# Patient Record
Sex: MALE | Race: WHITE | Employment: OTHER | URBAN - METROPOLITAN AREA
[De-identification: names, ages, dates, MRNs, and addresses within clinical notes are randomized per-mention and may not be internally consistent; named-entity substitution may affect disease eponyms.]

---

## 2017-02-15 PROBLEM — I65.23 BILATERAL CAROTID ARTERY STENOSIS: Status: ACTIVE | Noted: 2017-02-15

## 2017-05-09 ENCOUNTER — OFFICE VISIT (OUTPATIENT)
Dept: FAMILY MEDICINE CLINIC | Age: 67
End: 2017-05-09

## 2017-05-09 VITALS
HEART RATE: 60 BPM | RESPIRATION RATE: 16 BRPM | HEIGHT: 71 IN | TEMPERATURE: 97.9 F | DIASTOLIC BLOOD PRESSURE: 70 MMHG | SYSTOLIC BLOOD PRESSURE: 132 MMHG | BODY MASS INDEX: 34.8 KG/M2 | WEIGHT: 248.6 LBS

## 2017-05-09 DIAGNOSIS — F51.02 ADJUSTMENT INSOMNIA: Primary | ICD-10-CM

## 2017-05-09 PROCEDURE — 99213 OFFICE O/P EST LOW 20 MIN: CPT | Performed by: FAMILY MEDICINE

## 2017-05-09 RX ORDER — ZOLPIDEM TARTRATE 5 MG/1
5 TABLET ORAL NIGHTLY PRN
Qty: 30 TABLET | Refills: 0 | Status: SHIPPED | OUTPATIENT
Start: 2017-05-09 | End: 2018-08-22

## 2017-05-09 ASSESSMENT — ENCOUNTER SYMPTOMS
SORE THROAT: 0
CONSTIPATION: 0
NAUSEA: 0
ABDOMINAL PAIN: 0
SINUS PRESSURE: 0
COUGH: 0
RHINORRHEA: 0
ABDOMINAL DISTENTION: 0
DIARRHEA: 0
SHORTNESS OF BREATH: 0
EYE PAIN: 0

## 2017-11-27 RX ORDER — ATORVASTATIN CALCIUM 20 MG/1
TABLET, FILM COATED ORAL
Qty: 90 TABLET | Refills: 3 | Status: SHIPPED | OUTPATIENT
Start: 2017-11-27 | End: 2017-12-01 | Stop reason: SDUPTHER

## 2017-12-01 ENCOUNTER — TELEPHONE (OUTPATIENT)
Dept: CARDIOLOGY CLINIC | Age: 67
End: 2017-12-01

## 2017-12-02 RX ORDER — CLOPIDOGREL BISULFATE 75 MG/1
75 TABLET ORAL DAILY
Qty: 90 TABLET | Refills: 0 | Status: SHIPPED | OUTPATIENT
Start: 2017-12-02 | End: 2018-02-12 | Stop reason: SDUPTHER

## 2017-12-02 RX ORDER — METOPROLOL SUCCINATE 25 MG/1
TABLET, EXTENDED RELEASE ORAL
Qty: 90 TABLET | Refills: 0 | Status: SHIPPED | OUTPATIENT
Start: 2017-12-02 | End: 2018-02-12 | Stop reason: SDUPTHER

## 2017-12-02 RX ORDER — ATORVASTATIN CALCIUM 20 MG/1
TABLET, FILM COATED ORAL
Qty: 90 TABLET | Refills: 0 | Status: SHIPPED | OUTPATIENT
Start: 2017-12-02 | End: 2018-02-12 | Stop reason: SDUPTHER

## 2018-02-12 ENCOUNTER — OFFICE VISIT (OUTPATIENT)
Dept: CARDIOLOGY CLINIC | Age: 68
End: 2018-02-12
Payer: MEDICARE

## 2018-02-12 VITALS
HEART RATE: 90 BPM | DIASTOLIC BLOOD PRESSURE: 74 MMHG | BODY MASS INDEX: 33.93 KG/M2 | WEIGHT: 256 LBS | HEIGHT: 73 IN | SYSTOLIC BLOOD PRESSURE: 126 MMHG

## 2018-02-12 DIAGNOSIS — I25.810 CORONARY ARTERY DISEASE INVOLVING CORONARY BYPASS GRAFT OF NATIVE HEART WITHOUT ANGINA PECTORIS: ICD-10-CM

## 2018-02-12 DIAGNOSIS — I48.19 PERSISTENT ATRIAL FIBRILLATION (HCC): ICD-10-CM

## 2018-02-12 DIAGNOSIS — I10 ESSENTIAL HYPERTENSION: Primary | ICD-10-CM

## 2018-02-12 DIAGNOSIS — E78.01 FAMILIAL HYPERCHOLESTEROLEMIA: ICD-10-CM

## 2018-02-12 PROCEDURE — 1123F ACP DISCUSS/DSCN MKR DOCD: CPT | Performed by: NUCLEAR MEDICINE

## 2018-02-12 PROCEDURE — 93000 ELECTROCARDIOGRAM COMPLETE: CPT | Performed by: NUCLEAR MEDICINE

## 2018-02-12 PROCEDURE — G8484 FLU IMMUNIZE NO ADMIN: HCPCS | Performed by: NUCLEAR MEDICINE

## 2018-02-12 PROCEDURE — G8417 CALC BMI ABV UP PARAM F/U: HCPCS | Performed by: NUCLEAR MEDICINE

## 2018-02-12 PROCEDURE — 4040F PNEUMOC VAC/ADMIN/RCVD: CPT | Performed by: NUCLEAR MEDICINE

## 2018-02-12 PROCEDURE — 3017F COLORECTAL CA SCREEN DOC REV: CPT | Performed by: NUCLEAR MEDICINE

## 2018-02-12 PROCEDURE — G8427 DOCREV CUR MEDS BY ELIG CLIN: HCPCS | Performed by: NUCLEAR MEDICINE

## 2018-02-12 PROCEDURE — G8598 ASA/ANTIPLAT THER USED: HCPCS | Performed by: NUCLEAR MEDICINE

## 2018-02-12 PROCEDURE — 1036F TOBACCO NON-USER: CPT | Performed by: NUCLEAR MEDICINE

## 2018-02-12 PROCEDURE — 99214 OFFICE O/P EST MOD 30 MIN: CPT | Performed by: NUCLEAR MEDICINE

## 2018-02-12 RX ORDER — ATORVASTATIN CALCIUM 20 MG/1
TABLET, FILM COATED ORAL
Qty: 90 TABLET | Refills: 3 | Status: SHIPPED | OUTPATIENT
Start: 2018-02-12 | End: 2018-10-11 | Stop reason: SDUPTHER

## 2018-02-12 RX ORDER — METOPROLOL SUCCINATE 25 MG/1
TABLET, EXTENDED RELEASE ORAL
Qty: 90 TABLET | Refills: 3 | Status: SHIPPED | OUTPATIENT
Start: 2018-02-12 | End: 2018-10-11 | Stop reason: SDUPTHER

## 2018-02-12 RX ORDER — CLOPIDOGREL BISULFATE 75 MG/1
75 TABLET ORAL DAILY
Qty: 90 TABLET | Refills: 3 | Status: SHIPPED | OUTPATIENT
Start: 2018-02-12 | End: 2018-02-12

## 2018-02-21 ENCOUNTER — HOSPITAL ENCOUNTER (OUTPATIENT)
Dept: NON INVASIVE DIAGNOSTICS | Age: 68
Discharge: HOME | End: 2018-02-21
Payer: MEDICARE

## 2018-02-21 DIAGNOSIS — I25.810 CORONARY ARTERY DISEASE INVOLVING CORONARY BYPASS GRAFT OF NATIVE HEART WITHOUT ANGINA PECTORIS: ICD-10-CM

## 2018-02-21 DIAGNOSIS — I48.19 PERSISTENT ATRIAL FIBRILLATION (HCC): ICD-10-CM

## 2018-02-21 DIAGNOSIS — I10 ESSENTIAL HYPERTENSION: ICD-10-CM

## 2018-02-21 LAB
LV EF: 60 %
LVEF MODALITY: NORMAL

## 2018-02-21 PROCEDURE — 93306 TTE W/DOPPLER COMPLETE: CPT

## 2018-02-21 PROCEDURE — 93225 XTRNL ECG REC<48 HRS REC: CPT

## 2018-02-21 PROCEDURE — 93226 XTRNL ECG REC<48 HR SCAN A/R: CPT

## 2018-03-23 ENCOUNTER — OFFICE VISIT (OUTPATIENT)
Dept: CARDIOLOGY CLINIC | Age: 68
End: 2018-03-23
Payer: MEDICARE

## 2018-03-23 VITALS
HEIGHT: 73 IN | BODY MASS INDEX: 33.53 KG/M2 | DIASTOLIC BLOOD PRESSURE: 72 MMHG | SYSTOLIC BLOOD PRESSURE: 118 MMHG | WEIGHT: 253 LBS | HEART RATE: 88 BPM

## 2018-03-23 DIAGNOSIS — I73.9 PVD (PERIPHERAL VASCULAR DISEASE) (HCC): ICD-10-CM

## 2018-03-23 DIAGNOSIS — I10 ESSENTIAL HYPERTENSION: ICD-10-CM

## 2018-03-23 DIAGNOSIS — E78.01 FAMILIAL HYPERCHOLESTEROLEMIA: ICD-10-CM

## 2018-03-23 DIAGNOSIS — I25.810 CORONARY ARTERY DISEASE INVOLVING CORONARY BYPASS GRAFT OF NATIVE HEART WITHOUT ANGINA PECTORIS: Primary | ICD-10-CM

## 2018-03-23 DIAGNOSIS — I48.19 PERSISTENT ATRIAL FIBRILLATION (HCC): ICD-10-CM

## 2018-03-23 PROCEDURE — G8417 CALC BMI ABV UP PARAM F/U: HCPCS | Performed by: NUCLEAR MEDICINE

## 2018-03-23 PROCEDURE — 4040F PNEUMOC VAC/ADMIN/RCVD: CPT | Performed by: NUCLEAR MEDICINE

## 2018-03-23 PROCEDURE — G8484 FLU IMMUNIZE NO ADMIN: HCPCS | Performed by: NUCLEAR MEDICINE

## 2018-03-23 PROCEDURE — 99214 OFFICE O/P EST MOD 30 MIN: CPT | Performed by: NUCLEAR MEDICINE

## 2018-03-23 PROCEDURE — 1036F TOBACCO NON-USER: CPT | Performed by: NUCLEAR MEDICINE

## 2018-03-23 PROCEDURE — 3017F COLORECTAL CA SCREEN DOC REV: CPT | Performed by: NUCLEAR MEDICINE

## 2018-03-23 PROCEDURE — G8427 DOCREV CUR MEDS BY ELIG CLIN: HCPCS | Performed by: NUCLEAR MEDICINE

## 2018-03-23 PROCEDURE — G8598 ASA/ANTIPLAT THER USED: HCPCS | Performed by: NUCLEAR MEDICINE

## 2018-03-23 PROCEDURE — 1123F ACP DISCUSS/DSCN MKR DOCD: CPT | Performed by: NUCLEAR MEDICINE

## 2018-03-23 NOTE — PROGRESS NOTES
Pt here for 4 wk follow up echo and holter. Pt denies cp, dizziness/lightheadedness, palpitations, FLAVIA. Pt c/o SOB on exertion. Pt states he is having a hard time remembering things.
function was normal. EF was estimated in the range of 55-65%. There were no regional wall motion abnormalities. Wall thickness was normal. Doppler - LV diastolic function parameters were normal. Mild MR. The aortic valve was trileaflet. Leaflets exhibited mildly increased thickness, mild calcification, normal cuspal separation, and sclerosis. Mild TR. Family History   Problem Relation Age of Onset    Heart Disease Mother     Diabetes Mother     Heart Disease Father      Social History   Substance Use Topics    Smoking status: Never Smoker    Smokeless tobacco: Former User     Types: Chew     Quit date: 1/1/2000    Alcohol use No      Current Outpatient Prescriptions   Medication Sig Dispense Refill    atorvastatin (LIPITOR) 20 MG tablet TAKE 1 TABLET BY MOUTH DAILY 90 tablet 3    metoprolol succinate (TOPROL XL) 25 MG extended release tablet TAKE 1 TABLET BY MOUTH EVERY DAY 90 tablet 3    apixaban (ELIQUIS) 5 MG TABS tablet Take 1 tablet by mouth 2 times daily 60 tablet 11    aspirin 81 MG tablet Take 81 mg by mouth daily.  ferrous sulfate 325 (65 FE) MG tablet Take 1 tablet by mouth daily (with breakfast). 30 tablet 11    Krill Oil 500 MG CAPS Take  by mouth daily.  Coenzyme Q10 (CO Q 10 PO) Take  by mouth daily.  zolpidem (AMBIEN) 5 MG tablet Take 1 tablet by mouth nightly as needed for Sleep 30 tablet 0     No current facility-administered medications for this visit.       No Known Allergies  Health Maintenance   Topic Date Due    Hepatitis C screen  1950    HIV screen  08/03/1967    DTaP/Tdap/Td vaccine (1 - Tdap) 08/03/1971    Diabetes screen  08/03/1992    Shingles Vaccine (1 of 2 - 2 Dose Series) 08/03/2002    Colon cancer screen colonoscopy  08/03/2002    Pneumococcal low/med risk (1 of 2 - PCV13) 08/03/2017    Flu vaccine (1) 09/01/2017    Lipid screen  04/01/2020       Subjective:  Review of Systems  General:   No fever, no chills, some fatigue or weight

## 2018-08-22 ENCOUNTER — OFFICE VISIT (OUTPATIENT)
Dept: FAMILY MEDICINE CLINIC | Age: 68
End: 2018-08-22
Payer: MEDICARE

## 2018-08-22 VITALS
WEIGHT: 253 LBS | RESPIRATION RATE: 16 BRPM | HEART RATE: 68 BPM | TEMPERATURE: 97.9 F | BODY MASS INDEX: 33.38 KG/M2 | SYSTOLIC BLOOD PRESSURE: 139 MMHG | DIASTOLIC BLOOD PRESSURE: 74 MMHG

## 2018-08-22 DIAGNOSIS — R73.9 HYPERGLYCEMIA: ICD-10-CM

## 2018-08-22 DIAGNOSIS — Z11.59 NEED FOR HEPATITIS C SCREENING TEST: ICD-10-CM

## 2018-08-22 DIAGNOSIS — I48.91 ATRIAL FIBRILLATION, UNSPECIFIED TYPE (HCC): ICD-10-CM

## 2018-08-22 DIAGNOSIS — Z12.5 SCREENING PSA (PROSTATE SPECIFIC ANTIGEN): ICD-10-CM

## 2018-08-22 DIAGNOSIS — J40 BRONCHITIS: Primary | ICD-10-CM

## 2018-08-22 DIAGNOSIS — E78.5 HYPERLIPIDEMIA, UNSPECIFIED HYPERLIPIDEMIA TYPE: Chronic | ICD-10-CM

## 2018-08-22 DIAGNOSIS — I10 ESSENTIAL HYPERTENSION: ICD-10-CM

## 2018-08-22 PROCEDURE — G8417 CALC BMI ABV UP PARAM F/U: HCPCS | Performed by: FAMILY MEDICINE

## 2018-08-22 PROCEDURE — 4040F PNEUMOC VAC/ADMIN/RCVD: CPT | Performed by: FAMILY MEDICINE

## 2018-08-22 PROCEDURE — 1123F ACP DISCUSS/DSCN MKR DOCD: CPT | Performed by: FAMILY MEDICINE

## 2018-08-22 PROCEDURE — 96372 THER/PROPH/DIAG INJ SC/IM: CPT | Performed by: FAMILY MEDICINE

## 2018-08-22 PROCEDURE — 1101F PT FALLS ASSESS-DOCD LE1/YR: CPT | Performed by: FAMILY MEDICINE

## 2018-08-22 PROCEDURE — 3017F COLORECTAL CA SCREEN DOC REV: CPT | Performed by: FAMILY MEDICINE

## 2018-08-22 PROCEDURE — G8427 DOCREV CUR MEDS BY ELIG CLIN: HCPCS | Performed by: FAMILY MEDICINE

## 2018-08-22 PROCEDURE — 99213 OFFICE O/P EST LOW 20 MIN: CPT | Performed by: FAMILY MEDICINE

## 2018-08-22 PROCEDURE — 1036F TOBACCO NON-USER: CPT | Performed by: FAMILY MEDICINE

## 2018-08-22 PROCEDURE — G8598 ASA/ANTIPLAT THER USED: HCPCS | Performed by: FAMILY MEDICINE

## 2018-08-22 RX ORDER — ALBUTEROL SULFATE 90 UG/1
2 AEROSOL, METERED RESPIRATORY (INHALATION) EVERY 6 HOURS PRN
Qty: 1 INHALER | Refills: 3 | Status: SHIPPED | OUTPATIENT
Start: 2018-08-22 | End: 2018-12-05 | Stop reason: ALTCHOICE

## 2018-08-22 RX ORDER — CEFUROXIME AXETIL 250 MG/1
250 TABLET ORAL 2 TIMES DAILY
Qty: 20 TABLET | Refills: 0 | Status: SHIPPED | OUTPATIENT
Start: 2018-08-22 | End: 2018-09-01

## 2018-08-22 RX ORDER — METHYLPREDNISOLONE ACETATE 80 MG/ML
160 INJECTION, SUSPENSION INTRA-ARTICULAR; INTRALESIONAL; INTRAMUSCULAR; SOFT TISSUE ONCE
Status: COMPLETED | OUTPATIENT
Start: 2018-08-22 | End: 2018-08-22

## 2018-08-22 RX ADMIN — METHYLPREDNISOLONE ACETATE 160 MG: 80 INJECTION, SUSPENSION INTRA-ARTICULAR; INTRALESIONAL; INTRAMUSCULAR; SOFT TISSUE at 08:46

## 2018-08-22 ASSESSMENT — ENCOUNTER SYMPTOMS
NAUSEA: 0
ABDOMINAL DISTENTION: 0
RHINORRHEA: 0
SORE THROAT: 0
SHORTNESS OF BREATH: 0
DIARRHEA: 0
SINUS PRESSURE: 0
EYE PAIN: 0
ABDOMINAL PAIN: 0
WHEEZING: 1
COUGH: 1
CHEST TIGHTNESS: 1
CONSTIPATION: 0

## 2018-08-22 ASSESSMENT — PATIENT HEALTH QUESTIONNAIRE - PHQ9
2. FEELING DOWN, DEPRESSED OR HOPELESS: 0
SUM OF ALL RESPONSES TO PHQ QUESTIONS 1-9: 0
1. LITTLE INTEREST OR PLEASURE IN DOING THINGS: 0
SUM OF ALL RESPONSES TO PHQ QUESTIONS 1-9: 0
SUM OF ALL RESPONSES TO PHQ9 QUESTIONS 1 & 2: 0

## 2018-08-22 NOTE — PROGRESS NOTES
[address] [address]  Dept: [phone]  Dept Fax: [phone]  Loc: [phone]  PROGRESS NOTE      Visit Date: 8/22/2018    Kwasi Mora is a 79 y.o. male who presents today for:  Chief Complaint   Patient presents with    Cough     white to greenish yellow phlem x1wk, chest pain         Subjective:  HPI  Patient comes in with one-week history of cough bringing up greenish and white phlegm hears himself wheezing at times some soreness in his chest when he is coughing. Felt feverish couple days ago but no longer having that    Review of Systems   Constitutional: Negative for appetite change and fever. HENT: Positive for congestion. Negative for ear pain, postnasal drip, rhinorrhea, sinus pressure and sore throat. Eyes: Negative for pain and visual disturbance. Respiratory: Positive for cough, chest tightness and wheezing. Negative for shortness of breath. Cardiovascular: Negative for chest pain. Gastrointestinal: Negative for abdominal distention, abdominal pain, constipation, diarrhea and nausea. Genitourinary: Negative for dysuria, frequency and urgency. Musculoskeletal: Negative for arthralgias. Skin: Negative for rash. Neurological: Negative for dizziness. Past Medical History:   Diagnosis Date    BPH (benign prostatic hyperplasia)     CVA (cerebral vascular accident) ([facility] [address] 200    FH: heart disease     Hyperlipidemia     Hypertension     Obesity       Past Surgical History:   Procedure Laterality Date    CARDIAC VALVE REPLACEMENT  April 2011    [place], [place]    CARDIOVASCULAR STRESS TEST  1 05 2011    Cannot exclude slight inferobasal lateral stress-induced ischemia in a relatively small-sized area. EF 68%. Mildly abnormal nuclear scan. Lexiscan test associated with nonspecific symptoms. EKG nondiagnostic with nonspecific ST-T wave changes.      CAROTID ENDARTERECTOMY  2 08  aspirin 81 MG tablet Take 81 mg by mouth daily.  ferrous sulfate 325 (65 FE) MG tablet Take 1 tablet by mouth daily (with breakfast). 30 tablet 11    Krill Oil 500 MG CAPS Take  by mouth daily.  Coenzyme Q10 (CO Q 10 PO) Take  by mouth daily. No current facility-administered medications for this visit. No Known Allergies  Health Maintenance   Topic Date Due    Hepatitis C screen  1950    DTaP/Tdap/Td vaccine (1 - Tdap) 08/03/1971    Diabetes screen  08/03/1992    Shingles Vaccine (1 of 2 - 2 Dose Series) 08/03/2002    Colon cancer screen colonoscopy  08/03/2002    Pneumococcal low/med risk (1 of 2 - PCV13) 08/03/2017    Flu vaccine (1) 09/01/2018    Lipid screen  04/01/2020         Objective:     Physical Exam   Constitutional: He is oriented to person, place, and time. He appears well-developed and well-nourished. No distress. HENT:   Head: Normocephalic and atraumatic. Right Ear: External ear normal.   Left Ear: External ear normal.   Eyes: Conjunctivae are normal.   Neck: No JVD present. Cardiovascular: Normal rate, regular rhythm and normal heart sounds. Pulmonary/Chest: Effort normal. He has wheezes. He has no rales. Bilateral rhonchi   Musculoskeletal: He exhibits no edema or tenderness. Neurological: He is alert and oriented to person, place, and time. Skin: Skin is warm and dry. He is not diaphoretic. No pallor. /74 (Site: Left Arm)   Pulse 68   Temp 97.9 °F (36.6 °C) (Oral)   Resp 16   Wt 253 lb (114.8 kg)   BMI 33.38 kg/m²       Impression/Plan:  1. Bronchitis    2. Essential hypertension    3. Hyperlipidemia, unspecified hyperlipidemia type    4. Atrial fibrillation, unspecified type ([address]    5. Need for hepatitis C screening test    6. Screening PSA (prostate specific antigen)    7.  Hyperglycemia      Requested Prescriptions     Signed Prescriptions Disp Refills    albuterol sulfate HFA (PROAIR HFA) 108 (90 Base) MCG/ACT inhaler 1

## 2018-09-06 ENCOUNTER — HOSPITAL ENCOUNTER (OUTPATIENT)
Age: 68
Discharge: HOME | End: 2018-09-06
Payer: MEDICARE

## 2018-09-06 DIAGNOSIS — I48.91 ATRIAL FIBRILLATION, UNSPECIFIED TYPE (HCC): ICD-10-CM

## 2018-09-06 DIAGNOSIS — Z11.59 NEED FOR HEPATITIS C SCREENING TEST: ICD-10-CM

## 2018-09-06 DIAGNOSIS — E78.5 HYPERLIPIDEMIA, UNSPECIFIED HYPERLIPIDEMIA TYPE: Chronic | ICD-10-CM

## 2018-09-06 DIAGNOSIS — R73.9 HYPERGLYCEMIA: ICD-10-CM

## 2018-09-06 DIAGNOSIS — Z12.5 SCREENING PSA (PROSTATE SPECIFIC ANTIGEN): ICD-10-CM

## 2018-09-06 LAB
ALBUMIN SERPL-MCNC: 4.4 G/DL (ref 3.5–5.1)
ALP BLD-CCNC: 54 U/L (ref 38–126)
ALT SERPL-CCNC: 25 U/L (ref 11–66)
ANION GAP SERPL CALCULATED.3IONS-SCNC: 11 MEQ/L (ref 8–16)
AST SERPL-CCNC: 19 U/L (ref 5–40)
AVERAGE GLUCOSE: 129 MG/DL (ref 70–126)
BASOPHILS # BLD: 0.6 %
BASOPHILS ABSOLUTE: 0.1 THOU/MM3 (ref 0–0.1)
BILIRUB SERPL-MCNC: 0.9 MG/DL (ref 0.3–1.2)
BUN BLDV-MCNC: 15 MG/DL (ref 7–22)
CALCIUM SERPL-MCNC: 9.4 MG/DL (ref 8.5–10.5)
CHLORIDE BLD-SCNC: 103 MEQ/L (ref 98–111)
CHOLESTEROL, TOTAL: 143 MG/DL (ref 100–199)
CO2: 29 MEQ/L (ref 23–33)
CREAT SERPL-MCNC: 0.8 MG/DL (ref 0.4–1.2)
EOSINOPHIL # BLD: 1.5 %
EOSINOPHILS ABSOLUTE: 0.2 THOU/MM3 (ref 0–0.4)
ERYTHROCYTE [DISTWIDTH] IN BLOOD BY AUTOMATED COUNT: 11.6 % (ref 11.5–14.5)
ERYTHROCYTE [DISTWIDTH] IN BLOOD BY AUTOMATED COUNT: 42 FL (ref 35–45)
GFR SERPL CREATININE-BSD FRML MDRD: > 90 ML/MIN/1.73M2
GLUCOSE BLD-MCNC: 115 MG/DL (ref 70–108)
HBA1C MFR BLD: 6.3 % (ref 4.4–6.4)
HCT VFR BLD CALC: 50.6 % (ref 42–52)
HDLC SERPL-MCNC: 54 MG/DL
HEMOGLOBIN: 17 GM/DL (ref 14–18)
HEPATITIS C ANTIBODY: NEGATIVE
IMMATURE GRANS (ABS): 0.11 THOU/MM3 (ref 0–0.07)
IMMATURE GRANULOCYTES: 1 %
LDL CHOLESTEROL CALCULATED: 75 MG/DL
LYMPHOCYTES # BLD: 15.1 %
LYMPHOCYTES ABSOLUTE: 1.7 THOU/MM3 (ref 1–4.8)
MCH RBC QN AUTO: 32.9 PG (ref 26–33)
MCHC RBC AUTO-ENTMCNC: 33.6 GM/DL (ref 32.2–35.5)
MCV RBC AUTO: 98.1 FL (ref 80–94)
MONOCYTES # BLD: 7.6 %
MONOCYTES ABSOLUTE: 0.8 THOU/MM3 (ref 0.4–1.3)
NUCLEATED RED BLOOD CELLS: 0 /100 WBC
PLATELET # BLD: 222 THOU/MM3 (ref 130–400)
PMV BLD AUTO: 8.8 FL (ref 9.4–12.4)
POTASSIUM SERPL-SCNC: 4.5 MEQ/L (ref 3.5–5.2)
PROSTATE SPECIFIC ANTIGEN: 8.28 NG/ML (ref 0–1)
RBC # BLD: 5.16 MILL/MM3 (ref 4.7–6.1)
SEG NEUTROPHILS: 74.2 %
SEGMENTED NEUTROPHILS ABSOLUTE COUNT: 8.2 THOU/MM3 (ref 1.8–7.7)
SODIUM BLD-SCNC: 143 MEQ/L (ref 135–145)
TOTAL PROTEIN: 7.3 G/DL (ref 6.1–8)
TRIGL SERPL-MCNC: 68 MG/DL (ref 0–199)
WBC # BLD: 11 THOU/MM3 (ref 4.8–10.8)

## 2018-09-06 PROCEDURE — G0103 PSA SCREENING: HCPCS

## 2018-09-06 PROCEDURE — 80061 LIPID PANEL: CPT

## 2018-09-06 PROCEDURE — 85025 COMPLETE CBC W/AUTO DIFF WBC: CPT

## 2018-09-06 PROCEDURE — 36415 COLL VENOUS BLD VENIPUNCTURE: CPT

## 2018-09-06 PROCEDURE — 86803 HEPATITIS C AB TEST: CPT

## 2018-09-06 PROCEDURE — 83036 HEMOGLOBIN GLYCOSYLATED A1C: CPT

## 2018-09-06 PROCEDURE — 80053 COMPREHEN METABOLIC PANEL: CPT

## 2018-10-03 ENCOUNTER — TELEPHONE (OUTPATIENT)
Dept: FAMILY MEDICINE CLINIC | Age: 68
End: 2018-10-03

## 2018-10-03 DIAGNOSIS — R97.20 ELEVATED PSA: Primary | ICD-10-CM

## 2018-10-11 ENCOUNTER — OFFICE VISIT (OUTPATIENT)
Dept: CARDIOLOGY CLINIC | Age: 68
End: 2018-10-11
Payer: MEDICARE

## 2018-10-11 VITALS
BODY MASS INDEX: 34.54 KG/M2 | SYSTOLIC BLOOD PRESSURE: 136 MMHG | HEIGHT: 72 IN | HEART RATE: 92 BPM | WEIGHT: 255 LBS | DIASTOLIC BLOOD PRESSURE: 84 MMHG

## 2018-10-11 DIAGNOSIS — I73.9 PVD (PERIPHERAL VASCULAR DISEASE) (HCC): ICD-10-CM

## 2018-10-11 DIAGNOSIS — I10 ESSENTIAL HYPERTENSION: ICD-10-CM

## 2018-10-11 DIAGNOSIS — I25.810 CORONARY ARTERY DISEASE INVOLVING CORONARY BYPASS GRAFT OF NATIVE HEART WITHOUT ANGINA PECTORIS: Primary | ICD-10-CM

## 2018-10-11 PROCEDURE — 1123F ACP DISCUSS/DSCN MKR DOCD: CPT | Performed by: NUCLEAR MEDICINE

## 2018-10-11 PROCEDURE — G8484 FLU IMMUNIZE NO ADMIN: HCPCS | Performed by: NUCLEAR MEDICINE

## 2018-10-11 PROCEDURE — 3017F COLORECTAL CA SCREEN DOC REV: CPT | Performed by: NUCLEAR MEDICINE

## 2018-10-11 PROCEDURE — G8598 ASA/ANTIPLAT THER USED: HCPCS | Performed by: NUCLEAR MEDICINE

## 2018-10-11 PROCEDURE — G8427 DOCREV CUR MEDS BY ELIG CLIN: HCPCS | Performed by: NUCLEAR MEDICINE

## 2018-10-11 PROCEDURE — 1036F TOBACCO NON-USER: CPT | Performed by: NUCLEAR MEDICINE

## 2018-10-11 PROCEDURE — 99213 OFFICE O/P EST LOW 20 MIN: CPT | Performed by: NUCLEAR MEDICINE

## 2018-10-11 PROCEDURE — 4040F PNEUMOC VAC/ADMIN/RCVD: CPT | Performed by: NUCLEAR MEDICINE

## 2018-10-11 PROCEDURE — G8417 CALC BMI ABV UP PARAM F/U: HCPCS | Performed by: NUCLEAR MEDICINE

## 2018-10-11 PROCEDURE — 1101F PT FALLS ASSESS-DOCD LE1/YR: CPT | Performed by: NUCLEAR MEDICINE

## 2018-10-11 RX ORDER — METOPROLOL SUCCINATE 50 MG/1
TABLET, EXTENDED RELEASE ORAL
Qty: 90 TABLET | Refills: 3 | Status: SHIPPED | OUTPATIENT
Start: 2018-10-11 | End: 2019-09-29 | Stop reason: SDUPTHER

## 2018-10-11 RX ORDER — ATORVASTATIN CALCIUM 20 MG/1
TABLET, FILM COATED ORAL
Qty: 90 TABLET | Refills: 3 | Status: SHIPPED | OUTPATIENT
Start: 2018-10-11 | End: 2019-09-14 | Stop reason: SDUPTHER

## 2018-10-24 ENCOUNTER — OFFICE VISIT (OUTPATIENT)
Dept: UROLOGY | Age: 68
End: 2018-10-24
Payer: MEDICARE

## 2018-10-24 ENCOUNTER — TELEPHONE (OUTPATIENT)
Dept: UROLOGY | Age: 68
End: 2018-10-24

## 2018-10-24 VITALS
BODY MASS INDEX: 34.95 KG/M2 | DIASTOLIC BLOOD PRESSURE: 70 MMHG | WEIGHT: 258 LBS | HEIGHT: 72 IN | SYSTOLIC BLOOD PRESSURE: 114 MMHG

## 2018-10-24 DIAGNOSIS — R97.20 ELEVATED PSA: Primary | ICD-10-CM

## 2018-10-24 PROCEDURE — G8484 FLU IMMUNIZE NO ADMIN: HCPCS | Performed by: NURSE PRACTITIONER

## 2018-10-24 PROCEDURE — G8598 ASA/ANTIPLAT THER USED: HCPCS | Performed by: NURSE PRACTITIONER

## 2018-10-24 PROCEDURE — 99203 OFFICE O/P NEW LOW 30 MIN: CPT | Performed by: NURSE PRACTITIONER

## 2018-10-24 PROCEDURE — 4040F PNEUMOC VAC/ADMIN/RCVD: CPT | Performed by: NURSE PRACTITIONER

## 2018-10-24 PROCEDURE — G8417 CALC BMI ABV UP PARAM F/U: HCPCS | Performed by: NURSE PRACTITIONER

## 2018-10-24 PROCEDURE — 3017F COLORECTAL CA SCREEN DOC REV: CPT | Performed by: NURSE PRACTITIONER

## 2018-10-24 PROCEDURE — 1036F TOBACCO NON-USER: CPT | Performed by: NURSE PRACTITIONER

## 2018-10-24 PROCEDURE — G8427 DOCREV CUR MEDS BY ELIG CLIN: HCPCS | Performed by: NURSE PRACTITIONER

## 2018-10-24 PROCEDURE — 1101F PT FALLS ASSESS-DOCD LE1/YR: CPT | Performed by: NURSE PRACTITIONER

## 2018-10-24 PROCEDURE — 1123F ACP DISCUSS/DSCN MKR DOCD: CPT | Performed by: NURSE PRACTITIONER

## 2018-10-24 RX ORDER — GENTAMICIN SULFATE 40 MG/ML
80 INJECTION, SOLUTION INTRAMUSCULAR; INTRAVENOUS ONCE
Qty: 2 ML | Refills: 0 | Status: SHIPPED | OUTPATIENT
Start: 2018-10-24 | End: 2018-10-24

## 2018-10-24 RX ORDER — LORAZEPAM 1 MG/1
1 TABLET ORAL ONCE
Qty: 1 TABLET | Refills: 0 | Status: SHIPPED | OUTPATIENT
Start: 2018-10-24 | End: 2018-10-24

## 2018-10-24 RX ORDER — CIPROFLOXACIN 500 MG/1
500 TABLET, FILM COATED ORAL 2 TIMES DAILY
Qty: 6 TABLET | Refills: 0 | Status: SHIPPED | OUTPATIENT
Start: 2018-10-24 | End: 2018-10-27

## 2018-10-31 ENCOUNTER — TELEPHONE (OUTPATIENT)
Dept: CARDIOLOGY CLINIC | Age: 68
End: 2018-10-31

## 2018-11-19 ENCOUNTER — PROCEDURE VISIT (OUTPATIENT)
Dept: UROLOGY | Age: 68
End: 2018-11-19
Payer: MEDICARE

## 2018-11-19 VITALS
WEIGHT: 259 LBS | HEIGHT: 72 IN | SYSTOLIC BLOOD PRESSURE: 134 MMHG | BODY MASS INDEX: 35.08 KG/M2 | DIASTOLIC BLOOD PRESSURE: 80 MMHG

## 2018-11-19 DIAGNOSIS — R97.20 ELEVATED PSA: Primary | ICD-10-CM

## 2018-11-19 PROCEDURE — 55700 PR BIOPSY OF PROSTATE,NEEDLE/PUNCH: CPT | Performed by: UROLOGY

## 2018-11-19 PROCEDURE — 96372 THER/PROPH/DIAG INJ SC/IM: CPT | Performed by: UROLOGY

## 2018-11-19 PROCEDURE — 76872 US TRANSRECTAL: CPT | Performed by: UROLOGY

## 2018-11-19 PROCEDURE — 99999 PR SONO GUIDE NEEDLE BIOPSY: CPT | Performed by: UROLOGY

## 2018-11-19 PROCEDURE — 99999 PR OFFICE/OUTPT VISIT,PROCEDURE ONLY: CPT | Performed by: UROLOGY

## 2018-11-19 RX ORDER — TAMSULOSIN HYDROCHLORIDE 0.4 MG/1
0.4 CAPSULE ORAL NIGHTLY
Qty: 90 CAPSULE | Refills: 3 | Status: SHIPPED | OUTPATIENT
Start: 2018-11-19 | End: 2019-05-15

## 2018-11-19 RX ORDER — GENTAMICIN SULFATE 40 MG/ML
80 INJECTION, SOLUTION INTRAMUSCULAR; INTRAVENOUS ONCE
Status: COMPLETED | OUTPATIENT
Start: 2018-11-19 | End: 2018-11-19

## 2018-11-19 RX ADMIN — GENTAMICIN SULFATE 80 MG: 40 INJECTION, SOLUTION INTRAMUSCULAR; INTRAVENOUS at 09:38

## 2018-12-05 ENCOUNTER — OFFICE VISIT (OUTPATIENT)
Dept: UROLOGY | Age: 68
End: 2018-12-05
Payer: MEDICARE

## 2018-12-05 ENCOUNTER — OFFICE VISIT (OUTPATIENT)
Dept: FAMILY MEDICINE CLINIC | Age: 68
End: 2018-12-05
Payer: MEDICARE

## 2018-12-05 VITALS
HEART RATE: 84 BPM | BODY MASS INDEX: 35.7 KG/M2 | DIASTOLIC BLOOD PRESSURE: 78 MMHG | WEIGHT: 263.6 LBS | SYSTOLIC BLOOD PRESSURE: 130 MMHG | RESPIRATION RATE: 16 BRPM | TEMPERATURE: 98.8 F | HEIGHT: 72 IN

## 2018-12-05 DIAGNOSIS — C61 PROSTATE CA (HCC): Primary | ICD-10-CM

## 2018-12-05 DIAGNOSIS — H02.9 EYELID LESION: ICD-10-CM

## 2018-12-05 DIAGNOSIS — H02.403 PTOSIS OF EYELID, BILATERAL: ICD-10-CM

## 2018-12-05 DIAGNOSIS — H10.33 ACUTE BACTERIAL CONJUNCTIVITIS OF BOTH EYES: Primary | ICD-10-CM

## 2018-12-05 DIAGNOSIS — H01.00A BLEPHARITIS OF UPPER AND LOWER EYELIDS OF BOTH EYES, UNSPECIFIED TYPE: ICD-10-CM

## 2018-12-05 DIAGNOSIS — H01.00B BLEPHARITIS OF UPPER AND LOWER EYELIDS OF BOTH EYES, UNSPECIFIED TYPE: ICD-10-CM

## 2018-12-05 PROCEDURE — G8427 DOCREV CUR MEDS BY ELIG CLIN: HCPCS | Performed by: NURSE PRACTITIONER

## 2018-12-05 PROCEDURE — 1123F ACP DISCUSS/DSCN MKR DOCD: CPT | Performed by: NURSE PRACTITIONER

## 2018-12-05 PROCEDURE — G8598 ASA/ANTIPLAT THER USED: HCPCS | Performed by: NURSE PRACTITIONER

## 2018-12-05 PROCEDURE — 4040F PNEUMOC VAC/ADMIN/RCVD: CPT | Performed by: NURSE PRACTITIONER

## 2018-12-05 PROCEDURE — 3017F COLORECTAL CA SCREEN DOC REV: CPT | Performed by: NURSE PRACTITIONER

## 2018-12-05 PROCEDURE — G8484 FLU IMMUNIZE NO ADMIN: HCPCS | Performed by: NURSE PRACTITIONER

## 2018-12-05 PROCEDURE — 1101F PT FALLS ASSESS-DOCD LE1/YR: CPT | Performed by: NURSE PRACTITIONER

## 2018-12-05 PROCEDURE — 99213 OFFICE O/P EST LOW 20 MIN: CPT | Performed by: NURSE PRACTITIONER

## 2018-12-05 PROCEDURE — G8417 CALC BMI ABV UP PARAM F/U: HCPCS | Performed by: NURSE PRACTITIONER

## 2018-12-05 PROCEDURE — 99215 OFFICE O/P EST HI 40 MIN: CPT | Performed by: NURSE PRACTITIONER

## 2018-12-05 PROCEDURE — 1036F TOBACCO NON-USER: CPT | Performed by: NURSE PRACTITIONER

## 2018-12-05 RX ORDER — AZITHROMYCIN 250 MG/1
TABLET, FILM COATED ORAL
Qty: 6 TABLET | Refills: 0 | Status: SHIPPED | OUTPATIENT
Start: 2018-12-05 | End: 2018-12-15

## 2018-12-05 RX ORDER — PREDNISONE 1 MG/1
5 TABLET ORAL 2 TIMES DAILY
Qty: 14 TABLET | Refills: 0 | Status: SHIPPED | OUTPATIENT
Start: 2018-12-05 | End: 2018-12-12

## 2018-12-05 NOTE — PROGRESS NOTES
[address] [place] [address] Rd.  [address] [facility] [address]  Dept: [phone]  Dept Fax: [phone] : [phone]      Visit Date: 12/5/2018    HPI:     Carolina Dunn presents for follow-up of elevated PSA. He underwent TRUS biopsy on 11/19/18 for PSA of 8.28. He presents today to discuss the results. The patient reports that he has a history of elevated PSA and think she had a prostate biopsy \"about 20 years ago. \" He does not remember the location or physician. He denies any family history of prostate cancer. He does report some urinary symptoms including nocturia twice nightly, urinary hesitancy, and weak stream. These do not bother him. He does not take any Urological medications. Past Medical History:   Diagnosis Date    BPH (benign prostatic hyperplasia)     CVA (cerebral vascular accident) ([facility] [address] 200    FH: heart disease     Hyperlipidemia     Hypertension     Obesity       Past Surgical History:   Procedure Laterality Date    CARDIAC VALVE REPLACEMENT  April 2011    [place], [place]    CARDIOVASCULAR STRESS TEST  1 05 2011    Cannot exclude slight inferobasal lateral stress-induced ischemia in a relatively small-sized area. EF 68%. Mildly abnormal nuclear scan. Lexiscan test associated with nonspecific symptoms. EKG nondiagnostic with nonspecific ST-T wave changes.  CAROTID ENDARTERECTOMY  2 08 2011    Right carotid endarterectomy with patch angioplasty with convention patch angioplasty.  CORONARY ARTERY BYPASS GRAFT      DIAGNOSTIC CARDIAC CATH LAB PROCEDURE  3 24 2011    LV end-diastolic pressure was 12 mmHg with no change before and after contrast injection. There was no significant gradient across the aortic valve to signify aortic stenosis. LV function was within normal limits, EF 55%. 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  Nonobstructive diffuse disease in a small sized

## 2018-12-06 ENCOUNTER — TELEPHONE (OUTPATIENT)
Dept: FAMILY MEDICINE CLINIC | Age: 68
End: 2018-12-06

## 2018-12-06 ENCOUNTER — TELEPHONE (OUTPATIENT)
Dept: UROLOGY | Age: 68
End: 2018-12-06

## 2018-12-06 NOTE — TELEPHONE ENCOUNTER
I have the patient scheduled to see Benita Laguna on Thursday 02/21/2019 @ 10:15am for Eyelid Lesions. I left a message for the patient to call the office back in regards to appointment. Notified of phone hours.

## 2018-12-14 ENCOUNTER — TELEPHONE (OUTPATIENT)
Dept: UROLOGY | Age: 68
End: 2018-12-14

## 2018-12-14 DIAGNOSIS — C61 PROSTATE CA (HCC): Primary | ICD-10-CM

## 2018-12-14 NOTE — TELEPHONE ENCOUNTER
Received results of Onc[employer] Dx testing. The patient's score was GPS 55, which is unfavorable intermediate risk. The report is not scanned in due to it needing to be amended to correct the last name on the report. Spoke with , Trinity from [employer], she stated that they make it a high priority case and get the amended report faxed over. Just wanted to make Patty Halsted aware of these results. The amended report will be scanned in when it is received.

## 2018-12-19 DIAGNOSIS — C61 PROSTATE CANCER (HCC): Primary | ICD-10-CM

## 2018-12-26 ASSESSMENT — ENCOUNTER SYMPTOMS
EYE PAIN: 0
EYE DISCHARGE: 1
GASTROINTESTINAL NEGATIVE: 1
RESPIRATORY NEGATIVE: 1
EYE ITCHING: 1
EYE REDNESS: 1

## 2019-01-03 ENCOUNTER — HOSPITAL ENCOUNTER (OUTPATIENT)
Dept: MRI IMAGING | Age: 69
Discharge: HOME | End: 2019-01-03
Payer: MEDICARE

## 2019-01-03 DIAGNOSIS — C61 PROSTATE CA (HCC): ICD-10-CM

## 2019-01-03 LAB — POC CREATININE WHOLE BLOOD: 1.1 MG/DL (ref 0.5–1.2)

## 2019-01-03 PROCEDURE — 6360000004 HC RX CONTRAST MEDICATION: Performed by: NURSE PRACTITIONER

## 2019-01-03 PROCEDURE — 76377 3D RENDER W/INTRP POSTPROCES: CPT

## 2019-01-03 PROCEDURE — A9579 GAD-BASE MR CONTRAST NOS,1ML: HCPCS | Performed by: NURSE PRACTITIONER

## 2019-01-03 PROCEDURE — 82565 ASSAY OF CREATININE: CPT

## 2019-01-03 RX ADMIN — GADOTERIDOL 20 ML: 279.3 INJECTION, SOLUTION INTRAVENOUS at 20:09

## 2019-01-04 ENCOUNTER — TELEPHONE (OUTPATIENT)
Dept: UROLOGY | Age: 69
End: 2019-01-04

## 2019-01-04 RX ORDER — GENTAMICIN SULFATE 40 MG/ML
80 INJECTION, SOLUTION INTRAMUSCULAR; INTRAVENOUS ONCE
Qty: 2 ML | Refills: 0 | Status: SHIPPED | OUTPATIENT
Start: 2019-01-04 | End: 2019-01-04

## 2019-01-04 RX ORDER — CIPROFLOXACIN 500 MG/1
500 TABLET, FILM COATED ORAL 2 TIMES DAILY
Qty: 6 TABLET | Refills: 0 | Status: SHIPPED | OUTPATIENT
Start: 2019-01-04 | End: 2019-01-07

## 2019-01-16 ENCOUNTER — TELEPHONE (OUTPATIENT)
Dept: UROLOGY | Age: 69
End: 2019-01-16

## 2019-01-16 RX ORDER — GENTAMICIN SULFATE 40 MG/ML
80 INJECTION, SOLUTION INTRAMUSCULAR; INTRAVENOUS ONCE
Qty: 2 ML | Refills: 0 | Status: SHIPPED | OUTPATIENT
Start: 2019-01-16 | End: 2019-01-16

## 2019-01-16 RX ORDER — CIPROFLOXACIN 500 MG/1
500 TABLET, FILM COATED ORAL 2 TIMES DAILY
Qty: 6 TABLET | Refills: 0 | Status: SHIPPED | OUTPATIENT
Start: 2019-01-16 | End: 2019-01-17 | Stop reason: ALTCHOICE

## 2019-01-17 ENCOUNTER — OFFICE VISIT (OUTPATIENT)
Dept: FAMILY MEDICINE CLINIC | Age: 69
End: 2019-01-17
Payer: MEDICARE

## 2019-01-17 VITALS
RESPIRATION RATE: 16 BRPM | HEART RATE: 76 BPM | WEIGHT: 268.2 LBS | BODY MASS INDEX: 36.33 KG/M2 | DIASTOLIC BLOOD PRESSURE: 80 MMHG | HEIGHT: 72 IN | TEMPERATURE: 97.8 F | SYSTOLIC BLOOD PRESSURE: 124 MMHG

## 2019-01-17 DIAGNOSIS — H10.31 ACUTE BACTERIAL CONJUNCTIVITIS OF RIGHT EYE: Primary | ICD-10-CM

## 2019-01-17 PROCEDURE — G8598 ASA/ANTIPLAT THER USED: HCPCS | Performed by: NURSE PRACTITIONER

## 2019-01-17 PROCEDURE — 99213 OFFICE O/P EST LOW 20 MIN: CPT | Performed by: NURSE PRACTITIONER

## 2019-01-17 PROCEDURE — 3017F COLORECTAL CA SCREEN DOC REV: CPT | Performed by: NURSE PRACTITIONER

## 2019-01-17 PROCEDURE — 4040F PNEUMOC VAC/ADMIN/RCVD: CPT | Performed by: NURSE PRACTITIONER

## 2019-01-17 PROCEDURE — 1036F TOBACCO NON-USER: CPT | Performed by: NURSE PRACTITIONER

## 2019-01-17 PROCEDURE — 1101F PT FALLS ASSESS-DOCD LE1/YR: CPT | Performed by: NURSE PRACTITIONER

## 2019-01-17 PROCEDURE — G8417 CALC BMI ABV UP PARAM F/U: HCPCS | Performed by: NURSE PRACTITIONER

## 2019-01-17 PROCEDURE — G8427 DOCREV CUR MEDS BY ELIG CLIN: HCPCS | Performed by: NURSE PRACTITIONER

## 2019-01-17 PROCEDURE — 1123F ACP DISCUSS/DSCN MKR DOCD: CPT | Performed by: NURSE PRACTITIONER

## 2019-01-17 PROCEDURE — G8484 FLU IMMUNIZE NO ADMIN: HCPCS | Performed by: NURSE PRACTITIONER

## 2019-01-17 RX ORDER — GENTAMICIN SULFATE 3 MG/ML
1 SOLUTION/ DROPS OPHTHALMIC 3 TIMES DAILY
Qty: 1 BOTTLE | Refills: 0 | Status: SHIPPED | OUTPATIENT
Start: 2019-01-17 | End: 2019-01-22

## 2019-01-17 RX ORDER — AMOXICILLIN 500 MG/1
500 CAPSULE ORAL 2 TIMES DAILY
Qty: 14 CAPSULE | Refills: 0 | Status: SHIPPED | OUTPATIENT
Start: 2019-01-17 | End: 2019-01-24

## 2019-01-17 ASSESSMENT — ENCOUNTER SYMPTOMS
NAUSEA: 0
EYE PAIN: 0
SHORTNESS OF BREATH: 0
BACK PAIN: 0
VOMITING: 0
DIARRHEA: 0
STRIDOR: 0
EYE REDNESS: 1
COLOR CHANGE: 0
EYE ITCHING: 1
CONSTIPATION: 0
RHINORRHEA: 0
WHEEZING: 0
EYE DISCHARGE: 1
SORE THROAT: 0
PHOTOPHOBIA: 0
COUGH: 0
ABDOMINAL PAIN: 0

## 2019-01-31 ENCOUNTER — PROCEDURE VISIT (OUTPATIENT)
Dept: UROLOGY | Age: 69
End: 2019-01-31
Payer: MEDICARE

## 2019-01-31 VITALS
BODY MASS INDEX: 35.89 KG/M2 | DIASTOLIC BLOOD PRESSURE: 70 MMHG | WEIGHT: 265 LBS | HEIGHT: 72 IN | SYSTOLIC BLOOD PRESSURE: 104 MMHG

## 2019-01-31 DIAGNOSIS — C61 MALIGNANT NEOPLASM OF PROSTATE (HCC): Primary | ICD-10-CM

## 2019-01-31 PROCEDURE — 99999 PR OFFICE/OUTPT VISIT,PROCEDURE ONLY: CPT | Performed by: UROLOGY

## 2019-01-31 PROCEDURE — 99999 PR SONO GUIDE NEEDLE BIOPSY: CPT | Performed by: UROLOGY

## 2019-01-31 PROCEDURE — 55700 PR BIOPSY OF PROSTATE,NEEDLE/PUNCH: CPT | Performed by: UROLOGY

## 2019-01-31 PROCEDURE — 76872 US TRANSRECTAL: CPT | Performed by: UROLOGY

## 2019-01-31 PROCEDURE — 96372 THER/PROPH/DIAG INJ SC/IM: CPT | Performed by: UROLOGY

## 2019-01-31 RX ORDER — GENTAMICIN SULFATE 40 MG/ML
80 INJECTION, SOLUTION INTRAMUSCULAR; INTRAVENOUS ONCE
Status: COMPLETED | OUTPATIENT
Start: 2019-01-31 | End: 2019-01-31

## 2019-01-31 RX ADMIN — GENTAMICIN SULFATE 80 MG: 40 INJECTION, SOLUTION INTRAMUSCULAR; INTRAVENOUS at 13:30

## 2019-02-15 ENCOUNTER — OFFICE VISIT (OUTPATIENT)
Dept: UROLOGY | Age: 69
End: 2019-02-15
Payer: MEDICARE

## 2019-02-15 VITALS
BODY MASS INDEX: 35.62 KG/M2 | HEIGHT: 72 IN | WEIGHT: 263 LBS | DIASTOLIC BLOOD PRESSURE: 78 MMHG | SYSTOLIC BLOOD PRESSURE: 122 MMHG

## 2019-02-15 DIAGNOSIS — C61 PROSTATE CA (HCC): Primary | ICD-10-CM

## 2019-02-15 PROCEDURE — 1036F TOBACCO NON-USER: CPT | Performed by: NURSE PRACTITIONER

## 2019-02-15 PROCEDURE — 3017F COLORECTAL CA SCREEN DOC REV: CPT | Performed by: NURSE PRACTITIONER

## 2019-02-15 PROCEDURE — 99215 OFFICE O/P EST HI 40 MIN: CPT | Performed by: NURSE PRACTITIONER

## 2019-02-15 PROCEDURE — G8598 ASA/ANTIPLAT THER USED: HCPCS | Performed by: NURSE PRACTITIONER

## 2019-02-15 PROCEDURE — G8484 FLU IMMUNIZE NO ADMIN: HCPCS | Performed by: NURSE PRACTITIONER

## 2019-02-15 PROCEDURE — 1123F ACP DISCUSS/DSCN MKR DOCD: CPT | Performed by: NURSE PRACTITIONER

## 2019-02-15 PROCEDURE — G8417 CALC BMI ABV UP PARAM F/U: HCPCS | Performed by: NURSE PRACTITIONER

## 2019-02-15 PROCEDURE — 4040F PNEUMOC VAC/ADMIN/RCVD: CPT | Performed by: NURSE PRACTITIONER

## 2019-02-15 PROCEDURE — 1101F PT FALLS ASSESS-DOCD LE1/YR: CPT | Performed by: NURSE PRACTITIONER

## 2019-02-15 PROCEDURE — G8427 DOCREV CUR MEDS BY ELIG CLIN: HCPCS | Performed by: NURSE PRACTITIONER

## 2019-02-27 ENCOUNTER — HOSPITAL ENCOUNTER (OUTPATIENT)
Dept: NUCLEAR MEDICINE | Age: 69
Discharge: HOME | End: 2019-02-27
Payer: MEDICARE

## 2019-02-27 ENCOUNTER — HOSPITAL ENCOUNTER (OUTPATIENT)
Dept: CT IMAGING | Age: 69
Discharge: HOME | End: 2019-02-27
Payer: MEDICARE

## 2019-02-27 DIAGNOSIS — C61 PROSTATE CA (HCC): ICD-10-CM

## 2019-02-27 PROCEDURE — 6360000004 HC RX CONTRAST MEDICATION: Performed by: NURSE PRACTITIONER

## 2019-02-27 PROCEDURE — 78306 BONE IMAGING WHOLE BODY: CPT

## 2019-02-27 PROCEDURE — 74178 CT ABD&PLV WO CNTR FLWD CNTR: CPT

## 2019-02-27 PROCEDURE — 3430000000 HC RX DIAGNOSTIC RADIOPHARMACEUTICAL: Performed by: NURSE PRACTITIONER

## 2019-02-27 PROCEDURE — 2709999900 HC NON-CHARGEABLE SUPPLY

## 2019-02-27 PROCEDURE — A9503 TC99M MEDRONATE: HCPCS | Performed by: NURSE PRACTITIONER

## 2019-02-27 RX ORDER — TC 99M MEDRONATE 20 MG/10ML
25.3 INJECTION, POWDER, LYOPHILIZED, FOR SOLUTION INTRAVENOUS
Status: COMPLETED | OUTPATIENT
Start: 2019-02-27 | End: 2019-02-27

## 2019-02-27 RX ADMIN — IOPAMIDOL 85 ML: 755 INJECTION, SOLUTION INTRAVENOUS at 09:56

## 2019-02-27 RX ADMIN — TC 99M MEDRONATE 25.3 MILLICURIE: 20 INJECTION, POWDER, LYOPHILIZED, FOR SOLUTION INTRAVENOUS at 09:36

## 2019-03-04 ENCOUNTER — TELEPHONE (OUTPATIENT)
Dept: CARDIOLOGY CLINIC | Age: 69
End: 2019-03-04

## 2019-03-04 ENCOUNTER — TELEPHONE (OUTPATIENT)
Dept: UROLOGY | Age: 69
End: 2019-03-04

## 2019-03-04 ENCOUNTER — OFFICE VISIT (OUTPATIENT)
Dept: UROLOGY | Age: 69
End: 2019-03-04
Payer: MEDICARE

## 2019-03-04 VITALS
DIASTOLIC BLOOD PRESSURE: 74 MMHG | BODY MASS INDEX: 34.54 KG/M2 | WEIGHT: 255 LBS | SYSTOLIC BLOOD PRESSURE: 132 MMHG | HEIGHT: 72 IN

## 2019-03-04 DIAGNOSIS — R94.31 ABNORMAL EKG: Primary | ICD-10-CM

## 2019-03-04 DIAGNOSIS — C61 PROSTATE CANCER (HCC): Primary | ICD-10-CM

## 2019-03-04 DIAGNOSIS — Z01.818 PREOPERATIVE TESTING: ICD-10-CM

## 2019-03-04 PROCEDURE — G8417 CALC BMI ABV UP PARAM F/U: HCPCS | Performed by: UROLOGY

## 2019-03-04 PROCEDURE — G8598 ASA/ANTIPLAT THER USED: HCPCS | Performed by: UROLOGY

## 2019-03-04 PROCEDURE — G8427 DOCREV CUR MEDS BY ELIG CLIN: HCPCS | Performed by: UROLOGY

## 2019-03-04 PROCEDURE — 1123F ACP DISCUSS/DSCN MKR DOCD: CPT | Performed by: UROLOGY

## 2019-03-04 PROCEDURE — 1036F TOBACCO NON-USER: CPT | Performed by: UROLOGY

## 2019-03-04 PROCEDURE — 4040F PNEUMOC VAC/ADMIN/RCVD: CPT | Performed by: UROLOGY

## 2019-03-04 PROCEDURE — G8484 FLU IMMUNIZE NO ADMIN: HCPCS | Performed by: UROLOGY

## 2019-03-04 PROCEDURE — 99215 OFFICE O/P EST HI 40 MIN: CPT | Performed by: UROLOGY

## 2019-03-04 PROCEDURE — 3017F COLORECTAL CA SCREEN DOC REV: CPT | Performed by: UROLOGY

## 2019-03-04 ASSESSMENT — ENCOUNTER SYMPTOMS
CHEST TIGHTNESS: 0
COUGH: 0
NAUSEA: 0
BACK PAIN: 0
EYE ITCHING: 0
DIARRHEA: 0
EYE PAIN: 0
COLOR CHANGE: 0

## 2019-03-04 NOTE — PROGRESS NOTES
Subjective:      Patient ID: Skylar Mckinnon 79 y.o. male 1950    Chief Complaint   Patient presents with    Follow-up    Prostate Cancer       Other   This is a new (prostate cancer) problem. The current episode started 1 to 4 weeks ago. The problem occurs constantly. The problem has been unchanged. Pertinent negatives include no chest pain, chills, coughing, fever, nausea or rash. Nothing aggravates the symptoms. He has tried nothing for the symptoms. The treatment provided no relief. Mr. Viki Davenport was seen in follow up to discuss his biopsy results. Unfortunately we found prostate cancer in the biopsy cores as follows:    A. Prostate, right apex, needle biopsy:      Prostatic adenocarcinoma, Tip score 3+4 = 7, grade group 2, in      2 of 2 cores, 7/36 mm, 19%. B. Prostate, right mid, needle biopsy:      Prostatic adenocarcinoma, [place] score 3+4 = 7, grade group 2, in      2 of 2 cores, 8/37 mm, 21%.      Focal cribriform pattern 4 is present. C. Prostate, right base, needle biopsy:      Prostatic adenocarcinoma, Tip score 3+3 = 6, grade group 1, in      2 of 2 cores,     2/35 mm, 6%. D. Prostate, left apex, needle biopsy:   Benign prostatic tissue. E. Prostate, left mid, needle biopsy:   Benign prostatic tissue. F. Prostate, left base, needle biopsy:   Benign prostatic tissue. G. Prostate, lesion #1, needle biopsy:      Prostatic adenocarcinoma, Tip score 4+3 = 7, grade group 3, in      3 of 3 cores, 42/54 mm, 78%.      Cribriform pattern 4 is present. His most recent PSA was 8.28  Prostate size measured 37.16 cc at ultrasound for prostate biopsy. Today we discussed prostate cancer in general and Patrice's prostate cancer in specific. We discussed possible treatment options ranging from active surveillance, to radiation, to surgery. We also discussed alternative therapies such as cryotherapy and HIFU.   I have given him a book on prostate cancer and wellness to continue this review. We discussed his options at length and also their associated risks and benefits. He appears to understand and asked appropriate questions. Today more than 50% of Patrice's visit was spent in these discussions and the total time of his visit was 45 minutes. Past Medical History:   Diagnosis Date    BPH (benign prostatic hyperplasia)     CAD (coronary artery disease)     CVA (cerebral vascular accident) ([facility] [address] 2010    FH: heart disease     Hyperlipidemia     Hypertension     Obesity        Social History     Socioeconomic History    Marital status:       Spouse name: Not on file    Number of children: Not on file    Years of education: Not on file    Highest education level: Not on file   Occupational History    Not on file   Social Needs    Financial resource strain: Not on file    Food insecurity:     Worry: Not on file     Inability: Not on file    Transportation needs:     Medical: Not on file     Non-medical: Not on file   Tobacco Use    Smoking status: Never Smoker    Smokeless tobacco: Former User     Types: Chew   Substance and Sexual Activity    Alcohol use: No     Alcohol/week: 0.0 oz    Drug use: No    Sexual activity: Not on file   Lifestyle    Physical activity:     Days per week: Not on file     Minutes per session: Not on file    Stress: Not on file   Relationships    Social connections:     Talks on phone: Not on file     Gets together: Not on file     Attends [employer] service: Not on file     Active member of club or organization: Not on file     Attends meetings of clubs or organizations: Not on file     Relationship status: Not on file    Intimate partner violence:     Fear of current or ex partner: Not on file     Emotionally abused: Not on file     Physically abused: Not on file     Forced sexual activity: Not on file   Other Topics Concern    Not on file   Social History Narrative    Not on file       Family History   Problem Relation Age of Onset    Heart Disease Mother     Diabetes Mother     Heart Disease Father        Past Surgical History:   Procedure Laterality Date    CARDIAC VALVE REPLACEMENT  April 2011    [place], [place]    CARDIOVASCULAR STRESS TEST  1 05 2011    Cannot exclude slight inferobasal lateral stress-induced ischemia in a relatively small-sized area. EF 68%. Mildly abnormal nuclear scan. Lexiscan test associated with nonspecific symptoms. EKG nondiagnostic with nonspecific ST-T wave changes.  CAROTID ENDARTERECTOMY  2 08 2011    Right carotid endarterectomy with patch angioplasty with convention patch angioplasty.  CORONARY ARTERY BYPASS GRAFT      DIAGNOSTIC CARDIAC CATH LAB PROCEDURE  3 24 2011    LV end-diastolic pressure was 12 mmHg with no change before and after contrast injection. There was no significant gradient across the aortic valve to signify aortic stenosis. LV function was within normal limits, EF 55%. 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 Nonobstructive diffuse disease in a small sized RCA. Nomral LV function.  HERNIA REPAIR      Inguinal hernia repair.  PROSTATECTOMY N/A 3/20/2019    PROSTATECTOMY LAPAROSCOPIC ROBOTIC performed by Reid Hewitt MD at [address] [place] [address] ECHOCARDIOGRAM  12 21 2010    Size was normal. Systolic function was normal. EF was estimated in the range of 55-65%. There were no regional wall motion abnormalities. Wall thickness was normal. Doppler - LV diastolic function parameters were normal. Mild MR. The aortic valve was trileaflet. Leaflets exhibited mildly increased thickness, mild calcification, normal cuspal separation, and sclerosis. Mild TR.        No Known Allergies      Current Outpatient Medications:     tamsulosin (FLOMAX) 0.4 MG capsule, Take 1 capsule by mouth nightly, Disp: 90 capsule, Rfl: 3    apixaban (ELIQUIS) 5 MG TABS tablet, Take 1 tablet by mouth 2 times daily, Disp: 180 tablet, Rfl: 3    atorvastatin (LIPITOR) 20 MG tablet, TAKE 1 TABLET BY MOUTH DAILY, Disp: 90 tablet, Rfl: 3    metoprolol succinate (TOPROL XL) 50 MG extended release tablet, TAKE 1 TABLET BY MOUTH EVERY DAY, Disp: 90 tablet, Rfl: 3    aspirin 81 MG tablet, Take 81 mg by mouth daily. , Disp: , Rfl:     ferrous sulfate 325 (65 FE) MG tablet, Take 1 tablet by mouth daily (with breakfast). , Disp: 30 tablet, Rfl: 11    Krill Oil 500 MG CAPS, Take  by mouth daily. , Disp: , Rfl:     Coenzyme Q10 (CO Q 10 PO), Take  by mouth daily. , Disp: , Rfl:     polyethylene glycol (GLYCOLAX) powder, Dispense 238 Gram Bottle. Use as Directed, Disp: 238 g, Rfl: 0    Review of Systems   Constitutional: Negative for chills and fever. HENT: Negative for ear discharge and ear pain. Eyes: Negative for pain and itching. Respiratory: Negative for cough and chest tightness. Cardiovascular: Negative for chest pain and palpitations. Gastrointestinal: Negative for diarrhea and nausea. Endocrine: Negative for cold intolerance and heat intolerance. Genitourinary: Negative for flank pain and penile pain. Musculoskeletal: Negative for back pain and gait problem. Skin: Negative for color change and rash. Allergic/Immunologic: Negative for environmental allergies and food allergies. Neurological: Negative for dizziness and seizures. /74   Ht 6' (1.829 m)   Wt 255 lb (115.7 kg)   BMI 34.58 kg/m²     Objective:   Physical Exam   Constitutional: He is oriented to person, place, and time. Vital signs are normal. He appears well-developed and well-nourished. He is cooperative. No distress. HENT:   Head: Normocephalic and atraumatic. Mouth/Throat: Oropharynx is clear and moist and mucous membranes are normal. No oropharyngeal exudate. Eyes: Pupils are equal, round, and reactive to light. EOM are normal. Right eye exhibits no discharge. Left eye exhibits no discharge. No scleral icterus.    Neck: Trachea normal. No JVD present. No tracheal deviation present. Pulmonary/Chest: Effort normal. No respiratory distress. He has no wheezes. Abdominal: Soft. He exhibits no distension. There is no tenderness. There is no rebound and no CVA tenderness. Musculoskeletal: He exhibits no edema or tenderness. Lymphadenopathy:        Right: No supraclavicular adenopathy present. Left: No supraclavicular adenopathy present. Neurological: He is alert and oriented to person, place, and time. No cranial nerve deficit. Skin: Skin is warm and dry. He is not diaphoretic. Psychiatric: He has a normal mood and affect. His behavior is normal.   Nursing note and vitals reviewed. Labs    No results found for this visit on 03/04/19. Lab Results   Component Value Date    CREATININE 0.5 03/25/2019    BUN 6 (L) 03/25/2019     03/25/2019    K 3.8 03/25/2019     03/25/2019    CO2 25 03/25/2019       Lab Results   Component Value Date    PSA 8.28 (H) 09/06/2018    PSA 5.68 (H) 04/01/2015    PSA 7.02 (H) 01/27/2014     Radiology  The patient has had a CT scan that I have reviewed along with its accompanying report. The study demonstrates no evidence of metastatic disease. Impression   1. No acute intra-abdominal or intrapelvic findings. 2. No metastatic disease in the abdomen or pelvis. 3. Cholelithiasis. 4. Nonobstructive left-sided nephrolithiasis. 5. Uncomplicated sigmoid colon diverticulosis. Radiology  The patient has had a bone scan that I have reviewed along with its accompanying report. The study demonstrates no evidence of metastatic disease. Impression   Multiple areas of increased activity are detailed above report. These are most consistent with degenerative osteoarthrosis. Possible sinus disease at the nasopharyngeal level. No convincing evidence for skeletal metastases. Assessment:       Diagnosis Orders   1.  Prostate cancer [facility])  Basic Metabolic Panel    CBC Auto Differential    Creatinine, Serum    Amb External Referral To Physical Therapy   2. Preoperative testing  Basic Metabolic Panel    CBC Auto Differential    Creatinine, Serum       Mr. Devan Ledbetter presents today [facility]. He has had a bone scan and CT scan and there in no evidence of metastatic disease. Mr. Devan Ledbetter presents for discussion of his prostate cancer. All treatment options, including active surveillance, radiation therapy and surgery and other treatment modalities such as cryotherapy, were discussed. Mr. Devan Ledbetter is interested in pursuing surgical removal of his prostate and would like to have this done laparoscopically with robot-assistance. We have discussed the procedure in detail along with what to expect with the surgery as well as recovery. We discussed the most common side effects of surgical removal of the prostate gland, including incontinence and impotence. We also discussed those things we would be doing pre-operatively, intra-operatively and post-operatively in order to minimize these risks and to treat the conditions as they occur. We also discussed the following hospital course. We discussed the need to have a greenberg catheter for approximately a week after the procedure. We discussed our follow up schedule for checking PSA at 8 weeks, 5 months, 8 months and then again at the 1 year jayne. Additional testing will be based on final pathology and his PSA over the first year. We discussed the possible need for additional therapy such as radiation or hormone therapy. Mr. Devan Ledbetter understands our discussions and asked appropriate questions which were all answered. He desires to proceed with surgery and we will schedule this in the very near future. Plan:  I will schedule Mr. Devan Ledbetter for Robot-Assisted Laparoscopic prostatectomy in the near future.   Pre-operatively he will also be seeing Janeen Ochoa, the physical therapist I refer to for pelvic floor therapy. We will make sure we have appropriate medical and cardiac clearance prior to his surgery date. We will see . Nick Parsons in the office a week after the procedure to discuss his pathology and to remove his staples and greenberg catheter.

## 2019-03-06 ENCOUNTER — TELEPHONE (OUTPATIENT)
Dept: UROLOGY | Age: 69
End: 2019-03-06

## 2019-03-14 ENCOUNTER — HOSPITAL ENCOUNTER (OUTPATIENT)
Dept: NON INVASIVE DIAGNOSTICS | Age: 69
Discharge: HOME | End: 2019-03-14
Payer: MEDICARE

## 2019-03-14 DIAGNOSIS — R94.31 ABNORMAL EKG: ICD-10-CM

## 2019-03-14 LAB
LV EF: 60 %
LV EF: 64 %
LVEF MODALITY: NORMAL
LVEF MODALITY: NORMAL

## 2019-03-14 PROCEDURE — 3430000000 HC RX DIAGNOSTIC RADIOPHARMACEUTICAL: Performed by: NUCLEAR MEDICINE

## 2019-03-14 PROCEDURE — 78452 HT MUSCLE IMAGE SPECT MULT: CPT

## 2019-03-14 PROCEDURE — A9500 TC99M SESTAMIBI: HCPCS | Performed by: NUCLEAR MEDICINE

## 2019-03-14 PROCEDURE — 93306 TTE W/DOPPLER COMPLETE: CPT

## 2019-03-14 PROCEDURE — 2709999900 HC NON-CHARGEABLE SUPPLY

## 2019-03-14 PROCEDURE — 93017 CV STRESS TEST TRACING ONLY: CPT

## 2019-03-14 RX ADMIN — Medication 9.5 MILLICURIE: at 14:57

## 2019-03-14 RX ADMIN — Medication 29.6 MILLICURIE: at 15:44

## 2019-03-16 ENCOUNTER — HOSPITAL ENCOUNTER (OUTPATIENT)
Age: 69
Discharge: HOME | End: 2019-03-16
Payer: MEDICARE

## 2019-03-16 DIAGNOSIS — C61 PROSTATE CANCER (HCC): ICD-10-CM

## 2019-03-16 DIAGNOSIS — Z01.818 PREOPERATIVE TESTING: ICD-10-CM

## 2019-03-16 LAB
ANION GAP SERPL CALCULATED.3IONS-SCNC: 14 MEQ/L (ref 8–16)
BASOPHILS # BLD: 0.8 %
BASOPHILS ABSOLUTE: 0.1 THOU/MM3 (ref 0–0.1)
BUN BLDV-MCNC: 11 MG/DL (ref 7–22)
CALCIUM SERPL-MCNC: 9.7 MG/DL (ref 8.5–10.5)
CHLORIDE BLD-SCNC: 105 MEQ/L (ref 98–111)
CO2: 26 MEQ/L (ref 23–33)
CREAT SERPL-MCNC: 0.7 MG/DL (ref 0.4–1.2)
EOSINOPHIL # BLD: 2.1 %
EOSINOPHILS ABSOLUTE: 0.1 THOU/MM3 (ref 0–0.4)
ERYTHROCYTE [DISTWIDTH] IN BLOOD BY AUTOMATED COUNT: 11.8 % (ref 11.5–14.5)
ERYTHROCYTE [DISTWIDTH] IN BLOOD BY AUTOMATED COUNT: 42 FL (ref 35–45)
GFR SERPL CREATININE-BSD FRML MDRD: > 90 ML/MIN/1.73M2
GLUCOSE BLD-MCNC: 116 MG/DL (ref 70–108)
HCT VFR BLD CALC: 48.7 % (ref 42–52)
HEMOGLOBIN: 16.2 GM/DL (ref 14–18)
IMMATURE GRANS (ABS): 0.03 THOU/MM3 (ref 0–0.07)
IMMATURE GRANULOCYTES: 0.4 %
LYMPHOCYTES # BLD: 19.9 %
LYMPHOCYTES ABSOLUTE: 1.4 THOU/MM3 (ref 1–4.8)
MCH RBC QN AUTO: 32.5 PG (ref 26–33)
MCHC RBC AUTO-ENTMCNC: 33.3 GM/DL (ref 32.2–35.5)
MCV RBC AUTO: 97.8 FL (ref 80–94)
MONOCYTES # BLD: 7.4 %
MONOCYTES ABSOLUTE: 0.5 THOU/MM3 (ref 0.4–1.3)
NUCLEATED RED BLOOD CELLS: 0 /100 WBC
PLATELET # BLD: 232 THOU/MM3 (ref 130–400)
PMV BLD AUTO: 9.1 FL (ref 9.4–12.4)
POTASSIUM SERPL-SCNC: 4.4 MEQ/L (ref 3.5–5.2)
RBC # BLD: 4.98 MILL/MM3 (ref 4.7–6.1)
SEG NEUTROPHILS: 69.4 %
SEGMENTED NEUTROPHILS ABSOLUTE COUNT: 4.9 THOU/MM3 (ref 1.8–7.7)
SODIUM BLD-SCNC: 145 MEQ/L (ref 135–145)
WBC # BLD: 7.1 THOU/MM3 (ref 4.8–10.8)

## 2019-03-16 PROCEDURE — 36415 COLL VENOUS BLD VENIPUNCTURE: CPT

## 2019-03-16 PROCEDURE — 80048 BASIC METABOLIC PNL TOTAL CA: CPT

## 2019-03-16 PROCEDURE — 85025 COMPLETE CBC W/AUTO DIFF WBC: CPT

## 2019-03-19 ENCOUNTER — TELEPHONE (OUTPATIENT)
Dept: UROLOGY | Age: 69
End: 2019-03-19

## 2019-03-20 ENCOUNTER — ANESTHESIA EVENT (OUTPATIENT)
Dept: OPERATING ROOM | Age: 69
DRG: 707 | End: 2019-03-20
Payer: MEDICARE

## 2019-03-20 ENCOUNTER — ANESTHESIA (OUTPATIENT)
Dept: OPERATING ROOM | Age: 69
DRG: 707 | End: 2019-03-20
Payer: MEDICARE

## 2019-03-20 ENCOUNTER — HOSPITAL ENCOUNTER (INPATIENT)
Age: 69
LOS: 4 days | Discharge: HOME | DRG: 707 | End: 2019-03-26
Attending: UROLOGY | Admitting: UROLOGY
Payer: MEDICARE

## 2019-03-20 VITALS
SYSTOLIC BLOOD PRESSURE: 154 MMHG | OXYGEN SATURATION: 89 % | TEMPERATURE: 95.5 F | RESPIRATION RATE: 3 BRPM | DIASTOLIC BLOOD PRESSURE: 81 MMHG

## 2019-03-20 DIAGNOSIS — C61 PROSTATE CANCER (HCC): Primary | ICD-10-CM

## 2019-03-20 LAB — INR BLD: 1.05 (ref 0.85–1.13)

## 2019-03-20 PROCEDURE — 6360000002 HC RX W HCPCS: Performed by: UROLOGY

## 2019-03-20 PROCEDURE — 55866 LAPS SURG PRST8ECT RPBIC RAD: CPT | Performed by: UROLOGY

## 2019-03-20 PROCEDURE — 2709999900 HC NON-CHARGEABLE SUPPLY: Performed by: UROLOGY

## 2019-03-20 PROCEDURE — 3600000019 HC SURGERY ROBOT ADDTL 15MIN: Performed by: UROLOGY

## 2019-03-20 PROCEDURE — 38571 LAPAROSCOPY LYMPHADENECTOMY: CPT | Performed by: UROLOGY

## 2019-03-20 PROCEDURE — 6370000000 HC RX 637 (ALT 250 FOR IP): Performed by: PHYSICIAN ASSISTANT

## 2019-03-20 PROCEDURE — 36415 COLL VENOUS BLD VENIPUNCTURE: CPT

## 2019-03-20 PROCEDURE — 88305 TISSUE EXAM BY PATHOLOGIST: CPT

## 2019-03-20 PROCEDURE — 38571 LAPAROSCOPY LYMPHADENECTOMY: CPT | Performed by: PHYSICIAN ASSISTANT

## 2019-03-20 PROCEDURE — 8E0W4CZ ROBOTIC ASSISTED PROCEDURE OF TRUNK REGION, PERCUTANEOUS ENDOSCOPIC APPROACH: ICD-10-PCS | Performed by: UROLOGY

## 2019-03-20 PROCEDURE — 7100000001 HC PACU RECOVERY - ADDTL 15 MIN: Performed by: UROLOGY

## 2019-03-20 PROCEDURE — 55866 LAPS SURG PRST8ECT RPBIC RAD: CPT | Performed by: PHYSICIAN ASSISTANT

## 2019-03-20 PROCEDURE — 99999 PR OFFICE/OUTPT VISIT,PROCEDURE ONLY: CPT | Performed by: UROLOGY

## 2019-03-20 PROCEDURE — 2709999900 HC NON-CHARGEABLE SUPPLY

## 2019-03-20 PROCEDURE — 2720000010 HC SURG SUPPLY STERILE: Performed by: UROLOGY

## 2019-03-20 PROCEDURE — 0VT04ZZ RESECTION OF PROSTATE, PERCUTANEOUS ENDOSCOPIC APPROACH: ICD-10-PCS | Performed by: UROLOGY

## 2019-03-20 PROCEDURE — 2580000003 HC RX 258: Performed by: NURSE ANESTHETIST, CERTIFIED REGISTERED

## 2019-03-20 PROCEDURE — 07BC4ZZ EXCISION OF PELVIS LYMPHATIC, PERCUTANEOUS ENDOSCOPIC APPROACH: ICD-10-PCS | Performed by: UROLOGY

## 2019-03-20 PROCEDURE — 2500000003 HC RX 250 WO HCPCS: Performed by: NURSE ANESTHETIST, CERTIFIED REGISTERED

## 2019-03-20 PROCEDURE — 88307 TISSUE EXAM BY PATHOLOGIST: CPT

## 2019-03-20 PROCEDURE — 2580000003 HC RX 258

## 2019-03-20 PROCEDURE — 7100000000 HC PACU RECOVERY - FIRST 15 MIN: Performed by: UROLOGY

## 2019-03-20 PROCEDURE — 2500000003 HC RX 250 WO HCPCS: Performed by: UROLOGY

## 2019-03-20 PROCEDURE — 6360000002 HC RX W HCPCS: Performed by: PHYSICIAN ASSISTANT

## 2019-03-20 PROCEDURE — 6360000002 HC RX W HCPCS: Performed by: NURSE ANESTHETIST, CERTIFIED REGISTERED

## 2019-03-20 PROCEDURE — 2580000003 HC RX 258: Performed by: PHYSICIAN ASSISTANT

## 2019-03-20 PROCEDURE — S2900 ROBOTIC SURGICAL SYSTEM: HCPCS | Performed by: UROLOGY

## 2019-03-20 PROCEDURE — 3700000001 HC ADD 15 MINUTES (ANESTHESIA): Performed by: UROLOGY

## 2019-03-20 PROCEDURE — 15777 ACELLULAR DERM MATRIX IMPLT: CPT | Performed by: UROLOGY

## 2019-03-20 PROCEDURE — 88309 TISSUE EXAM BY PATHOLOGIST: CPT

## 2019-03-20 PROCEDURE — 3600000009 HC SURGERY ROBOT BASE: Performed by: UROLOGY

## 2019-03-20 PROCEDURE — 94760 N-INVAS EAR/PLS OXIMETRY 1: CPT

## 2019-03-20 PROCEDURE — 2700000000 HC OXYGEN THERAPY PER DAY

## 2019-03-20 PROCEDURE — 85610 PROTHROMBIN TIME: CPT

## 2019-03-20 PROCEDURE — 3700000000 HC ANESTHESIA ATTENDED CARE: Performed by: UROLOGY

## 2019-03-20 PROCEDURE — 2780000010 HC IMPLANT OTHER: Performed by: UROLOGY

## 2019-03-20 DEVICE — AGENT HEMOSTATIC SURGIFLOW MATRIX KIT W/THROMBIN: Type: IMPLANTABLE DEVICE | Status: FUNCTIONAL

## 2019-03-20 DEVICE — Z DUP USE 2648674 GRAFT HUM TISS W6XL3CM THK1MM UMB CRD AMNIO MEM CLARIX 1K: Type: IMPLANTABLE DEVICE | Status: FUNCTIONAL

## 2019-03-20 RX ORDER — HYDROCODONE BITARTRATE AND ACETAMINOPHEN 5; 325 MG/1; MG/1
1 TABLET ORAL EVERY 4 HOURS PRN
Status: DISCONTINUED | OUTPATIENT
Start: 2019-03-20 | End: 2019-03-22 | Stop reason: ALTCHOICE

## 2019-03-20 RX ORDER — MIDAZOLAM HYDROCHLORIDE 1 MG/ML
INJECTION INTRAMUSCULAR; INTRAVENOUS PRN
Status: DISCONTINUED | OUTPATIENT
Start: 2019-03-20 | End: 2019-03-20 | Stop reason: SDUPTHER

## 2019-03-20 RX ORDER — ONDANSETRON 2 MG/ML
INJECTION INTRAMUSCULAR; INTRAVENOUS PRN
Status: DISCONTINUED | OUTPATIENT
Start: 2019-03-20 | End: 2019-03-20 | Stop reason: SDUPTHER

## 2019-03-20 RX ORDER — SODIUM CHLORIDE 9 MG/ML
INJECTION, SOLUTION INTRAVENOUS CONTINUOUS
Status: DISCONTINUED | OUTPATIENT
Start: 2019-03-20 | End: 2019-03-20

## 2019-03-20 RX ORDER — ATROPA BELLADONNA AND OPIUM 16.2; 3 MG/1; MG/1
30 SUPPOSITORY RECTAL EVERY 8 HOURS PRN
Status: DISCONTINUED | OUTPATIENT
Start: 2019-03-20 | End: 2019-03-26 | Stop reason: HOSPADM

## 2019-03-20 RX ORDER — MEPERIDINE HYDROCHLORIDE 25 MG/ML
12.5 INJECTION INTRAMUSCULAR; INTRAVENOUS; SUBCUTANEOUS EVERY 5 MIN PRN
Status: DISCONTINUED | OUTPATIENT
Start: 2019-03-20 | End: 2019-03-20 | Stop reason: HOSPADM

## 2019-03-20 RX ORDER — BUPIVACAINE HYDROCHLORIDE AND EPINEPHRINE 5; 5 MG/ML; UG/ML
INJECTION, SOLUTION EPIDURAL; INTRACAUDAL; PERINEURAL PRN
Status: DISCONTINUED | OUTPATIENT
Start: 2019-03-20 | End: 2019-03-20 | Stop reason: ALTCHOICE

## 2019-03-20 RX ORDER — SODIUM CHLORIDE, SODIUM LACTATE, POTASSIUM CHLORIDE, CALCIUM CHLORIDE 600; 310; 30; 20 MG/100ML; MG/100ML; MG/100ML; MG/100ML
INJECTION, SOLUTION INTRAVENOUS CONTINUOUS PRN
Status: DISCONTINUED | OUTPATIENT
Start: 2019-03-20 | End: 2019-03-20 | Stop reason: SDUPTHER

## 2019-03-20 RX ORDER — ONDANSETRON 2 MG/ML
4 INJECTION INTRAMUSCULAR; INTRAVENOUS EVERY 6 HOURS PRN
Status: DISCONTINUED | OUTPATIENT
Start: 2019-03-20 | End: 2019-03-26 | Stop reason: HOSPADM

## 2019-03-20 RX ORDER — PROPOFOL 10 MG/ML
INJECTION, EMULSION INTRAVENOUS PRN
Status: DISCONTINUED | OUTPATIENT
Start: 2019-03-20 | End: 2019-03-20 | Stop reason: SDUPTHER

## 2019-03-20 RX ORDER — DEXAMETHASONE SODIUM PHOSPHATE 4 MG/ML
INJECTION, SOLUTION INTRA-ARTICULAR; INTRALESIONAL; INTRAMUSCULAR; INTRAVENOUS; SOFT TISSUE PRN
Status: DISCONTINUED | OUTPATIENT
Start: 2019-03-20 | End: 2019-03-20 | Stop reason: SDUPTHER

## 2019-03-20 RX ORDER — FENTANYL CITRATE 50 UG/ML
50 INJECTION, SOLUTION INTRAMUSCULAR; INTRAVENOUS EVERY 5 MIN PRN
Status: DISCONTINUED | OUTPATIENT
Start: 2019-03-20 | End: 2019-03-20 | Stop reason: HOSPADM

## 2019-03-20 RX ORDER — GLYCOPYRROLATE 1 MG/5 ML
SYRINGE (ML) INTRAVENOUS PRN
Status: DISCONTINUED | OUTPATIENT
Start: 2019-03-20 | End: 2019-03-20 | Stop reason: SDUPTHER

## 2019-03-20 RX ORDER — SODIUM CHLORIDE 9 MG/ML
INJECTION, SOLUTION INTRAVENOUS CONTINUOUS
Status: DISCONTINUED | OUTPATIENT
Start: 2019-03-20 | End: 2019-03-25

## 2019-03-20 RX ORDER — METOPROLOL SUCCINATE 50 MG/1
50 TABLET, EXTENDED RELEASE ORAL DAILY
Status: DISCONTINUED | OUTPATIENT
Start: 2019-03-21 | End: 2019-03-26 | Stop reason: HOSPADM

## 2019-03-20 RX ORDER — NEOSTIGMINE METHYLSULFATE 5 MG/5 ML
SYRINGE (ML) INTRAVENOUS PRN
Status: DISCONTINUED | OUTPATIENT
Start: 2019-03-20 | End: 2019-03-20 | Stop reason: SDUPTHER

## 2019-03-20 RX ORDER — FENTANYL CITRATE 50 UG/ML
INJECTION, SOLUTION INTRAMUSCULAR; INTRAVENOUS PRN
Status: DISCONTINUED | OUTPATIENT
Start: 2019-03-20 | End: 2019-03-20 | Stop reason: SDUPTHER

## 2019-03-20 RX ORDER — LABETALOL HYDROCHLORIDE 5 MG/ML
5 INJECTION, SOLUTION INTRAVENOUS EVERY 10 MIN PRN
Status: DISCONTINUED | OUTPATIENT
Start: 2019-03-20 | End: 2019-03-20 | Stop reason: HOSPADM

## 2019-03-20 RX ORDER — FAMOTIDINE 20 MG/1
20 TABLET, FILM COATED ORAL 2 TIMES DAILY
Status: DISCONTINUED | OUTPATIENT
Start: 2019-03-20 | End: 2019-03-26 | Stop reason: HOSPADM

## 2019-03-20 RX ORDER — SODIUM CHLORIDE 0.9 % (FLUSH) 0.9 %
10 SYRINGE (ML) INJECTION PRN
Status: DISCONTINUED | OUTPATIENT
Start: 2019-03-20 | End: 2019-03-26 | Stop reason: HOSPADM

## 2019-03-20 RX ORDER — ONDANSETRON 2 MG/ML
4 INJECTION INTRAMUSCULAR; INTRAVENOUS
Status: DISCONTINUED | OUTPATIENT
Start: 2019-03-20 | End: 2019-03-20 | Stop reason: HOSPADM

## 2019-03-20 RX ORDER — ROCURONIUM BROMIDE 10 MG/ML
INJECTION, SOLUTION INTRAVENOUS PRN
Status: DISCONTINUED | OUTPATIENT
Start: 2019-03-20 | End: 2019-03-20 | Stop reason: SDUPTHER

## 2019-03-20 RX ORDER — MORPHINE SULFATE 2 MG/ML
4 INJECTION, SOLUTION INTRAMUSCULAR; INTRAVENOUS
Status: DISCONTINUED | OUTPATIENT
Start: 2019-03-20 | End: 2019-03-26 | Stop reason: HOSPADM

## 2019-03-20 RX ORDER — SENNA AND DOCUSATE SODIUM 50; 8.6 MG/1; MG/1
1 TABLET, FILM COATED ORAL 2 TIMES DAILY
Status: DISCONTINUED | OUTPATIENT
Start: 2019-03-20 | End: 2019-03-26 | Stop reason: HOSPADM

## 2019-03-20 RX ORDER — SUCCINYLCHOLINE/SOD CL,ISO/PF 200MG/10ML
SYRINGE (ML) INTRAVENOUS PRN
Status: DISCONTINUED | OUTPATIENT
Start: 2019-03-20 | End: 2019-03-20 | Stop reason: SDUPTHER

## 2019-03-20 RX ORDER — DOCUSATE SODIUM 100 MG/1
100 CAPSULE, LIQUID FILLED ORAL 2 TIMES DAILY
Status: DISCONTINUED | OUTPATIENT
Start: 2019-03-20 | End: 2019-03-26 | Stop reason: HOSPADM

## 2019-03-20 RX ORDER — SODIUM CHLORIDE 0.9 % (FLUSH) 0.9 %
10 SYRINGE (ML) INJECTION EVERY 12 HOURS SCHEDULED
Status: DISCONTINUED | OUTPATIENT
Start: 2019-03-20 | End: 2019-03-26 | Stop reason: HOSPADM

## 2019-03-20 RX ORDER — MORPHINE SULFATE 2 MG/ML
2 INJECTION, SOLUTION INTRAMUSCULAR; INTRAVENOUS
Status: DISCONTINUED | OUTPATIENT
Start: 2019-03-20 | End: 2019-03-26 | Stop reason: HOSPADM

## 2019-03-20 RX ORDER — BISACODYL 10 MG
10 SUPPOSITORY, RECTAL RECTAL DAILY PRN
Status: DISCONTINUED | OUTPATIENT
Start: 2019-03-20 | End: 2019-03-26 | Stop reason: HOSPADM

## 2019-03-20 RX ORDER — ATORVASTATIN CALCIUM 20 MG/1
20 TABLET, FILM COATED ORAL DAILY
Status: DISCONTINUED | OUTPATIENT
Start: 2019-03-21 | End: 2019-03-26 | Stop reason: HOSPADM

## 2019-03-20 RX ORDER — HYDROCODONE BITARTRATE AND ACETAMINOPHEN 5; 325 MG/1; MG/1
2 TABLET ORAL EVERY 4 HOURS PRN
Status: DISCONTINUED | OUTPATIENT
Start: 2019-03-20 | End: 2019-03-22 | Stop reason: ALTCHOICE

## 2019-03-20 RX ADMIN — Medication 2 G: at 07:42

## 2019-03-20 RX ADMIN — Medication 0.2 MG: at 08:26

## 2019-03-20 RX ADMIN — FAMOTIDINE 20 MG: 20 TABLET ORAL at 13:04

## 2019-03-20 RX ADMIN — FENTANYL CITRATE 50 MCG: 50 INJECTION INTRAMUSCULAR; INTRAVENOUS at 08:40

## 2019-03-20 RX ADMIN — FENTANYL CITRATE 50 MCG: 50 INJECTION INTRAMUSCULAR; INTRAVENOUS at 10:05

## 2019-03-20 RX ADMIN — FENTANYL CITRATE 50 MCG: 50 INJECTION INTRAMUSCULAR; INTRAVENOUS at 10:39

## 2019-03-20 RX ADMIN — NEOMYCIN AND POLYMYXIN B SULFATES, BACITRACIN ZINC, AND HYDROCORTISONE: 3.5; 5000; 400; 1 OINTMENT TOPICAL at 15:28

## 2019-03-20 RX ADMIN — Medication 0.8 MG: at 10:12

## 2019-03-20 RX ADMIN — SENNOSIDES AND DOCUSATE SODIUM 1 TABLET: 8.6; 5 TABLET ORAL at 21:00

## 2019-03-20 RX ADMIN — Medication 10 ML: at 22:12

## 2019-03-20 RX ADMIN — CEFOXITIN SODIUM 2 G: 1 POWDER, FOR SOLUTION INTRAVENOUS at 15:28

## 2019-03-20 RX ADMIN — HYDROCODONE BITARTRATE AND ACETAMINOPHEN 2 TABLET: 5; 325 TABLET ORAL at 13:04

## 2019-03-20 RX ADMIN — ROCURONIUM BROMIDE 40 MG: 10 INJECTION INTRAVENOUS at 07:56

## 2019-03-20 RX ADMIN — PROPOFOL 150 MG: 10 INJECTION, EMULSION INTRAVENOUS at 07:41

## 2019-03-20 RX ADMIN — DOCUSATE SODIUM 100 MG: 100 CAPSULE, LIQUID FILLED ORAL at 21:00

## 2019-03-20 RX ADMIN — ONDANSETRON HYDROCHLORIDE 4 MG: 4 INJECTION, SOLUTION INTRAMUSCULAR; INTRAVENOUS at 07:50

## 2019-03-20 RX ADMIN — NEOMYCIN AND POLYMYXIN B SULFATES, BACITRACIN ZINC, AND HYDROCORTISONE: 3.5; 5000; 400; 1 OINTMENT TOPICAL at 21:00

## 2019-03-20 RX ADMIN — DEXAMETHASONE SODIUM PHOSPHATE 10 MG: 4 INJECTION, SOLUTION INTRAMUSCULAR; INTRAVENOUS at 07:50

## 2019-03-20 RX ADMIN — ATROPA BELLADONNA AND OPIUM 30 MG: 16.2; 3 SUPPOSITORY RECTAL at 13:04

## 2019-03-20 RX ADMIN — FENTANYL CITRATE 50 MCG: 50 INJECTION INTRAMUSCULAR; INTRAVENOUS at 10:34

## 2019-03-20 RX ADMIN — SENNOSIDES AND DOCUSATE SODIUM 1 TABLET: 8.6; 5 TABLET ORAL at 13:04

## 2019-03-20 RX ADMIN — Medication 160 MG: at 07:41

## 2019-03-20 RX ADMIN — Medication 5 MG: at 10:12

## 2019-03-20 RX ADMIN — MIDAZOLAM HYDROCHLORIDE 2 MG: 1 INJECTION, SOLUTION INTRAMUSCULAR; INTRAVENOUS at 07:34

## 2019-03-20 RX ADMIN — LIDOCAINE HYDROCHLORIDE 100 MG: 20 INJECTION, SOLUTION INTRAVENOUS at 07:39

## 2019-03-20 RX ADMIN — FAMOTIDINE 20 MG: 20 TABLET ORAL at 21:02

## 2019-03-20 RX ADMIN — FENTANYL CITRATE 100 MCG: 50 INJECTION INTRAMUSCULAR; INTRAVENOUS at 07:39

## 2019-03-20 RX ADMIN — SODIUM CHLORIDE, POTASSIUM CHLORIDE, SODIUM LACTATE AND CALCIUM CHLORIDE: 600; 310; 30; 20 INJECTION, SOLUTION INTRAVENOUS at 09:08

## 2019-03-20 RX ADMIN — DOCUSATE SODIUM 100 MG: 100 CAPSULE, LIQUID FILLED ORAL at 13:04

## 2019-03-20 RX ADMIN — SODIUM CHLORIDE: 9 INJECTION, SOLUTION INTRAVENOUS at 06:52

## 2019-03-20 ASSESSMENT — PULMONARY FUNCTION TESTS
PIF_VALUE: 35
PIF_VALUE: 21
PIF_VALUE: 35
PIF_VALUE: 4
PIF_VALUE: 35
PIF_VALUE: 3
PIF_VALUE: 19
PIF_VALUE: 35
PIF_VALUE: 21
PIF_VALUE: 35
PIF_VALUE: 35
PIF_VALUE: 34
PIF_VALUE: 35
PIF_VALUE: 35
PIF_VALUE: 7
PIF_VALUE: 21
PIF_VALUE: 22
PIF_VALUE: 35
PIF_VALUE: 3
PIF_VALUE: 24
PIF_VALUE: 21
PIF_VALUE: 34
PIF_VALUE: 21
PIF_VALUE: 35
PIF_VALUE: 35
PIF_VALUE: 22
PIF_VALUE: 35
PIF_VALUE: 13
PIF_VALUE: 30
PIF_VALUE: 35
PIF_VALUE: 33
PIF_VALUE: 3
PIF_VALUE: 21
PIF_VALUE: 21
PIF_VALUE: 35
PIF_VALUE: 22
PIF_VALUE: 0
PIF_VALUE: 22
PIF_VALUE: 7
PIF_VALUE: 21
PIF_VALUE: 21
PIF_VALUE: 35
PIF_VALUE: 22
PIF_VALUE: 35
PIF_VALUE: 24
PIF_VALUE: 31
PIF_VALUE: 35
PIF_VALUE: 35
PIF_VALUE: 2
PIF_VALUE: 22
PIF_VALUE: 35
PIF_VALUE: 35
PIF_VALUE: 20
PIF_VALUE: 34
PIF_VALUE: 2
PIF_VALUE: 35
PIF_VALUE: 35
PIF_VALUE: 21
PIF_VALUE: 35
PIF_VALUE: 0
PIF_VALUE: 35
PIF_VALUE: 22
PIF_VALUE: 21
PIF_VALUE: 11
PIF_VALUE: 27
PIF_VALUE: 24
PIF_VALUE: 4
PIF_VALUE: 35
PIF_VALUE: 30
PIF_VALUE: 35
PIF_VALUE: 35
PIF_VALUE: 21
PIF_VALUE: 35
PIF_VALUE: 22
PIF_VALUE: 35
PIF_VALUE: 21
PIF_VALUE: 29
PIF_VALUE: 35
PIF_VALUE: 35
PIF_VALUE: 14
PIF_VALUE: 20
PIF_VALUE: 21
PIF_VALUE: 35
PIF_VALUE: 35
PIF_VALUE: 2
PIF_VALUE: 24
PIF_VALUE: 22
PIF_VALUE: 15
PIF_VALUE: 35
PIF_VALUE: 29
PIF_VALUE: 3
PIF_VALUE: 22
PIF_VALUE: 0
PIF_VALUE: 35
PIF_VALUE: 35
PIF_VALUE: 0
PIF_VALUE: 35
PIF_VALUE: 0
PIF_VALUE: 35
PIF_VALUE: 2
PIF_VALUE: 35
PIF_VALUE: 35
PIF_VALUE: 23
PIF_VALUE: 22
PIF_VALUE: 35
PIF_VALUE: 35
PIF_VALUE: 21
PIF_VALUE: 34
PIF_VALUE: 21
PIF_VALUE: 35
PIF_VALUE: 22
PIF_VALUE: 35
PIF_VALUE: 21
PIF_VALUE: 35
PIF_VALUE: 11
PIF_VALUE: 35
PIF_VALUE: 21
PIF_VALUE: 1
PIF_VALUE: 35
PIF_VALUE: 30
PIF_VALUE: 35
PIF_VALUE: 5
PIF_VALUE: 2
PIF_VALUE: 35
PIF_VALUE: 20
PIF_VALUE: 35
PIF_VALUE: 4
PIF_VALUE: 21
PIF_VALUE: 35
PIF_VALUE: 35
PIF_VALUE: 22
PIF_VALUE: 35
PIF_VALUE: 35
PIF_VALUE: 22
PIF_VALUE: 35
PIF_VALUE: 35
PIF_VALUE: 23
PIF_VALUE: 19
PIF_VALUE: 21
PIF_VALUE: 35
PIF_VALUE: 34
PIF_VALUE: 21
PIF_VALUE: 2
PIF_VALUE: 3
PIF_VALUE: 21

## 2019-03-20 ASSESSMENT — PAIN DESCRIPTION - FREQUENCY
FREQUENCY: CONTINUOUS

## 2019-03-20 ASSESSMENT — PAIN - FUNCTIONAL ASSESSMENT
PAIN_FUNCTIONAL_ASSESSMENT: ACTIVITIES ARE NOT PREVENTED

## 2019-03-20 ASSESSMENT — PAIN DESCRIPTION - PROGRESSION

## 2019-03-20 ASSESSMENT — PAIN DESCRIPTION - ORIENTATION
ORIENTATION: MID

## 2019-03-20 ASSESSMENT — PAIN DESCRIPTION - LOCATION
LOCATION: ABDOMEN

## 2019-03-20 ASSESSMENT — PAIN SCALES - GENERAL
PAINLEVEL_OUTOF10: 3
PAINLEVEL_OUTOF10: 7
PAINLEVEL_OUTOF10: 3
PAINLEVEL_OUTOF10: 5
PAINLEVEL_OUTOF10: 3
PAINLEVEL_OUTOF10: 7
PAINLEVEL_OUTOF10: 3
PAINLEVEL_OUTOF10: 7
PAINLEVEL_OUTOF10: 8
PAINLEVEL_OUTOF10: 7
PAINLEVEL_OUTOF10: 5

## 2019-03-20 ASSESSMENT — PAIN DESCRIPTION - PAIN TYPE
TYPE: ACUTE PAIN

## 2019-03-20 ASSESSMENT — PAIN DESCRIPTION - ONSET
ONSET: GRADUAL

## 2019-03-20 ASSESSMENT — PAIN DESCRIPTION - DESCRIPTORS
DESCRIPTORS: CRAMPING
DESCRIPTORS: ACHING;CONSTANT;DISCOMFORT
DESCRIPTORS: CRAMPING

## 2019-03-21 LAB
ANION GAP SERPL CALCULATED.3IONS-SCNC: 9 MEQ/L (ref 8–16)
BASOPHILS # BLD: 0.1 %
BASOPHILS ABSOLUTE: 0 THOU/MM3 (ref 0–0.1)
BUN BLDV-MCNC: 12 MG/DL (ref 7–22)
CALCIUM SERPL-MCNC: 8.8 MG/DL (ref 8.5–10.5)
CHLORIDE BLD-SCNC: 103 MEQ/L (ref 98–111)
CO2: 27 MEQ/L (ref 23–33)
CREAT SERPL-MCNC: 0.7 MG/DL (ref 0.4–1.2)
EOSINOPHIL # BLD: 0 %
EOSINOPHILS ABSOLUTE: 0 THOU/MM3 (ref 0–0.4)
ERYTHROCYTE [DISTWIDTH] IN BLOOD BY AUTOMATED COUNT: 11.8 % (ref 11.5–14.5)
ERYTHROCYTE [DISTWIDTH] IN BLOOD BY AUTOMATED COUNT: 42.7 FL (ref 35–45)
GFR SERPL CREATININE-BSD FRML MDRD: > 90 ML/MIN/1.73M2
GLUCOSE BLD-MCNC: 144 MG/DL (ref 70–108)
HCT VFR BLD CALC: 43.1 % (ref 42–52)
HEMOGLOBIN: 14.2 GM/DL (ref 14–18)
IMMATURE GRANS (ABS): 0.08 THOU/MM3 (ref 0–0.07)
IMMATURE GRANULOCYTES: 0.5 %
LYMPHOCYTES # BLD: 6.1 %
LYMPHOCYTES ABSOLUTE: 0.9 THOU/MM3 (ref 1–4.8)
MCH RBC QN AUTO: 32.6 PG (ref 26–33)
MCHC RBC AUTO-ENTMCNC: 32.9 GM/DL (ref 32.2–35.5)
MCV RBC AUTO: 98.9 FL (ref 80–94)
MONOCYTES # BLD: 7.9 %
MONOCYTES ABSOLUTE: 1.2 THOU/MM3 (ref 0.4–1.3)
NUCLEATED RED BLOOD CELLS: 0 /100 WBC
PLATELET # BLD: 178 THOU/MM3 (ref 130–400)
PMV BLD AUTO: 9.1 FL (ref 9.4–12.4)
POTASSIUM SERPL-SCNC: 4.5 MEQ/L (ref 3.5–5.2)
RBC # BLD: 4.36 MILL/MM3 (ref 4.7–6.1)
SEG NEUTROPHILS: 85.4 %
SEGMENTED NEUTROPHILS ABSOLUTE COUNT: 12.9 THOU/MM3 (ref 1.8–7.7)
SODIUM BLD-SCNC: 139 MEQ/L (ref 135–145)
WBC # BLD: 15.1 THOU/MM3 (ref 4.8–10.8)

## 2019-03-21 PROCEDURE — 99024 POSTOP FOLLOW-UP VISIT: CPT | Performed by: NURSE PRACTITIONER

## 2019-03-21 PROCEDURE — 36415 COLL VENOUS BLD VENIPUNCTURE: CPT

## 2019-03-21 PROCEDURE — 85025 COMPLETE CBC W/AUTO DIFF WBC: CPT

## 2019-03-21 PROCEDURE — 80048 BASIC METABOLIC PNL TOTAL CA: CPT

## 2019-03-21 PROCEDURE — 6370000000 HC RX 637 (ALT 250 FOR IP): Performed by: PHYSICIAN ASSISTANT

## 2019-03-21 PROCEDURE — APPNB30 APP NON BILLABLE TIME 0-30 MINS: Performed by: NURSE PRACTITIONER

## 2019-03-21 PROCEDURE — 2580000003 HC RX 258: Performed by: PHYSICIAN ASSISTANT

## 2019-03-21 PROCEDURE — 6360000002 HC RX W HCPCS: Performed by: PHYSICIAN ASSISTANT

## 2019-03-21 PROCEDURE — 2709999900 HC NON-CHARGEABLE SUPPLY

## 2019-03-21 RX ADMIN — SODIUM CHLORIDE: 9 INJECTION, SOLUTION INTRAVENOUS at 22:03

## 2019-03-21 RX ADMIN — FAMOTIDINE 20 MG: 20 TABLET ORAL at 20:22

## 2019-03-21 RX ADMIN — BISACODYL 10 MG: 10 SUPPOSITORY RECTAL at 13:55

## 2019-03-21 RX ADMIN — NEOMYCIN AND POLYMYXIN B SULFATES, BACITRACIN ZINC, AND HYDROCORTISONE: 3.5; 5000; 400; 1 OINTMENT TOPICAL at 20:22

## 2019-03-21 RX ADMIN — FAMOTIDINE 20 MG: 20 TABLET ORAL at 08:24

## 2019-03-21 RX ADMIN — SENNOSIDES AND DOCUSATE SODIUM 1 TABLET: 8.6; 5 TABLET ORAL at 08:24

## 2019-03-21 RX ADMIN — HYDROCODONE BITARTRATE AND ACETAMINOPHEN 1 TABLET: 5; 325 TABLET ORAL at 02:57

## 2019-03-21 RX ADMIN — ATORVASTATIN CALCIUM 20 MG: 20 TABLET, FILM COATED ORAL at 20:21

## 2019-03-21 RX ADMIN — NEOMYCIN AND POLYMYXIN B SULFATES, BACITRACIN ZINC, AND HYDROCORTISONE: 3.5; 5000; 400; 1 OINTMENT TOPICAL at 08:18

## 2019-03-21 RX ADMIN — SODIUM CHLORIDE: 9 INJECTION, SOLUTION INTRAVENOUS at 02:57

## 2019-03-21 RX ADMIN — SODIUM CHLORIDE: 9 INJECTION, SOLUTION INTRAVENOUS at 13:50

## 2019-03-21 RX ADMIN — CEFOXITIN SODIUM 2 G: 1 POWDER, FOR SOLUTION INTRAVENOUS at 00:38

## 2019-03-21 RX ADMIN — DOCUSATE SODIUM 100 MG: 100 CAPSULE, LIQUID FILLED ORAL at 08:24

## 2019-03-21 RX ADMIN — METOPROLOL SUCCINATE 50 MG: 50 TABLET, EXTENDED RELEASE ORAL at 08:18

## 2019-03-21 RX ADMIN — SENNOSIDES AND DOCUSATE SODIUM 1 TABLET: 8.6; 5 TABLET ORAL at 20:22

## 2019-03-21 RX ADMIN — MAGNESIUM HYDROXIDE 30 ML: 400 SUSPENSION ORAL at 11:30

## 2019-03-21 RX ADMIN — DOCUSATE SODIUM 100 MG: 100 CAPSULE, LIQUID FILLED ORAL at 20:22

## 2019-03-21 ASSESSMENT — PAIN SCALES - GENERAL
PAINLEVEL_OUTOF10: 0
PAINLEVEL_OUTOF10: 5
PAINLEVEL_OUTOF10: 7
PAINLEVEL_OUTOF10: 2
PAINLEVEL_OUTOF10: 2

## 2019-03-21 ASSESSMENT — PAIN DESCRIPTION - PROGRESSION
CLINICAL_PROGRESSION: NOT CHANGED
CLINICAL_PROGRESSION: GRADUALLY IMPROVING
CLINICAL_PROGRESSION: NOT CHANGED
CLINICAL_PROGRESSION: NOT CHANGED

## 2019-03-21 ASSESSMENT — PAIN DESCRIPTION - ORIENTATION: ORIENTATION: LOWER

## 2019-03-21 ASSESSMENT — PAIN DESCRIPTION - LOCATION
LOCATION: ABDOMEN
LOCATION: ABDOMEN

## 2019-03-21 ASSESSMENT — PAIN - FUNCTIONAL ASSESSMENT: PAIN_FUNCTIONAL_ASSESSMENT: ACTIVITIES ARE NOT PREVENTED

## 2019-03-21 ASSESSMENT — PAIN DESCRIPTION - PAIN TYPE
TYPE: SURGICAL PAIN
TYPE: SURGICAL PAIN

## 2019-03-21 ASSESSMENT — PAIN DESCRIPTION - ONSET
ONSET: ON-GOING
ONSET: ON-GOING

## 2019-03-21 ASSESSMENT — PAIN DESCRIPTION - DESCRIPTORS
DESCRIPTORS: DISCOMFORT
DESCRIPTORS: SORE

## 2019-03-21 ASSESSMENT — PAIN DESCRIPTION - FREQUENCY
FREQUENCY: INTERMITTENT
FREQUENCY: INTERMITTENT

## 2019-03-22 ENCOUNTER — APPOINTMENT (OUTPATIENT)
Dept: GENERAL RADIOLOGY | Age: 69
DRG: 707 | End: 2019-03-22
Attending: UROLOGY
Payer: MEDICARE

## 2019-03-22 LAB
BASOPHILS # BLD: 0.3 %
BASOPHILS ABSOLUTE: 0 THOU/MM3 (ref 0–0.1)
EOSINOPHIL # BLD: 0.2 %
EOSINOPHILS ABSOLUTE: 0 THOU/MM3 (ref 0–0.4)
ERYTHROCYTE [DISTWIDTH] IN BLOOD BY AUTOMATED COUNT: 12.1 % (ref 11.5–14.5)
ERYTHROCYTE [DISTWIDTH] IN BLOOD BY AUTOMATED COUNT: 43.8 FL (ref 35–45)
HCT VFR BLD CALC: 45.6 % (ref 42–52)
HEMOGLOBIN: 15.2 GM/DL (ref 14–18)
IMMATURE GRANS (ABS): 0.1 THOU/MM3 (ref 0–0.07)
IMMATURE GRANULOCYTES: 0.7 %
LYMPHOCYTES # BLD: 8.1 %
LYMPHOCYTES ABSOLUTE: 1.2 THOU/MM3 (ref 1–4.8)
MCH RBC QN AUTO: 32.8 PG (ref 26–33)
MCHC RBC AUTO-ENTMCNC: 33.3 GM/DL (ref 32.2–35.5)
MCV RBC AUTO: 98.5 FL (ref 80–94)
MONOCYTES # BLD: 10.5 %
MONOCYTES ABSOLUTE: 1.5 THOU/MM3 (ref 0.4–1.3)
NUCLEATED RED BLOOD CELLS: 0 /100 WBC
PLATELET # BLD: 177 THOU/MM3 (ref 130–400)
PMV BLD AUTO: 9 FL (ref 9.4–12.4)
RBC # BLD: 4.63 MILL/MM3 (ref 4.7–6.1)
SEG NEUTROPHILS: 80.2 %
SEGMENTED NEUTROPHILS ABSOLUTE COUNT: 11.6 THOU/MM3 (ref 1.8–7.7)
WBC # BLD: 14.5 THOU/MM3 (ref 4.8–10.8)

## 2019-03-22 PROCEDURE — 6370000000 HC RX 637 (ALT 250 FOR IP): Performed by: NURSE PRACTITIONER

## 2019-03-22 PROCEDURE — 36415 COLL VENOUS BLD VENIPUNCTURE: CPT

## 2019-03-22 PROCEDURE — 99024 POSTOP FOLLOW-UP VISIT: CPT | Performed by: NURSE PRACTITIONER

## 2019-03-22 PROCEDURE — 2709999900 HC NON-CHARGEABLE SUPPLY

## 2019-03-22 PROCEDURE — 99222 1ST HOSP IP/OBS MODERATE 55: CPT | Performed by: FAMILY MEDICINE

## 2019-03-22 PROCEDURE — APPNB45 APP NON BILLABLE 31-45 MINUTES: Performed by: NURSE PRACTITIONER

## 2019-03-22 PROCEDURE — 6360000002 HC RX W HCPCS: Performed by: PHYSICIAN ASSISTANT

## 2019-03-22 PROCEDURE — 74018 RADEX ABDOMEN 1 VIEW: CPT

## 2019-03-22 PROCEDURE — 2580000003 HC RX 258: Performed by: PHYSICIAN ASSISTANT

## 2019-03-22 PROCEDURE — 85025 COMPLETE CBC W/AUTO DIFF WBC: CPT

## 2019-03-22 PROCEDURE — 1200000003 HC TELEMETRY R&B

## 2019-03-22 PROCEDURE — 6370000000 HC RX 637 (ALT 250 FOR IP): Performed by: PHYSICIAN ASSISTANT

## 2019-03-22 PROCEDURE — 6370000000 HC RX 637 (ALT 250 FOR IP): Performed by: FAMILY MEDICINE

## 2019-03-22 RX ORDER — LISINOPRIL 10 MG/1
10 TABLET ORAL DAILY
Status: DISCONTINUED | OUTPATIENT
Start: 2019-03-22 | End: 2019-03-26 | Stop reason: HOSPADM

## 2019-03-22 RX ORDER — TRAMADOL HYDROCHLORIDE 50 MG/1
50 TABLET ORAL EVERY 6 HOURS PRN
Status: DISCONTINUED | OUTPATIENT
Start: 2019-03-22 | End: 2019-03-26 | Stop reason: HOSPADM

## 2019-03-22 RX ORDER — POLYETHYLENE GLYCOL 3350 17 G/17G
17 POWDER, FOR SOLUTION ORAL DAILY
Status: DISCONTINUED | OUTPATIENT
Start: 2019-03-22 | End: 2019-03-23

## 2019-03-22 RX ORDER — BISACODYL 10 MG
10 SUPPOSITORY, RECTAL RECTAL ONCE
Status: COMPLETED | OUTPATIENT
Start: 2019-03-22 | End: 2019-03-22

## 2019-03-22 RX ORDER — HYDRALAZINE HYDROCHLORIDE 10 MG/1
10 TABLET, FILM COATED ORAL EVERY 6 HOURS PRN
Status: DISCONTINUED | OUTPATIENT
Start: 2019-03-22 | End: 2019-03-26 | Stop reason: HOSPADM

## 2019-03-22 RX ADMIN — ATORVASTATIN CALCIUM 20 MG: 20 TABLET, FILM COATED ORAL at 23:01

## 2019-03-22 RX ADMIN — FAMOTIDINE 20 MG: 20 TABLET ORAL at 09:34

## 2019-03-22 RX ADMIN — BISACODYL 10 MG: 10 SUPPOSITORY RECTAL at 22:59

## 2019-03-22 RX ADMIN — LISINOPRIL 10 MG: 10 TABLET ORAL at 23:12

## 2019-03-22 RX ADMIN — NEOMYCIN AND POLYMYXIN B SULFATES, BACITRACIN ZINC, AND HYDROCORTISONE: 3.5; 5000; 400; 1 OINTMENT TOPICAL at 22:59

## 2019-03-22 RX ADMIN — TRAMADOL HYDROCHLORIDE 50 MG: 50 TABLET, FILM COATED ORAL at 21:13

## 2019-03-22 RX ADMIN — DOCUSATE SODIUM 100 MG: 100 CAPSULE, LIQUID FILLED ORAL at 22:59

## 2019-03-22 RX ADMIN — POLYETHYLENE GLYCOL 3350 17 G: 17 POWDER, FOR SOLUTION ORAL at 22:58

## 2019-03-22 RX ADMIN — FAMOTIDINE 20 MG: 20 TABLET ORAL at 22:59

## 2019-03-22 RX ADMIN — NEOMYCIN AND POLYMYXIN B SULFATES, BACITRACIN ZINC, AND HYDROCORTISONE: 3.5; 5000; 400; 1 OINTMENT TOPICAL at 09:53

## 2019-03-22 RX ADMIN — SENNOSIDES AND DOCUSATE SODIUM 1 TABLET: 8.6; 5 TABLET ORAL at 22:59

## 2019-03-22 RX ADMIN — ONDANSETRON 4 MG: 2 INJECTION INTRAMUSCULAR; INTRAVENOUS at 21:13

## 2019-03-22 RX ADMIN — ONDANSETRON 4 MG: 2 INJECTION INTRAMUSCULAR; INTRAVENOUS at 09:34

## 2019-03-22 RX ADMIN — SODIUM CHLORIDE: 9 INJECTION, SOLUTION INTRAVENOUS at 09:52

## 2019-03-22 RX ADMIN — METOPROLOL SUCCINATE 50 MG: 50 TABLET, EXTENDED RELEASE ORAL at 11:42

## 2019-03-22 RX ADMIN — ONDANSETRON 4 MG: 2 INJECTION INTRAMUSCULAR; INTRAVENOUS at 00:18

## 2019-03-22 RX ADMIN — DOCUSATE SODIUM 100 MG: 100 CAPSULE, LIQUID FILLED ORAL at 09:34

## 2019-03-22 RX ADMIN — LIDOCAINE HYDROCHLORIDE: 20 SOLUTION ORAL; TOPICAL at 18:05

## 2019-03-22 RX ADMIN — SODIUM CHLORIDE: 9 INJECTION, SOLUTION INTRAVENOUS at 21:08

## 2019-03-22 RX ADMIN — SENNOSIDES AND DOCUSATE SODIUM 1 TABLET: 8.6; 5 TABLET ORAL at 09:34

## 2019-03-22 RX ADMIN — HYDROCODONE BITARTRATE AND ACETAMINOPHEN 2 TABLET: 5; 325 TABLET ORAL at 00:02

## 2019-03-22 RX ADMIN — HYDROCODONE BITARTRATE AND ACETAMINOPHEN 2 TABLET: 5; 325 TABLET ORAL at 10:06

## 2019-03-22 ASSESSMENT — PAIN DESCRIPTION - DESCRIPTORS
DESCRIPTORS: SORE

## 2019-03-22 ASSESSMENT — PAIN DESCRIPTION - PROGRESSION
CLINICAL_PROGRESSION: NOT CHANGED
CLINICAL_PROGRESSION: GRADUALLY WORSENING
CLINICAL_PROGRESSION: GRADUALLY WORSENING

## 2019-03-22 ASSESSMENT — PAIN DESCRIPTION - ORIENTATION
ORIENTATION: LOWER

## 2019-03-22 ASSESSMENT — PAIN SCALES - GENERAL
PAINLEVEL_OUTOF10: 8
PAINLEVEL_OUTOF10: 10
PAINLEVEL_OUTOF10: 8
PAINLEVEL_OUTOF10: 6
PAINLEVEL_OUTOF10: 0
PAINLEVEL_OUTOF10: 2

## 2019-03-22 ASSESSMENT — PAIN - FUNCTIONAL ASSESSMENT
PAIN_FUNCTIONAL_ASSESSMENT: ACTIVITIES ARE NOT PREVENTED

## 2019-03-22 ASSESSMENT — PAIN DESCRIPTION - ONSET
ONSET: ON-GOING

## 2019-03-22 ASSESSMENT — PAIN DESCRIPTION - PAIN TYPE
TYPE: SURGICAL PAIN

## 2019-03-22 ASSESSMENT — PAIN DESCRIPTION - LOCATION
LOCATION: ABDOMEN

## 2019-03-22 ASSESSMENT — PAIN DESCRIPTION - FREQUENCY
FREQUENCY: INTERMITTENT

## 2019-03-23 ENCOUNTER — APPOINTMENT (OUTPATIENT)
Dept: GENERAL RADIOLOGY | Age: 69
DRG: 707 | End: 2019-03-23
Attending: UROLOGY
Payer: MEDICARE

## 2019-03-23 LAB — GLUCOSE BLD-MCNC: 138 MG/DL (ref 70–108)

## 2019-03-23 PROCEDURE — 1200000003 HC TELEMETRY R&B

## 2019-03-23 PROCEDURE — 94760 N-INVAS EAR/PLS OXIMETRY 1: CPT

## 2019-03-23 PROCEDURE — 6370000000 HC RX 637 (ALT 250 FOR IP): Performed by: NURSE PRACTITIONER

## 2019-03-23 PROCEDURE — 74018 RADEX ABDOMEN 1 VIEW: CPT

## 2019-03-23 PROCEDURE — 2580000003 HC RX 258: Performed by: PHYSICIAN ASSISTANT

## 2019-03-23 PROCEDURE — 6370000000 HC RX 637 (ALT 250 FOR IP): Performed by: PHYSICIAN ASSISTANT

## 2019-03-23 PROCEDURE — 6370000000 HC RX 637 (ALT 250 FOR IP): Performed by: FAMILY MEDICINE

## 2019-03-23 PROCEDURE — 99232 SBSQ HOSP IP/OBS MODERATE 35: CPT | Performed by: FAMILY MEDICINE

## 2019-03-23 PROCEDURE — 2709999900 HC NON-CHARGEABLE SUPPLY

## 2019-03-23 PROCEDURE — 82948 REAGENT STRIP/BLOOD GLUCOSE: CPT

## 2019-03-23 PROCEDURE — 99024 POSTOP FOLLOW-UP VISIT: CPT | Performed by: UROLOGY

## 2019-03-23 RX ORDER — POLYETHYLENE GLYCOL 3350 17 G/17G
17 POWDER, FOR SOLUTION ORAL DAILY PRN
Status: DISCONTINUED | OUTPATIENT
Start: 2019-03-23 | End: 2019-03-26 | Stop reason: HOSPADM

## 2019-03-23 RX ADMIN — DOCUSATE SODIUM 100 MG: 100 CAPSULE, LIQUID FILLED ORAL at 08:50

## 2019-03-23 RX ADMIN — SODIUM CHLORIDE: 9 INJECTION, SOLUTION INTRAVENOUS at 16:25

## 2019-03-23 RX ADMIN — LISINOPRIL 10 MG: 10 TABLET ORAL at 08:50

## 2019-03-23 RX ADMIN — DOCUSATE SODIUM 100 MG: 100 CAPSULE, LIQUID FILLED ORAL at 21:06

## 2019-03-23 RX ADMIN — ATORVASTATIN CALCIUM 20 MG: 20 TABLET, FILM COATED ORAL at 21:08

## 2019-03-23 RX ADMIN — NEOMYCIN AND POLYMYXIN B SULFATES, BACITRACIN ZINC, AND HYDROCORTISONE: 3.5; 5000; 400; 1 OINTMENT TOPICAL at 21:06

## 2019-03-23 RX ADMIN — NALOXEGOL OXALATE 12.5 MG: 12.5 TABLET, FILM COATED ORAL at 10:53

## 2019-03-23 RX ADMIN — SODIUM CHLORIDE: 9 INJECTION, SOLUTION INTRAVENOUS at 07:19

## 2019-03-23 RX ADMIN — METOPROLOL SUCCINATE 50 MG: 50 TABLET, EXTENDED RELEASE ORAL at 08:52

## 2019-03-23 RX ADMIN — NEOMYCIN AND POLYMYXIN B SULFATES, BACITRACIN ZINC, AND HYDROCORTISONE: 3.5; 5000; 400; 1 OINTMENT TOPICAL at 08:50

## 2019-03-23 RX ADMIN — FAMOTIDINE 20 MG: 20 TABLET ORAL at 21:06

## 2019-03-23 RX ADMIN — FAMOTIDINE 20 MG: 20 TABLET ORAL at 08:51

## 2019-03-23 ASSESSMENT — PAIN SCALES - GENERAL: PAINLEVEL_OUTOF10: 0

## 2019-03-24 LAB
GLUCOSE BLD-MCNC: 139 MG/DL (ref 70–108)
GLUCOSE BLD-MCNC: 87 MG/DL (ref 70–108)

## 2019-03-24 PROCEDURE — 82948 REAGENT STRIP/BLOOD GLUCOSE: CPT

## 2019-03-24 PROCEDURE — 2580000003 HC RX 258: Performed by: PHYSICIAN ASSISTANT

## 2019-03-24 PROCEDURE — 2709999900 HC NON-CHARGEABLE SUPPLY

## 2019-03-24 PROCEDURE — 6370000000 HC RX 637 (ALT 250 FOR IP): Performed by: PHYSICIAN ASSISTANT

## 2019-03-24 PROCEDURE — 6370000000 HC RX 637 (ALT 250 FOR IP): Performed by: FAMILY MEDICINE

## 2019-03-24 PROCEDURE — 97161 PT EVAL LOW COMPLEX 20 MIN: CPT

## 2019-03-24 PROCEDURE — 6370000000 HC RX 637 (ALT 250 FOR IP): Performed by: NURSE PRACTITIONER

## 2019-03-24 PROCEDURE — 99024 POSTOP FOLLOW-UP VISIT: CPT | Performed by: UROLOGY

## 2019-03-24 PROCEDURE — 1200000003 HC TELEMETRY R&B

## 2019-03-24 PROCEDURE — 97116 GAIT TRAINING THERAPY: CPT

## 2019-03-24 RX ADMIN — SENNOSIDES AND DOCUSATE SODIUM 1 TABLET: 8.6; 5 TABLET ORAL at 21:58

## 2019-03-24 RX ADMIN — NEOMYCIN AND POLYMYXIN B SULFATES, BACITRACIN ZINC, AND HYDROCORTISONE: 3.5; 5000; 400; 1 OINTMENT TOPICAL at 21:58

## 2019-03-24 RX ADMIN — DOCUSATE SODIUM 100 MG: 100 CAPSULE, LIQUID FILLED ORAL at 21:58

## 2019-03-24 RX ADMIN — SODIUM CHLORIDE: 9 INJECTION, SOLUTION INTRAVENOUS at 12:35

## 2019-03-24 RX ADMIN — DOCUSATE SODIUM 100 MG: 100 CAPSULE, LIQUID FILLED ORAL at 08:46

## 2019-03-24 RX ADMIN — FAMOTIDINE 20 MG: 20 TABLET ORAL at 08:46

## 2019-03-24 RX ADMIN — SODIUM CHLORIDE: 9 INJECTION, SOLUTION INTRAVENOUS at 02:26

## 2019-03-24 RX ADMIN — NEOMYCIN AND POLYMYXIN B SULFATES, BACITRACIN ZINC, AND HYDROCORTISONE: 3.5; 5000; 400; 1 OINTMENT TOPICAL at 08:43

## 2019-03-24 RX ADMIN — METOPROLOL SUCCINATE 50 MG: 50 TABLET, EXTENDED RELEASE ORAL at 08:47

## 2019-03-24 RX ADMIN — FAMOTIDINE 20 MG: 20 TABLET ORAL at 21:58

## 2019-03-24 RX ADMIN — LISINOPRIL 10 MG: 10 TABLET ORAL at 08:46

## 2019-03-24 RX ADMIN — SODIUM CHLORIDE: 9 INJECTION, SOLUTION INTRAVENOUS at 21:58

## 2019-03-24 RX ADMIN — NALOXEGOL OXALATE 12.5 MG: 12.5 TABLET, FILM COATED ORAL at 08:46

## 2019-03-24 RX ADMIN — ATORVASTATIN CALCIUM 20 MG: 20 TABLET, FILM COATED ORAL at 21:58

## 2019-03-24 ASSESSMENT — PAIN SCALES - GENERAL
PAINLEVEL_OUTOF10: 0
PAINLEVEL_OUTOF10: 0

## 2019-03-25 ENCOUNTER — APPOINTMENT (OUTPATIENT)
Dept: CT IMAGING | Age: 69
DRG: 707 | End: 2019-03-25
Attending: UROLOGY
Payer: MEDICARE

## 2019-03-25 ENCOUNTER — APPOINTMENT (OUTPATIENT)
Dept: GENERAL RADIOLOGY | Age: 69
DRG: 707 | End: 2019-03-25
Attending: UROLOGY
Payer: MEDICARE

## 2019-03-25 LAB
ANION GAP SERPL CALCULATED.3IONS-SCNC: 11 MEQ/L (ref 8–16)
BUN BLDV-MCNC: 6 MG/DL (ref 7–22)
CALCIUM SERPL-MCNC: 8.1 MG/DL (ref 8.5–10.5)
CHLORIDE BLD-SCNC: 106 MEQ/L (ref 98–111)
CO2: 25 MEQ/L (ref 23–33)
CREAT SERPL-MCNC: 0.5 MG/DL (ref 0.4–1.2)
ERYTHROCYTE [DISTWIDTH] IN BLOOD BY AUTOMATED COUNT: 12 % (ref 11.5–14.5)
ERYTHROCYTE [DISTWIDTH] IN BLOOD BY AUTOMATED COUNT: 42.4 FL (ref 35–45)
GFR SERPL CREATININE-BSD FRML MDRD: > 90 ML/MIN/1.73M2
GLUCOSE BLD-MCNC: 102 MG/DL (ref 70–108)
HCT VFR BLD CALC: 41.6 % (ref 42–52)
HEMOGLOBIN: 14.1 GM/DL (ref 14–18)
MCH RBC QN AUTO: 32.8 PG (ref 26–33)
MCHC RBC AUTO-ENTMCNC: 33.9 GM/DL (ref 32.2–35.5)
MCV RBC AUTO: 96.7 FL (ref 80–94)
PLATELET # BLD: 195 THOU/MM3 (ref 130–400)
PMV BLD AUTO: 9.1 FL (ref 9.4–12.4)
POTASSIUM SERPL-SCNC: 3.8 MEQ/L (ref 3.5–5.2)
RBC # BLD: 4.3 MILL/MM3 (ref 4.7–6.1)
SODIUM BLD-SCNC: 142 MEQ/L (ref 135–145)
WBC # BLD: 8.2 THOU/MM3 (ref 4.8–10.8)

## 2019-03-25 PROCEDURE — 6370000000 HC RX 637 (ALT 250 FOR IP): Performed by: FAMILY MEDICINE

## 2019-03-25 PROCEDURE — 6370000000 HC RX 637 (ALT 250 FOR IP): Performed by: NURSE PRACTITIONER

## 2019-03-25 PROCEDURE — 6370000000 HC RX 637 (ALT 250 FOR IP): Performed by: PHYSICIAN ASSISTANT

## 2019-03-25 PROCEDURE — 72192 CT PELVIS W/O DYE: CPT

## 2019-03-25 PROCEDURE — 99024 POSTOP FOLLOW-UP VISIT: CPT | Performed by: NURSE PRACTITIONER

## 2019-03-25 PROCEDURE — 36415 COLL VENOUS BLD VENIPUNCTURE: CPT

## 2019-03-25 PROCEDURE — 2580000003 HC RX 258: Performed by: PHYSICIAN ASSISTANT

## 2019-03-25 PROCEDURE — 74018 RADEX ABDOMEN 1 VIEW: CPT

## 2019-03-25 PROCEDURE — 80048 BASIC METABOLIC PNL TOTAL CA: CPT

## 2019-03-25 PROCEDURE — 1200000003 HC TELEMETRY R&B

## 2019-03-25 PROCEDURE — 85027 COMPLETE CBC AUTOMATED: CPT

## 2019-03-25 RX ADMIN — Medication 10 ML: at 22:12

## 2019-03-25 RX ADMIN — NEOMYCIN AND POLYMYXIN B SULFATES, BACITRACIN ZINC, AND HYDROCORTISONE: 3.5; 5000; 400; 1 OINTMENT TOPICAL at 22:12

## 2019-03-25 RX ADMIN — NALOXEGOL OXALATE 12.5 MG: 12.5 TABLET, FILM COATED ORAL at 08:17

## 2019-03-25 RX ADMIN — ATORVASTATIN CALCIUM 20 MG: 20 TABLET, FILM COATED ORAL at 22:13

## 2019-03-25 RX ADMIN — NEOMYCIN AND POLYMYXIN B SULFATES, BACITRACIN ZINC, AND HYDROCORTISONE: 3.5; 5000; 400; 1 OINTMENT TOPICAL at 08:15

## 2019-03-25 RX ADMIN — FAMOTIDINE 20 MG: 20 TABLET ORAL at 08:20

## 2019-03-25 RX ADMIN — LISINOPRIL 10 MG: 10 TABLET ORAL at 08:17

## 2019-03-25 RX ADMIN — METOPROLOL SUCCINATE 50 MG: 50 TABLET, EXTENDED RELEASE ORAL at 08:17

## 2019-03-25 RX ADMIN — Medication 10 ML: at 08:17

## 2019-03-25 RX ADMIN — FAMOTIDINE 20 MG: 20 TABLET ORAL at 22:11

## 2019-03-25 RX ADMIN — HYDRALAZINE HYDROCHLORIDE 10 MG: 10 TABLET, FILM COATED ORAL at 08:18

## 2019-03-25 ASSESSMENT — PAIN SCALES - GENERAL
PAINLEVEL_OUTOF10: 0

## 2019-03-26 ENCOUNTER — TELEPHONE (OUTPATIENT)
Dept: FAMILY MEDICINE CLINIC | Age: 69
End: 2019-03-26

## 2019-03-26 VITALS
TEMPERATURE: 98.1 F | SYSTOLIC BLOOD PRESSURE: 144 MMHG | HEART RATE: 80 BPM | OXYGEN SATURATION: 97 % | WEIGHT: 246.8 LBS | BODY MASS INDEX: 33.43 KG/M2 | DIASTOLIC BLOOD PRESSURE: 94 MMHG | RESPIRATION RATE: 18 BRPM | HEIGHT: 72 IN

## 2019-03-26 PROCEDURE — 99999 PR OFFICE/OUTPT VISIT,PROCEDURE ONLY: CPT | Performed by: NURSE PRACTITIONER

## 2019-03-26 PROCEDURE — 6370000000 HC RX 637 (ALT 250 FOR IP): Performed by: FAMILY MEDICINE

## 2019-03-26 PROCEDURE — 6370000000 HC RX 637 (ALT 250 FOR IP): Performed by: PHYSICIAN ASSISTANT

## 2019-03-26 PROCEDURE — APPNB45 APP NON BILLABLE 31-45 MINUTES: Performed by: NURSE PRACTITIONER

## 2019-03-26 PROCEDURE — 6370000000 HC RX 637 (ALT 250 FOR IP): Performed by: NURSE PRACTITIONER

## 2019-03-26 PROCEDURE — 99024 POSTOP FOLLOW-UP VISIT: CPT | Performed by: NURSE PRACTITIONER

## 2019-03-26 RX ORDER — TRAMADOL HYDROCHLORIDE 50 MG/1
50 TABLET ORAL EVERY 6 HOURS PRN
Qty: 20 TABLET | Refills: 0 | Status: SHIPPED | OUTPATIENT
Start: 2019-03-26 | End: 2019-03-31

## 2019-03-26 RX ADMIN — NEOMYCIN AND POLYMYXIN B SULFATES, BACITRACIN ZINC, AND HYDROCORTISONE: 3.5; 5000; 400; 1 OINTMENT TOPICAL at 09:11

## 2019-03-26 RX ADMIN — METOPROLOL SUCCINATE 50 MG: 50 TABLET, EXTENDED RELEASE ORAL at 09:11

## 2019-03-26 RX ADMIN — FAMOTIDINE 20 MG: 20 TABLET ORAL at 09:15

## 2019-03-26 RX ADMIN — LISINOPRIL 10 MG: 10 TABLET ORAL at 09:11

## 2019-03-26 RX ADMIN — NALOXEGOL OXALATE 12.5 MG: 12.5 TABLET, FILM COATED ORAL at 09:11

## 2019-03-26 ASSESSMENT — PAIN SCALES - GENERAL: PAINLEVEL_OUTOF10: 0

## 2019-03-27 ENCOUNTER — CARE COORDINATION (OUTPATIENT)
Dept: CASE MANAGEMENT | Age: 69
End: 2019-03-27

## 2019-03-27 ENCOUNTER — OFFICE VISIT (OUTPATIENT)
Dept: UROLOGY | Age: 69
End: 2019-03-27

## 2019-03-27 VITALS
HEIGHT: 72 IN | DIASTOLIC BLOOD PRESSURE: 52 MMHG | SYSTOLIC BLOOD PRESSURE: 80 MMHG | BODY MASS INDEX: 34.08 KG/M2 | WEIGHT: 251.6 LBS

## 2019-03-27 DIAGNOSIS — C61 PROSTATE CANCER (HCC): Primary | ICD-10-CM

## 2019-03-27 DIAGNOSIS — C61 PROSTATE CA (HCC): Primary | ICD-10-CM

## 2019-03-27 PROCEDURE — 99024 POSTOP FOLLOW-UP VISIT: CPT | Performed by: NURSE PRACTITIONER

## 2019-03-29 ENCOUNTER — CARE COORDINATION (OUTPATIENT)
Dept: CASE MANAGEMENT | Age: 69
End: 2019-03-29

## 2019-04-01 ENCOUNTER — CARE COORDINATION (OUTPATIENT)
Dept: CASE MANAGEMENT | Age: 69
End: 2019-04-01

## 2019-04-02 ENCOUNTER — CARE COORDINATION (OUTPATIENT)
Dept: CASE MANAGEMENT | Age: 69
End: 2019-04-02

## 2019-04-02 ENCOUNTER — OFFICE VISIT (OUTPATIENT)
Dept: FAMILY MEDICINE CLINIC | Age: 69
End: 2019-04-02
Payer: MEDICARE

## 2019-04-02 VITALS
HEIGHT: 71 IN | RESPIRATION RATE: 20 BRPM | TEMPERATURE: 98 F | SYSTOLIC BLOOD PRESSURE: 104 MMHG | BODY MASS INDEX: 33.88 KG/M2 | DIASTOLIC BLOOD PRESSURE: 80 MMHG | WEIGHT: 242 LBS | HEART RATE: 80 BPM

## 2019-04-02 DIAGNOSIS — I73.9 PVD (PERIPHERAL VASCULAR DISEASE) (HCC): ICD-10-CM

## 2019-04-02 DIAGNOSIS — Z09 HOSPITAL DISCHARGE FOLLOW-UP: Primary | ICD-10-CM

## 2019-04-02 DIAGNOSIS — I48.91 ATRIAL FIBRILLATION, UNSPECIFIED TYPE (HCC): ICD-10-CM

## 2019-04-02 DIAGNOSIS — Z90.79 HISTORY OF ROBOT-ASSISTED LAPAROSCOPIC RADICAL PROSTATECTOMY: ICD-10-CM

## 2019-04-02 DIAGNOSIS — R05.3 CHRONIC COUGH: ICD-10-CM

## 2019-04-02 PROCEDURE — 99495 TRANSJ CARE MGMT MOD F2F 14D: CPT | Performed by: NURSE PRACTITIONER

## 2019-04-02 PROCEDURE — G8598 ASA/ANTIPLAT THER USED: HCPCS | Performed by: NURSE PRACTITIONER

## 2019-04-02 PROCEDURE — 1036F TOBACCO NON-USER: CPT | Performed by: NURSE PRACTITIONER

## 2019-04-02 PROCEDURE — 4040F PNEUMOC VAC/ADMIN/RCVD: CPT | Performed by: NURSE PRACTITIONER

## 2019-04-02 PROCEDURE — G8417 CALC BMI ABV UP PARAM F/U: HCPCS | Performed by: NURSE PRACTITIONER

## 2019-04-02 PROCEDURE — G8427 DOCREV CUR MEDS BY ELIG CLIN: HCPCS | Performed by: NURSE PRACTITIONER

## 2019-04-02 PROCEDURE — 1111F DSCHRG MED/CURRENT MED MERGE: CPT | Performed by: NURSE PRACTITIONER

## 2019-04-02 PROCEDURE — 1123F ACP DISCUSS/DSCN MKR DOCD: CPT | Performed by: NURSE PRACTITIONER

## 2019-04-02 PROCEDURE — 3017F COLORECTAL CA SCREEN DOC REV: CPT | Performed by: NURSE PRACTITIONER

## 2019-04-02 RX ORDER — PREDNISONE 20 MG/1
20 TABLET ORAL 2 TIMES DAILY
Qty: 10 TABLET | Refills: 0 | Status: SHIPPED | OUTPATIENT
Start: 2019-04-02 | End: 2019-04-07

## 2019-04-02 ASSESSMENT — ENCOUNTER SYMPTOMS
RHINORRHEA: 0
COUGH: 1
EYE PAIN: 0
SORE THROAT: 0
DIARRHEA: 0
SHORTNESS OF BREATH: 0
STRIDOR: 0
NAUSEA: 0
COLOR CHANGE: 0
CONSTIPATION: 0
WHEEZING: 0
BACK PAIN: 0
ABDOMINAL PAIN: 0
EYE DISCHARGE: 0
VOMITING: 0

## 2019-04-02 ASSESSMENT — PATIENT HEALTH QUESTIONNAIRE - PHQ9
SUM OF ALL RESPONSES TO PHQ QUESTIONS 1-9: 2
2. FEELING DOWN, DEPRESSED OR HOPELESS: 1
1. LITTLE INTEREST OR PLEASURE IN DOING THINGS: 1
SUM OF ALL RESPONSES TO PHQ9 QUESTIONS 1 & 2: 2
SUM OF ALL RESPONSES TO PHQ QUESTIONS 1-9: 2

## 2019-04-02 NOTE — PROGRESS NOTES
Post-Discharge Transitional Care Management Services or Hospital Follow Up      Rachel Mantilla   YOB: 1950    Date of Office Visit:  4/2/2019  Date of Hospital Admission: 3/20/19  Date of Hospital Discharge: 3/26/19  Readmission Risk Score(high >=14%. Medium >=10%):Readmission Risk Score: 13      Care management risk score Rising risk (score 2-5) and Complex Care (Scores >=6): 4     Non face to face  following discharge, date last encounter closed (first attempt may have been earlier): 3/27/2019 11:00 AM 3/27/2019 11:00 AM    Call initiated 2 business days of discharge: Yes     Patient Active Problem List   Diagnosis    Benign prostatic hyperplasia    Hyperlipidemia    CVA (cerebral vascular accident) ([address]    Hypertension    Obesity    FH: heart disease    Bilateral carotid artery stenosis    Atrial fibrillation ([address]    Coronary artery disease involving coronary bypass graft of native heart without angina pectoris    PVD (peripheral vascular disease) ([address]    Prostate cancer ([address]    Ileus, postoperative ([address]       No Known Allergies    Medications listed as ordered at the time of discharge from [facility]   Patrice Hastings   Home Medication Instructions PRABHJOT:    Printed on:04/02/19 1134   Medication Information                      apixaban (ELIQUIS) 5 MG TABS tablet  Take 1 tablet by mouth 2 times daily             aspirin 81 MG tablet  Take 81 mg by mouth daily. atorvastatin (LIPITOR) 20 MG tablet  TAKE 1 TABLET BY MOUTH DAILY             Coenzyme Q10 (CO Q 10 PO)  Take  by mouth daily. ferrous sulfate 325 (65 FE) MG tablet  Take 1 tablet by mouth daily (with breakfast). Krill Oil 500 MG CAPS  Take  by mouth daily.              metoprolol succinate (TOPROL XL) 50 MG extended release tablet  TAKE 1 TABLET BY MOUTH EVERY DAY             predniSONE (DELTASONE) 20 MG tablet  Take 1 tablet by mouth 2 times daily for 5 days greenberg catheter, pain is minimal, ambulating well with assistance. He does complain of some left groin discomfort and weakness with adduction but denies difficulty ambulating. He already follows with Mango Edwards for PT and will ask her to evaluate left leg weakness. \"         Cough   Associated symptoms include myalgias. Pertinent negatives include no chest pain, chills, ear pain, fever, headaches, rash, rhinorrhea, sore throat, shortness of breath or wheezing. Inpatient course: Discharge summary reviewed- see chart. Interval history/Current status: stable    Review of Systems   Constitutional: Negative for activity change, appetite change, chills, fatigue and fever. HENT: Negative for congestion, ear pain, rhinorrhea and sore throat. Eyes: Negative for pain and discharge. Respiratory: Positive for cough. Negative for shortness of breath, wheezing and stridor. Cardiovascular: Negative for chest pain. Gastrointestinal: Negative for abdominal pain, constipation, diarrhea, nausea and vomiting. Genitourinary: Negative for dysuria, flank pain and frequency. Musculoskeletal: Positive for myalgias. Negative for arthralgias, back pain and neck pain. Skin: Negative for color change and rash. Allergic/Immunologic: Negative for immunocompromised state. Neurological: Negative for dizziness, weakness and headaches. Psychiatric/Behavioral: Negative. Vitals:    04/02/19 1034   BP: 104/80   Pulse: 80   Resp: 20   Temp: 98 °F (36.7 °C)   TempSrc: Oral   Weight: 242 lb (109.8 kg)   Height: 5' 10.5\" (1.791 m)     Body mass index is 34.23 kg/m². Wt Readings from Last 3 Encounters:   04/02/19 242 lb (109.8 kg)   03/27/19 251 lb 9.6 oz (114.1 kg)   03/20/19 246 lb 12.8 oz (111.9 kg)     BP Readings from Last 3 Encounters:   04/02/19 104/80   03/27/19 (!) 80/52   03/26/19 (!) 144/94       Physical Exam   Constitutional: He is oriented to person, place, and time.  He appears well-developed and well-nourished. He is cooperative. No distress. HENT:   Right Ear: Hearing, tympanic membrane, external ear and ear canal normal.   Left Ear: Hearing, tympanic membrane, external ear and ear canal normal.   Nose: Nose normal. No mucosal edema or rhinorrhea. Mouth/Throat: Uvula is midline, oropharynx is clear and moist and mucous membranes are normal. No uvula swelling. No oropharyngeal exudate, posterior oropharyngeal edema or posterior oropharyngeal erythema. Eyes: Pupils are equal, round, and reactive to light. Conjunctivae, EOM and lids are normal. Right eye exhibits no discharge. Left eye exhibits no discharge. Neck: Full passive range of motion without pain. No muscular tenderness present. Cardiovascular: Normal rate and normal heart sounds. An irregularly irregular rhythm present. PMI is not displaced. Pulmonary/Chest: Effort normal and breath sounds normal. No accessory muscle usage. No respiratory distress. He has no wheezes. He has no rales. Musculoskeletal: He exhibits no tenderness or deformity. Lymphadenopathy:     He has no cervical adenopathy. Neurological: He is alert and oriented to person, place, and time. He has normal strength. No cranial nerve deficit. Coordination and gait normal. GCS eye subscore is 4. GCS verbal subscore is 5. GCS motor subscore is 6. Skin: Skin is warm and dry. He is not diaphoretic. Psychiatric: He has a normal mood and affect. His speech is normal and behavior is normal. Judgment and thought content normal. Cognition and memory are normal.   Nursing note and vitals reviewed. He states his left leg discomfort and function are returning. This was likely due to surgical positioning. Assessment/Plan:  1. Hospital discharge follow-up    - [place] DISCHARGE MEDS RECONCILED W/ CURRENT OUTPATIENT MED LIST    2. PVD (peripheral vascular disease) ([address] stable      3. Atrial fibrillation, unspecified type ([address] stable      4.  Chronic cough   Complains of his chronic cough since January returning. Has no sore throat or other symptoms. Is treated with a short course of Prednisone. Will follow up with CXR if persistent due to previous finding of pulmonary nodules and granulomas.      5. History of robot-assisted laparoscopic radical prostatectomy     Doing very well with incontinence improving daily.     - [place] DISCHARGE MEDS RECONCILED W/ CURRENT OUTPATIENT MED LIST        Medical Decision Making: moderate complexity

## 2019-04-03 ENCOUNTER — CARE COORDINATION (OUTPATIENT)
Dept: CASE MANAGEMENT | Age: 69
End: 2019-04-03

## 2019-04-03 NOTE — CARE COORDINATION
[facility] Transitions Follow Up Call    4/3/2019    Patient: Nilsa Macdonald  Patient : 1950   MRN: [id number]  Reason for Admission: No admission diagnoses are documented for this encounter. Discharge Date: 3/26/19 RARS: Readmission Risk Score: 13    Third and final attempt to contact patient for sub Care Transition follow up. Message left for patient to return call. Contact information for CTC provided. CTC will continue to follow if patient returns call. Care Transitions Subsequent and Final Call    Subsequent and Final Calls  Care Transitions Interventions  Other Interventions:             Follow Up  Future Appointments   Date Time Provider Meggan Ramirez   2019  9:30 AM Cecile Lennox, APRN - CNP [facility]   2019  8:45 AM Paulette [facility] [address]   2019  7:45 AM Paulette [facility] [place] [address]   10/3/2019  8:30 AM Toni Avina MD [address] [facility] - Alec Tang RN  Care Transition Coordinator  (N) [phone]  21

## 2019-04-08 ENCOUNTER — OFFICE VISIT (OUTPATIENT)
Dept: FAMILY MEDICINE CLINIC | Age: 69
End: 2019-04-08
Payer: MEDICARE

## 2019-04-08 ENCOUNTER — HOSPITAL ENCOUNTER (OUTPATIENT)
Age: 69
Discharge: HOME | End: 2019-04-08
Payer: MEDICARE

## 2019-04-08 ENCOUNTER — HOSPITAL ENCOUNTER (OUTPATIENT)
Dept: GENERAL RADIOLOGY | Age: 69
Discharge: HOME | End: 2019-04-08
Payer: MEDICARE

## 2019-04-08 VITALS
SYSTOLIC BLOOD PRESSURE: 119 MMHG | WEIGHT: 236.4 LBS | BODY MASS INDEX: 33.44 KG/M2 | TEMPERATURE: 98 F | DIASTOLIC BLOOD PRESSURE: 68 MMHG | HEART RATE: 66 BPM | RESPIRATION RATE: 18 BRPM

## 2019-04-08 DIAGNOSIS — R05.3 CHRONIC COUGH: ICD-10-CM

## 2019-04-08 DIAGNOSIS — J06.9 URI WITH COUGH AND CONGESTION: Primary | ICD-10-CM

## 2019-04-08 DIAGNOSIS — R06.2 WHEEZING: ICD-10-CM

## 2019-04-08 PROCEDURE — G8417 CALC BMI ABV UP PARAM F/U: HCPCS | Performed by: NURSE PRACTITIONER

## 2019-04-08 PROCEDURE — 3017F COLORECTAL CA SCREEN DOC REV: CPT | Performed by: NURSE PRACTITIONER

## 2019-04-08 PROCEDURE — 1111F DSCHRG MED/CURRENT MED MERGE: CPT | Performed by: NURSE PRACTITIONER

## 2019-04-08 PROCEDURE — 1036F TOBACCO NON-USER: CPT | Performed by: NURSE PRACTITIONER

## 2019-04-08 PROCEDURE — G8598 ASA/ANTIPLAT THER USED: HCPCS | Performed by: NURSE PRACTITIONER

## 2019-04-08 PROCEDURE — 1123F ACP DISCUSS/DSCN MKR DOCD: CPT | Performed by: NURSE PRACTITIONER

## 2019-04-08 PROCEDURE — 4040F PNEUMOC VAC/ADMIN/RCVD: CPT | Performed by: NURSE PRACTITIONER

## 2019-04-08 PROCEDURE — G8427 DOCREV CUR MEDS BY ELIG CLIN: HCPCS | Performed by: NURSE PRACTITIONER

## 2019-04-08 PROCEDURE — 71046 X-RAY EXAM CHEST 2 VIEWS: CPT

## 2019-04-08 PROCEDURE — 99213 OFFICE O/P EST LOW 20 MIN: CPT | Performed by: NURSE PRACTITIONER

## 2019-04-08 RX ORDER — AMOXICILLIN AND CLAVULANATE POTASSIUM 875; 125 MG/1; MG/1
1 TABLET, FILM COATED ORAL 2 TIMES DAILY
Qty: 20 TABLET | Refills: 0 | Status: SHIPPED | OUTPATIENT
Start: 2019-04-08 | End: 2019-04-18

## 2019-04-08 RX ORDER — GUAIFENESIN AND CODEINE PHOSPHATE 100; 10 MG/5ML; MG/5ML
5 SOLUTION ORAL 2 TIMES DAILY PRN
Qty: 60 ML | Refills: 0 | Status: SHIPPED | OUTPATIENT
Start: 2019-04-08 | End: 2019-04-11

## 2019-04-08 ASSESSMENT — ENCOUNTER SYMPTOMS
CONSTIPATION: 0
SORE THROAT: 0
ABDOMINAL PAIN: 0
BACK PAIN: 0
NAUSEA: 0
DIARRHEA: 0
COLOR CHANGE: 0
EYE PAIN: 0
STRIDOR: 0
EYE DISCHARGE: 0
WHEEZING: 0
VOMITING: 0
COUGH: 1
RHINORRHEA: 0
SHORTNESS OF BREATH: 0

## 2019-04-08 NOTE — PROGRESS NOTES
Disp: , Rfl:     ferrous sulfate 325 (65 FE) MG tablet, Take 1 tablet by mouth daily (with breakfast). , Disp: 30 tablet, Rfl: 11    Krill Oil 500 MG CAPS, Take  by mouth daily. , Disp: , Rfl:     Coenzyme Q10 (CO Q 10 PO), Take  by mouth daily. , Disp: , Rfl:     The patient has No Known Allergies. Past Medical History  Sofiya Saleem  has a past medical history of BPH (benign prostatic hyperplasia), CAD (coronary artery disease), CVA (cerebral vascular accident) ([facility] FH: heart disease, Hyperlipidemia, Hypertension, and Obesity. Subjective:      Review of Systems   Constitutional: Positive for fatigue. Negative for activity change, appetite change, chills and fever. HENT: Positive for congestion. Negative for ear pain, rhinorrhea and sore throat. Eyes: Negative for pain and discharge. Respiratory: Positive for cough. Negative for shortness of breath, wheezing and stridor. Cardiovascular: Negative for chest pain. Gastrointestinal: Negative for abdominal pain, constipation, diarrhea, nausea and vomiting. Genitourinary: Negative for dysuria, flank pain and frequency. Musculoskeletal: Negative for arthralgias, back pain, myalgias and neck pain. Skin: Negative for color change and rash. Allergic/Immunologic: Negative for immunocompromised state. Neurological: Negative for dizziness, weakness and headaches. Psychiatric/Behavioral: Negative. Objective:     /68 (Site: Right Upper Arm)   Pulse 66   Temp 98 °F (36.7 °C) (Oral)   Resp 18   Wt 236 lb 6.4 oz (107.2 kg)   BMI 33.44 kg/m²     Physical Exam   Constitutional: He is oriented to person, place, and time. He appears well-developed and well-nourished. He is cooperative. He appears ill. No distress. HENT:   Right Ear: Hearing, tympanic membrane, external ear and ear canal normal.   Left Ear: Hearing, tympanic membrane, external ear and ear canal normal.   Nose: Nose normal. No mucosal edema or rhinorrhea.    Mouth/Throat: Uvula is midline, oropharynx is clear and moist and mucous membranes are normal. No uvula swelling. No oropharyngeal exudate, posterior oropharyngeal edema or posterior oropharyngeal erythema. Eyes: Pupils are equal, round, and reactive to light. Conjunctivae, EOM and lids are normal. Right eye exhibits no discharge. Left eye exhibits no discharge. Neck: Full passive range of motion without pain. No muscular tenderness present. Cardiovascular: Normal rate and normal heart sounds. An irregularly irregular rhythm present. Pulmonary/Chest: Effort normal and breath sounds normal. No accessory muscle usage. No respiratory distress. He has no wheezes. He has no rales. Lymphadenopathy:     He has no cervical adenopathy. Neurological: He is alert and oriented to person, place, and time. He has normal strength. No cranial nerve deficit. Coordination and gait normal. GCS eye subscore is 4. GCS verbal subscore is 5. GCS motor subscore is 6. Skin: Skin is warm and dry. He is not diaphoretic. Psychiatric: He has a normal mood and affect. His speech is normal and behavior is normal. Judgment and thought content normal. Cognition and memory are normal.   Nursing note and vitals reviewed. Assessment/Plan:      Gordon Mena was seen today for cough. His presentation today is worse than on his previous visit as he is very mildly pale, and does not look like he feels well. He has wheezing throughout all lung fields now, and light Rales in both bases. There is concern for pneumonia. He is afebrile. Apical pulse is irregularly irregular, and he is in A. fib most of the time at a controlled rate. Patient is started on Augmentin and given some narcotic cough syrup. He is going to go and get a chest x-ray completed on an outpatient basis and will follow up as needed. Diagnoses and all orders for this visit:    URI with cough and congestion  -     amoxicillin-clavulanate (AUGMENTIN) 875-125 MG per tablet;  Take 1 tablet by mouth 2 times daily for 10 days  -     guaiFENesin-codeine (TUSSI-ORGANIDIN NR) 100-10 MG/5ML syrup; Take 5 mLs by mouth 2 times daily as needed for Cough for up to 3 days. Chronic cough  -     XR CHEST STANDARD (2 VW); Future    Wheezing        Return if symptoms worsen or fail to improve. Patient instructions given amid.         Electronically signed by DOREEN Gordon CNP on 4/8/2019 at 11:14 AM

## 2019-04-08 NOTE — PATIENT INSTRUCTIONS
Patient Education        Chronic Cough: Care Instructions  Your Care Instructions    A cough is your body's response to something that bothers your throat or airways. Many things can cause a cough. You might cough because of a cold or the flu, bronchitis, or asthma. Smoking, postnasal drip, allergies, and stomach acid that backs up into your throat also can cause a cough. A cough can be short-term (acute) or long-term (chronic). A chronic cough lasts more than 3 weeks. A chronic cough is often caused by a long-term problem, such as asthma. Another cause might be a medicine, such as an ACE inhibitor. A cough is a symptom, not a disease. To treat a chronic cough, you may need to treat the problem that causes it. You can take a few steps at home to cough less and feel better. Some people cough or clear their throat out of habit for no clear reason. Follow-up care is a key part of your treatment and safety. Be sure to make and go to all appointments, and call your doctor if you are having problems. It's also a good idea to know your test results and keep a list of the medicines you take. How can you care for yourself at home? · Drink plenty of water and other fluids. This may help soothe a dry or sore throat. Honey or lemon juice in hot water or tea may ease a dry cough. · Prop up your head on pillows to help you breathe and ease a cough. · Do not smoke or allow others to smoke around you. Smoke can make a cough worse. If you need help quitting, talk to your doctor about stop-smoking programs and medicines. These can increase your chances of quitting for good. · Avoid exposure to smoke, dust, or other pollutants, or wear a face mask. Check with your doctor or pharmacist to find out which type of face mask will give you the most benefit. · Take cough medicine as directed by your doctor. · Try cough drops to soothe a dry or sore throat. Cough drops don't stop a cough.  Medicine-flavored cough drops are no better than candy-flavored drops or hard candy. Throat clearing  When you have a chronic cough or a disease that may cause this type of cough, you may often feel like you want to clear your throat. This helps bring up mucus. But throat clearing does not always have a cause. Throat clearing can become a habit. The more you do it, the more you feel like you need to do it. But frequent throat clearing can be hard on your vocal cords. It's like slamming them together. To help lessen throat clearing, you can try:  · Taking small sips of water. · Not clearing your throat when you feel you need to. · Swallowing hard when you want to clear your throat. You may want to ask your doctor if a medicine that thins mucus would help. When should you call for help? Call 911 anytime you think you may need emergency care. For example, call if:    · You have severe trouble breathing.    Call your doctor now or seek immediate medical care if:    · You cough up blood.     · You have new or worse trouble breathing.     · You have a new or higher fever.    Watch closely for changes in your health, and be sure to contact your doctor if:    · You cough more deeply or more often, especially if you notice more mucus or a change in the color of your mucus.     · You do not get better as expected. Where can you learn more? Go to [email or web address]://[email or web address] org and sign in to your [employer] account. Enter U178 in the [employer] box to learn more about \"Chronic Cough: Care Instructions. \"     If you do not have an account, please click on the \"Sign Up Now\" link. Current as of: September 5, 2018  Content Version: 11.9  © 2566-0166 [employer]. Care instructions adapted under license by [facility] ([facility]). If you have questions about a medical condition or this instruction, always ask your healthcare professional. [address] any warranty or liability for your use of this information.

## 2019-04-29 ENCOUNTER — TELEPHONE (OUTPATIENT)
Dept: UROLOGY | Age: 69
End: 2019-04-29

## 2019-04-29 NOTE — TELEPHONE ENCOUNTER
[facility] sent out with demo sheet, insurance info, progress note, and surgical path. Pt has not had a psa done post surgery yet. See scan.

## 2019-04-30 ENCOUNTER — TELEPHONE (OUTPATIENT)
Dept: UROLOGY | Age: 69
End: 2019-04-30

## 2019-04-30 NOTE — TELEPHONE ENCOUNTER
----- Message from DOREEN Pierce CNP sent at 4/30/2019  8:10 AM EDT -----  See below. Please send for [facility]. Thanks    ----- Message -----  From: Berkley Nava MD  Sent: 4/28/2019   8:24 PM  To: DOREEN Pierce CNP    Can we prostatectomy specimen for [facility]? Thanks!     C

## 2019-05-03 ENCOUNTER — HOSPITAL ENCOUNTER (OUTPATIENT)
Age: 69
Discharge: HOME | End: 2019-05-03
Payer: MEDICARE

## 2019-05-03 DIAGNOSIS — C61 PROSTATE CA (HCC): ICD-10-CM

## 2019-05-03 LAB — PROSTATE SPECIFIC ANTIGEN: 0.07 NG/ML (ref 0–1)

## 2019-05-03 PROCEDURE — 36415 COLL VENOUS BLD VENIPUNCTURE: CPT

## 2019-05-03 PROCEDURE — 84153 ASSAY OF PSA TOTAL: CPT

## 2019-05-15 ENCOUNTER — OFFICE VISIT (OUTPATIENT)
Dept: UROLOGY | Age: 69
End: 2019-05-15
Payer: MEDICARE

## 2019-05-15 VITALS
HEIGHT: 72 IN | WEIGHT: 241 LBS | BODY MASS INDEX: 32.64 KG/M2 | DIASTOLIC BLOOD PRESSURE: 84 MMHG | SYSTOLIC BLOOD PRESSURE: 122 MMHG

## 2019-05-15 DIAGNOSIS — C61 PROSTATE CA (HCC): Primary | ICD-10-CM

## 2019-05-15 LAB
BILIRUBIN URINE: NEGATIVE
BLOOD URINE, POC: NEGATIVE
CHARACTER, URINE: CLEAR
COLOR, URINE: YELLOW
GLUCOSE URINE: NEGATIVE MG/DL
KETONES, URINE: NEGATIVE
LEUKOCYTE CLUMPS, URINE: NEGATIVE
NITRITE, URINE: NEGATIVE
PH, URINE: 5.5 (ref 5–9)
PROTEIN, URINE: NEGATIVE MG/DL
SPECIFIC GRAVITY, URINE: 1.02 (ref 1–1.03)
UROBILINOGEN, URINE: 0.2 EU/DL (ref 0–1)

## 2019-05-15 PROCEDURE — 99024 POSTOP FOLLOW-UP VISIT: CPT | Performed by: NURSE PRACTITIONER

## 2019-05-15 PROCEDURE — 81003 URINALYSIS AUTO W/O SCOPE: CPT | Performed by: NURSE PRACTITIONER

## 2019-05-15 NOTE — PROGRESS NOTES
[address] [place] [address] Daniel Charles  Dept: [phone]  Dept Fax: [phone] : [phone]      Visit Date: 5/15/2019    HPI:     Gordon Mena presents for follow-up of prostate cancer. On 3/20/19 he underwent Robot-Assisted Laparoscopic Radical Prostatectomy (RALRP) and bilateral pelvic lymph node dissection and bilateral nerve wrap per Dr. Dafne Pearce. Pathology showed Tip 3+4=7 prostate cancer with tertiary 5, perineural invasion and right bladder neck involvement, pT3a N0. He reports the incontinence is improving. He is now dry at night. He is down to wearing pads during the day. He has one more visit with PT. He is feeling well. He denies any hematuria or dysuria. Past Medical History:   Diagnosis Date    BPH (benign prostatic hyperplasia)     CAD (coronary artery disease)     CVA (cerebral vascular accident) ([facility] [address]    FH: heart disease     Hyperlipidemia     Hypertension     Obesity       Past Surgical History:   Procedure Laterality Date    CARDIAC VALVE REPLACEMENT  April 2011    [place], [place]    CARDIOVASCULAR STRESS TEST  1 05 2011    Cannot exclude slight inferobasal lateral stress-induced ischemia in a relatively small-sized area. EF 68%. Mildly abnormal nuclear scan. Lexiscan test associated with nonspecific symptoms. EKG nondiagnostic with nonspecific ST-T wave changes.  CAROTID ENDARTERECTOMY  2 08 2011    Right carotid endarterectomy with patch angioplasty with convention patch angioplasty.  CORONARY ARTERY BYPASS GRAFT      DIAGNOSTIC CARDIAC CATH LAB PROCEDURE  3 24 2011    LV end-diastolic pressure was 12 mmHg with no change before and after contrast injection. There was no significant gradient across the aortic valve to signify aortic stenosis. LV function was within normal limits, EF 55%. 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 Nonobstructive diffuse disease in a small sized RCA. Nomral LV function.  HERNIA REPAIR      Inguinal hernia repair.  PROSTATECTOMY N/A 3/20/2019    PROSTATECTOMY LAPAROSCOPIC ROBOTIC performed by Gopi Dinero MD at [address] [place] [address] ECHOCARDIOGRAM  12 21 2010    Size was normal. Systolic function was normal. EF was estimated in the range of 55-65%. There were no regional wall motion abnormalities. Wall thickness was normal. Doppler - LV diastolic function parameters were normal. Mild MR. The aortic valve was trileaflet. Leaflets exhibited mildly increased thickness, mild calcification, normal cuspal separation, and sclerosis. Mild TR. Family History   Problem Relation Age of Onset    Heart Disease Mother     Diabetes Mother     Heart Disease Father        Social History     Tobacco Use    Smoking status: Never Smoker    Smokeless tobacco: Former User     Types: Chew   Substance Use Topics    Alcohol use: No     Alcohol/week: 0.0 oz          ROS:  Constitutional: Negative for chills, fatigue, fever, or weight loss. Eyes: Denies reported visual changes. ENT: Denies headache, difficulty swallowing, nose bleeds, ringing in ears, or earaches. Cardiovascular: Negative for chest pain, palpitations, tachycardia or edema. Respiratory: Denies cough or SOB. GI:The patient denies abdominal or flank pain, anorexia, nausea or vomiting. : See HPI  Musculoskeletal: Patient denies low back pain or painful or reduced ROM of the back or extremities. Neurological: The patient denies any symptoms of neurological impairment or TIA's; no history of stroke. Lymphatic: Denies swollen glands in neck, axillary or inguinal areas. Psychiatric: Denies anxiety or depression. Skin: Denies rash or lesions. The remainder of the complete ROS is negative    PHYSICAL EXAM:  VITALS:  There were no vitals taken for this visit. .    Constitutional:    Alert and oriented times 3, no acute distress and cooperative to examination with appropriate mood and affect. HEENT:   Head:         Normocephalic and atraumatic. Mouth/Throat:          Mucous membranes are normal.   Eyes:         EOM are normal. No scleral icterus. Nose:    The external appearance of the nose is normal  Ears: The ears appear normal to external inspection   Neck:         Supple, symmetrical, trachea midline, no adenopathy, thyroid symmetric, not enlarged and no tenderness. Cardiovascular:        Normal rate, regular rhythm, S1 S2 heart sounds. Pulmonary/Chest:       Chest symmetric with normal A/P diameter, no wheezes, rales, or rhonchi noted. Normal respiratory rate and rhthym. No use of accessory muscles. Abdominal:          Soft. No tenderness. Active bowel sounds. Musculoskeletal:    Normal range of motion. She exhibits no edema or tenderness of lower extremities. Extremities:    No cyanosis, clubbing, or edema present. Neurological:    Alert and oriented. No cranial nerve deficit. There are no focalizing motor or sensory deficits.     DATA:  CBC:   Lab Results   Component Value Date    WBC 8.2 03/25/2019    RBC 4.30 03/25/2019    HGB 14.1 03/25/2019    HCT 41.6 03/25/2019    MCV 96.7 03/25/2019    MCH 32.8 03/25/2019    MCHC 33.9 03/25/2019    RDW 12.1 04/01/2015     03/25/2019    MPV 9.1 03/25/2019     BMP:    Lab Results   Component Value Date     03/25/2019    K 3.8 03/25/2019     03/25/2019    CO2 25 03/25/2019    BUN 6 03/25/2019    CREATININE 0.5 03/25/2019    CALCIUM 8.1 03/25/2019    LABGLOM >90 03/25/2019    GLUCOSE 102 03/25/2019     BUN/Creatinine:    Lab Results   Component Value Date    BUN 6 03/25/2019    CREATININE 0.5 03/25/2019     Magnesium:  No results found for: MG  Phosphorus:  No results found for: PHOS  PT/INR:    Lab Results   Component Value Date    INR 1.05 03/20/2019     U/A:  No results found for: NITRITE, COLORU, PHUR, LABCAST, WBCUA, RBCUA, MUCUS, TRICHOMONAS, YEAST, BACTERIA, CLARITYU, SPECGRAV, LEUKOCYTESUR, UROBILINOGEN, BILIRUBINUR, BLOODU, GLUCOSEU, AMORPHOUS    Clinical Information: PROSTATE CANCER    FINAL DIAGNOSIS:  A.  Prostate, radical prostatectomy:      Adenocarcinoma of the prostate, Tip score 3+4 =7 with tertiary      5, involving approximately 30% of prostatic tissue, with perineural      invasion and right bladder neck involvement, stage pT3a N0.   Margin positive for carcinoma, right bladder neck. B.  Lymph nodes, left pelvic, excision:   Two lymph nodes, negative for malignancy (0/2). C.  Lymph nodes, right pelvic, excision:   Three lymph nodes, negative for malignancy (0/3). Lab Results   Component Value Date    PSA 0.07 05/03/2019    PSA 8.28 (H) 09/06/2018    PSA 5.68 (H) 04/01/2015     Results for POC orders placed in visit on 05/15/19   POCT Urinalysis No Micro (Auto)   Result Value Ref Range    Glucose, Ur Negative NEGATIVE mg/dl    Bilirubin Urine Negative     Ketones, Urine Negative NEGATIVE    Specific Gravity, Urine 1.025 1.002 - 1.03    Blood, UA POC Negative NEGATIVE    pH, Urine 5.50 5.0 - 9.0    Protein, Urine Negative NEGATIVE mg/dl    Urobilinogen, Urine 0.20 0.0 - 1.0 eu/dl    Nitrite, Urine Negative NEGATIVE    Leukocyte Clumps, Urine Negative NEGATIVE    Color, Urine Yellow YELLOW-STR    Character, Urine Clear [id number]       Assessment & Plan:      Prostate Cancer pT3a    PSA is higher than we hoped and is 0.07. He will need adjunct radiation when continence is achieved. He deferred daily sildenafil at last visit. Follow-up in 2 months with PSA prior.         Electronically signed by DOREEN Diaz CNP on 5/14/2019 at 9:28 PM

## 2019-06-11 ENCOUNTER — HOSPITAL ENCOUNTER (EMERGENCY)
Age: 69
Discharge: HOME | End: 2019-06-11
Attending: EMERGENCY MEDICINE
Payer: MEDICARE

## 2019-06-11 ENCOUNTER — APPOINTMENT (OUTPATIENT)
Dept: GENERAL RADIOLOGY | Age: 69
End: 2019-06-11
Payer: MEDICARE

## 2019-06-11 VITALS
HEART RATE: 97 BPM | TEMPERATURE: 100.1 F | RESPIRATION RATE: 12 BRPM | HEIGHT: 72 IN | SYSTOLIC BLOOD PRESSURE: 172 MMHG | OXYGEN SATURATION: 95 % | DIASTOLIC BLOOD PRESSURE: 99 MMHG | BODY MASS INDEX: 32.78 KG/M2 | WEIGHT: 242 LBS

## 2019-06-11 DIAGNOSIS — R50.9 FEBRILE ILLNESS: Primary | ICD-10-CM

## 2019-06-11 LAB
ANION GAP SERPL CALCULATED.3IONS-SCNC: 10 MEQ/L (ref 8–16)
BACTERIA: ABNORMAL /HPF
BASOPHILS # BLD: 0.3 %
BASOPHILS ABSOLUTE: 0.1 THOU/MM3 (ref 0–0.1)
BILIRUBIN URINE: NEGATIVE
BLOOD, URINE: NEGATIVE
BUN BLDV-MCNC: 12 MG/DL (ref 7–22)
C-REACTIVE PROTEIN: 3.48 MG/DL (ref 0–1)
CALCIUM SERPL-MCNC: 9.3 MG/DL (ref 8.5–10.5)
CASTS 2: ABNORMAL /LPF
CASTS UA: ABNORMAL /LPF
CHARACTER, URINE: CLEAR
CHLORIDE BLD-SCNC: 101 MEQ/L (ref 98–111)
CO2: 26 MEQ/L (ref 23–33)
COLOR: YELLOW
CREAT SERPL-MCNC: 0.7 MG/DL (ref 0.4–1.2)
CRYSTALS, UA: ABNORMAL
EKG ATRIAL RATE: 277 BPM
EKG Q-T INTERVAL: 358 MS
EKG QRS DURATION: 98 MS
EKG QTC CALCULATION (BAZETT): 454 MS
EKG R AXIS: -13 DEGREES
EKG T AXIS: 22 DEGREES
EKG VENTRICULAR RATE: 97 BPM
EOSINOPHIL # BLD: 0.3 %
EOSINOPHILS ABSOLUTE: 0.1 THOU/MM3 (ref 0–0.4)
EPITHELIAL CELLS, UA: ABNORMAL /HPF
ERYTHROCYTE [DISTWIDTH] IN BLOOD BY AUTOMATED COUNT: 12 % (ref 11.5–14.5)
ERYTHROCYTE [DISTWIDTH] IN BLOOD BY AUTOMATED COUNT: 44.5 FL (ref 35–45)
GFR SERPL CREATININE-BSD FRML MDRD: > 90 ML/MIN/1.73M2
GLUCOSE BLD-MCNC: 142 MG/DL (ref 70–108)
GLUCOSE URINE: NEGATIVE MG/DL
HCT VFR BLD CALC: 45.7 % (ref 42–52)
HEMOGLOBIN: 15.2 GM/DL (ref 14–18)
IMMATURE GRANS (ABS): 0.25 THOU/MM3 (ref 0–0.07)
IMMATURE GRANULOCYTES: 1.1 %
KETONES, URINE: NEGATIVE
LACTIC ACID: 1.2 MMOL/L (ref 0.5–2.2)
LEUKOCYTE ESTERASE, URINE: NEGATIVE
LYMPHOCYTES # BLD: 2.9 %
LYMPHOCYTES ABSOLUTE: 0.6 THOU/MM3 (ref 1–4.8)
MCH RBC QN AUTO: 33.4 PG (ref 26–33)
MCHC RBC AUTO-ENTMCNC: 33.3 GM/DL (ref 32.2–35.5)
MCV RBC AUTO: 100.4 FL (ref 80–94)
MISCELLANEOUS 2: ABNORMAL
MONOCYTES # BLD: 6 %
MONOCYTES ABSOLUTE: 1.3 THOU/MM3 (ref 0.4–1.3)
NITRITE, URINE: NEGATIVE
NUCLEATED RED BLOOD CELLS: 0 /100 WBC
OSMOLALITY CALCULATION: 276 MOSMOL/KG (ref 275–300)
PH UA: 7 (ref 5–9)
PLATELET # BLD: 176 THOU/MM3 (ref 130–400)
PMV BLD AUTO: 8.9 FL (ref 9.4–12.4)
POTASSIUM SERPL-SCNC: 4.2 MEQ/L (ref 3.5–5.2)
PROCALCITONIN: 0.09 NG/ML (ref 0.01–0.09)
PROTEIN UA: 30
RBC # BLD: 4.55 MILL/MM3 (ref 4.7–6.1)
RBC URINE: ABNORMAL /HPF
RENAL EPITHELIAL, UA: ABNORMAL
SEDIMENTATION RATE, ERYTHROCYTE: 8 MM/HR (ref 0–10)
SEG NEUTROPHILS: 89.4 %
SEGMENTED NEUTROPHILS ABSOLUTE COUNT: 19.8 THOU/MM3 (ref 1.8–7.7)
SODIUM BLD-SCNC: 137 MEQ/L (ref 135–145)
SPECIFIC GRAVITY, URINE: 1.02 (ref 1–1.03)
TROPONIN T: < 0.01 NG/ML
UROBILINOGEN, URINE: 1 EU/DL (ref 0–1)
WBC # BLD: 22.1 THOU/MM3 (ref 4.8–10.8)
WBC UA: ABNORMAL /HPF
YEAST: ABNORMAL

## 2019-06-11 PROCEDURE — 80048 BASIC METABOLIC PNL TOTAL CA: CPT

## 2019-06-11 PROCEDURE — 84484 ASSAY OF TROPONIN QUANT: CPT

## 2019-06-11 PROCEDURE — 2580000003 HC RX 258: Performed by: EMERGENCY MEDICINE

## 2019-06-11 PROCEDURE — 71045 X-RAY EXAM CHEST 1 VIEW: CPT

## 2019-06-11 PROCEDURE — 85651 RBC SED RATE NONAUTOMATED: CPT

## 2019-06-11 PROCEDURE — 87040 BLOOD CULTURE FOR BACTERIA: CPT

## 2019-06-11 PROCEDURE — 81001 URINALYSIS AUTO W/SCOPE: CPT

## 2019-06-11 PROCEDURE — 85025 COMPLETE CBC W/AUTO DIFF WBC: CPT

## 2019-06-11 PROCEDURE — 36415 COLL VENOUS BLD VENIPUNCTURE: CPT

## 2019-06-11 PROCEDURE — 99285 EMERGENCY DEPT VISIT HI MDM: CPT

## 2019-06-11 PROCEDURE — 93005 ELECTROCARDIOGRAM TRACING: CPT | Performed by: EMERGENCY MEDICINE

## 2019-06-11 PROCEDURE — 83605 ASSAY OF LACTIC ACID: CPT

## 2019-06-11 PROCEDURE — 86140 C-REACTIVE PROTEIN: CPT

## 2019-06-11 PROCEDURE — 6370000000 HC RX 637 (ALT 250 FOR IP): Performed by: EMERGENCY MEDICINE

## 2019-06-11 PROCEDURE — 84145 PROCALCITONIN (PCT): CPT

## 2019-06-11 RX ORDER — ACETAMINOPHEN 325 MG/1
650 TABLET ORAL ONCE
Status: COMPLETED | OUTPATIENT
Start: 2019-06-11 | End: 2019-06-11

## 2019-06-11 RX ORDER — NAPROXEN 500 MG/1
500 TABLET ORAL 2 TIMES DAILY
Qty: 10 TABLET | Refills: 0 | Status: ON HOLD | OUTPATIENT
Start: 2019-06-11 | End: 2019-06-24 | Stop reason: HOSPADM

## 2019-06-11 RX ORDER — 0.9 % SODIUM CHLORIDE 0.9 %
30 INTRAVENOUS SOLUTION INTRAVENOUS ONCE
Status: COMPLETED | OUTPATIENT
Start: 2019-06-11 | End: 2019-06-11

## 2019-06-11 RX ORDER — IBUPROFEN 200 MG
600 TABLET ORAL ONCE
Status: COMPLETED | OUTPATIENT
Start: 2019-06-11 | End: 2019-06-11

## 2019-06-11 RX ADMIN — ACETAMINOPHEN 650 MG: 325 TABLET ORAL at 21:24

## 2019-06-11 RX ADMIN — SODIUM CHLORIDE 3294 ML: 9 INJECTION, SOLUTION INTRAVENOUS at 20:03

## 2019-06-11 RX ADMIN — IBUPROFEN 600 MG: 200 TABLET, FILM COATED ORAL at 22:12

## 2019-06-11 ASSESSMENT — PAIN DESCRIPTION - LOCATION
LOCATION: BACK
LOCATION: FLANK;ABDOMEN

## 2019-06-11 ASSESSMENT — PAIN DESCRIPTION - FREQUENCY
FREQUENCY: CONTINUOUS

## 2019-06-11 ASSESSMENT — PAIN SCALES - GENERAL
PAINLEVEL_OUTOF10: 8

## 2019-06-11 ASSESSMENT — PAIN DESCRIPTION - PROGRESSION
CLINICAL_PROGRESSION: NOT CHANGED

## 2019-06-11 ASSESSMENT — PAIN DESCRIPTION - ONSET
ONSET: ON-GOING

## 2019-06-11 ASSESSMENT — PAIN DESCRIPTION - DESCRIPTORS
DESCRIPTORS: ACHING

## 2019-06-11 ASSESSMENT — PAIN DESCRIPTION - ORIENTATION: ORIENTATION: LOWER

## 2019-06-11 ASSESSMENT — PAIN DESCRIPTION - PAIN TYPE
TYPE: CHRONIC PAIN
TYPE: ACUTE PAIN

## 2019-06-11 NOTE — ED PROVIDER NOTES
[facility]  eMERGENCY dEPARTMENT eNCOUnter          [address] [facility] [place]       Chief Complaint   Patient presents with    Generalized Body Aches    Fever    Flank Pain       Nurses Notes reviewed and I agree except as noted in the HPI. HISTORY OF PRESENT ILLNESS    Abena Breen is a 79 y.o. male. The patient reports to the ED for evaluation of body aches and shortness of breath. The patient report generalized aches and pains, low back pain, and myalgias. He reports fever of 100.1 today. The patient has chronic back pain with no history of surgeries. He rates his back pain as 8/10. The patient had prostate surgery in March 2019 and since has had incontinence and left leg pain. He denies chest pain or sick contacts. He denies history of diabetes. REVIEW OF SYSTEMS     Constitutional: fever  Eyes: no visual changes  ENT: no sore throat  Respiratory: shortness of breath  Cardiovascular: no chest pain  Gastrointestinal : no abdominal pain  Integument: no rash  Musculoskeletal: body aches, back pain  Neurological: no focal weakness or numbness      Remainder of review of systems is otherwise reviewed as negative. PAST MEDICAL HISTORY    has a past medical history of BPH (benign prostatic hyperplasia), CAD (coronary artery disease), CVA (cerebral vascular accident) ([facility] [address] FH: heart disease, Hyperlipidemia, Hypertension, and Obesity. SURGICAL HISTORY      has a past surgical history that includes Coronary artery bypass graft; Carotid endarterectomy (2 08 2011); hernia repair; cardiovascular stress test (1 05 2011); transthoracic echocardiogram (12 21 2010); Diagnostic Cardiac Cath Lab Procedure (3 24 2011); Cardiac valve replacement (April 2011); and Prostatectomy (N/A, 3/20/2019).     CURRENT MEDICATIONS       Discharge Medication List as of 6/11/2019 10:04 PM      CONTINUE these medications which have NOT CHANGED    Details   apixaban (ELIQUIS) 5 MG TABS tablet Take 1 tablet by mouth 2 times daily, Disp-180 tablet, R-3Normal      atorvastatin (LIPITOR) 20 MG tablet TAKE 1 TABLET BY MOUTH DAILY, Disp-90 tablet, R-3Normal      metoprolol succinate (TOPROL XL) 50 MG extended release tablet TAKE 1 TABLET BY MOUTH EVERY DAY, Disp-90 tablet, R-3Normal      aspirin 81 MG tablet Take 81 mg by mouth daily. ferrous sulfate 325 (65 FE) MG tablet Take 1 tablet by mouth daily (with breakfast). , Disp-30 tablet, R-11      Krill Oil 500 MG CAPS Take  by mouth daily. Coenzyme Q10 (CO Q 10 PO) Take  by mouth daily. ALLERGIES     has No Known Allergies. FAMILY HISTORY     indicated that his mother is . He indicated that his father is . family history includes Diabetes in his mother; Heart Disease in his father and mother. SOCIAL HISTORY      reports that he has never smoked. He quit smokeless tobacco use about 19 years ago. His smokeless tobacco use included chew. He reports that he does not drink alcohol or use drugs. PHYSICAL EXAM     INITIAL VITALS:  height is 6' (1.829 m) and weight is 242 lb (109.8 kg). His temperature is 100.1 °F (37.8 °C). His blood pressure is 172/99 (abnormal) and his pulse is 97. His respiration is 12 and oxygen saturation is 95%. Constitutional:non-toxic   Eyes:  Pupils are equal and reactive, extraocular muscles intact   HENT:  Atraumatic appearing  oropharynx moist, no pharyngeal exudates. Neck- normal range of motion, no tenderness, supple   Respiratory:  No wheezing, rhonchi or rales  Cardiovascular: regular, no murmur  GI:  Non tender, no rigidity, rebound or guarding  Musculoskeletal:  2/4 distal pulses, no pitting edema, no deformity of the upper or lower extremities. Back- no point  tenderness  Integument: warm and dry   Neurologic:  Alert & oriented x 3, CN 2-12 grossly intact.   Equal strength in the upper lower extremities bilaterally  Psychiatric:  Speech and behavior appropriate         DIAGNOSTIC RESULTS     EKG: All EKG's are interpreted by the Emergency Department Physician who either signs or Co-signs this chart in the absence of a cardiologist.  EKG interpreted by me showing atrial fibrillation rate of about 97, QRS of 98, QTc of 54, axis of -13    RADIOLOGY: non-plain film images(s) such as CT, Ultrasound and MRI are read by the radiologist.  Chest x-ray interpreted by the radiologist with no evidence of infiltrate or edema. LABS:   Labs Reviewed   CBC WITH AUTO DIFFERENTIAL - Abnormal; Notable for the following components:       Result Value    WBC 22.1 (*)     RBC 4.55 (*)     .4 (*)     MCH 33.4 (*)     MPV 8.9 (*)     Segs Absolute 19.8 (*)     Lymphocytes # 0.6 (*)     Immature Grans (Abs) 0.25 (*)     All other components within normal limits   BASIC METABOLIC PANEL - Abnormal; Notable for the following components:    Glucose 142 (*)     All other components within normal limits   URINE WITH REFLEXED MICRO - Abnormal; Notable for the following components:    Protein, UA 30 (*)     All other components within normal limits   C-REACTIVE PROTEIN - Abnormal; Notable for the following components:    CRP 3.48 (*)     All other components within normal limits   CULTURE BLOOD #1   CULTURE BLOOD #1   LACTIC ACID, PLASMA   ANION GAP   GLOMERULAR FILTRATION RATE, ESTIMATED   OSMOLALITY   TROPONIN   SEDIMENTATION RATE   PROCALCITONIN       EMERGENCY DEPARTMENT COURSE:   Vitals:    Vitals:    06/11/19 1728 06/11/19 1925 06/11/19 2032 06/11/19 2125   BP: (!) 189/95 (!) 177/104 (!) 177/102 (!) 172/99   Pulse:  89 98 97   Resp:  17 15 12   Temp:       SpO2:  97% 96% 95%   Weight:       Height:         Patient presents with a febrile illness he has generalized body aches and pains, it does not seem to be focal.  He did complain of some back pain as well. But he has no previous back surgeries or risk factors for an abscess. In fact his sed rate is normal.  His urinalysis was negative as well.   He does have an elevated white blood cell count that was the only abnormality found. I think this is likely a viral illness. He ambulated in through triage and appears to be steady. He is advised to follow-up as soon as possible with his primary physician so this can be followed. CRITICAL CARE:   none       FINAL IMPRESSION      1. Febrile illness          DISPOSITION/PLAN   discharged    DISCHARGE MEDICATIONS:  Discharge Medication List as of 6/11/2019 10:04 PM      START taking these medications    Details   naproxen (NAPROSYN) 500 MG tablet Take 1 tablet by mouth 2 times daily, Disp-10 tablet, R-0Print             (Please note that portions of this note were completed with a voice recognition program.  Efforts were made to edit the dictations but occasionally words are mis-transcribed.)    Nickie Lucas DO    Scribe:  Ino Zavala 6/11/19 7:29 PM Scribing for and in the presence of Nickie Lucas DO. Signed by: Ade Boyd, 05/05/18 8:44 AM  Provider:    I personally performed the services described in the documentation, reviewed and edited the documentation which was dictated to the scribe in my presence, and it accurately records my words and actions.     Nickie Lucas DO 06/12/19 12:23 AM       Nickie Lucas DO  06/12/19 0023

## 2019-06-11 NOTE — ED TRIAGE NOTES
Pt to ed with body aches, fever and flank pain that started this morning. Pt rates pain 8/10 and reports not taking any otc medications prior to arrival. Pt also reports recent prostate surgery.

## 2019-06-12 ENCOUNTER — OFFICE VISIT (OUTPATIENT)
Dept: FAMILY MEDICINE CLINIC | Age: 69
End: 2019-06-12
Payer: MEDICARE

## 2019-06-12 VITALS
DIASTOLIC BLOOD PRESSURE: 60 MMHG | TEMPERATURE: 97.8 F | RESPIRATION RATE: 20 BRPM | SYSTOLIC BLOOD PRESSURE: 96 MMHG | HEART RATE: 72 BPM | WEIGHT: 244 LBS | BODY MASS INDEX: 33.09 KG/M2 | OXYGEN SATURATION: 98 %

## 2019-06-12 DIAGNOSIS — B34.9 VIRAL SYNDROME: Primary | ICD-10-CM

## 2019-06-12 PROCEDURE — 93010 ELECTROCARDIOGRAM REPORT: CPT | Performed by: NUCLEAR MEDICINE

## 2019-06-12 PROCEDURE — G8598 ASA/ANTIPLAT THER USED: HCPCS | Performed by: FAMILY MEDICINE

## 2019-06-12 PROCEDURE — 1123F ACP DISCUSS/DSCN MKR DOCD: CPT | Performed by: FAMILY MEDICINE

## 2019-06-12 PROCEDURE — 4040F PNEUMOC VAC/ADMIN/RCVD: CPT | Performed by: FAMILY MEDICINE

## 2019-06-12 PROCEDURE — G8427 DOCREV CUR MEDS BY ELIG CLIN: HCPCS | Performed by: FAMILY MEDICINE

## 2019-06-12 PROCEDURE — 1036F TOBACCO NON-USER: CPT | Performed by: FAMILY MEDICINE

## 2019-06-12 PROCEDURE — 3017F COLORECTAL CA SCREEN DOC REV: CPT | Performed by: FAMILY MEDICINE

## 2019-06-12 PROCEDURE — G8417 CALC BMI ABV UP PARAM F/U: HCPCS | Performed by: FAMILY MEDICINE

## 2019-06-12 PROCEDURE — 99213 OFFICE O/P EST LOW 20 MIN: CPT | Performed by: FAMILY MEDICINE

## 2019-06-12 ASSESSMENT — ENCOUNTER SYMPTOMS
EYE PAIN: 0
CONSTIPATION: 0
COUGH: 0
RHINORRHEA: 0
SORE THROAT: 0
ABDOMINAL DISTENTION: 0
SHORTNESS OF BREATH: 0
ABDOMINAL PAIN: 0
DIARRHEA: 1
NAUSEA: 0
SINUS PRESSURE: 0

## 2019-06-12 NOTE — ED NOTES
Pt resting quietly in room no needs expressed. Side rails up x2 with call light in reach. Will continue to monitor.        [facility]  06/11/19 2032

## 2019-06-12 NOTE — ED NOTES
Pt resting quietly in room no needs expressed. Side rails up x2 with call light in reach. Will continue to monitor.        [facility]  06/11/19 2125

## 2019-06-13 ENCOUNTER — CARE COORDINATION (OUTPATIENT)
Dept: CASE MANAGEMENT | Age: 69
End: 2019-06-13

## 2019-06-13 NOTE — PROGRESS NOTES
[address] [facility],[address] FAMILY MEDICINE  [address] [address]  Dept: [phone]  Dept Fax: [phone]  Loc: [phone]  PROGRESS NOTE      VisitDate: 6/12/2019    Joan Landeros is a 79 y.o. male who presents today for:     Chief Complaint   Patient presents with   [facility] ED Follow-up     STR 6/11 for body aches, fever 101, bilateral flank pain. Dx with febrile illness and was dx on naprosyn. Son states he is alternating tylenol and motrin with 600mg taken at 8am today. Body aches have lessened, fever was 100 at 3am, sob with minimal exertion,     Cough     raspy, dry cough for 2 wks.  Results     cxr, ekg and labs were ok except elevated wbc at 22.1 and blood cx were pending at d/c.     Diarrhea     started this morning         Subjective:  HPI  Patient comes in ER follow-up fever body aches had some intermittent shortness of breath. Feels much better today but still having some low-grade temps off and on and some mild body aches. Had some diarrhea earlier this morning that    Review of Systems   Constitutional: Positive for activity change, appetite change, fatigue and fever. HENT: Negative for congestion, ear pain, postnasal drip, rhinorrhea, sinus pressure and sore throat. Eyes: Negative for pain and visual disturbance. Respiratory: Negative for cough and shortness of breath. Cardiovascular: Negative for chest pain. Gastrointestinal: Positive for diarrhea. Negative for abdominal distention, abdominal pain, constipation and nausea. Genitourinary: Negative for dysuria, frequency and urgency. Musculoskeletal: Positive for myalgias. Negative for arthralgias. Skin: Negative for rash. Neurological: Positive for weakness. Negative for dizziness.      Past Medical History:   Diagnosis Date    BPH (benign prostatic hyperplasia)     CAD (coronary artery disease)     CVA (cerebral vascular accident) ([facility] [address] 200    FH: heart disease     Alcohol/week: 0.0 oz      Current Outpatient Medications   Medication Sig Dispense Refill    apixaban (ELIQUIS) 5 MG TABS tablet Take 1 tablet by mouth 2 times daily 180 tablet 3    atorvastatin (LIPITOR) 20 MG tablet TAKE 1 TABLET BY MOUTH DAILY 90 tablet 3    metoprolol succinate (TOPROL XL) 50 MG extended release tablet TAKE 1 TABLET BY MOUTH EVERY DAY 90 tablet 3    aspirin 81 MG tablet Take 81 mg by mouth daily.  ferrous sulfate 325 (65 FE) MG tablet Take 1 tablet by mouth daily (with breakfast). 30 tablet 11    Krill Oil 500 MG CAPS Take  by mouth daily.  Coenzyme Q10 (CO Q 10 PO) Take  by mouth daily.  naproxen (NAPROSYN) 500 MG tablet Take 1 tablet by mouth 2 times daily 10 tablet 0     No current facility-administered medications for this visit. No Known Allergies  Health Maintenance   Topic Date Due    DTaP/Tdap/Td vaccine (1 - Tdap) 08/03/1971    Shingles Vaccine (1 of 2) 08/03/2002    Colon cancer screen colonoscopy  08/03/2002    Pneumococcal 65+ years Vaccine (1 of 2 - PCV13) 08/03/2017    Flu vaccine (Season Ended) 09/01/2019    A1C test (Diabetic or Prediabetic)  09/06/2019    Lipid screen  09/06/2023    Hepatitis C screen  Completed         Objective:     Physical Exam   Constitutional: He is oriented to person, place, and time. He appears well-developed and well-nourished. No distress. HENT:   Head: Normocephalic and atraumatic. Right Ear: External ear normal.   Left Ear: External ear normal.   Eyes: Conjunctivae are normal.   Neck: No JVD present. Cardiovascular: Normal rate, regular rhythm and normal heart sounds. Pulmonary/Chest: Effort normal and breath sounds normal. He has no wheezes. He has no rales. Musculoskeletal: He exhibits no edema or tenderness. Neurological: He is alert and oriented to person, place, and time. Skin: Skin is warm and dry. He is not diaphoretic. No pallor.      BP 96/60   Pulse 72   Temp 97.8 °F (36.6 °C) (Oral)   Resp 20   Wt 244 lb (110.7 kg)   SpO2 98%   BMI 33.09 kg/m²       Impression/Plan:  1. Viral syndrome      Requested Prescriptions      No prescriptions requested or ordered in this encounter     Orders Placed This Encounter   Procedures    CBC Auto Differential     Standing Status:   Future     Standing Expiration Date:   6/11/2020   We will recheck his blood counts down the road, continue Tylenol and ibuprofen as needed, encouraged hydration and rest    Patient giveneducational materials - see patient instructions. Discussed use, benefit, and side effects of prescribed medications. All patient questions answered. Pt voiced understanding. Reviewed health maintenance. Patient agreedwith treatment plan. Follow up as directed. **This report has been created using voice recognition software. It may contain minor errorswhich are inherent in voice recognition technology. **       Electronically signed by Percy Irving MD on 6/12/2019 at 9:28 PM

## 2019-06-14 ENCOUNTER — OFFICE VISIT (OUTPATIENT)
Dept: FAMILY MEDICINE CLINIC | Age: 69
End: 2019-06-14
Payer: MEDICARE

## 2019-06-14 VITALS
BODY MASS INDEX: 33.09 KG/M2 | HEART RATE: 72 BPM | WEIGHT: 244 LBS | SYSTOLIC BLOOD PRESSURE: 128 MMHG | DIASTOLIC BLOOD PRESSURE: 60 MMHG | RESPIRATION RATE: 18 BRPM | TEMPERATURE: 98.2 F

## 2019-06-14 DIAGNOSIS — K52.9 ACUTE GASTROENTERITIS: Primary | ICD-10-CM

## 2019-06-14 PROCEDURE — 1036F TOBACCO NON-USER: CPT | Performed by: FAMILY MEDICINE

## 2019-06-14 PROCEDURE — 3017F COLORECTAL CA SCREEN DOC REV: CPT | Performed by: FAMILY MEDICINE

## 2019-06-14 PROCEDURE — 1123F ACP DISCUSS/DSCN MKR DOCD: CPT | Performed by: FAMILY MEDICINE

## 2019-06-14 PROCEDURE — 99213 OFFICE O/P EST LOW 20 MIN: CPT | Performed by: FAMILY MEDICINE

## 2019-06-14 PROCEDURE — G8417 CALC BMI ABV UP PARAM F/U: HCPCS | Performed by: FAMILY MEDICINE

## 2019-06-14 PROCEDURE — G8427 DOCREV CUR MEDS BY ELIG CLIN: HCPCS | Performed by: FAMILY MEDICINE

## 2019-06-14 PROCEDURE — G8598 ASA/ANTIPLAT THER USED: HCPCS | Performed by: FAMILY MEDICINE

## 2019-06-14 PROCEDURE — 4040F PNEUMOC VAC/ADMIN/RCVD: CPT | Performed by: FAMILY MEDICINE

## 2019-06-14 RX ORDER — ONDANSETRON 4 MG/1
4 TABLET, FILM COATED ORAL DAILY PRN
Qty: 30 TABLET | Refills: 0 | Status: SHIPPED | OUTPATIENT
Start: 2019-06-14 | End: 2019-09-01

## 2019-06-14 RX ORDER — FAMOTIDINE 20 MG/1
20 TABLET, FILM COATED ORAL 2 TIMES DAILY
Qty: 180 TABLET | Refills: 1 | Status: SHIPPED | OUTPATIENT
Start: 2019-06-14 | End: 2019-12-16 | Stop reason: SDUPTHER

## 2019-06-16 ASSESSMENT — ENCOUNTER SYMPTOMS
CONSTIPATION: 0
DIARRHEA: 0
NAUSEA: 0
RHINORRHEA: 0
EYE PAIN: 0
SHORTNESS OF BREATH: 0
ABDOMINAL PAIN: 0
ABDOMINAL DISTENTION: 0
SORE THROAT: 0
COUGH: 0
SINUS PRESSURE: 0

## 2019-06-17 ENCOUNTER — TELEPHONE (OUTPATIENT)
Dept: FAMILY MEDICINE CLINIC | Age: 69
End: 2019-06-17

## 2019-06-17 DIAGNOSIS — I73.9 PVD (PERIPHERAL VASCULAR DISEASE) (HCC): ICD-10-CM

## 2019-06-17 DIAGNOSIS — R23.3 PETECHIAL RASH: ICD-10-CM

## 2019-06-17 DIAGNOSIS — B34.9 VIRAL SYNDROME: ICD-10-CM

## 2019-06-17 DIAGNOSIS — R23.3: Primary | ICD-10-CM

## 2019-06-17 LAB
BLOOD CULTURE, ROUTINE: NORMAL
BLOOD CULTURE, ROUTINE: NORMAL

## 2019-06-17 NOTE — PROGRESS NOTES
[address] [address]  Dept: [phone]  Dept Fax: [phone]  Loc: [phone]  PROGRESS NOTE      VisitDate: 6/14/2019    Stacia Schreiber is a 79 y.o. male who presents today for:     Chief Complaint   Patient presents with    Emesis     seen 6/12 for viral syndrome and ER fu, joint pain is better, vomitting still x3 today, clear, only taking in water, no fever/low grade, hard to lift left leg, dry cough with HA         Subjective:  HPI  Patient presents short-term follow-up has developed vomiting and diarrhea. Fevers have improved and still having body aches but not completely resolved. Feels weak    Review of Systems   Constitutional: Negative for appetite change and fever. HENT: Negative for congestion, ear pain, postnasal drip, rhinorrhea, sinus pressure and sore throat. Eyes: Negative for pain and visual disturbance. Respiratory: Negative for cough and shortness of breath. Cardiovascular: Negative for chest pain. Gastrointestinal: Negative for abdominal distention, abdominal pain, constipation, diarrhea and nausea. Genitourinary: Negative for dysuria, frequency and urgency. Musculoskeletal: Negative for arthralgias. Skin: Negative for rash. Neurological: Negative for dizziness. Past Medical History:   Diagnosis Date    BPH (benign prostatic hyperplasia)     CAD (coronary artery disease)     CVA (cerebral vascular accident) ([facility] [address] 200    FH: heart disease     Hyperlipidemia     Hypertension     Obesity       Past Surgical History:   Procedure Laterality Date    CARDIAC VALVE REPLACEMENT  April 2011    [place], [place]    CARDIOVASCULAR STRESS TEST  1 05 2011    Cannot exclude slight inferobasal lateral stress-induced ischemia in a relatively small-sized area. EF 68%. Mildly abnormal nuclear scan. Lexiscan test associated with nonspecific symptoms.  EKG nondiagnostic with nonspecific ST-T wave changes.  CAROTID ENDARTERECTOMY  2 08 2011    Right carotid endarterectomy with patch angioplasty with convention patch angioplasty.  CORONARY ARTERY BYPASS GRAFT      DIAGNOSTIC CARDIAC CATH LAB PROCEDURE  3 24 2011    LV end-diastolic pressure was 12 mmHg with no change before and after contrast injection. There was no significant gradient across the aortic valve to signify aortic stenosis. LV function was within normal limits, EF 55%. 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 Nonobstructive diffuse disease in a small sized RCA. Nomral LV function.  HERNIA REPAIR      Inguinal hernia repair.  PROSTATECTOMY N/A 3/20/2019    PROSTATECTOMY LAPAROSCOPIC ROBOTIC performed by Romulo Oliveira MD at [address] [place] [address] ECHOCARDIOGRAM  12 21 2010    Size was normal. Systolic function was normal. EF was estimated in the range of 55-65%. There were no regional wall motion abnormalities. Wall thickness was normal. Doppler - LV diastolic function parameters were normal. Mild MR. The aortic valve was trileaflet. Leaflets exhibited mildly increased thickness, mild calcification, normal cuspal separation, and sclerosis. Mild TR.      Family History   Problem Relation Age of Onset    Heart Disease Mother     Diabetes Mother     Heart Disease Father      Social History     Tobacco Use    Smoking status: Never Smoker    Smokeless tobacco: Former User     Types: Chew   Substance Use Topics    Alcohol use: No     Alcohol/week: 0.0 oz      Current Outpatient Medications   Medication Sig Dispense Refill    famotidine (PEPCID) 20 MG tablet Take 1 tablet by mouth 2 times daily 180 tablet 1    ondansetron (ZOFRAN) 4 MG tablet Take 1 tablet by mouth daily as needed for Nausea or Vomiting 30 tablet 0    naproxen (NAPROSYN) 500 MG tablet Take 1 tablet by mouth 2 times daily 10 tablet 0    apixaban (ELIQUIS) 5 MG TABS tablet Take 1 tablet by mouth 2 times daily 180 tablet 3    atorvastatin (LIPITOR) 20 MG tablet TAKE 1 TABLET BY MOUTH DAILY 90 tablet 3    metoprolol succinate (TOPROL XL) 50 MG extended release tablet TAKE 1 TABLET BY MOUTH EVERY DAY 90 tablet 3    aspirin 81 MG tablet Take 81 mg by mouth daily.  ferrous sulfate 325 (65 FE) MG tablet Take 1 tablet by mouth daily (with breakfast). 30 tablet 11    Krill Oil 500 MG CAPS Take  by mouth daily.  Coenzyme Q10 (CO Q 10 PO) Take  by mouth daily. No current facility-administered medications for this visit. No Known Allergies  Health Maintenance   Topic Date Due    DTaP/Tdap/Td vaccine (1 - Tdap) 08/03/1971    Shingles Vaccine (1 of 2) 08/03/2002    Colon cancer screen colonoscopy  08/03/2002    Pneumococcal 65+ years Vaccine (1 of 2 - PCV13) 08/03/2017    Flu vaccine (Season Ended) 09/01/2019    A1C test (Diabetic or Prediabetic)  09/06/2019    Lipid screen  09/06/2023    Hepatitis C screen  Completed         Objective:     Physical Exam   Constitutional: He is oriented to person, place, and time. He appears well-developed and well-nourished. No distress. HENT:   Head: Normocephalic and atraumatic. Right Ear: External ear normal.   Left Ear: External ear normal.   Eyes: Conjunctivae are normal.   Neck: No JVD present. Cardiovascular: Normal rate, regular rhythm and normal heart sounds. Pulmonary/Chest: Effort normal and breath sounds normal. He has no wheezes. He has no rales. Musculoskeletal: He exhibits no edema or tenderness. Neurological: He is alert and oriented to person, place, and time. Skin: Skin is warm and dry. He is not diaphoretic. No pallor. /60 (Site: Left Upper Arm)   Pulse 72   Temp 98.2 °F (36.8 °C) (Oral)   Resp 18   Wt 244 lb (110.7 kg)   BMI 33.09 kg/m²       Impression/Plan:  1.  Acute gastroenteritis      Requested Prescriptions     Signed Prescriptions Disp Refills    famotidine (PEPCID) 20 MG tablet 180 tablet 1     Sig: Take 1 tablet by mouth 2 times daily    ondansetron (ZOFRAN) 4 MG tablet 30 tablet 0     Sig: Take 1 tablet by mouth daily as needed for Nausea or Vomiting     No orders of the defined types were placed in this encounter. Patient giveneducational materials - see patient instructions. Discussed use, benefit, and side effects of prescribed medications. All patient questions answered. Pt voiced understanding. Reviewed health maintenance. Patient agreedwith treatment plan. Follow up as directed. **This report has been created using voice recognition software. It may contain minor errorswhich are inherent in voice recognition technology. **       Electronically signed by Neil Aguero MD on 6/16/2019 at 9:57 PM

## 2019-06-17 NOTE — TELEPHONE ENCOUNTER
Son, Odilia Blanco, was notified and labs are ordered in [address] He will be taking him to [facility].

## 2019-06-17 NOTE — TELEPHONE ENCOUNTER
Was seen on 6-14-19 for a viral illness, now daughter says he has petechiae on both ankles. Daughter is concerned that it is vasculitis because he is also complaining of extreme pain in the left leg. Appt? Testing?     Call son, Dayana Legacy   [phone]

## 2019-06-18 ENCOUNTER — APPOINTMENT (OUTPATIENT)
Dept: CT IMAGING | Age: 69
DRG: 862 | End: 2019-06-18
Payer: MEDICARE

## 2019-06-18 ENCOUNTER — APPOINTMENT (OUTPATIENT)
Dept: GENERAL RADIOLOGY | Age: 69
DRG: 862 | End: 2019-06-18
Payer: MEDICARE

## 2019-06-18 ENCOUNTER — HOSPITAL ENCOUNTER (INPATIENT)
Age: 69
LOS: 6 days | Discharge: HOME HEALTH | DRG: 862 | End: 2019-06-24
Attending: EMERGENCY MEDICINE | Admitting: INTERNAL MEDICINE
Payer: MEDICARE

## 2019-06-18 DIAGNOSIS — K65.1 ABSCESS OF MALE PELVIS (HCC): Primary | ICD-10-CM

## 2019-06-18 LAB
ALBUMIN SERPL-MCNC: 2.3 G/DL (ref 3.5–5.1)
ALP BLD-CCNC: 160 U/L (ref 38–126)
ALT SERPL-CCNC: 37 U/L (ref 11–66)
AMMONIA: 50 UMOL/L (ref 11–60)
ANION GAP SERPL CALCULATED.3IONS-SCNC: 12 MEQ/L (ref 8–16)
AST SERPL-CCNC: 29 U/L (ref 5–40)
BACTERIA: ABNORMAL /HPF
BASOPHILS # BLD: 0 %
BASOPHILS ABSOLUTE: 0 THOU/MM3 (ref 0–0.1)
BILIRUB SERPL-MCNC: 3.1 MG/DL (ref 0.3–1.2)
BILIRUBIN URINE: ABNORMAL
BLOOD, URINE: NEGATIVE
BUN BLDV-MCNC: 14 MG/DL (ref 7–22)
C-REACTIVE PROTEIN: 19.79 MG/DL (ref 0–1)
CALCIUM SERPL-MCNC: 8.4 MG/DL (ref 8.5–10.5)
CASTS 2: ABNORMAL /LPF
CASTS UA: ABNORMAL /LPF
CHARACTER, URINE: CLEAR
CHLORIDE BLD-SCNC: 92 MEQ/L (ref 98–111)
CO2: 30 MEQ/L (ref 23–33)
COLOR: ABNORMAL
CREAT SERPL-MCNC: 0.5 MG/DL (ref 0.4–1.2)
CRYSTALS, UA: ABNORMAL
DIFFERENTIAL TYPE: ABNORMAL
DIFFERENTIAL, MANUAL: NORMAL
DOHLE BODIES: PRESENT
EKG ATRIAL RATE: 96 BPM
EKG Q-T INTERVAL: 398 MS
EKG QRS DURATION: 106 MS
EKG QTC CALCULATION (BAZETT): 478 MS
EKG R AXIS: -7 DEGREES
EKG T AXIS: -12 DEGREES
EKG VENTRICULAR RATE: 87 BPM
EOSINOPHIL # BLD: 1 %
EOSINOPHILS ABSOLUTE: 0.3 THOU/MM3 (ref 0–0.4)
EPITHELIAL CELLS, UA: ABNORMAL /HPF
ERYTHROCYTE [DISTWIDTH] IN BLOOD BY AUTOMATED COUNT: 12.9 % (ref 11.5–14.5)
ERYTHROCYTE [DISTWIDTH] IN BLOOD BY AUTOMATED COUNT: 46.2 FL (ref 35–45)
GFR SERPL CREATININE-BSD FRML MDRD: > 90 ML/MIN/1.73M2
GLUCOSE BLD-MCNC: 219 MG/DL (ref 70–108)
GLUCOSE URINE: NEGATIVE MG/DL
HCT VFR BLD CALC: 38.5 % (ref 42–52)
HEMOGLOBIN: 13.1 GM/DL (ref 14–18)
ICTOTEST: POSITIVE
KETONES, URINE: NEGATIVE
LACTIC ACID: 1.8 MMOL/L (ref 0.5–2.2)
LACTIC ACID: 3.9 MMOL/L (ref 0.5–2.2)
LEUKOCYTE ESTERASE, URINE: ABNORMAL
LYMPHOCYTES # BLD: 2 %
LYMPHOCYTES ABSOLUTE: 0.7 THOU/MM3 (ref 1–4.8)
MCH RBC QN AUTO: 33.2 PG (ref 26–33)
MCHC RBC AUTO-ENTMCNC: 34 GM/DL (ref 32.2–35.5)
MCV RBC AUTO: 97.5 FL (ref 80–94)
METAMYELOCYTES: 3 %
MISCELLANEOUS 2: ABNORMAL
MONOCYTES # BLD: 3 %
MONOCYTES ABSOLUTE: 1 THOU/MM3 (ref 0.4–1.3)
MYELOCYTES: 1 %
NITRITE, URINE: NEGATIVE
NUCLEATED RED BLOOD CELLS: 0 /100 WBC
OSMOLALITY CALCULATION: 275.4 MOSMOL/KG (ref 275–300)
PATHOLOGIST REVIEW: ABNORMAL
PH UA: 6 (ref 5–9)
PLATELET # BLD: 163 THOU/MM3 (ref 130–400)
PMV BLD AUTO: 10 FL (ref 9.4–12.4)
POTASSIUM SERPL-SCNC: 3 MEQ/L (ref 3.5–5.2)
PRO-BNP: 1952 PG/ML (ref 0–900)
PROCALCITONIN: 0.53 NG/ML (ref 0.01–0.09)
PROTEIN UA: ABNORMAL
RBC # BLD: 3.95 MILL/MM3 (ref 4.7–6.1)
RBC URINE: ABNORMAL /HPF
RENAL EPITHELIAL, UA: ABNORMAL
SEDIMENTATION RATE, ERYTHROCYTE: 68 MM/HR (ref 0–10)
SEG NEUTROPHILS: 90 %
SEGMENTED NEUTROPHILS ABSOLUTE COUNT: 30 THOU/MM3 (ref 1.8–7.7)
SODIUM BLD-SCNC: 134 MEQ/L (ref 135–145)
SPECIFIC GRAVITY, URINE: 1.02 (ref 1–1.03)
TOTAL PROTEIN: 6.2 G/DL (ref 6.1–8)
TOXIC GRANULATION: PRESENT
TROPONIN T: < 0.01 NG/ML
UROBILINOGEN, URINE: 2 EU/DL (ref 0–1)
WBC # BLD: 33.3 THOU/MM3 (ref 4.8–10.8)
WBC UA: ABNORMAL /HPF
YEAST: ABNORMAL

## 2019-06-18 PROCEDURE — 99223 1ST HOSP IP/OBS HIGH 75: CPT | Performed by: INTERNAL MEDICINE

## 2019-06-18 PROCEDURE — 87186 SC STD MICRODIL/AGAR DIL: CPT

## 2019-06-18 PROCEDURE — 2580000003 HC RX 258: Performed by: INTERNAL MEDICINE

## 2019-06-18 PROCEDURE — 71045 X-RAY EXAM CHEST 1 VIEW: CPT

## 2019-06-18 PROCEDURE — 77012 CT SCAN FOR NEEDLE BIOPSY: CPT

## 2019-06-18 PROCEDURE — 99285 EMERGENCY DEPT VISIT HI MDM: CPT

## 2019-06-18 PROCEDURE — C1769 GUIDE WIRE: HCPCS

## 2019-06-18 PROCEDURE — 85651 RBC SED RATE NONAUTOMATED: CPT

## 2019-06-18 PROCEDURE — 2580000003 HC RX 258: Performed by: EMERGENCY MEDICINE

## 2019-06-18 PROCEDURE — 6360000002 HC RX W HCPCS: Performed by: RADIOLOGY

## 2019-06-18 PROCEDURE — 86140 C-REACTIVE PROTEIN: CPT

## 2019-06-18 PROCEDURE — 6360000002 HC RX W HCPCS

## 2019-06-18 PROCEDURE — 1200000003 HC TELEMETRY R&B

## 2019-06-18 PROCEDURE — 87147 CULTURE TYPE IMMUNOLOGIC: CPT

## 2019-06-18 PROCEDURE — 84145 PROCALCITONIN (PCT): CPT

## 2019-06-18 PROCEDURE — 6360000002 HC RX W HCPCS: Performed by: EMERGENCY MEDICINE

## 2019-06-18 PROCEDURE — 0W9M30Z DRAINAGE OF MALE PERINEUM WITH DRAINAGE DEVICE, PERCUTANEOUS APPROACH: ICD-10-PCS | Performed by: RADIOLOGY

## 2019-06-18 PROCEDURE — 6360000002 HC RX W HCPCS: Performed by: INTERNAL MEDICINE

## 2019-06-18 PROCEDURE — 2709999900 HC NON-CHARGEABLE SUPPLY

## 2019-06-18 PROCEDURE — 93005 ELECTROCARDIOGRAM TRACING: CPT | Performed by: EMERGENCY MEDICINE

## 2019-06-18 PROCEDURE — 36415 COLL VENOUS BLD VENIPUNCTURE: CPT

## 2019-06-18 PROCEDURE — 85025 COMPLETE CBC W/AUTO DIFF WBC: CPT

## 2019-06-18 PROCEDURE — 80053 COMPREHEN METABOLIC PANEL: CPT

## 2019-06-18 PROCEDURE — 96375 TX/PRO/DX INJ NEW DRUG ADDON: CPT

## 2019-06-18 PROCEDURE — 84484 ASSAY OF TROPONIN QUANT: CPT

## 2019-06-18 PROCEDURE — 87077 CULTURE AEROBIC IDENTIFY: CPT

## 2019-06-18 PROCEDURE — 83605 ASSAY OF LACTIC ACID: CPT

## 2019-06-18 PROCEDURE — 6360000004 HC RX CONTRAST MEDICATION: Performed by: EMERGENCY MEDICINE

## 2019-06-18 PROCEDURE — 73502 X-RAY EXAM HIP UNI 2-3 VIEWS: CPT

## 2019-06-18 PROCEDURE — 96365 THER/PROPH/DIAG IV INF INIT: CPT

## 2019-06-18 PROCEDURE — 83880 ASSAY OF NATRIURETIC PEPTIDE: CPT

## 2019-06-18 PROCEDURE — 93010 ELECTROCARDIOGRAM REPORT: CPT | Performed by: INTERNAL MEDICINE

## 2019-06-18 PROCEDURE — 6370000000 HC RX 637 (ALT 250 FOR IP): Performed by: INTERNAL MEDICINE

## 2019-06-18 PROCEDURE — 87040 BLOOD CULTURE FOR BACTERIA: CPT

## 2019-06-18 PROCEDURE — C1894 INTRO/SHEATH, NON-LASER: HCPCS

## 2019-06-18 PROCEDURE — 10030 IMG GID FLU COLL DRG SFT TIS: CPT

## 2019-06-18 PROCEDURE — 87070 CULTURE OTHR SPECIMN AEROBIC: CPT

## 2019-06-18 PROCEDURE — C1729 CATH, DRAINAGE: HCPCS

## 2019-06-18 PROCEDURE — 82140 ASSAY OF AMMONIA: CPT

## 2019-06-18 PROCEDURE — 73701 CT LOWER EXTREMITY W/DYE: CPT

## 2019-06-18 PROCEDURE — 96376 TX/PRO/DX INJ SAME DRUG ADON: CPT

## 2019-06-18 PROCEDURE — 81001 URINALYSIS AUTO W/SCOPE: CPT

## 2019-06-18 PROCEDURE — 87075 CULTR BACTERIA EXCEPT BLOOD: CPT

## 2019-06-18 RX ORDER — METOPROLOL SUCCINATE 50 MG/1
50 TABLET, EXTENDED RELEASE ORAL DAILY
Status: DISCONTINUED | OUTPATIENT
Start: 2019-06-19 | End: 2019-06-24 | Stop reason: HOSPADM

## 2019-06-18 RX ORDER — SODIUM CHLORIDE 0.9 % (FLUSH) 0.9 %
10 SYRINGE (ML) INJECTION EVERY 12 HOURS SCHEDULED
Status: DISCONTINUED | OUTPATIENT
Start: 2019-06-18 | End: 2019-06-24 | Stop reason: HOSPADM

## 2019-06-18 RX ORDER — ONDANSETRON 2 MG/ML
4 INJECTION INTRAMUSCULAR; INTRAVENOUS EVERY 6 HOURS PRN
Status: DISCONTINUED | OUTPATIENT
Start: 2019-06-18 | End: 2019-06-24 | Stop reason: HOSPADM

## 2019-06-18 RX ORDER — OXYCODONE HYDROCHLORIDE AND ACETAMINOPHEN 5; 325 MG/1; MG/1
1 TABLET ORAL EVERY 6 HOURS PRN
Status: DISCONTINUED | OUTPATIENT
Start: 2019-06-18 | End: 2019-06-20

## 2019-06-18 RX ORDER — MORPHINE SULFATE 2 MG/ML
2 INJECTION, SOLUTION INTRAMUSCULAR; INTRAVENOUS ONCE
Status: COMPLETED | OUTPATIENT
Start: 2019-06-18 | End: 2019-06-18

## 2019-06-18 RX ORDER — SODIUM CHLORIDE 9 MG/ML
INJECTION, SOLUTION INTRAVENOUS CONTINUOUS
Status: DISCONTINUED | OUTPATIENT
Start: 2019-06-18 | End: 2019-06-24 | Stop reason: HOSPADM

## 2019-06-18 RX ORDER — ATORVASTATIN CALCIUM 20 MG/1
20 TABLET, FILM COATED ORAL NIGHTLY
Status: DISCONTINUED | OUTPATIENT
Start: 2019-06-18 | End: 2019-06-24 | Stop reason: HOSPADM

## 2019-06-18 RX ORDER — 0.9 % SODIUM CHLORIDE 0.9 %
1000 INTRAVENOUS SOLUTION INTRAVENOUS ONCE
Status: COMPLETED | OUTPATIENT
Start: 2019-06-18 | End: 2019-06-18

## 2019-06-18 RX ORDER — FENTANYL CITRATE 50 UG/ML
25 INJECTION, SOLUTION INTRAMUSCULAR; INTRAVENOUS ONCE
Status: COMPLETED | OUTPATIENT
Start: 2019-06-18 | End: 2019-06-18

## 2019-06-18 RX ORDER — SODIUM CHLORIDE 0.9 % (FLUSH) 0.9 %
10 SYRINGE (ML) INJECTION PRN
Status: DISCONTINUED | OUTPATIENT
Start: 2019-06-18 | End: 2019-06-24 | Stop reason: HOSPADM

## 2019-06-18 RX ORDER — ASPIRIN 81 MG/1
81 TABLET ORAL DAILY
Status: DISCONTINUED | OUTPATIENT
Start: 2019-06-19 | End: 2019-06-24 | Stop reason: HOSPADM

## 2019-06-18 RX ORDER — FAMOTIDINE 20 MG/1
20 TABLET, FILM COATED ORAL 2 TIMES DAILY
Status: DISCONTINUED | OUTPATIENT
Start: 2019-06-18 | End: 2019-06-24 | Stop reason: HOSPADM

## 2019-06-18 RX ORDER — MORPHINE SULFATE 2 MG/ML
2 INJECTION, SOLUTION INTRAMUSCULAR; INTRAVENOUS EVERY 4 HOURS PRN
Status: DISCONTINUED | OUTPATIENT
Start: 2019-06-18 | End: 2019-06-20

## 2019-06-18 RX ORDER — MIDAZOLAM HYDROCHLORIDE 1 MG/ML
0.25 INJECTION INTRAMUSCULAR; INTRAVENOUS ONCE
Status: COMPLETED | OUTPATIENT
Start: 2019-06-18 | End: 2019-06-18

## 2019-06-18 RX ORDER — ACETAMINOPHEN 325 MG/1
650 TABLET ORAL EVERY 4 HOURS PRN
Status: DISCONTINUED | OUTPATIENT
Start: 2019-06-18 | End: 2019-06-18

## 2019-06-18 RX ORDER — MORPHINE SULFATE 2 MG/ML
2 INJECTION, SOLUTION INTRAMUSCULAR; INTRAVENOUS EVERY 4 HOURS PRN
Status: DISCONTINUED | OUTPATIENT
Start: 2019-06-18 | End: 2019-06-18

## 2019-06-18 RX ADMIN — SODIUM CHLORIDE 1000 ML: 9 INJECTION, SOLUTION INTRAVENOUS at 17:55

## 2019-06-18 RX ADMIN — PIPERACILLIN SODIUM,TAZOBACTAM SODIUM 3.38 G: 3; .375 INJECTION, POWDER, FOR SOLUTION INTRAVENOUS at 13:21

## 2019-06-18 RX ADMIN — APIXABAN 5 MG: 5 TABLET, FILM COATED ORAL at 21:02

## 2019-06-18 RX ADMIN — MIDAZOLAM 0.25 MG: 1 INJECTION INTRAMUSCULAR; INTRAVENOUS at 20:23

## 2019-06-18 RX ADMIN — MORPHINE SULFATE 2 MG: 2 INJECTION, SOLUTION INTRAMUSCULAR; INTRAVENOUS at 17:03

## 2019-06-18 RX ADMIN — SODIUM CHLORIDE: 9 INJECTION, SOLUTION INTRAVENOUS at 09:53

## 2019-06-18 RX ADMIN — SODIUM CHLORIDE: 9 INJECTION, SOLUTION INTRAVENOUS at 21:01

## 2019-06-18 RX ADMIN — VANCOMYCIN HYDROCHLORIDE 1250 MG: 1 INJECTION, POWDER, LYOPHILIZED, FOR SOLUTION INTRAVENOUS at 14:25

## 2019-06-18 RX ADMIN — MORPHINE SULFATE 2 MG: 2 INJECTION, SOLUTION INTRAMUSCULAR; INTRAVENOUS at 12:30

## 2019-06-18 RX ADMIN — MORPHINE SULFATE 2 MG: 2 INJECTION, SOLUTION INTRAMUSCULAR; INTRAVENOUS at 09:55

## 2019-06-18 RX ADMIN — FAMOTIDINE 20 MG: 20 TABLET, FILM COATED ORAL at 21:01

## 2019-06-18 RX ADMIN — IOPAMIDOL 80 ML: 755 INJECTION, SOLUTION INTRAVENOUS at 13:08

## 2019-06-18 RX ADMIN — PIPERACILLIN SODIUM,TAZOBACTAM SODIUM 3.38 G: 3; .375 INJECTION, POWDER, FOR SOLUTION INTRAVENOUS at 21:01

## 2019-06-18 RX ADMIN — ATORVASTATIN CALCIUM 20 MG: 20 TABLET, FILM COATED ORAL at 21:02

## 2019-06-18 RX ADMIN — FENTANYL CITRATE 25 MCG: 50 INJECTION INTRAMUSCULAR; INTRAVENOUS at 20:23

## 2019-06-18 ASSESSMENT — PAIN SCALES - GENERAL
PAINLEVEL_OUTOF10: 10
PAINLEVEL_OUTOF10: 9
PAINLEVEL_OUTOF10: 1
PAINLEVEL_OUTOF10: 9
PAINLEVEL_OUTOF10: 7
PAINLEVEL_OUTOF10: 1
PAINLEVEL_OUTOF10: 9
PAINLEVEL_OUTOF10: 1
PAINLEVEL_OUTOF10: 3
PAINLEVEL_OUTOF10: 1
PAINLEVEL_OUTOF10: 0
PAINLEVEL_OUTOF10: 9
PAINLEVEL_OUTOF10: 2
PAINLEVEL_OUTOF10: 9

## 2019-06-18 ASSESSMENT — PAIN DESCRIPTION - PAIN TYPE
TYPE: ACUTE PAIN
TYPE: ACUTE PAIN

## 2019-06-18 ASSESSMENT — PAIN DESCRIPTION - PROGRESSION
CLINICAL_PROGRESSION: NOT CHANGED
CLINICAL_PROGRESSION: NOT CHANGED

## 2019-06-18 ASSESSMENT — ENCOUNTER SYMPTOMS
NAUSEA: 0
DIARRHEA: 0
SHORTNESS OF BREATH: 0
ABDOMINAL DISTENTION: 0
RHINORRHEA: 0
EYE ITCHING: 0
WHEEZING: 0
COUGH: 1
ABDOMINAL PAIN: 0
VOMITING: 0
EYE DISCHARGE: 0

## 2019-06-18 ASSESSMENT — PAIN DESCRIPTION - LOCATION
LOCATION: HIP
LOCATION: HIP

## 2019-06-18 ASSESSMENT — PAIN DESCRIPTION - DESCRIPTORS
DESCRIPTORS: ACHING
DESCRIPTORS: STABBING

## 2019-06-18 ASSESSMENT — PAIN - FUNCTIONAL ASSESSMENT: PAIN_FUNCTIONAL_ASSESSMENT: PREVENTS OR INTERFERES SOME ACTIVE ACTIVITIES AND ADLS

## 2019-06-18 ASSESSMENT — PAIN DESCRIPTION - FREQUENCY: FREQUENCY: CONTINUOUS

## 2019-06-18 ASSESSMENT — PAIN DESCRIPTION - ORIENTATION
ORIENTATION: LEFT
ORIENTATION: LEFT

## 2019-06-18 ASSESSMENT — PAIN DESCRIPTION - ONSET: ONSET: ON-GOING

## 2019-06-18 NOTE — PROGRESS NOTES
2002 Patient received in CT holding area for pre-procedure assessment and consenting for procedure with family at bedside. Monitor applied  2005 Dr. Rogerio Barnes here; spoke to patient. This procedure has been fully reviewed with the patient and written informed consent has been obtained. 2010 Pt positioned on CT table and pre scan started. 2019 Procedure started with Dr. Rogerio Barnes. 2030 Procedure completed patient tolerated well. Samples collected and sent to pathologist for further review. Post scan obtained. 2033 Patient on bed; comfort ensured. 2035 Report called to 5K and patient taken to 5K via bed with family at bedside.

## 2019-06-18 NOTE — H&P
History & Physical        Patient:  Thee Ford  YOB: 1950    MRN: [id number]     Acct: [de-identified]    PCP: Adiel Duke MD    Date of Admission: 6/18/2019    Date of Service: Pt seen/examined on 06/18/19 and Admitted to Inpatient with expected LOS greater than two midnights due to medical therapy. Chief Complaint:  L Hip Pain     History Of Present Illness:     79 y.o. male who presented to [address] [place] [address] with L hip pain of several days duration   Pt underwent prostate surgery ~ 6 weeks ago and initially thought it was related to post op issues but has been worse for the last 1 week to the point he is unable to bear wt on his L hip     Past Medical History:          Diagnosis Date    BPH (benign prostatic hyperplasia)     CAD (coronary artery disease)     CVA (cerebral vascular accident) ([facility] [address] 2010    FH: heart disease     Hyperlipidemia     Hypertension     Obesity        Past Surgical History:          Procedure Laterality Date    CARDIAC VALVE [place]AC[place]  April 2011    [place]    CARDIOVASCULAR STRESS TEST  1 05 2011    Cannot exclude slight inferobasal lateral stress-induced ischemia in a relatively small-sized area. EF 68%. Mildly abnormal nuclear scan. Lexiscan test associated with nonspecific symptoms. EKG nondiagnostic with nonspecific ST-T wave changes.  CAROTID ENDARTERECTOMY  2 08 2011    Right carotid endarterectomy with patch angioplasty with convention patch angioplasty.  CORONARY ARTERY BYPASS GRAFT      DIAGNOSTIC CARDIAC CATH LAB PROCEDURE  3 24 2011    LV end-diastolic pressure was 12 mmHg with no change before and after contrast injection. There was no significant gradient across the aortic valve to signify aortic stenosis. LV function was within normal limits, EF 55%. 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 Nonobstructive diffuse disease in a small sized RCA.  Nomral LV function.  HERNIA REPAIR      Inguinal hernia repair.  PROSTATECTOMY N/A 3/20/2019    PROSTATECTOMY LAPAROSCOPIC ROBOTIC performed by Jeffrey Vanegas MD at [address] [place] [address] ECHOCARDIOGRAM  12 21 2010    Size was normal. Systolic function was normal. EF was estimated in the range of 55-65%. There were no regional wall motion abnormalities. Wall thickness was normal. Doppler - LV diastolic function parameters were normal. Mild MR. The aortic valve was trileaflet. Leaflets exhibited mildly increased thickness, mild calcification, normal cuspal separation, and sclerosis. Mild TR. Medications Prior to Admission:      Prior to Admission medications    Medication Sig Start Date End Date Taking? Authorizing Provider   famotidine (PEPCID) 20 MG tablet Take 1 tablet by mouth 2 times daily 6/14/19  Yes Douglas Javed MD   apixaban (ELIQUIS) 5 MG TABS tablet Take 1 tablet by mouth 2 times daily 10/11/18  Yes Brianna Navarro MD   atorvastatin (LIPITOR) 20 MG tablet TAKE 1 TABLET BY MOUTH DAILY 10/11/18  Yes Brianna Navarro MD   metoprolol succinate (TOPROL XL) 50 MG extended release tablet TAKE 1 TABLET BY MOUTH EVERY DAY 10/11/18  Yes Brianna Navarro MD   aspirin 81 MG tablet Take 81 mg by mouth daily. Yes Historical Provider, MD   ferrous sulfate 325 (65 FE) MG tablet Take 1 tablet by mouth daily (with breakfast). 5/20/14  Yes Douglas Javed MD   Krill Oil 500 MG CAPS Take  by mouth daily. Yes Historical Provider, MD   Coenzyme Q10 (CO Q 10 PO) Take  by mouth daily. Yes Historical Provider, MD   ondansetron (ZOFRAN) 4 MG tablet Take 1 tablet by mouth daily as needed for Nausea or Vomiting 6/14/19   Douglas Javed MD   naproxen (NAPROSYN) 500 MG tablet Take 1 tablet by mouth 2 times daily 6/11/19   Jeri Albright DO       Allergies:  Patient has no known allergies. Social History:      The patient currently lives at home     TOBACCO:   reports that he has never smoked.  He quit Recent Labs     06/18/19  0950   AST 29   ALT 37   BILITOT 3.1*   ALKPHOS 160*     Urinalysis:      Lab Results   Component Value Date    NITRU NEGATIVE 06/18/2019    WBCUA 2-4 06/18/2019    BACTERIA NONE 06/18/2019    RBCUA 3-5 06/18/2019    BLOODU NEGATIVE 06/18/2019    GLUCOSEU NEGATIVE 06/18/2019       Radiology:       CT HIP LEFT W CONTRAST   Final Result   1. Mild degenerative changes left hip. 2. 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 As mentioned, this also extends into the left iliacus and psoas muscles. .               **This report has been created using voice recognition software. It may contain minor errors which are inherent in voice recognition technology. **      Final report electronically signed by Dr. Leny Carlson on 6/18/2019 1:25 PM      XR HIP 2-3 VW W PELVIS LEFT   Final Result   Mild degenerative changes both hips. Study otherwise unremarkable. No acute findings. **This report has been created using voice recognition software. It may contain minor errors which are inherent in voice recognition technology. **      Final report electronically signed by Dr. Leny Carlson on 6/18/2019 10:44 AM      XR CHEST 1 VW   Final Result   1. Suboptimal inflation lungs. Mild cardiomegaly. 2. Minimal subsegmental atelectasis left midlung. No definite alveolar infiltrates or effusions are seen.       Final report electronically signed by Dr. Leny Carlson on 6/18/2019 10:42 AM      IR INJ ARTHROGRAM HIP LEFT    (Results Pending)            DVT prophylaxis: [x] Lovenox                                 [] SCDs                                 [] SQ Heparin                                 [] Encourage ambulation           [] Already on Anticoagulation    Code Status: Prior      PT/OT Eval Status: Consult     Disposition:    [x] Home       [] TCU       [] Rehab       [] Psych       [] SNF       [] [facility]

## 2019-06-18 NOTE — FLOWSHEET NOTE
06/18/19 1548   Provider Notification   Reason for Communication New orders   Provider Name Dr Gela Olivo   Provider Notification Physician   Method of Communication Secure Message  (need for pain medication patient rated at 9/10)   Response Other (Comment)  (physician entered orders)

## 2019-06-18 NOTE — ED PROVIDER NOTES
[facility]  eMERGENCY dEPARTMENT eNCOUnter          CHIEF COMPLAINT       Chief Complaint   Patient presents with    Hip Pain     left       Nurses Notes reviewed and I agreeexcept as noted in the HPI. HISTORY OF PRESENT ILLNESS    Skylar Mckinnon is a 79 y.o. male who presents to Emergency Department with multiple complaints. First he complains he has been having left hip pain for about one week which gets worse after he fell last night. He is left hip pain is worse on hip flexion and weightbearing. Pain during rest is minimal. No prior hip surgery. Of not she is status post radical prostatectomy on 3/20/2019 with Dr. Lauren Harrington at [facility]. He also complains he has been having dry cough for the last week. His family is concerned he may be dehydrated. His family also notices in the past 3 day had small amount of erythematous dots which they think may be petechia. Patient has no fever or chills. No chest pain. No abdominal pain. No diarrhea. No urinary symptoms. REVIEW OF SYSTEMS     Review of Systems   Constitutional: Positive for activity change, appetite change and fever. Negative for chills and fatigue. HENT: Negative for congestion, ear discharge, hearing loss, nosebleeds and rhinorrhea. Eyes: Negative for discharge and itching. Respiratory: Positive for cough. Negative for shortness of breath and wheezing. Cardiovascular: Negative for chest pain. Gastrointestinal: Negative for abdominal distention, abdominal pain, diarrhea, nausea and vomiting. Endocrine: Negative for cold intolerance. Musculoskeletal: Positive for arthralgias, gait problem and myalgias. Negative for neck pain and neck stiffness. Skin: Positive for rash. Negative for wound. Neurological: Negative for dizziness, weakness and headaches. Psychiatric/Behavioral: Negative for agitation and suicidal ideas.           PAST MEDICAL HISTORY    has a past medical history of BPH (benign smokeless tobacco use included chew. He reports that he does not drink alcohol or use drugs. PHYSICAL EXAM     INITIAL VITALS:  height is 6' (1.829 m) and weight is 247 lb 4.8 oz (112.2 kg). His oral temperature is 99 °F (37.2 °C). His blood pressure is 109/56 (abnormal) and his pulse is 83. His respiration is 16 and oxygen saturation is 92%. Physical Exam   Constitutional: He is oriented to person, place, and time. He appears well-developed and well-nourished. HENT:   Head: Normocephalic and atraumatic. Eyes: Pupils are equal, round, and reactive to light. Right eye exhibits no discharge. Left eye exhibits no discharge. No scleral icterus. Neck: Normal range of motion. Neck supple. No tracheal deviation present. Cardiovascular: Normal rate, regular rhythm and normal heart sounds. Exam reveals no gallop and no friction rub. No murmur heard. Pulmonary/Chest: Effort normal. No stridor. No respiratory distress. He has no wheezes. Abdominal: Soft. There is tenderness. There is no rebound and no guarding. Tenderness to left groin. Musculoskeletal: He exhibits tenderness. He exhibits no edema. Tenderness to left hip   Neurological: He is alert and oriented to person, place, and time. No cranial nerve deficit. Coordination normal.   Skin: Skin is warm and dry. He is not diaphoretic. Scattered erythematous dots which are non-blanching, consistent with petechia   Psychiatric: He has a normal mood and affect. His behavior is normal. Judgment and thought content normal.   Nursing note and vitals reviewed.         DIFFERENTIAL DIAGNOSIS:   Bronchitis, viral illness, hip joint arthritis, hip fracture, septic hip, less likely postop pelvic fluid collection    DIAGNOSTIC RESULTS     EKG: All EKG's are interpreted by the Emergency Department Physician who either signs or Co-signsthis chart in the absence of a cardiologist.  Atrial fibrillation  Ventricular rate 87 bpm  QRS duration 106 ms  QTc 478 ms  Q waves from septal leads, age undetermined  No ST elevation or acute T wave    RADIOLOGY: non-plain film images(s) such as CT, Ultrasound and MRI are read by the radiologist.    CT HIP LEFT W CONTRAST   Final Result   1. Mild degenerative changes left hip. 2. 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 As mentioned, this also extends into the left iliacus and psoas muscles. .               **This report has been created using voice recognition software. It may contain minor errors which are inherent in voice recognition technology. **      Final report electronically signed by Dr. Makayla Farooq on 6/18/2019 1:25 PM      XR HIP 2-3 VW W PELVIS LEFT   Final Result   Mild degenerative changes both hips. Study otherwise unremarkable. No acute findings. **This report has been created using voice recognition software. It may contain minor errors which are inherent in voice recognition technology. **      Final report electronically signed by Dr. Makayla Farooq on 6/18/2019 10:44 AM      XR CHEST 1 VW   Final Result   1. Suboptimal inflation lungs. Mild cardiomegaly. 2. Minimal subsegmental atelectasis left midlung. No definite alveolar infiltrates or effusions are seen.       Final report electronically signed by Dr. Makayla Farooq on 6/18/2019 10:42 AM      IR ABSCESS DRAINAGE PERC    (Results Pending)       []Visualized and interpreted by me   [] Radiologist's Wet Read Report Reviewed   [] Discussed with Radiologist.    [facility]:   Results for orders placed or performed during the hospital encounter of 06/18/19   CBC Auto Differential   Result Value Ref Range    WBC 33.3 (HH) 4.8 - 10.8 thou/mm3    RBC 3.95 (L) 4.70 - 6.10 mill/mm3    Hemoglobin 13.1 (L) 14.0 - 18.0 gm/dl    Hematocrit 38.5 (L) 42.0 - 52.0 %    MCV 97.5 (H) 80.0 - 94.0 fL    MCH 33.2 (H) 26.0 - 33.0 pg    MCHC 34.0 32.2 - 35.5 gm/dl    RDW-CV 12.9 11.5 - 14.5 % /hpf    Casts UA 4-8 HYALINE NONE SEEN /lpf    Crystals NONE SEEN NONE SEEN    Renal Epithelial, Urine NONE SEEN NONE SEEN    Yeast, UA NONE SEEN NONE SEEN    CASTS 2 NONE SEEN NONE SEEN /lpf    MISCELLANEOUS 2 NONE SEEN    Bile Acids, Total   Result Value Ref Range    Ictotest POSITIVE (A) NEGATIVE   Procalcitonin   Result Value Ref Range    Procalcitonin 0.53 (H) 0.01 - 0.09 ng/mL   Lactic acid, plasma   Result Value Ref Range    Lactic Acid 3.9 (H) 0.5 - 2.2 mmol/L   C-reactive protein   Result Value Ref Range    CRP 19.79 (H) 0.00 - 1.00 mg/dl   Sedimentation Rate   Result Value Ref Range    Sed Rate 68 (H) 0 - 10 mm/hr   Anion Gap   Result Value Ref Range    Anion Gap 12.0 8.0 - 16.0 meq/L   Glomerular Filtration Rate, Estimated   Result Value Ref Range    Est, Glom Filt Rate >90 ml/min/1.73m2   Osmolality   Result Value Ref Range    Osmolality Calc 275.4 275.0 - 300 mOsmol/kg   Manual Differential   Result Value Ref Range    Differential, manual see below    EKG 12 Lead   Result Value Ref Range    Ventricular Rate 87 BPM    Atrial Rate 96 BPM    QRS Duration 106 ms    Q-T Interval 398 ms    QTc Calculation (Bazett) 478 ms    R Axis -7 degrees    T Axis -12 degrees       EMERGENCY DEPARTMENT COURSE:   Vitals:    Vitals:    06/18/19 1232 06/18/19 1327 06/18/19 1432 06/18/19 1515   BP: (!) 144/95 (!) 141/89 (!) 130/34    Pulse: 82 79 86    Resp: 16 16 16 (P) 16   Temp:       TempSrc:    (P) Oral   SpO2: 95% 96% 96%    Weight:    (P) 247 lb 4.8 oz (112.2 kg)   Height:    (P) 6' (1.829 m)       9:22 AM    Patient is seen and evaluated in a timely fashion. MedicalDecision Making    His WBC 33.3. His blood glucose 219. With his intermittent fever and left hip pain,, initial concern is that this could be septic hip. I discussed with Ortho on-call who agrees an IR left hip arthrocentesis is necessary. Prior to IR,  CT left hip with contrast is ordered.   Surprisingly there is a large pelvic fluid collection possibly lymphocele due to prostatectomy. He received Zosyn and Vancomycin in ED. Urology is discussed who agrees IR fluid drainage. Admitted to Hospitalist service. CRITICAL CARE:   CRITICAL CARE: There was a high probability of clinically significant/life threatening deterioration in this patient's condition which required my urgent intervention. Total critical care time was 30 minutes. This excludes any time for separately reportable procedures. CONSULTS:  Dr. Jeronimo Pickering (hospitalist)  Dr. Joe Perry (Urology)  Dr. Mihir Lake (IR)    PROCEDURES:  None    FINAL IMPRESSION      1.  Abscess of male pelvis [facility])          DISPOSITION/PLAN   Admit    PATIENT REFERRED TO:  Steph aSntiago MD  [address] [place] [address]  [address]  [address] [phone]            DISCHARGE MEDICATIONS:  Current Discharge Medication List          (Please note that portions of this note were completed with a voice recognition program.  Efforts were made to edit the dictations but occasionally words aremis-transcribed.)    MD Whitney Persaud MD  06/18/19 1639

## 2019-06-18 NOTE — ED NOTES
Presents with complaints of left hip pain, a rash on his lower legs and a dry cough. States that his hip has been bothering him for about a week. States that the cough has been going on for some time now. States that those are the things that are bothering him the most right now. States that he has not got the blood work ordered by his family doctor done yet.      Chris Hill RN  06/18/19 8288

## 2019-06-19 LAB
ANION GAP SERPL CALCULATED.3IONS-SCNC: 11 MEQ/L (ref 8–16)
BUN BLDV-MCNC: 11 MG/DL (ref 7–22)
CALCIUM SERPL-MCNC: 7.9 MG/DL (ref 8.5–10.5)
CHLORIDE BLD-SCNC: 93 MEQ/L (ref 98–111)
CO2: 29 MEQ/L (ref 23–33)
CREAT SERPL-MCNC: 0.5 MG/DL (ref 0.4–1.2)
ERYTHROCYTE [DISTWIDTH] IN BLOOD BY AUTOMATED COUNT: 13.1 % (ref 11.5–14.5)
ERYTHROCYTE [DISTWIDTH] IN BLOOD BY AUTOMATED COUNT: 46.7 FL (ref 35–45)
GFR SERPL CREATININE-BSD FRML MDRD: > 90 ML/MIN/1.73M2
GLUCOSE BLD-MCNC: 143 MG/DL (ref 70–108)
HCT VFR BLD CALC: 36.2 % (ref 42–52)
HEMOGLOBIN: 12 GM/DL (ref 14–18)
LACTIC ACID: 1.6 MMOL/L (ref 0.5–2.2)
MAGNESIUM: 1.9 MG/DL (ref 1.6–2.4)
MCH RBC QN AUTO: 32.7 PG (ref 26–33)
MCHC RBC AUTO-ENTMCNC: 33.1 GM/DL (ref 32.2–35.5)
MCV RBC AUTO: 98.6 FL (ref 80–94)
PLATELET # BLD: 154 THOU/MM3 (ref 130–400)
PMV BLD AUTO: 9.7 FL (ref 9.4–12.4)
POTASSIUM REFLEX MAGNESIUM: 3.1 MEQ/L (ref 3.5–5.2)
RBC # BLD: 3.67 MILL/MM3 (ref 4.7–6.1)
SODIUM BLD-SCNC: 133 MEQ/L (ref 135–145)
WBC # BLD: 27.3 THOU/MM3 (ref 4.8–10.8)

## 2019-06-19 PROCEDURE — 83735 ASSAY OF MAGNESIUM: CPT

## 2019-06-19 PROCEDURE — 99233 SBSQ HOSP IP/OBS HIGH 50: CPT | Performed by: INTERNAL MEDICINE

## 2019-06-19 PROCEDURE — 2580000003 HC RX 258: Performed by: INTERNAL MEDICINE

## 2019-06-19 PROCEDURE — 83605 ASSAY OF LACTIC ACID: CPT

## 2019-06-19 PROCEDURE — 80048 BASIC METABOLIC PNL TOTAL CA: CPT

## 2019-06-19 PROCEDURE — 36415 COLL VENOUS BLD VENIPUNCTURE: CPT

## 2019-06-19 PROCEDURE — 1200000003 HC TELEMETRY R&B

## 2019-06-19 PROCEDURE — 6360000002 HC RX W HCPCS: Performed by: INTERNAL MEDICINE

## 2019-06-19 PROCEDURE — 6370000000 HC RX 637 (ALT 250 FOR IP): Performed by: INTERNAL MEDICINE

## 2019-06-19 PROCEDURE — 2709999900 HC NON-CHARGEABLE SUPPLY

## 2019-06-19 PROCEDURE — 85027 COMPLETE CBC AUTOMATED: CPT

## 2019-06-19 PROCEDURE — 87205 SMEAR GRAM STAIN: CPT

## 2019-06-19 RX ADMIN — FAMOTIDINE 20 MG: 20 TABLET, FILM COATED ORAL at 22:13

## 2019-06-19 RX ADMIN — ATORVASTATIN CALCIUM 20 MG: 20 TABLET, FILM COATED ORAL at 22:10

## 2019-06-19 RX ADMIN — PIPERACILLIN SODIUM,TAZOBACTAM SODIUM 3.38 G: 3; .375 INJECTION, POWDER, FOR SOLUTION INTRAVENOUS at 22:10

## 2019-06-19 RX ADMIN — APIXABAN 5 MG: 5 TABLET, FILM COATED ORAL at 22:10

## 2019-06-19 RX ADMIN — ASPIRIN 81 MG: 81 TABLET ORAL at 10:29

## 2019-06-19 RX ADMIN — OXYCODONE AND ACETAMINOPHEN 1 TABLET: 5; 325 TABLET ORAL at 23:01

## 2019-06-19 RX ADMIN — VANCOMYCIN HYDROCHLORIDE 1750 MG: 5 INJECTION, POWDER, LYOPHILIZED, FOR SOLUTION INTRAVENOUS at 01:37

## 2019-06-19 RX ADMIN — PIPERACILLIN SODIUM,TAZOBACTAM SODIUM 3.38 G: 3; .375 INJECTION, POWDER, FOR SOLUTION INTRAVENOUS at 03:41

## 2019-06-19 RX ADMIN — Medication 10 ML: at 22:10

## 2019-06-19 RX ADMIN — METOPROLOL SUCCINATE 50 MG: 50 TABLET, FILM COATED, EXTENDED RELEASE ORAL at 10:28

## 2019-06-19 RX ADMIN — PIPERACILLIN SODIUM,TAZOBACTAM SODIUM 3.38 G: 3; .375 INJECTION, POWDER, FOR SOLUTION INTRAVENOUS at 13:25

## 2019-06-19 RX ADMIN — APIXABAN 5 MG: 5 TABLET, FILM COATED ORAL at 10:28

## 2019-06-19 RX ADMIN — FAMOTIDINE 20 MG: 20 TABLET, FILM COATED ORAL at 10:29

## 2019-06-19 RX ADMIN — VANCOMYCIN HYDROCHLORIDE 1750 MG: 5 INJECTION, POWDER, LYOPHILIZED, FOR SOLUTION INTRAVENOUS at 15:35

## 2019-06-19 RX ADMIN — OXYCODONE AND ACETAMINOPHEN 1 TABLET: 5; 325 TABLET ORAL at 14:01

## 2019-06-19 ASSESSMENT — PAIN DESCRIPTION - PROGRESSION
CLINICAL_PROGRESSION: NOT CHANGED
CLINICAL_PROGRESSION: GRADUALLY IMPROVING
CLINICAL_PROGRESSION: NOT CHANGED

## 2019-06-19 ASSESSMENT — PAIN SCALES - GENERAL
PAINLEVEL_OUTOF10: 4
PAINLEVEL_OUTOF10: 6
PAINLEVEL_OUTOF10: 0
PAINLEVEL_OUTOF10: 6
PAINLEVEL_OUTOF10: 2

## 2019-06-19 ASSESSMENT — PAIN DESCRIPTION - LOCATION: LOCATION: HIP

## 2019-06-19 ASSESSMENT — PAIN DESCRIPTION - PAIN TYPE: TYPE: ACUTE PAIN

## 2019-06-19 ASSESSMENT — PAIN DESCRIPTION - DESCRIPTORS: DESCRIPTORS: PRESSURE

## 2019-06-19 ASSESSMENT — PAIN DESCRIPTION - FREQUENCY: FREQUENCY: INTERMITTENT

## 2019-06-19 ASSESSMENT — PAIN - FUNCTIONAL ASSESSMENT: PAIN_FUNCTIONAL_ASSESSMENT: PREVENTS OR INTERFERES SOME ACTIVE ACTIVITIES AND ADLS

## 2019-06-19 ASSESSMENT — PAIN DESCRIPTION - ONSET: ONSET: ON-GOING

## 2019-06-19 ASSESSMENT — PAIN DESCRIPTION - ORIENTATION: ORIENTATION: LEFT

## 2019-06-19 NOTE — CARE COORDINATION
6/19/19, 11:58 AM      Dada Hastings       Admitted from: ER, patient presented with left hip pain. 6/18/2019/ [facility] day: 1   Location: [facility]18/018-A Reason for admit: Abscess of male pelvis ([facility] [address] [K65.1] Status: Inpt. Admit order signed?: yes  PMH:  has a past medical history of BPH (benign prostatic hyperplasia), CAD (coronary artery disease), CVA (cerebral vascular accident) ([facility] [address] FH: heart disease, Hyperlipidemia, Hypertension, and Obesity. Procedure: 6/18/19 CT guided drain placement   Prior to admission, 3/30/19 radical prostatectomy by Dr. Lauren Harrington. Pertinent abnormal Imaging:CT of left hip:   1. Mild degenerative changes left hip. 2. 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 As mentioned, this also extends into the left iliacus and psoas muscles. .       Medications:  Scheduled Meds:   apixaban  5 mg Oral BID    aspirin  81 mg Oral Daily    atorvastatin  20 mg Oral Nightly    famotidine  20 mg Oral BID    metoprolol succinate  50 mg Oral Daily    sodium chloride flush  10 mL Intravenous 2 times per day    piperacillin-tazobactam  3.375 g Intravenous Q8H    vancomycin (VANCOCIN) intermittent dosing (placeholder)   Other RX Placeholder    vancomycin  15 mg/kg Intravenous Q12H     Continuous Infusions:   sodium chloride 125 mL/hr at 06/18/19 2101      Pertinent Info/Orders/Treatment Plan:  IV fluids, IV Vancomycin per pharmacy dosing, Zosyn, Eliquis, prn Morphine or Percocet for pain, wound and drain care (truclose drain), telemetry, up with assistance. Diet: DIET CARB CONTROL;   Smoking status:  reports that he has never smoked. He quit smokeless tobacco use about 19 years ago. His smokeless tobacco use included chew.    PCP: Zaida Chaudhary MD  Readmission: No  Readmission Risk Score: 12%    Discharge Planning  Current Residence:  Private Residence  Living Arrangements:  Alone   Support Systems: Family Members, Children  Current Services PTA:     Potential Assistance Needed:  N/A  Potential Assistance Purchasing Medications:  No  Does patient want to participate in local refill/ meds to beds program?  No  Type of Home Care Services:  None  Patient expects to be discharged to:  home  Expected Discharge date:  06/20/19  Follow Up Appointment: Best Day/ Time: Tuesday AM(prefers mornings)  Discharge Plan: Met with Demetrio Betancourt. He is from home alone. He states his sister may be staying with him at discharge.  discussed and Demetrio Betancourt states he will talk to his sister about it. List of [facility] agencies provided to Demetrio Betancourt to review.     Evaluation: no

## 2019-06-19 NOTE — PLAN OF CARE
Problem: Pain:  Goal: Pain level will decrease  Description  Pain level will decrease  6/19/2019 0516 by Epifanio Funes RN  Outcome: Ongoing  Note:   Pain level decreased after procedure. Patient states pain is a 2/10 on pain scale with 1/10 being pain goal. PRN pain medications given as ordered with rest and reposition being utilized. Problem: Pain:  Goal: Control of acute pain  Description  Control of acute pain  6/19/2019 0516 by Epifanio Funes RN  Outcome: Ongoing  Note:   As above     Problem: Pain:  Goal: Control of chronic pain  Description  Control of chronic pain  6/19/2019 0516 by Epifanio Funes RN  Outcome: Ongoing  Note:   Pain controlled with PRN pain meds. Problem: Falls - Risk of:  Goal: Will remain free from falls  Description  Will remain free from falls  6/19/2019 0516 by Epifanio Fnues RN  Outcome: Ongoing  Note:   All fall precautions in place. No falls this shift. Personal belongings at bedside. Bed alarm on.   6/19/2019 0516 by Epifanio Funes RN  Outcome: Ongoing     Problem: Falls - Risk of:  Goal: Absence of physical injury  Description  Absence of physical injury  6/19/2019 0516 by Epifanio Funes RN  Outcome: Ongoing  Note:   All fall precautions in place. No falls this shift. Personal belongings at bedside. Bed alarm on. Reviewed care plan with patient. Patient active with plan of care.

## 2019-06-19 NOTE — H&P
Formulation and discussion of sedation / procedure plans, risks, benefits, side effects and alternatives with patient and/or responsible adult completed.     Electronically signed by Jace Bamberger, MD on 6/18/2019 at 8:31 PM

## 2019-06-19 NOTE — PROGRESS NOTES
Hospitalist Progress Note  [facility] Onc Med 5K       Patient: Blele Primrose  Unit/Bed: 1F-44/414-P  YOB: 1950  MRN: [id number]  Acct: [de-identified]   AdmittingDiagnosis: Abscess of male pelvis [facility]) [K65.1]  Admit Date: 6/18/2019  Hospital Day: 1    Subjective: All left quadrant and hip pain is much improved, denies any fever no chills    Objective:   /62   Pulse 73   Temp 98.4 °F (36.9 °C) (Oral)   Resp 16   Ht 6' (1.829 m)   Wt 247 lb 4.8 oz (112.2 kg)   SpO2 93%   BMI 33.54 kg/m²       Intake/Output Summary (Last 24 hours) at 6/19/2019 1645  Last data filed at 6/19/2019 1359  Gross per 24 hour   Intake 4832 ml   Output 1300 ml   Net 3532 ml     Physical Exam  General appearance:  No apparent distress, appears stated age and cooperative. HEENT:  Normal cephalic, atraumatic without obvious deformity. Pupils equal, round, and reactive to light. Extra ocular muscles intact. Conjunctivae/corneas clear. Neck: Supple, with full range of motion. No jugular venous distention. Trachea midline. Respiratory:  Normal respiratory effort. Clear to auscultation, bilaterally without Rales/Wheezes/Rhonchi. Cardiovascular:  Regular rate and rhythm with normal S1/S2 without murmurs, rubs or gallops. Abdomen: Soft, non-tender, non-distended with normal bowel sounds. Musculoskeletal:  No clubbing, cyanosis or edema bilaterally. range of motion significantly limited at L hip , no warmth or redness  Skin: Skin color, texture, turgor normal.  No rashes or lesions. Neurologic:  Neurovascularly intact without any focal sensory/motor deficits.  Cranial nerves: II-XII intact, grossly non-focal.  Psychiatric:  Alert and oriented, thought content appropriate, normal insight  Capillary Refill: Brisk,< 3 seconds   Peripheral Pulses: +2 palpable, equal bilaterally     DVT Prophylaxis: Lovenox    Data:  CBC:   Lab Results   Component Value Date    WBC 27.3 06/19/2019    HGB 12.0 06/19/2019    HCT 36.2 06/19/2019    MCV 98.6 06/19/2019     06/19/2019     BMP:   Lab Results   Component Value Date     06/19/2019    K 3.1 06/19/2019    CL 93 06/19/2019    CO2 29 06/19/2019    BUN 11 06/19/2019    CREATININE 0.5 06/19/2019    CALCIUM 7.9 06/19/2019     ABGs: No results found for: PH, PCO2, PO2, HCO3, O2SAT  Troponin: No results found for: TROPONINI   LFTs   Lab Results   Component Value Date    AST 29 06/18/2019    ALT 37 06/18/2019    BILITOT 3.1 06/18/2019    ALKPHOS 160 06/18/2019          Assessment/Plan:  1. L Pelvic abscess, s/p drainage by lenin FALL pending, continue IV Zosyn and [address] [place] [address] urology for further evaluation treatment patient has recently undergone     prostate surgery  2. Coronary artery disease clinically stable continue aspirin beta-blocker  3. History of CVA improved stable  4.   Hypertension under control continue home meds      Electronically signed by Gideon Aparicio MD on 6/19/2019 at 4:45 PM    Rounding Hospitalist

## 2019-06-20 ENCOUNTER — APPOINTMENT (OUTPATIENT)
Dept: CT IMAGING | Age: 69
DRG: 862 | End: 2019-06-20
Payer: MEDICARE

## 2019-06-20 LAB
ANION GAP SERPL CALCULATED.3IONS-SCNC: 13 MEQ/L (ref 8–16)
BUN BLDV-MCNC: 12 MG/DL (ref 7–22)
CALCIUM SERPL-MCNC: 8.3 MG/DL (ref 8.5–10.5)
CHLORIDE BLD-SCNC: 93 MEQ/L (ref 98–111)
CO2: 29 MEQ/L (ref 23–33)
CREAT SERPL-MCNC: 0.4 MG/DL (ref 0.4–1.2)
ERYTHROCYTE [DISTWIDTH] IN BLOOD BY AUTOMATED COUNT: 13 % (ref 11.5–14.5)
ERYTHROCYTE [DISTWIDTH] IN BLOOD BY AUTOMATED COUNT: 46.5 FL (ref 35–45)
GFR SERPL CREATININE-BSD FRML MDRD: > 90 ML/MIN/1.73M2
GLUCOSE BLD-MCNC: 142 MG/DL (ref 70–108)
HCT VFR BLD CALC: 38 % (ref 42–52)
HEMOGLOBIN: 12.5 GM/DL (ref 14–18)
MCH RBC QN AUTO: 32.6 PG (ref 26–33)
MCHC RBC AUTO-ENTMCNC: 32.9 GM/DL (ref 32.2–35.5)
MCV RBC AUTO: 99 FL (ref 80–94)
PLATELET # BLD: 206 THOU/MM3 (ref 130–400)
PMV BLD AUTO: 9.7 FL (ref 9.4–12.4)
POTASSIUM SERPL-SCNC: 3.2 MEQ/L (ref 3.5–5.2)
RBC # BLD: 3.84 MILL/MM3 (ref 4.7–6.1)
SODIUM BLD-SCNC: 135 MEQ/L (ref 135–145)
VANCOMYCIN TROUGH: 12.8 UG/ML (ref 5–15)
WBC # BLD: 27.6 THOU/MM3 (ref 4.8–10.8)

## 2019-06-20 PROCEDURE — 80202 ASSAY OF VANCOMYCIN: CPT

## 2019-06-20 PROCEDURE — 6360000002 HC RX W HCPCS: Performed by: INTERNAL MEDICINE

## 2019-06-20 PROCEDURE — C1769 GUIDE WIRE: HCPCS

## 2019-06-20 PROCEDURE — 2709999900 HC NON-CHARGEABLE SUPPLY

## 2019-06-20 PROCEDURE — 10140 I&D HMTMA SEROMA/FLUID COLLJ: CPT

## 2019-06-20 PROCEDURE — 2580000003 HC RX 258: Performed by: INTERNAL MEDICINE

## 2019-06-20 PROCEDURE — 10030 IMG GID FLU COLL DRG SFT TIS: CPT

## 2019-06-20 PROCEDURE — C1894 INTRO/SHEATH, NON-LASER: HCPCS

## 2019-06-20 PROCEDURE — 85027 COMPLETE CBC AUTOMATED: CPT

## 2019-06-20 PROCEDURE — 6370000000 HC RX 637 (ALT 250 FOR IP): Performed by: FAMILY MEDICINE

## 2019-06-20 PROCEDURE — 6370000000 HC RX 637 (ALT 250 FOR IP): Performed by: NURSE PRACTITIONER

## 2019-06-20 PROCEDURE — 36415 COLL VENOUS BLD VENIPUNCTURE: CPT

## 2019-06-20 PROCEDURE — 6360000002 HC RX W HCPCS: Performed by: RADIOLOGY

## 2019-06-20 PROCEDURE — 99232 SBSQ HOSP IP/OBS MODERATE 35: CPT | Performed by: FAMILY MEDICINE

## 2019-06-20 PROCEDURE — 6370000000 HC RX 637 (ALT 250 FOR IP): Performed by: INTERNAL MEDICINE

## 2019-06-20 PROCEDURE — 74177 CT ABD & PELVIS W/CONTRAST: CPT

## 2019-06-20 PROCEDURE — 6360000002 HC RX W HCPCS

## 2019-06-20 PROCEDURE — 0W9M30Z DRAINAGE OF MALE PERINEUM WITH DRAINAGE DEVICE, PERCUTANEOUS APPROACH: ICD-10-PCS | Performed by: RADIOLOGY

## 2019-06-20 PROCEDURE — 80048 BASIC METABOLIC PNL TOTAL CA: CPT

## 2019-06-20 PROCEDURE — C1729 CATH, DRAINAGE: HCPCS

## 2019-06-20 PROCEDURE — 1200000003 HC TELEMETRY R&B

## 2019-06-20 PROCEDURE — 99221 1ST HOSP IP/OBS SF/LOW 40: CPT | Performed by: NURSE PRACTITIONER

## 2019-06-20 PROCEDURE — 6360000002 HC RX W HCPCS: Performed by: NURSE PRACTITIONER

## 2019-06-20 PROCEDURE — 6360000004 HC RX CONTRAST MEDICATION: Performed by: NURSE PRACTITIONER

## 2019-06-20 RX ORDER — LIDOCAINE 4 G/G
2 PATCH TOPICAL DAILY
Status: DISCONTINUED | OUTPATIENT
Start: 2019-06-20 | End: 2019-06-24 | Stop reason: HOSPADM

## 2019-06-20 RX ORDER — POTASSIUM CHLORIDE 20 MEQ/1
40 TABLET, EXTENDED RELEASE ORAL ONCE
Status: COMPLETED | OUTPATIENT
Start: 2019-06-20 | End: 2019-06-20

## 2019-06-20 RX ORDER — OXYCODONE HYDROCHLORIDE AND ACETAMINOPHEN 5; 325 MG/1; MG/1
1 TABLET ORAL EVERY 4 HOURS PRN
Status: DISCONTINUED | OUTPATIENT
Start: 2019-06-20 | End: 2019-06-24 | Stop reason: HOSPADM

## 2019-06-20 RX ORDER — OXYCODONE HYDROCHLORIDE AND ACETAMINOPHEN 5; 325 MG/1; MG/1
2 TABLET ORAL EVERY 4 HOURS PRN
Status: DISCONTINUED | OUTPATIENT
Start: 2019-06-20 | End: 2019-06-20

## 2019-06-20 RX ORDER — KETOROLAC TROMETHAMINE 30 MG/ML
15 INJECTION, SOLUTION INTRAMUSCULAR; INTRAVENOUS EVERY 6 HOURS PRN
Status: DISCONTINUED | OUTPATIENT
Start: 2019-06-20 | End: 2019-06-24 | Stop reason: HOSPADM

## 2019-06-20 RX ORDER — FENTANYL CITRATE 50 UG/ML
25 INJECTION, SOLUTION INTRAMUSCULAR; INTRAVENOUS ONCE
Status: COMPLETED | OUTPATIENT
Start: 2019-06-20 | End: 2019-06-20

## 2019-06-20 RX ORDER — MORPHINE SULFATE 2 MG/ML
2 INJECTION, SOLUTION INTRAMUSCULAR; INTRAVENOUS
Status: DISCONTINUED | OUTPATIENT
Start: 2019-06-20 | End: 2019-06-22

## 2019-06-20 RX ADMIN — PIPERACILLIN SODIUM,TAZOBACTAM SODIUM 3.38 G: 3; .375 INJECTION, POWDER, FOR SOLUTION INTRAVENOUS at 11:12

## 2019-06-20 RX ADMIN — MORPHINE SULFATE 2 MG: 2 INJECTION, SOLUTION INTRAMUSCULAR; INTRAVENOUS at 14:10

## 2019-06-20 RX ADMIN — OXYCODONE AND ACETAMINOPHEN 1 TABLET: 5; 325 TABLET ORAL at 10:06

## 2019-06-20 RX ADMIN — FAMOTIDINE 20 MG: 20 TABLET, FILM COATED ORAL at 20:01

## 2019-06-20 RX ADMIN — MORPHINE SULFATE 2 MG: 2 INJECTION, SOLUTION INTRAMUSCULAR; INTRAVENOUS at 08:54

## 2019-06-20 RX ADMIN — OXYCODONE HYDROCHLORIDE AND ACETAMINOPHEN 1 TABLET: 5; 325 TABLET ORAL at 22:35

## 2019-06-20 RX ADMIN — FENTANYL CITRATE 25 MCG: 50 INJECTION INTRAMUSCULAR; INTRAVENOUS at 16:47

## 2019-06-20 RX ADMIN — Medication 10 ML: at 08:54

## 2019-06-20 RX ADMIN — ASPIRIN 81 MG: 81 TABLET ORAL at 08:54

## 2019-06-20 RX ADMIN — APIXABAN 5 MG: 5 TABLET, FILM COATED ORAL at 08:54

## 2019-06-20 RX ADMIN — PIPERACILLIN SODIUM,TAZOBACTAM SODIUM 3.38 G: 3; .375 INJECTION, POWDER, FOR SOLUTION INTRAVENOUS at 02:18

## 2019-06-20 RX ADMIN — APIXABAN 5 MG: 5 TABLET, FILM COATED ORAL at 20:01

## 2019-06-20 RX ADMIN — ATORVASTATIN CALCIUM 20 MG: 20 TABLET, FILM COATED ORAL at 20:01

## 2019-06-20 RX ADMIN — KETOROLAC TROMETHAMINE 15 MG: 30 INJECTION, SOLUTION INTRAMUSCULAR at 11:12

## 2019-06-20 RX ADMIN — IOPAMIDOL 80 ML: 755 INJECTION, SOLUTION INTRAVENOUS at 13:24

## 2019-06-20 RX ADMIN — PIPERACILLIN SODIUM,TAZOBACTAM SODIUM 3.38 G: 3; .375 INJECTION, POWDER, FOR SOLUTION INTRAVENOUS at 20:08

## 2019-06-20 RX ADMIN — METOPROLOL SUCCINATE 50 MG: 50 TABLET, FILM COATED, EXTENDED RELEASE ORAL at 08:54

## 2019-06-20 RX ADMIN — POTASSIUM CHLORIDE 40 MEQ: 20 TABLET, EXTENDED RELEASE ORAL at 14:08

## 2019-06-20 RX ADMIN — OXYCODONE HYDROCHLORIDE AND ACETAMINOPHEN 1 TABLET: 5; 325 TABLET ORAL at 15:48

## 2019-06-20 RX ADMIN — VANCOMYCIN HYDROCHLORIDE 1750 MG: 5 INJECTION, POWDER, LYOPHILIZED, FOR SOLUTION INTRAVENOUS at 03:30

## 2019-06-20 RX ADMIN — FAMOTIDINE 20 MG: 20 TABLET, FILM COATED ORAL at 08:54

## 2019-06-20 RX ADMIN — MORPHINE SULFATE 2 MG: 2 INJECTION, SOLUTION INTRAMUSCULAR; INTRAVENOUS at 04:26

## 2019-06-20 RX ADMIN — VANCOMYCIN HYDROCHLORIDE 1750 MG: 5 INJECTION, POWDER, LYOPHILIZED, FOR SOLUTION INTRAVENOUS at 15:47

## 2019-06-20 ASSESSMENT — PAIN SCALES - GENERAL
PAINLEVEL_OUTOF10: 5
PAINLEVEL_OUTOF10: 2
PAINLEVEL_OUTOF10: 7
PAINLEVEL_OUTOF10: 8
PAINLEVEL_OUTOF10: 9
PAINLEVEL_OUTOF10: 0
PAINLEVEL_OUTOF10: 8
PAINLEVEL_OUTOF10: 0
PAINLEVEL_OUTOF10: 7
PAINLEVEL_OUTOF10: 6
PAINLEVEL_OUTOF10: 9

## 2019-06-20 ASSESSMENT — PAIN DESCRIPTION - DIRECTION: RADIATING_TOWARDS: LOWER LEG

## 2019-06-20 ASSESSMENT — PAIN DESCRIPTION - FREQUENCY: FREQUENCY: CONTINUOUS

## 2019-06-20 ASSESSMENT — PAIN DESCRIPTION - PAIN TYPE: TYPE: ACUTE PAIN

## 2019-06-20 ASSESSMENT — PAIN DESCRIPTION - DESCRIPTORS: DESCRIPTORS: BURNING;THROBBING

## 2019-06-20 ASSESSMENT — PAIN DESCRIPTION - ORIENTATION: ORIENTATION: LEFT

## 2019-06-20 ASSESSMENT — PAIN DESCRIPTION - LOCATION: LOCATION: HIP

## 2019-06-20 NOTE — PROGRESS NOTES
Pharmacy Vancomycin Consult     Vancomycin Day: 3  Current Dosin mg IV q12h    Temp max:  Afebrile    Recent Labs     19  0625 19  0620   BUN 11 12       Recent Labs     19  0625 19  0620   CREATININE 0.5 0.4       Recent Labs     19  0625 19  0620   WBC 27.3* 27.6*         Intake/Output Summary (Last 24 hours) at 2019 1450  Last data filed at 2019 1311  Gross per 24 hour   Intake 3356 ml   Output 700 ml   Net 2656 ml     Date Source Result   19 Blood x 2 sent   19 Pelvic abscess Staph coag +       Ht Readings from Last 1 Encounters:   19 6' (1.829 m)        Wt Readings from Last 1 Encounters:   19 247 lb 4.8 oz (112.2 kg)         Body mass index is 33.54 kg/m². Estimated Creatinine Clearance: 235 mL/min (based on SCr of 0.4 mg/dL). Trough: 12.8 mcg/mL    Assessment/Plan:  - Trough subtherapeutic and was drawn 3 hours before next dose was due so actual trough would continue to decrease. - Want to avoid 2,000 mg IV q12h due to peak toxicity effects and patient also on Zosyn  - Continue to monitor renal function as patient is also on Zosyn  - Creatinine and urine output are adequate. - Increase dose to 1,250 mg IV q8h starting after next 1,750 mg dose. Continue to monitor and likely order trough prior to 4th dose of new regimen.     Alejandro CrockerD, [facility]  2019  2:59 PM

## 2019-06-20 NOTE — PLAN OF CARE
Problem: Falls - Risk of:  Goal: Absence of physical injury  Description  Absence of physical injury  Outcome: Met This Shift     Problem: Pain:  Goal: Control of acute pain  Description  Control of acute pain  Outcome: Ongoing  Note:   Patient continue to complain of pain throughout shift in left hip/leg. Patients stated pain goal is a 4. Pain goal met. PRN medications given when available. Lidocaine patches applied to left hip and left groin. Will continue to assess pain hourly. Problem: Falls - Risk of:  Goal: Will remain free from falls  Description  Will remain free from falls  Outcome: Ongoing  Note:   Fall sign posted. Arm band in place. Non-skid slippers on. Bed alarm on. Patient agreeable to use of call light. Call light within reach. Bed in lowest position. Care plan reviewed with patient and family. Patient and family verbalize understanding of the plan of care and contribute to goal setting.

## 2019-06-20 NOTE — PROGRESS NOTES
1623 Pt arrives to CT scanning for abdominal drain placement  1630 Pt identified, procedure explained, consent signed  [id number] Pt transferred to CT table and attached to monitor  1638 Dr Carolyn Posey in  [address] [place] [address] Pre scan done  1652 Procedure started  1702 A 10 Fr. Drain placed. Connected to tolu-close bag. Thick tan, off white drainage obtained. Total of 100 ml fluid removed and discarded. 1711 Drain sutured in place  1713 Procedure completed. Percu-joshua to site. Dressing applied to site. Small amount of oozing at site. Pressure dressing to site.   1715 Pt transferred back into bed  1725 Pt transported back to 5 per bed

## 2019-06-20 NOTE — PROGRESS NOTES
Hospitalist Progress Note    Patient:  Baron Williamson      Unit/Bed:5K-18/018-A    YOB: 1950    MRN: [id number]       Acct: [de-identified]     PCP: Genna Villanueva MD    Date of Admission: 6/18/2019    Assessment/Plan:  1) Left Pelvic abscess  - Urology consulted  - s/p drainage by IR  - Culutres pending; prelim showing staph  - Continue IV Zosyn and Vanco (Day # 3)   - I will consult ID    2) CAD/HLD/HTN  - Continue with ASA, Lipitor  and BB     3)  History of CVA improved stable    4) Acute pain 2/2 to #1  - Change morphine from Q 4 hrs to Q 3 hrs    Expected discharge date:  2 days    Disposition:    [] Home       [] TCU       [] Rehab       [] Psych       [] SNF       [] [place]       [] Other-    Chief Complaint: F/U abscess    subjective (past 24 hours): Pt seen and examined bedside. Pt okay ok. Pt's drain has not drain in > 16 hrs. Pain still present and worse then yesterday. No fever. Medications:  Reviewed    Infusion Medications    sodium chloride 125 mL/hr at 06/18/19 2101     Scheduled Medications    lidocaine  2 patch Transdermal Daily    vancomycin  1,250 mg Intravenous Q8H    apixaban  5 mg Oral BID    aspirin  81 mg Oral Daily    atorvastatin  20 mg Oral Nightly    famotidine  20 mg Oral BID    metoprolol succinate  50 mg Oral Daily    sodium chloride flush  10 mL Intravenous 2 times per day    piperacillin-tazobactam  3.375 g Intravenous Q8H    vancomycin (VANCOCIN) intermittent dosing (placeholder)   Other RX Placeholder    vancomycin  15 mg/kg Intravenous Q12H     PRN Meds: ketorolac, oxyCODONE-acetaminophen **OR** [DISCONTINUED] oxyCODONE-acetaminophen, sodium chloride flush, magnesium hydroxide, ondansetron, morphine      Intake/Output Summary (Last 24 hours) at 6/20/2019 1523  Last data filed at 6/20/2019 1311  Gross per 24 hour   Intake 3356 ml   Output 700 ml   Net 2656 ml       Diet:  DIET CARB CONTROL;     Exam:  BP (!) 169/89 Pulse 89   Temp 99.1 °F (37.3 °C) (Oral)   Resp 18   Ht 6' (1.829 m)   Wt 247 lb 4.8 oz (112.2 kg)   SpO2 93%   BMI 33.54 kg/m²     General appearance: No apparent distress, appears stated age and cooperative. HEENT: Pupils equal, round, and reactive to light. Conjunctivae/corneas clear. Neck: Supple, with full range of motion. No jugular venous distention. Trachea midline. Respiratory:  Normal respiratory effort. Clear to auscultation, bilaterally without Rales/Wheezes/Rhonchi. Cardiovascular: Regular rate and rhythm with normal S1/S2 without murmurs, rubs or gallops. Abdomen: Soft, non-tender, non-distended with normal bowel sounds. Musculoskeletal: Left hip demonstrated LOM, UE WNL  Skin: Skin color, texture, turgor normal.  No rashes or lesions. Psychiatric: Alert and oriented, thought content appropriate, normal insight  Peripheral Pulses: +2 palpable, equal bilaterally       Labs:   Recent Labs     06/18/19  0950 06/19/19  0625 06/20/19  0620   WBC 33.3* 27.3* 27.6*   HGB 13.1* 12.0* 12.5*   HCT 38.5* 36.2* 38.0*    154 206     Recent Labs     06/18/19  0950 06/19/19  0625 06/20/19  0620   * 133* 135   K 3.0* 3.1* 3.2*   CL 92* 93* 93*   CO2 30 29 29   BUN 14 11 12   CREATININE 0.5 0.5 0.4   CALCIUM 8.4* 7.9* 8.3*     Recent Labs     06/18/19  0950   AST 29   ALT 37   BILITOT 3.1*   ALKPHOS 160*     No results for input(s): INR in the last 72 hours. No results for input(s): Madhuri Halon in the last 72 hours. Microbiology:      Urinalysis:      Lab Results   Component Value Date    NITRU NEGATIVE 06/18/2019    WBCUA 2-4 06/18/2019    BACTERIA NONE 06/18/2019    RBCUA 3-5 06/18/2019    BLOODU NEGATIVE 06/18/2019    GLUCOSEU NEGATIVE 06/18/2019       Radiology:  CT ABDOMEN PELVIS W IV CONTRAST Additional Contrast? None   Final Result   1. Left pelvic sidewall abscess is again seen.  There has been interval retraction of the drainage catheter which is outside of the lumen of the abscess. Recommend replacement of catheter into left pelvic sidewall abscess. This was discussed with the    referring clinician at the time of dictation. 2. Bilateral nonobstructive renal calculi are demonstrated. **This report has been created using voice recognition software. It may contain minor errors which are inherent in voice recognition technology. **      Final report electronically signed by Dr. Viktoria Rutherford on 6/20/2019 1:55 PM      CT GUIDED NEEDLE PLACEMENT   Final Result   Status post successful abscess drainage procedure. .               **This report has been created using voice recognition software. It may contain minor errors which are inherent in voice recognition technology. **         Final report electronically signed by Dr Zac Fritz on 6/19/2019 8:06 AM      CT ABSCESS DRAINAGE SOFT TISSUE   Final Result   Status post successful abscess drainage procedure. .               **This report has been created using voice recognition software. It may contain minor errors which are inherent in voice recognition technology. **         Final report electronically signed by Dr Zac Fritz on 6/19/2019 8:06 AM      CT HIP LEFT W CONTRAST   Final Result   1. Mild degenerative changes left hip. 2. 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 As mentioned, this also extends into the left iliacus and psoas muscles. .               **This report has been created using voice recognition software. It may contain minor errors which are inherent in voice recognition technology. **      Final report electronically signed by Dr. Gomez Floyd on 6/18/2019 1:25 PM      XR HIP 2-3 VW W PELVIS LEFT   Final Result   Mild degenerative changes both hips. Study otherwise unremarkable. No acute findings. **This report has been created using voice recognition software.   It may contain minor errors which are inherent in voice recognition technology. **      Final report electronically signed by Dr. Dyer Begun on 6/18/2019 10:44 AM      XR CHEST 1 VW   Final Result   1. Suboptimal inflation lungs. Mild cardiomegaly. 2. Minimal subsegmental atelectasis left midlung. No definite alveolar infiltrates or effusions are seen.       Final report electronically signed by Dr. Dyer Begun on 6/18/2019 10:42 AM      CT ABSCESS DRAINAGE SOFT TISSUE    (Results Pending)       DVT prophylaxis: [] Lovenox                                 [] SCDs                                 [] SQ Heparin                                 [] Encourage ambulation           [x] Already on Anticoagulation     Code Status: Full Code    PT/OT Eval Status: consulted    Tele:   [] yes             [] no    Active Hospital Problems    Diagnosis Date Noted    Decreased range of motion of left lower extremity [M25.662]     Abscess of male pelvis ([facility] [address] [K65.1] 06/18/2019       Electronically signed by Vera Winkler MD on 6/20/2019 at 3:23 PM

## 2019-06-20 NOTE — CONSULTS
Urology Consult Note      Reason for Consult:  \"pelvic abscess s/p prostate surgery\"  Requesting Physician:  Dr. Edwin Mcpherson    History Obtained From:  patient    Chief Complaint: left hip pain. HISTORY OF PRESENT ILLNESS:                The patient is a 79 y.o. male with significant past medical history of prostate cancer s/p Robot-Assisted Laparoscopic Radical Prostatectomy (RALRP) and bilateral pelvic lymph node dissection and bilateral nerve wrap 3/20/19 by Dr. Markus Liang. Pathology with [place] 3+4=7 prostate cancer with perineural invasion and right bladder neck involvement. Lymph noted negative. [id number]. Decipher testing with score of 0.93 indicating genomic high risk. Last PSA 0.07 5/3/19. Pt was recently treated outpatient by PCP for body aches, fever 101, bilateral flank pain, cough, body aches, n/v/d. Symptoms failed to improve. Daughter reports increased malaise, fatigue, and worsened condition as well as worsening left hip pain x 2 weeks caused him to present to ER on 6/18/19. WBC's were 33.3. CT of the left hip 6/18/19 noted a 10 cm x 5.7 cm fluid collection in the left pelvis consistent with a lymphocele. Pt was started on Zosyn and Vancomycin and underwent CT guided drainage on 6/18/19 by IR. Preliminary culture is growing staphylococcus. Gram stain noted many gram positive cocci in pairs and clusters. WBC's have trended down to 27.6. Pt reports pain in left pelvis since surgery that worsened in the last several weeks. POD #1 pt was noted to have difficulty lifting left leg and performing adduction. CT Pelvis 3/25/19 was negative for loculated fluid collection at that time. Pt reports he has been working with physical therapy secondary to difficulty lifting leg and performing adduction since surgery and symptoms are worsened currently secondary to pain. Pt is unable to lift left leg off bed or perform adduction.         Past Medical History:        Diagnosis Date    BPH (benign Socioeconomic History    Marital status:      Spouse name: Not on file    Number of children: 3    Years of education: Not on file    Highest education level: Not on file   Occupational History    Not on file   Social Needs    Financial resource strain: Not on file    Food insecurity:     Worry: Not on file     Inability: Not on file    Transportation needs:     Medical: Not on file     Non-medical: Not on file   Tobacco Use    Smoking status: Never Smoker    Smokeless tobacco: Former User     Types: Chew   Substance and Sexual Activity    Alcohol use: No     Alcohol/week: 0.0 oz    Drug use: No    Sexual activity: Not Currently   Lifestyle    Physical activity:     Days per week: Not on file     Minutes per session: Not on file    Stress: Not on file   Relationships    Social connections:     Talks on phone: Not on file     Gets together: Not on file     Attends Zoroastrianism service: Not on file     Active member of club or organization: Not on file     Attends meetings of clubs or organizations: Not on file     Relationship status: Not on file    Intimate partner violence:     Fear of current or ex partner: Not on file     Emotionally abused: Not on file     Physically abused: Not on file     Forced sexual activity: Not on file   Other Topics Concern    Not on file   Social History Narrative    Not on file       Family History:       Problem Relation Age of Onset    Heart Disease Mother     Diabetes Mother     Heart Disease Father        ROS:  Constitutional: Negative for chills, fatigue, fever, or weight loss. Eyes: Denies reported visual changes. ENT: Denies headache, difficulty swallowing, earache, and nosebleeds. Cardiovascular: Negative for chest pain, palpitations, tachycardia or edema. Respiratory: Denies cough or SOB. GI:The patient denies abdominal or flank pain, anorexia, nausea or vomiting. : see HPI.   Musculoskeletal: Patient denies low back pain or painful or Component Value Date     06/20/2019    K 3.2 06/20/2019    K 3.1 06/19/2019    CL 93 06/20/2019    CO2 29 06/20/2019    BUN 12 06/20/2019    CREATININE 0.4 06/20/2019    CALCIUM 8.3 06/20/2019    LABGLOM >90 06/20/2019    GLUCOSE 142 06/20/2019     BUN/Creatinine:    Lab Results   Component Value Date    BUN 12 06/20/2019    CREATININE 0.4 06/20/2019     Magnesium:    Lab Results   Component Value Date    MG 1.9 06/19/2019     Phosphorus:  No results found for: PHOS  PT/INR:    Lab Results   Component Value Date    INR 1.05 03/20/2019     U/A:    Lab Results   Component Value Date    COLORU DK YELLOW 06/18/2019    PHUR 6.0 06/18/2019    WBCUA 2-4 06/18/2019    RBCUA 3-5 06/18/2019    YEAST NONE SEEN 06/18/2019    BACTERIA NONE 06/18/2019    LEUKOCYTESUR TRACE 06/18/2019    UROBILINOGEN 2.0 06/18/2019    BILIRUBINUR SMALL 06/18/2019    BLOODU NEGATIVE 06/18/2019    GLUCOSEU NEGATIVE 06/18/2019       Imaging: The patient has had a CT pelvis which I have reviewed along with its accompanying report. The study demonstrates degenerative changes to left hip and interval development of a large post prostatectomy lymphocele in left pelvis 10 x 5.7 cms in size extending into the left iliacus and psoas muscle. IMPRESSION:   1. Infected L lymphocele s/p drainage 6/18/19  2. Sepsis secondary to above  3. Left hip and pelvic pain--severe with movement   4. Decreased ROM LLE  5. Prostate cancer [id number]--Last PSA 0.07, high risk Decipher--planning adjuvant radiation once continent. Plan:      Drain output has dropped off. Pt continues with significant pain and decreased ROM of LLE. Pt had some issues with adduction of the left leg and straight leg raise POD#1 however this is worsened with current infection. Infection/inflammation noted in area of obturator internus on CT imaging.   Discussed with radiology and we will repeat CT imaging at this time (including abdomen as it has not been imaged

## 2019-06-20 NOTE — PROGRESS NOTES
[address] [address]  Sedation/Analgesia History & Physical    Pt Name: Kirsty Pearce  MRN: [id number]  YOB: 1950  Provider Performing Procedure: Javi Green MD  Primary Care Physician: Brandon Roque MD    PRE-PROCEDURE   DNR-CCA/DNR-CC ? Yes ? No  Brief History/Pre-Procedure Diagnosis: Pelvic abscess, drainage catheter retracted          MEDICAL HISTORY  ?CAD/Valve  ? Liver Disease  ? Lung Disease ? Diabetes  ? Hypertension ? Renal Disease  ? Additional information:       has a past medical history of BPH (benign prostatic hyperplasia), CAD (coronary artery disease), CVA (cerebral vascular accident) ([facility] [address] FH: heart disease, Hyperlipidemia, Hypertension, and Obesity. SURGICAL HISTORY   has a past surgical history that includes Coronary artery bypass graft; Carotid endarterectomy (2 08 2011); hernia repair; cardiovascular stress test (1 05 2011); transthoracic echocardiogram (12 21 2010); Diagnostic Cardiac Cath Lab Procedure (3 24 2011); Cardiac valve replacement (April 2011); Prostatectomy (N/A, 3/20/2019); Colonoscopy; and vascular surgery. Additional information:       ALLERGIES   Allergies as of 06/18/2019    (No Known Allergies)     Additional information:       MEDICATIONS   Coumadin Use Last 5 Days ? No ?Yes  Antiplatelet drug therapy use last 5 days  ? No ?Yes  Other anticoagulant use last 5 days  ? No ?Yes    Current Facility-Administered Medications:     ketorolac (TORADOL) injection 15 mg, 15 mg, Intravenous, Q6H PRN, Edu Blanco APRN - CNP, 15 mg at 06/20/19 1112    oxyCODONE-acetaminophen (PERCOCET) 5-325 MG per tablet 1 tablet, 1 tablet, Oral, Q4H PRN, 1 tablet at 06/20/19 1548 **OR** [DISCONTINUED] oxyCODONE-acetaminophen (PERCOCET) 5-325 MG per tablet 2 tablet, 2 tablet, Oral, Q4H PRN, DOREEN Lopez - CNP    lidocaine 4 % external patch 2 patch, 2 patch, Transdermal, Daily, [address] [facility], APRN - CNP, 2 patch at 06/20/19 1232    vancomycin (VANCOCIN) 1,250 mg in dextrose 5 % 250 mL IVPB, 1,250 mg, Intravenous, Q8H, Naima Lazaro MD    morphine (PF) injection 2 mg, 2 mg, Intravenous, Q3H PRN, Zen Perdomo MD    0.9 % sodium chloride infusion, , Intravenous, Continuous, Zen Perdomo MD, Last Rate: 125 mL/hr at 06/18/19 2101    apixaban (ELIQUIS) tablet 5 mg, 5 mg, Oral, BID, Naima Lazaro MD, 5 mg at 06/20/19 0854    aspirin EC tablet 81 mg, 81 mg, Oral, Daily, Naima Lazaro MD, 81 mg at 06/20/19 0854    atorvastatin (LIPITOR) tablet 20 mg, 20 mg, Oral, Nightly, Naima Lazaro MD, 20 mg at 06/19/19 2210    famotidine (PEPCID) tablet 20 mg, 20 mg, Oral, BID, Naima Lazaro MD, 20 mg at 06/20/19 0854    metoprolol succinate (TOPROL XL) extended release tablet 50 mg, 50 mg, Oral, Daily, Naima Lazaro MD, 50 mg at 06/20/19 0854    sodium chloride flush 0.9 % injection 10 mL, 10 mL, Intravenous, 2 times per day, Naima Lazaro MD, 10 mL at 06/20/19 0854    sodium chloride flush 0.9 % injection 10 mL, 10 mL, Intravenous, PRN, Naima Lazaro MD    magnesium hydroxide (MILK OF MAGNESIA) 400 MG/5ML suspension 30 mL, 30 mL, Oral, Daily PRN, Naima Lazaro MD    ondansetron (ZOFRAN) injection 4 mg, 4 mg, Intravenous, Q6H PRN, Naima Lazaro MD    piperacillin-tazobactam (ZOSYN) 3.375 g in dextrose 5 % 50 mL IVPB extended infusion (mini-bag), 3.375 g, Intravenous, Q8H, Naima Lazaro MD, Stopped at 06/20/19 1519    vancomycin (VANCOCIN) intermittent dosing (placeholder), , Other, RX Placeholder, Naima Lazaro MD    vancomycin (VANCOCIN) 1,750 mg in dextrose 5 % 500 mL IVPB, 15 mg/kg, Intravenous, Q12H, Naima Lazaro MD, Last Rate: 250 mL/hr at 06/20/19 1547, 1,750 mg at 06/20/19 1547  Prior to Admission medications    Medication Sig Start Date End Date Taking?  Authorizing Provider   famotidine (PEPCID) 20 MG tablet Take 1 tablet by mouth 2 times daily 6/14/19  Yes Libia Escalante MD   ondansetron (ZOFRAN) 4 MG tablet Take 1 tablet by mouth daily as needed for Nausea or Vomiting 6/14/19  Yes Angela Levin MD   naproxen (NAPROSYN) 500 MG tablet Take 1 tablet by mouth 2 times daily  Patient taking differently: Take 500 mg by mouth 2 times daily as needed  6/11/19  Yes Renea Pena,    apixaban (ELIQUIS) 5 MG TABS tablet Take 1 tablet by mouth 2 times daily 10/11/18  Yes Erinn Neumann MD   atorvastatin (LIPITOR) 20 MG tablet TAKE 1 TABLET BY MOUTH DAILY 10/11/18  Yes Erinn Neumann MD   metoprolol succinate (TOPROL XL) 50 MG extended release tablet TAKE 1 TABLET BY MOUTH EVERY DAY 10/11/18  Yes Erinn Neumann MD   aspirin 81 MG tablet Take 81 mg by mouth daily. Yes Historical Provider, MD   ferrous sulfate 325 (65 FE) MG tablet Take 1 tablet by mouth daily (with breakfast). 5/20/14  Yes Angela Levin MD   Krill Oil 500 MG CAPS Take  by mouth daily. Yes Historical Provider, MD   Coenzyme Q10 (CO Q 10 PO) Take  by mouth daily. Yes Historical Provider, MD     Additional information:       VITAL SIGNS   Vitals:    06/20/19 1641   BP: 138/67   Pulse: 81   Resp: (!) 97   Temp:    SpO2:        PHYSICAL:   Heart:  ?Regular rate and rhythm  ? Other:    Lungs:  ?Clear    ? Other:    Abdomen: ? Soft    ? Other: tender    Mental Status: ? Alert & Oriented  ? Other:      PLANNED PROCEDURE   ? Biospy ? Arteriogram              ?Drainage   ? Mediport Insertion  ? Fistulogram ?IV access       ? Vertebroplasty / Augmentation  ? IVC filter ? Dialysis catheter ? Biliary drainage  ? Other: ? CAPD Catheter ? Nephrostomy Tube / Stent  SEDATION  Planned agent:?Midazolam ?Meperidine ? Sublimaze ? Dilaudid ? Morphine     ? Diazepam  ?Other:     ASA Classification:  ?1 ?2 ?3 ?4 ?5  Class 1: A normal healthy patient  Class 2: Pt with mild to moderate systemic disease  Class 3: Severe systemic disease or disturbance  Class 4: Severe systemic disorders that are already life threatening. Class 5: Moribund pt with little chances of survival, for more than 24 hours.   Mallampati I Airway Classification:

## 2019-06-20 NOTE — PROGRESS NOTES
Department of Radiology  Post Procedure Progress Note      Pre-Procedure Diagnosis:  Pelvic abscess    Procedure Performed:  CT guided drainage    Anesthesia: local / fentanyl for analgesia only    Findings: successful    Immediate Complications:  None    Estimated Blood Loss: minimal    SEE DICTATED PROCEDURE NOTE FOR COMPLETE DETAILS.     John Epps   6/20/2019 5:22 PM

## 2019-06-21 LAB
ANION GAP SERPL CALCULATED.3IONS-SCNC: 8 MEQ/L (ref 8–16)
BUN BLDV-MCNC: 9 MG/DL (ref 7–22)
CALCIUM SERPL-MCNC: 7.9 MG/DL (ref 8.5–10.5)
CHLORIDE BLD-SCNC: 98 MEQ/L (ref 98–111)
CO2: 29 MEQ/L (ref 23–33)
CREAT SERPL-MCNC: 0.5 MG/DL (ref 0.4–1.2)
ERYTHROCYTE [DISTWIDTH] IN BLOOD BY AUTOMATED COUNT: 12.8 % (ref 11.5–14.5)
ERYTHROCYTE [DISTWIDTH] IN BLOOD BY AUTOMATED COUNT: 46.6 FL (ref 35–45)
GFR SERPL CREATININE-BSD FRML MDRD: > 90 ML/MIN/1.73M2
GLUCOSE BLD-MCNC: 125 MG/DL (ref 70–108)
HCT VFR BLD CALC: 34.5 % (ref 42–52)
HEMOGLOBIN: 11.3 GM/DL (ref 14–18)
MCH RBC QN AUTO: 32.7 PG (ref 26–33)
MCHC RBC AUTO-ENTMCNC: 32.8 GM/DL (ref 32.2–35.5)
MCV RBC AUTO: 99.7 FL (ref 80–94)
PLATELET # BLD: 194 THOU/MM3 (ref 130–400)
PMV BLD AUTO: 9.3 FL (ref 9.4–12.4)
POTASSIUM SERPL-SCNC: 3.4 MEQ/L (ref 3.5–5.2)
RBC # BLD: 3.46 MILL/MM3 (ref 4.7–6.1)
SODIUM BLD-SCNC: 135 MEQ/L (ref 135–145)
WBC # BLD: 18.7 THOU/MM3 (ref 4.8–10.8)

## 2019-06-21 PROCEDURE — 6370000000 HC RX 637 (ALT 250 FOR IP): Performed by: INTERNAL MEDICINE

## 2019-06-21 PROCEDURE — 1200000003 HC TELEMETRY R&B

## 2019-06-21 PROCEDURE — 97535 SELF CARE MNGMENT TRAINING: CPT

## 2019-06-21 PROCEDURE — 2580000003 HC RX 258: Performed by: INTERNAL MEDICINE

## 2019-06-21 PROCEDURE — 80048 BASIC METABOLIC PNL TOTAL CA: CPT

## 2019-06-21 PROCEDURE — 6370000000 HC RX 637 (ALT 250 FOR IP): Performed by: FAMILY MEDICINE

## 2019-06-21 PROCEDURE — 6360000002 HC RX W HCPCS: Performed by: INTERNAL MEDICINE

## 2019-06-21 PROCEDURE — 6360000002 HC RX W HCPCS: Performed by: NURSE PRACTITIONER

## 2019-06-21 PROCEDURE — 99232 SBSQ HOSP IP/OBS MODERATE 35: CPT | Performed by: FAMILY MEDICINE

## 2019-06-21 PROCEDURE — 36415 COLL VENOUS BLD VENIPUNCTURE: CPT

## 2019-06-21 PROCEDURE — 6370000000 HC RX 637 (ALT 250 FOR IP): Performed by: NURSE PRACTITIONER

## 2019-06-21 PROCEDURE — 99233 SBSQ HOSP IP/OBS HIGH 50: CPT | Performed by: NURSE PRACTITIONER

## 2019-06-21 PROCEDURE — 85027 COMPLETE CBC AUTOMATED: CPT

## 2019-06-21 PROCEDURE — 97165 OT EVAL LOW COMPLEX 30 MIN: CPT

## 2019-06-21 RX ORDER — POTASSIUM CHLORIDE 20 MEQ/1
20 TABLET, EXTENDED RELEASE ORAL ONCE
Status: COMPLETED | OUTPATIENT
Start: 2019-06-21 | End: 2019-06-21

## 2019-06-21 RX ADMIN — KETOROLAC TROMETHAMINE 15 MG: 30 INJECTION, SOLUTION INTRAMUSCULAR at 14:00

## 2019-06-21 RX ADMIN — APIXABAN 5 MG: 5 TABLET, FILM COATED ORAL at 09:08

## 2019-06-21 RX ADMIN — METOPROLOL SUCCINATE 50 MG: 50 TABLET, FILM COATED, EXTENDED RELEASE ORAL at 09:08

## 2019-06-21 RX ADMIN — ATORVASTATIN CALCIUM 20 MG: 20 TABLET, FILM COATED ORAL at 21:41

## 2019-06-21 RX ADMIN — PIPERACILLIN SODIUM,TAZOBACTAM SODIUM 3.38 G: 3; .375 INJECTION, POWDER, FOR SOLUTION INTRAVENOUS at 05:00

## 2019-06-21 RX ADMIN — OXYCODONE HYDROCHLORIDE AND ACETAMINOPHEN 1 TABLET: 5; 325 TABLET ORAL at 11:05

## 2019-06-21 RX ADMIN — FAMOTIDINE 20 MG: 20 TABLET, FILM COATED ORAL at 21:41

## 2019-06-21 RX ADMIN — FAMOTIDINE 20 MG: 20 TABLET, FILM COATED ORAL at 09:08

## 2019-06-21 RX ADMIN — VANCOMYCIN HYDROCHLORIDE 1250 MG: 5 INJECTION, POWDER, LYOPHILIZED, FOR SOLUTION INTRAVENOUS at 01:30

## 2019-06-21 RX ADMIN — APIXABAN 5 MG: 5 TABLET, FILM COATED ORAL at 21:41

## 2019-06-21 RX ADMIN — VANCOMYCIN HYDROCHLORIDE 1250 MG: 5 INJECTION, POWDER, LYOPHILIZED, FOR SOLUTION INTRAVENOUS at 09:20

## 2019-06-21 RX ADMIN — POTASSIUM CHLORIDE 20 MEQ: 20 TABLET, EXTENDED RELEASE ORAL at 09:08

## 2019-06-21 RX ADMIN — KETOROLAC TROMETHAMINE 15 MG: 30 INJECTION, SOLUTION INTRAMUSCULAR at 21:42

## 2019-06-21 RX ADMIN — VANCOMYCIN HYDROCHLORIDE 1250 MG: 5 INJECTION, POWDER, LYOPHILIZED, FOR SOLUTION INTRAVENOUS at 17:00

## 2019-06-21 RX ADMIN — ASPIRIN 81 MG: 81 TABLET ORAL at 09:08

## 2019-06-21 ASSESSMENT — PAIN DESCRIPTION - LOCATION
LOCATION: HIP
LOCATION: ABDOMEN

## 2019-06-21 ASSESSMENT — PAIN DESCRIPTION - ONSET
ONSET: ON-GOING
ONSET: AWAKENED FROM SLEEP

## 2019-06-21 ASSESSMENT — PAIN DESCRIPTION - PAIN TYPE
TYPE: ACUTE PAIN
TYPE: ACUTE PAIN

## 2019-06-21 ASSESSMENT — PAIN SCALES - GENERAL
PAINLEVEL_OUTOF10: 4
PAINLEVEL_OUTOF10: 0
PAINLEVEL_OUTOF10: 0
PAINLEVEL_OUTOF10: 5
PAINLEVEL_OUTOF10: 6
PAINLEVEL_OUTOF10: 6
PAINLEVEL_OUTOF10: 5

## 2019-06-21 ASSESSMENT — PAIN DESCRIPTION - DESCRIPTORS
DESCRIPTORS: DULL;ACHING
DESCRIPTORS: ACHING;DISCOMFORT

## 2019-06-21 ASSESSMENT — PAIN DESCRIPTION - FREQUENCY
FREQUENCY: INTERMITTENT
FREQUENCY: CONTINUOUS

## 2019-06-21 ASSESSMENT — PAIN - FUNCTIONAL ASSESSMENT: PAIN_FUNCTIONAL_ASSESSMENT: PREVENTS OR INTERFERES SOME ACTIVE ACTIVITIES AND ADLS

## 2019-06-21 ASSESSMENT — PAIN DESCRIPTION - PROGRESSION
CLINICAL_PROGRESSION: GRADUALLY IMPROVING
CLINICAL_PROGRESSION: GRADUALLY IMPROVING

## 2019-06-21 ASSESSMENT — PAIN DESCRIPTION - ORIENTATION: ORIENTATION: LEFT

## 2019-06-21 NOTE — PLAN OF CARE
Problem: Pain:  Description  Pain management should include both nonpharmacologic and pharmacologic interventions. Goal: Pain level will decrease  Description  Pain level will decrease  6/21/2019 1027 by Paulette Gordon RN  Outcome: Ongoing  Note:   Patient's stated pain goal 4/10. PRN Toradol, Percocet and Morphine. Also utilizing rest and repositioning. Problem: Falls - Risk of:  Goal: Will remain free from falls  Description  Will remain free from falls  6/21/2019 1027 by Paulette Gordon RN  Outcome: Ongoing  Note:   Patient remains free from falls this shift. Call light and bedside table within reach. Bed in lowest position with alarm on. Rounding hourly. Problem: Physical Regulation:  Goal: Will remain free from infection  Description  Will remain free from infection  6/21/2019 1027 by Paulette Gordon RN  Outcome: Ongoing  Note:   CHG bath. Contact Isolation. Proper hand hygiene. Problem: Skin Integrity:  Goal: Demonstration of wound healing without infection will improve  Description  Demonstration of wound healing without infection will improve  6/21/2019 1027 by Paulette Gordon RN  Outcome: Ongoing  Note:   No new skin issues noted. Drain sight clean dry and intact. Care plan reviewed with patient. Patient verbalizes understanding of the plan of care and contribute to goal setting.

## 2019-06-21 NOTE — PROGRESS NOTES
Hospitalist Progress Note    Patient:  Rachel Mantilla      Unit/Bed:5K-18/018-A    YOB: 1950    MRN: [id number]       Acct: [de-identified]     PCP: Marvin Lopez MD    Date of Admission: 6/18/2019    Assessment/Plan:  1) Left Pelvic abscess/left lymphocele   - Urology consulted  - s/p drainage by IR drainage 6/18 & tip replacement 6/20/2019   - Culutres pending; prelim showing staph  - Continue IV  Vanco (Day # 4) per ID. Zosyn d/c  - ID consulted     2) CAD/HLD/HTN  - Continue with ASA, Lipitor  and BB     3)  History of CVA improved stable    4) Acute pain 2/2 to #1  - Change morphine from Q 4 hrs to Q 3 hrs    5) Prostate cancer [id number]  - Last PSA 0.07  - high risk decipher - planning adjuvant radiation once continent    6) Weakness   - PT consulted    Expected discharge date:  2 days    Disposition:    [] Home       [] TCU       [] Rehab       [] Psych       [] SNF       [] [place]       [] Other-    Chief Complaint: F/U abscess    subjective (past 24 hours): Pt seen and examined bedside. Pt states his pain is much better controlled. He denies any fever. Pt still having a hard time moving his LLE, but it is a little better.     Medications:  Reviewed    Infusion Medications    sodium chloride 75 mL/hr at 06/20/19 1814     Scheduled Medications    lidocaine  2 patch Transdermal Daily    vancomycin  1,250 mg Intravenous Q8H    apixaban  5 mg Oral BID    aspirin  81 mg Oral Daily    atorvastatin  20 mg Oral Nightly    famotidine  20 mg Oral BID    metoprolol succinate  50 mg Oral Daily    sodium chloride flush  10 mL Intravenous 2 times per day    vancomycin (VANCOCIN) intermittent dosing (placeholder)   Other RX Placeholder     PRN Meds: ketorolac, oxyCODONE-acetaminophen **OR** [DISCONTINUED] oxyCODONE-acetaminophen, morphine, sodium chloride flush, magnesium hydroxide, ondansetron      Intake/Output Summary (Last 24 hours) at 6/21/2019 1213  Last data filed report electronically signed by Dr Esme Padgett on 6/19/2019 8:06 AM      CT HIP LEFT W CONTRAST   Final Result   1. Mild degenerative changes left hip. 2. 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 As mentioned, this also extends into the left iliacus and psoas muscles. .               **This report has been created using voice recognition software. It may contain minor errors which are inherent in voice recognition technology. **      Final report electronically signed by Dr. Evelyn Glass on 6/18/2019 1:25 PM      XR HIP 2-3 VW W PELVIS LEFT   Final Result   Mild degenerative changes both hips. Study otherwise unremarkable. No acute findings. **This report has been created using voice recognition software. It may contain minor errors which are inherent in voice recognition technology. **      Final report electronically signed by Dr. Evelyn Glass on 6/18/2019 10:44 AM      XR CHEST 1 VW   Final Result   1. Suboptimal inflation lungs. Mild cardiomegaly. 2. Minimal subsegmental atelectasis left midlung. No definite alveolar infiltrates or effusions are seen.       Final report electronically signed by Dr. Evelyn Glass on 6/18/2019 10:42 AM        DVT prophylaxis: [] Lovenox                                 [] SCDs                                 [] SQ Heparin                                 [] Encourage ambulation           [x] Already on Anticoagulation     Code Status: Full Code    PT/OT Eval Status: consulted    Tele:   [] yes             [] no    Active Hospital Problems    Diagnosis Date Noted    Pain, joint, hip, left [M25.552]     Sepsis due to methicillin resistant Staphylococcus aureus (MRSA) ([facility] [address] [A41.02]     Decreased range of motion of left lower extremity [M25.662]     Abscess of male pelvis ([facility] [address] [K65.1] 06/18/2019       Electronically signed by Debbie Miles MD on 6/21/2019 at 12:13 PM

## 2019-06-21 NOTE — CONSULTS
Consults                                  CONSULTATION NOTE :ID       Patient - Neena Lundborg,  Age - 79 y.o.    - 1950      Room Number - 5K-18/018-A   N -  [id number]   [facility]  Date of Admission -  2019  8:52 AM  Patient's PCP: Yeimi Butts MD     Requesting Physician: Jimenez Gupta MD    REASON FOR CONSULTATION   Pelvic abscess    HISTORY OF PRESENT ILLNESS       This is a very pleasant 79 y.o. male who was admitted to the hospital with a chief complaints of pelvic pain and difficulty of abduction following robotic surgery for prostate cancer. It was done on March, after the surgery he had trouble abducting the hip. He has been doing physical therapy however he has been having more pain on the left hip. He was found to have a deep lymphocele. It was aspirated and that culture is positive for MRSA. He has been on vancomycin and Zosyn. I was asked to see this patient and recommend treatment. He reports that since it was aspirated, he is feeling much better. He denies any fever or chills, he has no cough or chest pain no abdominal pain he had occasional urinary incontinence. He reports that the urinary incontinence has improved since  the pelvic fluid collection was aspirated. PAST MEDICAL  HISTORY       Past Medical History:   Diagnosis Date    BPH (benign prostatic hyperplasia)     CAD (coronary artery disease)     CVA (cerebral vascular accident) ([facility] [address] 200    FH: heart disease     Hyperlipidemia     Hypertension     Obesity        PAST SURGICAL HISTORY     Past Surgical History:   Procedure Laterality Date    CARDIAC VALVE REPLACEMENT  2011    [place], [place]    CARDIOVASCULAR STRESS TEST  2011    Cannot exclude slight inferobasal lateral stress-induced ischemia in a relatively small-sized area. EF 68%. Mildly abnormal nuclear scan. Lexiscan test associated with nonspecific symptoms. EKG nondiagnostic with nonspecific ST-T wave changes.      CAROTID ENDARTERECTOMY  2 08 2011    Right carotid endarterectomy with patch angioplasty with convention patch angioplasty.  COLONOSCOPY      CORONARY ARTERY BYPASS GRAFT      DIAGNOSTIC CARDIAC CATH LAB PROCEDURE  3 24 2011    LV end-diastolic pressure was 12 mmHg with no change before and after contrast injection. There was no significant gradient across the aortic valve to signify aortic stenosis. LV function was within normal limits, EF 55%. 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 Nonobstructive diffuse disease in a small sized RCA. Nomral LV function.  HERNIA REPAIR      Inguinal hernia repair.  PROSTATECTOMY N/A 3/20/2019    PROSTATECTOMY LAPAROSCOPIC ROBOTIC performed by Addison Javier MD at [address] [place] [address] ECHOCARDIOGRAM  12 21 2010    Size was normal. Systolic function was normal. EF was estimated in the range of 55-65%. There were no regional wall motion abnormalities. Wall thickness was normal. Doppler - LV diastolic function parameters were normal. Mild MR. The aortic valve was trileaflet. Leaflets exhibited mildly increased thickness, mild calcification, normal cuspal separation, and sclerosis.  Mild TR.    VASCULAR SURGERY      cabg harvests from chest         MEDICATIONS:       Scheduled Meds:   lidocaine  2 patch Transdermal Daily    vancomycin  1,250 mg Intravenous Q8H    apixaban  5 mg Oral BID    aspirin  81 mg Oral Daily    atorvastatin  20 mg Oral Nightly    famotidine  20 mg Oral BID    metoprolol succinate  50 mg Oral Daily    sodium chloride flush  10 mL Intravenous 2 times per day    vancomycin (VANCOCIN) intermittent dosing (placeholder)   Other RX Placeholder     Continuous Infusions:   sodium chloride 75 mL/hr at 06/20/19 1814     PRN Meds:ketorolac, oxyCODONE-acetaminophen **OR** [DISCONTINUED] oxyCODONE-acetaminophen, morphine, sodium chloride flush, magnesium hydroxide, 12.5* 11.3*    206 194     BMP:    Recent Labs     06/19/19  0625 06/20/19  0620 06/21/19  0556   * 135 135   K 3.1* 3.2* 3.4*   CL 93* 93* 98   CO2 29 29 29   BUN 11 12 9   CREATININE 0.5 0.4 0.5   GLUCOSE 143* 142* 125*     Calcium:  Recent Labs     06/21/19  0556   CALCIUM 7.9*     Ionized Calcium:No results for input(s): IONCA in the last 72 hours. Magnesium:  Recent Labs     06/19/19  0625   MG 1.9    Hepatic:   Recent Labs     06/18/19  0950   ALKPHOS 160*   ALT 37   AST 29   PROT 6.2   BILITOT 3.1*   LABALBU 2.3*     Amylase and Lipase:  Recent Labs     06/19/19  0625   LACTA 1.6     Lactic Acid:   Recent Labs     06/19/19  0625   LACTA 1.6      UA:   Recent Labs     06/18/19  1000   PHUR 6.0   COLORU DK YELLOW*   PROTEINU TRACE*   BLOODU NEGATIVE   RBCUA 3-5   WBCUA 2-4   BACTERIA NONE   NITRU NEGATIVE   GLUCOSEU NEGATIVE   BILIRUBINUR SMALL*   UROBILINOGEN 2.0*   KETUA NEGATIVE         IMAGING:    Micro:   Lab Results   Component Value Date    BC No growth-preliminary 06/18/2019       Problem list of patient      Patient Active Problem List   Diagnosis Code    Benign prostatic hyperplasia N40.0    Hyperlipidemia E78.5    CVA (cerebral vascular accident) ([facility] [address] I63.9    Hypertension I10    Obesity E66.9    FH: heart disease Z82.49    Bilateral carotid artery stenosis I65.23    Atrial fibrillation (HCC) I48.91    Coronary artery disease involving coronary bypass graft of native heart without angina pectoris I25.810    PVD (peripheral vascular disease) ([facility]) I73.9    Prostate cancer ([facility]) C61    Ileus, postoperative ([facility]) K91.89, K56.7    Abscess of male pelvis (HCC) K65.1    Decreased range of motion of left lower extremity M25.662           Impression and Recommendation:   · Infected pelvic lymphocele with MRSA  · History of prostate cancer status post robotic surgery  · Weakness of hip adduction related to surgery( effect on adductor group) and worsened by the fluid collection. We will continue therapy. he will continue physical therapy  · Coronary artery disease  · Continue IV vancomycin. Infection Control:   Contact isolation   Thank you Leighann Tillman MD for allowing me to participate in this patient's care.     Aguila Tay MD,FACP 6/21/2019 9:30 AM

## 2019-06-21 NOTE — PROGRESS NOTES
[address] [place] [address]  Dept: [phone]  Loc: [phone]  Visit Date: 2019  Urology Progress Note    Chief Complaint: left pelvic pain    Subjective: \"I feel a lot better than 24 hours ago\"  Patient is resting in bed, voiding spontaneously which he states is frequent, denies any abdominal or suprapubic pressure, +flatus,  ambulating with assistance, tolerating regular diet, denies any nausea or vomiting. There are complaints of no pain at this time. Vitals:  BP (!) 160/88   Pulse 84   Temp 97.8 °F (36.6 °C) (Oral)   Resp 16   Ht 6' (1.829 m)   Wt 247 lb 4.8 oz (112.2 kg)   SpO2 96%   BMI 33.54 kg/m²   Temp  Av.1 °F (36.7 °C)  Min: 97.7 °F (36.5 °C)  Max: 98.9 °F (37.2 °C)    Intake/Output Summary (Last 24 hours) at 2019 0919  Last data filed at 2019 8655  Gross per 24 hour   Intake 4651.91 ml   Output 1425 ml   Net 3226.91 ml       Social History     Socioeconomic History    Marital status:       Spouse name: Not on file    Number of children: 3    Years of education: Not on file    Highest education level: Not on file   Occupational History    Not on file   Social Needs    Financial resource strain: Not on file    Food insecurity:     Worry: Not on file     Inability: Not on file    Transportation needs:     Medical: Not on file     Non-medical: Not on file   Tobacco Use    Smoking status: Never Smoker    Smokeless tobacco: Former User     Types: Chew   Substance and Sexual Activity    Alcohol use: No     Alcohol/week: 0.0 oz    Drug use: No    Sexual activity: Not Currently   Lifestyle    Physical activity:     Days per week: Not on file     Minutes per session: Not on file    Stress: Not on file   Relationships    Social connections:     Talks on phone: Not on file     Gets together: Not on file     Attends Orthodox service: Not on file     Active member of club or organization: Not on file     Attends meetings of clubs or organizations: Not on file     Relationship status: Not on file    Intimate partner violence:     Fear of current or ex partner: Not on file     Emotionally abused: Not on file     Physically abused: Not on file     Forced sexual activity: Not on file   Other Topics Concern    Not on file   Social History Narrative    Not on file     Family History   Problem Relation Age of Onset    Heart Disease Mother     Diabetes Mother     Heart Disease Father      No Known Allergies    Objective:    Constitutional: Alert and oriented times x3, no acute distress, and cooperative to examination with appropriate mood and affect. HEENT:   Head:         Normocephalic and atraumatic. Mucous membranes are normal.   Eyes:         EOM are normal. No scleral icterus. Nose:    The external appearance of the nose is normal  Ears: The ears appear normal to external inspection. Cardiovascular:       Normal rate, regular rhythm. Pulmonary/Chest:  Normal respiratory rate and rhthym. No use of accessory muscles. Lungs clear bilaterally. Abdominal:          Soft. No tenderness. Active bowel sounds. LLQ pelvic drain intact and draining purulent fluid. Musculoskeletal:    Decreased range of motion LLE with some discomfort noted on adduction. Improved from yesterday. He exhibits no edema of lower extremities. Extremities:    No cyanosis, clubbing, or edema present. Neurological:    Alert and oriented.      Labs:  WBC:    Lab Results   Component Value Date    WBC 18.7 06/21/2019     Hemoglobin/Hematocrit:    Lab Results   Component Value Date    HGB 11.3 06/21/2019    HCT 34.5 06/21/2019     BMP:    Lab Results   Component Value Date     06/21/2019    K 3.4 06/21/2019    K 3.1 06/19/2019    CL 98 06/21/2019    CO2 29 06/21/2019    BUN 9 06/21/2019    LABALBU 2.3 06/18/2019    LABALBU 4.5 11/25/2011    CREATININE 0.5 06/21/2019    CALCIUM 7.9 06/21/2019 LABGLOM >90 06/21/2019     Imaging: The patient has had a CT Abd & Pelvis With Contrast which I have independently reviewed along with its accompanying report. The study demonstrates:    Narrative   PROCEDURE: CT ABDOMEN PELVIS W IV CONTRAST       CLINICAL INFORMATION: Left pelvic abscess        COMPARISON: 6/18/2019       TECHNIQUE:  Axial CT images were obtained through the abdomen and pelvis following the intravenous administration of 80 mL Isovue 370 contrast.  Coronal and sagittal reformatted images were rendered. All CT scans at this facility use dose modulation,    iterative reconstruction, and/or weight-based dosing when appropriate to reduce radiation dose to as low as reasonably achievable.           FINDINGS:        The patient's previously placed left pelvic sidewall fluid collection drain has retracted since the prior examination from 2 days prior. The pigtail portion of the catheter situated along the inferior aspect of the left rectus abdominis muscle on axial    image 86. There is a residual abscess demonstrated along the left pelvic sidewall measuring approximately 6.8 x 6.8 x 12.9 cm in the AP, transverse and oblique craniocaudal dimensions respectively. This abscess causes mass effect upon the psoas and left    iliac vessels displacing them anterolaterally towards the left.       There is a trace amount of pleural fluid at the lung bases bilaterally.       The liver, spleen, pancreas and adrenal glands appear normal. There is cholelithiasis demonstrated.       Punctate 2 mm renal calculi within the right kidney are demonstrated. No hydronephrosis is seen. There is also a punctate 2 to 3 mm renal calculus within the left kidney with no hydronephrosis. There is a punctate 1 mm renal calculus within the superior    pole of the left kidney. No hydroureter or ureterolithiasis is seen.       The urinary bladder appears decompressed.  There is mild circumferential bladder wall thickening which may be

## 2019-06-21 NOTE — CARE COORDINATION
6/21/19, 2:16 PM      [address] day: 3  Location: [facility]18/018-A Reason for admit: Abscess of male pelvis [facility]) [K65.1]   Procedure: 6/18/19 CT guided drain placement   6/20/19 CT guided drain placement (prior had came out)  Treatment Plan of Care: Urology and ID following, IV fluids, IV Vancomycin per pharmacy dosing, prn Toradol, Morphine or Percocet for pain, BMP and CBC in a.m., drain care, telemetry, PT/OT, up with assistance. PCP: Maia Fritz MD  Readmission Risk Score: 13%  Discharge Plan: Luly Mobley is from home alone. He states someone will be staying with him at discharge. He has a [facility] list if he would change his mind.

## 2019-06-21 NOTE — PROGRESS NOTES
Independent  Homemaking Assistance: Independent  Homemaking Responsibilities: Yes  Ambulation Assistance: Independent  Transfer Assistance: Independent    Active : Yes  Occupation: Part time employment  Type of occupation: Security for [employer]  Leisure & Hobbies: [address], taking care of his dog  Additional Comments: Pt was independent PTA. Cognition/Orientation:  Overall Orientation Status: Within Normal Limits  Overall Cognitive Status: WNL    ADL;s:  Grooming: Supervision  UE Bathing: Supervision  LE Bathing: Supervision  UE Dressing: Supervision  Toileting: Minimal assistance  Additional Comments: Pt did a spongebath and changed his hospital gown. He used a bedpan and hand help with hygiene after. Pt uses the urinal without difficulty. Vision - Basic Assessment  Prior Vision: Wears glasses only for reading    Functional Mobility:  Bed mobility  Rolling to Left: Stand by assistance(using a bedrail)  Rolling to Right: Stand by assistance(using a bedrail)  Supine to Sit: Stand by assistance    Functional Mobility  Functional Mobility Comments: Not able to demonstrate secondary to bedrest.     Balance:  Balance  Sitting Balance: Supervision(reaching to  items off of the floor while sitting at EOB)  Standing Balance: Unable to assess(comment)  Standing Balance  Time: Not applicable    Transfers:  Transfer Comments: Pt on bedrest until later today.        Upper Extremity Assessment:Hand Dominance: Right  LUE PROM: WNL  LUE AROM : WNL  Left Hand PROM: WNL  Left Hand AROM: WNL  RUE PROM: WNL  RUE AROM : WNL  Right Hand PROM: WNL  Right Hand AROM: WNL    LUE Strength  L Hand General: 4/5  LUE Strength Comment: 3+/5 deltoid; 4-/5 pectoral; 4/5 biceps/triceps    RUE Strength  R Hand General: 4/5  RUE Strength Comment: 3+/5 deltoid; 4-/5 pectoral; 4/5 biceps/triceps      Sensation  Overall Sensation Status: WNL  Fine Motor Skills  Fine Motor Comment: No difficulty noted with doing his self care.        Activity Tolerance: Patient Tolerated treatment well  Pt was restricted with not getting out of bed. He did start to have pain at the end of session. Pt's nurse was notified. Assessment:  Assessment: Patient would benefit from continued skilled OT services to address above deficits. He presents with decreased functional mobility and ADLs and limited advanced IADLs secondary to admission for pelvis abscess. He had a drainage tube placed and is on bedrest until later today. He was doing his own self care and homemaking and worked part time prior to admission. Pt was not using any AD. Pt needed help with use of the bedpan at this time. He did other self care while he was sitting at the edge of bed. Pt did have back/hip pain following the ADLs today. Performance deficits / Impairments: Decreased functional mobility , Decreased ADL status, Decreased high-level IADLs  Prognosis: Good  REQUIRES OT FOLLOW UP: Yes  Decision Making: Low Complexity    Treatment Initiated: Treatment and education initiated within context of evaluation. Evaluation time included review of current medical information, gathering information related to past medical, social and functional history, completion of standardized testing, formal and informal observation of tasks, assessment of data and development of plan of care and goals. Treatment time included skilled education and facilitation of tasks to increase safety and independence with ADL's for improved functional independence and quality of life.     Discharge Recommendations:  Continue to assess pending progress, Patient would benefit from continued therapy after discharge    Patient Education:  Patient Education: OT POC; pt's goal; progression of getting back to work when he is stable to be discharged and his truclose drain can be pulled    Equipment Recommendations:  Equipment Needed: No    Plan:  Times per week: 3-5x  Current Treatment Recommendations: Strengthening, Functional Mobility Training, Self-Care / ADL, Safety Education & Training  Plan Comment: Pt would be able to return home when medically stable and discharged from Acute. Specific instructions for Next Treatment: Functional mobility as able; ADLs and standing tolerance; upper body exercises    Goals:  Patient goals : \"I want to get home and return to doing activities. \" pt stated. Short term goals  Time Frame for Short term goals: 2-4 tx  Short term goal 1: Pt will demonstrate functional mobility with OTR to prepare for doing self care and homemaking at discharge. Short term goal 2: Pt will complete standing ADLs with OTR to increase his endurance for ease of doing homemaking and returning to work. Short term goal 3: Pt will complete BUE moderate resistance exercises while following any handout needed to increase his endurance and strength for ease of doing  yardwork. Long term goals  Time Frame for Long term goals : None secondary to short estimated length of stay. Following session, patient left in safe position with all fall risk precautions in place.

## 2019-06-22 LAB
ANION GAP SERPL CALCULATED.3IONS-SCNC: 8 MEQ/L (ref 8–16)
BUN BLDV-MCNC: 9 MG/DL (ref 7–22)
CALCIUM SERPL-MCNC: 8.4 MG/DL (ref 8.5–10.5)
CHLORIDE BLD-SCNC: 99 MEQ/L (ref 98–111)
CO2: 28 MEQ/L (ref 23–33)
CREAT SERPL-MCNC: 0.5 MG/DL (ref 0.4–1.2)
ERYTHROCYTE [DISTWIDTH] IN BLOOD BY AUTOMATED COUNT: 12.4 % (ref 11.5–14.5)
ERYTHROCYTE [DISTWIDTH] IN BLOOD BY AUTOMATED COUNT: 45.6 FL (ref 35–45)
GFR SERPL CREATININE-BSD FRML MDRD: > 90 ML/MIN/1.73M2
GLUCOSE BLD-MCNC: 113 MG/DL (ref 70–108)
HCT VFR BLD CALC: 37.1 % (ref 42–52)
HEMOGLOBIN: 12 GM/DL (ref 14–18)
MCH RBC QN AUTO: 32.3 PG (ref 26–33)
MCHC RBC AUTO-ENTMCNC: 32.3 GM/DL (ref 32.2–35.5)
MCV RBC AUTO: 100 FL (ref 80–94)
PLATELET # BLD: 255 THOU/MM3 (ref 130–400)
PMV BLD AUTO: 8.8 FL (ref 9.4–12.4)
POTASSIUM SERPL-SCNC: 4.1 MEQ/L (ref 3.5–5.2)
RBC # BLD: 3.71 MILL/MM3 (ref 4.7–6.1)
SODIUM BLD-SCNC: 135 MEQ/L (ref 135–145)
VANCOMYCIN TROUGH: 14.6 UG/ML (ref 5–15)
WBC # BLD: 15.7 THOU/MM3 (ref 4.8–10.8)

## 2019-06-22 PROCEDURE — 2709999900 HC NON-CHARGEABLE SUPPLY

## 2019-06-22 PROCEDURE — 80048 BASIC METABOLIC PNL TOTAL CA: CPT

## 2019-06-22 PROCEDURE — 2580000003 HC RX 258: Performed by: INTERNAL MEDICINE

## 2019-06-22 PROCEDURE — 36415 COLL VENOUS BLD VENIPUNCTURE: CPT

## 2019-06-22 PROCEDURE — 85027 COMPLETE CBC AUTOMATED: CPT

## 2019-06-22 PROCEDURE — 6370000000 HC RX 637 (ALT 250 FOR IP): Performed by: NURSE PRACTITIONER

## 2019-06-22 PROCEDURE — 80202 ASSAY OF VANCOMYCIN: CPT

## 2019-06-22 PROCEDURE — 6370000000 HC RX 637 (ALT 250 FOR IP): Performed by: INTERNAL MEDICINE

## 2019-06-22 PROCEDURE — 97535 SELF CARE MNGMENT TRAINING: CPT

## 2019-06-22 PROCEDURE — 6360000002 HC RX W HCPCS: Performed by: INTERNAL MEDICINE

## 2019-06-22 PROCEDURE — 1200000003 HC TELEMETRY R&B

## 2019-06-22 PROCEDURE — 99232 SBSQ HOSP IP/OBS MODERATE 35: CPT | Performed by: FAMILY MEDICINE

## 2019-06-22 PROCEDURE — 6360000002 HC RX W HCPCS: Performed by: NURSE PRACTITIONER

## 2019-06-22 RX ADMIN — KETOROLAC TROMETHAMINE 15 MG: 30 INJECTION, SOLUTION INTRAMUSCULAR at 03:43

## 2019-06-22 RX ADMIN — ATORVASTATIN CALCIUM 20 MG: 20 TABLET, FILM COATED ORAL at 20:20

## 2019-06-22 RX ADMIN — VANCOMYCIN HYDROCHLORIDE 1250 MG: 5 INJECTION, POWDER, LYOPHILIZED, FOR SOLUTION INTRAVENOUS at 01:07

## 2019-06-22 RX ADMIN — OXYCODONE HYDROCHLORIDE AND ACETAMINOPHEN 1 TABLET: 5; 325 TABLET ORAL at 14:37

## 2019-06-22 RX ADMIN — VANCOMYCIN HYDROCHLORIDE 1250 MG: 5 INJECTION, POWDER, LYOPHILIZED, FOR SOLUTION INTRAVENOUS at 17:24

## 2019-06-22 RX ADMIN — APIXABAN 5 MG: 5 TABLET, FILM COATED ORAL at 20:20

## 2019-06-22 RX ADMIN — METOPROLOL SUCCINATE 50 MG: 50 TABLET, FILM COATED, EXTENDED RELEASE ORAL at 08:36

## 2019-06-22 RX ADMIN — APIXABAN 5 MG: 5 TABLET, FILM COATED ORAL at 08:36

## 2019-06-22 RX ADMIN — FAMOTIDINE 20 MG: 20 TABLET, FILM COATED ORAL at 20:20

## 2019-06-22 RX ADMIN — OXYCODONE HYDROCHLORIDE AND ACETAMINOPHEN 1 TABLET: 5; 325 TABLET ORAL at 22:25

## 2019-06-22 RX ADMIN — FAMOTIDINE 20 MG: 20 TABLET, FILM COATED ORAL at 08:36

## 2019-06-22 RX ADMIN — ASPIRIN 81 MG: 81 TABLET ORAL at 08:36

## 2019-06-22 RX ADMIN — VANCOMYCIN HYDROCHLORIDE 1250 MG: 5 INJECTION, POWDER, LYOPHILIZED, FOR SOLUTION INTRAVENOUS at 08:36

## 2019-06-22 ASSESSMENT — PAIN DESCRIPTION - ORIENTATION: ORIENTATION: LEFT

## 2019-06-22 ASSESSMENT — PAIN SCALES - GENERAL
PAINLEVEL_OUTOF10: 6
PAINLEVEL_OUTOF10: 4
PAINLEVEL_OUTOF10: 0
PAINLEVEL_OUTOF10: 0
PAINLEVEL_OUTOF10: 8
PAINLEVEL_OUTOF10: 6

## 2019-06-22 ASSESSMENT — PAIN DESCRIPTION - DESCRIPTORS: DESCRIPTORS: ACHING;DISCOMFORT

## 2019-06-22 ASSESSMENT — PAIN DESCRIPTION - LOCATION: LOCATION: ABDOMEN

## 2019-06-22 ASSESSMENT — PAIN DESCRIPTION - FREQUENCY: FREQUENCY: INTERMITTENT

## 2019-06-22 ASSESSMENT — PAIN DESCRIPTION - PAIN TYPE: TYPE: ACUTE PAIN

## 2019-06-22 ASSESSMENT — PAIN DESCRIPTION - ONSET: ONSET: ON-GOING

## 2019-06-22 ASSESSMENT — PAIN DESCRIPTION - DIRECTION: RADIATING_TOWARDS: LOWER LEG

## 2019-06-22 NOTE — PROGRESS NOTES
Progress note: Infectious diseases    Patient - Jamie Asecncio,  Age - 79 y.o.    - 1950      Room Number - 5K-18/018-A   MRN -  [id number]   Acct # - [de-identified]  Date of Admission -  2019  8:52 AM    SUBJECTIVE:   He has no new complaints. OBJECTIVE   VITALS    height is 6' (1.829 m) and weight is 247 lb 4.8 oz (112.2 kg). His oral temperature is 98.2 °F (36.8 °C). His blood pressure is 137/77 and his pulse is 83. His respiration is 16 and oxygen saturation is 94%. Wt Readings from Last 3 Encounters:   19 247 lb 4.8 oz (112.2 kg)   19 244 lb (110.7 kg)   19 244 lb (110.7 kg)       I/O (24 Hours)    Intake/Output Summary (Last 24 hours) at 2019 1008  Last data filed at 2019 5864  Gross per 24 hour   Intake 2703.62 ml   Output 1250 ml   Net 1453.62 ml       General Appearance  Awake, alert, oriented,  not  In acute distress  HEENT - normocephalic, atraumatic, pink conjunctiva,  anicteric sclera, scabbed herpetic blisters on the lips  Neck - Supple, no mass  Lungs -  Bilateral   air entry, no rhonchi, no wheeze  Cardiovascular - Heart sounds are normal.     Abdomen - soft, not distended, nontender, drain in place  Neurologic - oriented.  Left lower leg noted to be weak on abduction, adduction  Skin - No bruising or bleeding  Extremities - No edema, no cyanosis, clubbing     MEDICATIONS:      lidocaine  2 patch Transdermal Daily    vancomycin  1,250 mg Intravenous Q8H    apixaban  5 mg Oral BID    aspirin  81 mg Oral Daily    atorvastatin  20 mg Oral Nightly    famotidine  20 mg Oral BID    metoprolol succinate  50 mg Oral Daily    sodium chloride flush  10 mL Intravenous 2 times per day    vancomycin (VANCOCIN) intermittent dosing (placeholder)   Other RX Placeholder      sodium chloride 75 mL/hr at 19 1814     ketorolac, oxyCODONE-acetaminophen **OR**

## 2019-06-22 NOTE — PROGRESS NOTES
Pharmacy Vancomycin Consult     Vancomycin Day: 5  Current Dosin mg every 8 hours    Temp max:  98.2    Recent Labs     19  0620 19  0556   BUN 12 9       Recent Labs     19  0620 19  0556   CREATININE 0.4 0.5       Recent Labs     19  0620 19  0556   WBC 27.6* 18.7*         Intake/Output Summary (Last 24 hours) at 2019 0203  Last data filed at 2019 2109  Gross per 24 hour   Intake 3170.26 ml   Output 1700 ml   Net 1470.26 ml       Date Source Result   19 Blood x 2 NGTD   19 Pelvic abscess MRSA         Ht Readings from Last 1 Encounters:   19 6' (1.829 m)        Wt Readings from Last 1 Encounters:   19 247 lb 4.8 oz (112.2 kg)         Body mass index is 33.54 kg/m². Estimated Creatinine Clearance: 188 mL/min (based on SCr of 0.5 mg/dL). Trough: 14.6    Assessment/Plan:  No change in dosing regimen at this time. Will continue to follow.     Katja Shin RPh  2019  2:05 AM

## 2019-06-22 NOTE — PROGRESS NOTES
[facility] 5K  Occupational Therapy  Daily Note  Time:    Time In: 8569  Time Out: 1528  Timed Code Treatment Minutes: 18 Minutes  Minutes: 18          Date: 2019  Patient Name: Joan Landeros,   Gender: male      Room: [facility]18/018-A  MRN: [id number]  : 1950  (79 y.o.)  Referring Practitioner: SONYA Silver   Diagnosis: Abscess of Male Pelvis  Additional Pertinent Hx: Pt presented to [facility] with L hip pain of several days duration. Pt had surgery on his prostate in March. He reports hip pain in the past week progressed to point he could not weight bear on his L leg. Restrictions/Precautions:  Restrictions/Precautions: Isolation, Strict Bedrest  Position Activity Restriction  Other position/activity restrictions: Bedrest until 5:30 PM.    SUBJECTIVE: cooperative, pleasant. Requesting to use bathroom    PAIN: 0/10:      COGNITION: WFL    ADL:   Toileting: Stand By Assistance. stood at toilet to urine. BALANCE:  Standing Balance: Stand By Assistance    BED MOBILITY:  Supine to Sit: Stand By Assistance, X 1     Sit to Supine: Stand By Assistance       TRANSFERS:  Sit to Stand:  Stand By Assistance. FUNCTIONAL MOBILITY:  Assistive Device: None  Assist Level:  Stand By Assistance. Distance: 60ft in hallway        ADDITIONAL ACTIVITIES:  Dicussed safety with going home; balance with ADLs & LE dressing strategies    ASSESSMENT:  Activity Tolerance:  Patient tolerance of  treatment: good.         Discharge Recommendations: Continue to assess pending progress, Patient would benefit from continued therapy after discharge  Equipment Recommendations: Equipment Needed: No  Plan: Times per week: 3-5x  Specific instructions for Next Treatment: Functional mobility as able; ADLs and standing tolerance; upper body exercises  Current Treatment Recommendations: Strengthening, Functional Mobility Training, Self-Care / ADL, Safety Education & Training  Plan Comment: Pt would be able to return home when medically stable and discharged from Acute. Patient Education  Patient Education: Home Safety     Goals  Short term goals  Time Frame for Short term goals: 2-4 tx  Short term goal 1: Pt will demonstrate functional mobility with OTR to prepare for doing self care and homemaking at discharge. MET, REVISE  Short term goal 2: Pt will complete standing ADLs with OTR to increase his endurance for ease of doing homemaking and returning to work. MET, REVISE  Short term goal 3: Pt will complete BUE moderate resistance exercises while following any handout needed to increase his endurance and strength for ease of doing  Yardwork. NOT MET, REVISE  Long term goals  Time Frame for Long term goals : None secondary to short estimated length of stay. .ostg    Following session, patient left in safe position with all fall risk precautions in place.

## 2019-06-22 NOTE — PROGRESS NOTES
Hospitalist Progress Note    Patient:  Quinn Alvarez      Unit/Bed:5K-18/018-A    YOB: 1950    MRN: [id number]       Acct: [de-identified]     PCP: Reggie Barahona MD    Date of Admission: 6/18/2019    Assessment/Plan:  1) Left Pelvic abscess/left lymphocele   - Urology consulted  - s/p drainage by IR drainage 6/18 & tip replacement 6/20/2019   - [place] pending; prelim showing staph  - Continue IV  Vanco (Day # 5) per ID. Zosyn d/c --> can discharge on bactrim at discharge  - ID consulted     2) CAD/HLD/HTN  - Continue with ASA, Lipitor  and BB     3)  History of CVA improved stable    4) Acute pain 2/2 to #1  - D/C morphine and place pt on PRN oxycodone    5) Prostate cancer [id number]  - Last PSA 0.07  - high risk decipher - planning adjuvant radiation once continent    6) Weakness   - PT consulted ---> pending    Expected discharge date:  2 days    Disposition:    [] Home       [] TCU       [] Rehab       [] Psych       [] SNF       [] [place]       [] Other-    Chief Complaint: F/U abscess    subjective (past 24 hours): Pt seen and examined bedside. Pt states his pain is controlled. He is still having LLE weakness.       Medications:  Reviewed    Infusion Medications    sodium chloride 75 mL/hr at 06/20/19 1814     Scheduled Medications    lidocaine  2 patch Transdermal Daily    vancomycin  1,250 mg Intravenous Q8H    apixaban  5 mg Oral BID    aspirin  81 mg Oral Daily    atorvastatin  20 mg Oral Nightly    famotidine  20 mg Oral BID    metoprolol succinate  50 mg Oral Daily    sodium chloride flush  10 mL Intravenous 2 times per day    vancomycin (VANCOCIN) intermittent dosing (placeholder)   Other RX Placeholder     PRN Meds: ketorolac, oxyCODONE-acetaminophen **OR** [DISCONTINUED] oxyCODONE-acetaminophen, morphine, sodium chloride flush, magnesium hydroxide, ondansetron      Intake/Output Summary (Last 24 hours) at 6/22/2019 1122  Last data filed at 6/22/2019 5656  Gross per 24 hour   Intake 2703.62 ml   Output 1250 ml   Net 1453.62 ml     Diet:  DIET CARB CONTROL; Exam:  /77   Pulse 83   Temp 98.2 °F (36.8 °C) (Oral)   Resp 16   Ht 6' (1.829 m)   Wt 247 lb 4.8 oz (112.2 kg)   SpO2 94%   BMI 33.54 kg/m²     General appearance: No apparent distress, appears stated age and cooperative. HEENT: Pupils equal, round, and reactive to light. Conjunctivae/corneas clear. Neck: Supple, with full range of motion. No jugular venous distention. Trachea midline. Respiratory:  Normal respiratory effort. Clear to auscultation, bilaterally without Rales/Wheezes/Rhonchi. Cardiovascular: Regular rate and rhythm with normal S1/S2 without murmurs, rubs or gallops. Abdomen: Soft, non-tender, non-distended with normal bowel sounds. Musculoskeletal: Left hip demonstrated LOM, UE WNL  Skin: Skin color, texture, turgor normal.  No rashes or lesions. Psychiatric: Alert and oriented, thought content appropriate, normal insight  Peripheral Pulses: +2 palpable, equal bilaterally     Labs:   Recent Labs     06/20/19  0620 06/21/19  0556 06/22/19  0536   WBC 27.6* 18.7* 15.7*   HGB 12.5* 11.3* 12.0*   HCT 38.0* 34.5* 37.1*    194 255     Recent Labs     06/20/19  0620 06/21/19  0556 06/22/19  0536    135 135   K 3.2* 3.4* 4.1   CL 93* 98 99   CO2 29 29 28   BUN 12 9 9   CREATININE 0.4 0.5 0.5   CALCIUM 8.3* 7.9* 8.4*     No results for input(s): AST, ALT, BILIDIR, BILITOT, ALKPHOS in the last 72 hours. No results for input(s): INR in the last 72 hours. No results for input(s): [place] in the last 72 hours.     Microbiology:      Urinalysis:      Lab Results   Component Value Date    NITRU NEGATIVE 06/18/2019    WBCUA 2-4 06/18/2019    BACTERIA NONE 06/18/2019    RBCUA 3-5 06/18/2019    BLOODU NEGATIVE 06/18/2019    GLUCOSEU NEGATIVE 06/18/2019       Radiology:  CT ABSCESS DRAINAGE SOFT TISSUE   Final Result   Status post successful abscess drainage procedure. .               **This report has been created using voice recognition software. It may contain minor errors which are inherent in voice recognition technology. **         Final report electronically signed by Dr. Rufino Medel on 6/21/2019 8:52 AM      CT DRAINAGE HEMATOMA/SEROMA/FLUID COLLECTION   Final Result   Status post successful abscess drainage procedure. .               **This report has been created using voice recognition software. It may contain minor errors which are inherent in voice recognition technology. **         Final report electronically signed by Dr. Rufino Medel on 6/21/2019 8:52 AM      CT ABDOMEN PELVIS W IV CONTRAST Additional Contrast? None   Final Result   1. Left pelvic sidewall abscess is again seen. There has been interval retraction of the drainage catheter which is outside of the lumen of the abscess. Recommend replacement of catheter into left pelvic sidewall abscess. This was discussed with the    referring clinician at the time of dictation. 2. Bilateral nonobstructive renal calculi are demonstrated. **This report has been created using voice recognition software. It may contain minor errors which are inherent in voice recognition technology. **      Final report electronically signed by Dr. Rufino Medel on 6/20/2019 1:55 PM      CT GUIDED NEEDLE PLACEMENT   Final Result   Status post successful abscess drainage procedure. .               **This report has been created using voice recognition software. It may contain minor errors which are inherent in voice recognition technology. **         Final report electronically signed by Dr Michelle Jeffery on 6/19/2019 8:06 AM      CT ABSCESS DRAINAGE SOFT TISSUE   Final Result   Status post successful abscess drainage procedure. .               **This report has been created using voice recognition software. It may contain minor errors which are inherent in voice recognition technology. **         Final report electronically signed by Dr Cassandra Medina on 6/19/2019 8:06 AM      CT HIP LEFT W CONTRAST   Final Result   1. Mild degenerative changes left hip. 2. 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 As mentioned, this also extends into the left iliacus and psoas muscles. .               **This report has been created using voice recognition software. It may contain minor errors which are inherent in voice recognition technology. **      Final report electronically signed by Dr. Karn Schilder on 6/18/2019 1:25 PM      XR HIP 2-3 VW W PELVIS LEFT   Final Result   Mild degenerative changes both hips. Study otherwise unremarkable. No acute findings. **This report has been created using voice recognition software. It may contain minor errors which are inherent in voice recognition technology. **      Final report electronically signed by Dr. Karn Schilder on 6/18/2019 10:44 AM      XR CHEST 1 VW   Final Result   1. Suboptimal inflation lungs. Mild cardiomegaly. 2. Minimal subsegmental atelectasis left midlung. No definite alveolar infiltrates or effusions are seen.       Final report electronically signed by Dr. Karn Schilder on 6/18/2019 10:42 AM        DVT prophylaxis: [] Lovenox                                 [] SCDs                                 [] SQ Heparin                                 [] Encourage ambulation           [x] Already on Anticoagulation     Code Status: Full Code    PT/OT Eval Status: consulted    Tele:   [] yes             [] no    Active Hospital Problems    Diagnosis Date Noted    Pain, joint, hip, left [M25.552]     Sepsis due to methicillin resistant Staphylococcus aureus (MRSA) ([facility] [address] [A41.02]     Decreased range of motion of left lower extremity [M25.662]     Abscess of male pelvis ([address] [K65.1] 06/18/2019       Electronically signed by Gustavo Ortiz MD on 6/22/2019 at 11:22 AM

## 2019-06-22 NOTE — PLAN OF CARE
Problem: Pain:  Goal: Pain level will decrease  Description  Pain level will decrease  6/21/2019 2345 by Mayito Flanagan RN  Outcome: Met This Shift  Note:   Pt is taking iv Toradol for pain with relief allowing him to rest with eyes closed with no distress noted  6/21/2019 1027 by Tarik Reeves RN  Outcome: Ongoing  Note:   Patient's stated pain goal 4/10. PRN Toradol, Percocet and Morphine. Also utilizing rest and repositioning. Problem: Falls - Risk of:  Goal: Will remain free from falls  Description  Will remain free from falls  6/21/2019 2345 by Mayito Flanagan RN  Outcome: Met This Shift  Note:   Patient free from falls this shift. Patient fall risk r/t. Continues on falling star program, siderails upx2-3, bed alarm on, call light in reach, bed in low and locked position. Hourly checks preformed, potty, position, pain, pathway and possessions assessed. Continuing to monitor. 6/21/2019 1027 by Tarik Reeves RN  Outcome: Ongoing  Note:   Patient remains free from falls this shift. Call light and bedside table within reach. Bed in lowest position with alarm on. Rounding hourly. Problem: Falls - Risk of:  Goal: Absence of physical injury  Description  Absence of physical injury  Outcome: Met This Shift  Note:   Patient free from injury/harm this shift. Complying well with medical devices and following safety precautions. Fall risk continues to be assessed. Fall precautions in place, 5 P's used to provide safe environment. Bed in low and locked position, call light in reach, side rails upx2-3. Needs being met. Continuing to monitor. Problem: Physical Regulation:  Goal: Will remain free from infection  Description  Will remain free from infection  6/21/2019 2345 by Mayito Flanagan RN  Outcome: Ongoing  Note:   Pt is positive for MRSA and remains in contact isolation  6/21/2019 1027 by Tarik Reeves RN  Outcome: Ongoing  Note:   CHG bath. Contact Isolation.  Proper hand hygiene. Problem: Skin Integrity:  Goal: Demonstration of wound healing without infection will improve  Description  Demonstration of wound healing without infection will improve  6/21/2019 1027 by Marah Mcdonald RN  Outcome: Ongoing  Note:   No new skin issues noted. Drain sight clean dry and intact. Care plan reviewed with patient. Patient does verbalize understanding of the plan of care and contribute to goal setting.

## 2019-06-22 NOTE — PLAN OF CARE
Problem: Pain:  Description  Pain management should include both nonpharmacologic and pharmacologic interventions. Goal: Pain level will decrease  Description  Pain level will decrease  6/22/2019 0927 by Purvi Vance RN  Outcome: Ongoing  Note:   Patient 's stated pain goal 4/10. PRN Toradol and Percocet. Also utilizing rest and repositioning. Problem: Falls - Risk of:  Goal: Will remain free from falls  Description  Will remain free from falls  6/22/2019 0927 by Purvi Vance RN  Outcome: Ongoing  Note:   Patient remains free from falls this shift. Call light and bedside table within reach. Bed in lowest position with alarm on. Rounding hourly. Problem: Physical Regulation:  Goal: Will remain free from infection  Description  Will remain free from infection  6/22/2019 0927 by Purvi Vance RN  Outcome: Ongoing  Note:   Contact precautions. CHG bath daily for drain site. Problem: Skin Integrity:  Goal: Demonstration of wound healing without infection will improve  Description  Demonstration of wound healing without infection will improve  Outcome: Ongoing  Note:   No new skin issues noted. Turns and repositions self. Up with 1 assist.      Care plan reviewed with patient. Patient verbalizes understanding of the plan of care and contribute to goal setting.

## 2019-06-23 ENCOUNTER — APPOINTMENT (OUTPATIENT)
Dept: CT IMAGING | Age: 69
DRG: 862 | End: 2019-06-23
Payer: MEDICARE

## 2019-06-23 LAB
ERYTHROCYTE [DISTWIDTH] IN BLOOD BY AUTOMATED COUNT: 12.4 % (ref 11.5–14.5)
ERYTHROCYTE [DISTWIDTH] IN BLOOD BY AUTOMATED COUNT: 43.6 FL (ref 35–45)
HCT VFR BLD CALC: 38.4 % (ref 42–52)
HEMOGLOBIN: 13.3 GM/DL (ref 14–18)
MCH RBC QN AUTO: 33.2 PG (ref 26–33)
MCHC RBC AUTO-ENTMCNC: 34.6 GM/DL (ref 32.2–35.5)
MCV RBC AUTO: 95.8 FL (ref 80–94)
PLATELET # BLD: 181 THOU/MM3 (ref 130–400)
PMV BLD AUTO: 10.2 FL (ref 9.4–12.4)
RBC # BLD: 4.01 MILL/MM3 (ref 4.7–6.1)
WBC # BLD: 14.3 THOU/MM3 (ref 4.8–10.8)

## 2019-06-23 PROCEDURE — 85027 COMPLETE CBC AUTOMATED: CPT

## 2019-06-23 PROCEDURE — 99232 SBSQ HOSP IP/OBS MODERATE 35: CPT | Performed by: FAMILY MEDICINE

## 2019-06-23 PROCEDURE — 97110 THERAPEUTIC EXERCISES: CPT

## 2019-06-23 PROCEDURE — 99222 1ST HOSP IP/OBS MODERATE 55: CPT | Performed by: PSYCHIATRY & NEUROLOGY

## 2019-06-23 PROCEDURE — 6370000000 HC RX 637 (ALT 250 FOR IP): Performed by: NURSE PRACTITIONER

## 2019-06-23 PROCEDURE — 2709999900 HC NON-CHARGEABLE SUPPLY

## 2019-06-23 PROCEDURE — 2580000003 HC RX 258: Performed by: INTERNAL MEDICINE

## 2019-06-23 PROCEDURE — 1200000003 HC TELEMETRY R&B

## 2019-06-23 PROCEDURE — 6370000000 HC RX 637 (ALT 250 FOR IP): Performed by: INTERNAL MEDICINE

## 2019-06-23 PROCEDURE — 6360000002 HC RX W HCPCS: Performed by: INTERNAL MEDICINE

## 2019-06-23 PROCEDURE — 36415 COLL VENOUS BLD VENIPUNCTURE: CPT

## 2019-06-23 PROCEDURE — 99231 SBSQ HOSP IP/OBS SF/LOW 25: CPT | Performed by: UROLOGY

## 2019-06-23 PROCEDURE — 97162 PT EVAL MOD COMPLEX 30 MIN: CPT

## 2019-06-23 PROCEDURE — 72193 CT PELVIS W/DYE: CPT

## 2019-06-23 PROCEDURE — 6360000004 HC RX CONTRAST MEDICATION: Performed by: FAMILY MEDICINE

## 2019-06-23 RX ADMIN — FAMOTIDINE 20 MG: 20 TABLET, FILM COATED ORAL at 07:51

## 2019-06-23 RX ADMIN — APIXABAN 5 MG: 5 TABLET, FILM COATED ORAL at 22:57

## 2019-06-23 RX ADMIN — METOPROLOL SUCCINATE 50 MG: 50 TABLET, FILM COATED, EXTENDED RELEASE ORAL at 07:51

## 2019-06-23 RX ADMIN — VANCOMYCIN HYDROCHLORIDE 1250 MG: 5 INJECTION, POWDER, LYOPHILIZED, FOR SOLUTION INTRAVENOUS at 01:33

## 2019-06-23 RX ADMIN — VANCOMYCIN HYDROCHLORIDE 1250 MG: 5 INJECTION, POWDER, LYOPHILIZED, FOR SOLUTION INTRAVENOUS at 09:06

## 2019-06-23 RX ADMIN — VANCOMYCIN HYDROCHLORIDE 1250 MG: 5 INJECTION, POWDER, LYOPHILIZED, FOR SOLUTION INTRAVENOUS at 17:27

## 2019-06-23 RX ADMIN — IOPAMIDOL 80 ML: 755 INJECTION, SOLUTION INTRAVENOUS at 13:45

## 2019-06-23 RX ADMIN — ATORVASTATIN CALCIUM 20 MG: 20 TABLET, FILM COATED ORAL at 22:57

## 2019-06-23 RX ADMIN — FAMOTIDINE 20 MG: 20 TABLET, FILM COATED ORAL at 22:57

## 2019-06-23 RX ADMIN — Medication 10 ML: at 23:01

## 2019-06-23 RX ADMIN — ASPIRIN 81 MG: 81 TABLET ORAL at 07:51

## 2019-06-23 RX ADMIN — OXYCODONE HYDROCHLORIDE AND ACETAMINOPHEN 1 TABLET: 5; 325 TABLET ORAL at 06:31

## 2019-06-23 RX ADMIN — APIXABAN 5 MG: 5 TABLET, FILM COATED ORAL at 07:51

## 2019-06-23 ASSESSMENT — PAIN SCALES - GENERAL
PAINLEVEL_OUTOF10: 0
PAINLEVEL_OUTOF10: 4
PAINLEVEL_OUTOF10: 0
PAINLEVEL_OUTOF10: 0

## 2019-06-23 NOTE — PLAN OF CARE
Problem: Pain:  Description  Pain management should include both nonpharmacologic and pharmacologic interventions. Goal: Pain level will decrease  Description  Pain level will decrease  6/23/2019 0935 by Pinky Sanchez RN  Outcome: Ongoing  Note:   Patient's stated pain goal 5/10. PRN Percocet and Toradol for pain. Also utilizing rest and repositioning. Problem: Falls - Risk of:  Goal: Will remain free from falls  Description  Will remain free from falls  6/23/2019 0935 by Pinky Sanchez RN  Outcome: Ongoing  Note:   Patient remains free from falls this shift. Call light and bedside table within reach. Bed in lowest position with alarm on. Rounding hourly. P  Problem: Skin Integrity:  Goal: Demonstration of wound healing without infection will improve  Description  Demonstration of wound healing without infection will improve  6/23/2019 0935 by Pinky Sanchez RN  Outcome: Ongoing  Note:   No new skin issues noted this shift. Drain still in place. Urology to see today. Care plan reviewed with patient. Patient verbalizes understanding of the plan of care and contribute to goal setting.

## 2019-06-23 NOTE — PROGRESS NOTES
Physically abused: Not on file     Forced sexual activity: Not on file   Other Topics Concern    Not on file   Social History Narrative    Not on file     Family History   Problem Relation Age of Onset    Heart Disease Mother     Diabetes Mother     Heart Disease Father      No Known Allergies      Neurological:    Alert and oriented. Abdominal:          Soft, non tender, non distended - drain by LLQ  Genitalia:   Normal   Extremities: No cyanosis       Labs:  CBC:   Recent Labs     06/21/19  0556 06/22/19  0536 06/23/19  0637   WBC 18.7* 15.7* 14.3*   HGB 11.3* 12.0* 13.3*    255 181     BMP:    Recent Labs     06/21/19  0556 06/22/19  0536    135   K 3.4* 4.1   CL 98 99   CO2 29 28   BUN 9 9   CREATININE 0.5 0.5   GLUCOSE 125* 113*   CALCIUM 7.9* 8.4*       Impression:     1. Infected L lymphocele s/p drainage 6/18/19 & tip replacement 6/20/19  2. Sepsis secondary to above - improved  3. Left hip and pelvic pain-- improved  4. Decreased ROM LLE  Prostate cancer S/P Robot-Assisted Laparoscopic Radical Prostatectomy (RALRP) and bilateral pelvic lymph node dissection and bilateral nerve wrap on 3/20/19 by Dr. Mayte Mcpherson. [id number]--Rcoh PSA 0.07, high risk Decipher--planning adjuvant radiation once continent  5. Bilateral renal calculi      Plan:   May repeat CT pelvis prior to removing the drain (per radiology)    Thank you for including us in the care of Thee Ford      Electronically signed by Peter Vela MD on 6/23/2019 at [facility]  Urology

## 2019-06-23 NOTE — PROGRESS NOTES
[address] [address]  INPATIENT PHYSICAL THERAPY  EVALUATION  [facility] ONC MED 5K - 5K-18/018-A    Time In: 8223  Time Out: 1337  Timed Code Treatment Minutes: 8 Minutes  Minutes: 21          Date: 2019  Patient Name: Stacia Schreiber,  Gender:  male        MRN: [id number]  : 1950  (79 y.o.)      Referring Practitioner: Thee Eaton CNP  Diagnosis: Abscess of male pelvis  Additional Pertinent Hx: This is a very pleasant 79 y.o. male who was admitted to the hospital with a chief complaints of pelvic pain and difficulty of abduction following robotic surgery for prostate cancer. It was done on March, after the surgery he had trouble abducting the hip. He has been doing physical therapy however he has been having more pain on the left hip. He was found to have a deep lymphocele. It was aspirated and that culture is positive for MRSA. He has been on vancomycin and Zosyn. I was asked to see this patient and recommend treatment. He reports that since it was aspirated, he is feeling much better. He denies any fever or chills, he has no cough or chest pain no abdominal pain he had occasional urinary incontinence. He reports that the urinary incontinence has improved since  the pelvic fluid collection was aspirated. Past Medical History:   Diagnosis Date    BPH (benign prostatic hyperplasia)     CAD (coronary artery disease)     CVA (cerebral vascular accident) ([facility] [address] 200    FH: heart disease     Hyperlipidemia     Hypertension     Obesity      Past Surgical History:   Procedure Laterality Date    CARDIAC VALVE REPLACEMENT  2011    [place], [place]    CARDIOVASCULAR STRESS TEST  2011    Cannot exclude slight inferobasal lateral stress-induced ischemia in a relatively small-sized area. EF 68%. Mildly abnormal nuclear scan. Lexiscan test associated with nonspecific symptoms. EKG nondiagnostic with nonspecific ST-T wave changes.      CAROTID ENDARTERECTOMY   2011    Right carotid endarterectomy with patch angioplasty with convention patch angioplasty.  COLONOSCOPY      CORONARY ARTERY BYPASS GRAFT      DIAGNOSTIC CARDIAC CATH LAB PROCEDURE  3 24 2011    LV end-diastolic pressure was 12 mmHg with no change before and after contrast injection. There was no significant gradient across the aortic valve to signify aortic stenosis. LV function was within normal limits, EF 55%. 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 Nonobstructive diffuse disease in a small sized RCA. Nomral LV function.  HERNIA REPAIR      Inguinal hernia repair.  PROSTATECTOMY N/A 3/20/2019    PROSTATECTOMY LAPAROSCOPIC ROBOTIC performed by Addison Javier MD at [address] [place] [address] ECHOCARDIOGRAM  12 21 2010    Size was normal. Systolic function was normal. EF was estimated in the range of 55-65%. There were no regional wall motion abnormalities. Wall thickness was normal. Doppler - LV diastolic function parameters were normal. Mild MR. The aortic valve was trileaflet. Leaflets exhibited mildly increased thickness, mild calcification, normal cuspal separation, and sclerosis. Mild TR.    VASCULAR SURGERY      cabg harvests from chest       Restrictions/Precautions:  General Precautions              Subjective:  Chart Reviewed: Yes  Patient assessed for rehabilitation services?: Yes  Family / Caregiver Present: Yes(sister)  Subjective: RN approved session, pt is supine in bed and very pleasant, agreeable to PT. General:  Overall Orientation Status: Within Functional Limits  Follows Commands: Within Functional Limits    Vision: Within Functional Limits    Hearing: Within functional limits         Pain:  Denies.           Social/Functional History:    Lives With: Alone  Type of Home: House  Home Layout: Able to Live on Main level with bedroom/bathroom, Two level  Home Access: Stairs to enter without rails  Home Equipment: Rolling walker     Bathroom Shower/Tub: Tub/Shower unit  Bathroom Toilet: Standard  Bathroom Accessibility: Accessible    Receives Help From: Family  ADL Assistance: Independent  Homemaking Assistance: Independent  Homemaking Responsibilities: Yes  Meal Prep Responsibility: Primary  Laundry Responsibility: Primary  Cleaning Responsibility: Primary  Shopping Responsibility: Primary  Health Care Management: Primary  Ambulation Assistance: Independent  Transfer Assistance: Independent    Active : Yes  Occupation: Part time employment  Type of occupation: Security for [employer]  Leisure & Hobbies: [address], taking care of his dog  Additional Comments: Pt was independent PTA. Objective:       RLE AROM: WFL       LLE AROM : WFL       Strength RLE: WNL    Strength LLE: WNL          Supine to Sit: Independent    Transfers  Sit to Stand: Independent  Stand to sit: Independent       Ambulation 1  Surface: level tile  Device: No Device  Assistance: Supervision  Gait Deviations: Slow Mona, Decreased step length  Distance: >500 feet            Exercises:  Comments: Pt performs standing marches, heel raises and mini squats x 10 reps to increase strength for improved mobility. Activity Tolerance:  Activity Tolerance: Patient Tolerated treatment well    Treatment Initiated: See above exercises, education. Assessment: Body structures, Functions, Activity limitations: Decreased functional mobility   Assessment: Pt tolerates session well, amb in hallway without AD without difficulty. Pt ed to amb in hallway with staff several times/day. No further PT services warranted at this time.   Prognosis: Excellent    Clinical Presentation: Moderate - Evolving with Changing Characteristics: see assessment    Decision Making: High Complexitybased on patient assessment and decision making process of determining plan of care and establishing reasonable expectations for measurable functional outcomes    REQUIRES PT

## 2019-06-23 NOTE — PROGRESS NOTES
Hospitalist Progress Note    Patient:  Baron Williamson      Unit/Bed:5K-18/018-A    YOB: 1950    MRN: [id number]       Acct: [de-identified]     PCP: Genna Villanueva MD    Date of Admission: 6/18/2019    Assessment/Plan:  1) Left Pelvic abscess/left lymphocele   - Urology consulted  - s/p drainage by IR drainage 6/18 & tip replacement 6/20/2019   - Culutres pending; prelim showing staph  - Continue IV  Vanco (Day # 5) per ID. Zosyn d/c --> can discharge on bactrim at discharge  - ID consulted   - Repeat CT of Pelvis showed decrease in fluid. - Surgery consulted for drain care    2) CAD/HLD/HTN  - Continue with ASA, Lipitor  and BB     3)  History of CVA improved stable    4) Acute pain 2/2 to #1  - D/C morphine and place pt on PRN oxycodone    5) Prostate cancer [id number]  - Last PSA 0.07  - high risk decipher - planning adjuvant radiation once continent    6) Weakness   - PT consulted who stated pt can go home  - Consult neuro regarding his persistent weakness with LLE weakness     Expected discharge date:  2 days    Disposition:    [] Home       [] TCU       [] Rehab       [] Psych       [] SNF       [] [place]       [] Other-    Chief Complaint: F/U abscess    subjective (past 24 hours): Pt seen and examined bedside. Pt states his pain is controlled.   He is still having LLE weakness with no real improvement     Medications:  Reviewed    Infusion Medications    sodium chloride 75 mL/hr at 06/20/19 1814     Scheduled Medications    lidocaine  2 patch Transdermal Daily    vancomycin  1,250 mg Intravenous Q8H    apixaban  5 mg Oral BID    aspirin  81 mg Oral Daily    atorvastatin  20 mg Oral Nightly    famotidine  20 mg Oral BID    metoprolol succinate  50 mg Oral Daily    sodium chloride flush  10 mL Intravenous 2 times per day    vancomycin (VANCOCIN) intermittent dosing (placeholder)   Other RX Placeholder     PRN Meds: ketorolac, oxyCODONE-acetaminophen **OR** [DISCONTINUED] oxyCODONE-acetaminophen, sodium chloride flush, magnesium hydroxide, ondansetron      Intake/Output Summary (Last 24 hours) at 6/23/2019 1702  Last data filed at 6/23/2019 1612  Gross per 24 hour   Intake 2228.83 ml   Output 2650 ml   Net -421.17 ml     Diet:  DIET CARB CONTROL; Exam:  /82   Pulse 83   Temp 97.4 °F (36.3 °C) (Oral)   Resp 16   Ht 6' (1.829 m)   Wt 247 lb 4.8 oz (112.2 kg)   SpO2 97%   BMI 33.54 kg/m²     General appearance: No apparent distress, appears stated age and cooperative. HEENT: Pupils equal, round, and reactive to light. Conjunctivae/corneas clear. Neck: Supple, with full range of motion. No jugular venous distention. Trachea midline. Respiratory:  Normal respiratory effort. Clear to auscultation, bilaterally without Rales/Wheezes/Rhonchi. Cardiovascular: Regular rate and rhythm with normal S1/S2 without murmurs, rubs or gallops. Abdomen: Soft, non-tender, non-distended with normal bowel sounds. Musculoskeletal: Left hip demonstrated LOM, UE WNL  Skin: Skin color, texture, turgor normal.  No rashes or lesions. Psychiatric: Alert and oriented, thought content appropriate, normal insight  Peripheral Pulses: +2 palpable, equal bilaterally     Labs:   Recent Labs     06/21/19  0556 06/22/19  0536 06/23/19  0637   WBC 18.7* 15.7* 14.3*   HGB 11.3* 12.0* 13.3*   HCT 34.5* 37.1* 38.4*    255 181     Recent Labs     06/21/19  0556 06/22/19  0536    135   K 3.4* 4.1   CL 98 99   CO2 29 28   BUN 9 9   CREATININE 0.5 0.5   CALCIUM 7.9* 8.4*     No results for input(s): AST, ALT, BILIDIR, BILITOT, ALKPHOS in the last 72 hours. No results for input(s): INR in the last 72 hours. No results for input(s): Ronal Daily in the last 72 hours.     Microbiology:      Urinalysis:      Lab Results   Component Value Date    NITRU NEGATIVE 06/18/2019    WBCUA 2-4 06/18/2019    BACTERIA NONE 06/18/2019    RBCUA 3-5 06/18/2019    BLOODU NEGATIVE 06/18/2019 GLUCOSEU NEGATIVE 06/18/2019       Radiology:  CT PELVIS W CONTRAST Additional Contrast? None   Final Result       1. There has been interval decrease in size of a low attenuating fluid collection containing bubbles of gas within the left peritoneum posterior to the left psoas muscle and iliac vessels. This is again most consistent with an abscess. A pigtail catheter    is in satisfactory position within the inferior aspect of the collection. 2. Stranding and inflammatory change is noted surrounding the described abscess. There is thickening of the left side of the bladder wall which may be reactive versus secondary to underdistention. **This report has been created using voice recognition software. It may contain minor errors which are inherent in voice recognition technology. **      Final report electronically signed by Dr. Nii Dumas on 6/23/2019 3:43 PM      CT ABSCESS DRAINAGE SOFT TISSUE   Final Result   Status post successful abscess drainage procedure. .               **This report has been created using voice recognition software. It may contain minor errors which are inherent in voice recognition technology. **         Final report electronically signed by Dr. Oly Payne on 6/21/2019 8:52 AM      CT DRAINAGE HEMATOMA/SEROMA/FLUID COLLECTION   Final Result   Status post successful abscess drainage procedure. .               **This report has been created using voice recognition software. It may contain minor errors which are inherent in voice recognition technology. **         Final report electronically signed by Dr. Oly Payne on 6/21/2019 8:52 AM      CT ABDOMEN PELVIS W IV CONTRAST Additional Contrast? None   Final Result   1. Left pelvic sidewall abscess is again seen. There has been interval retraction of the drainage catheter which is outside of the lumen of the abscess. Recommend replacement of catheter into left pelvic sidewall abscess.  This was discussed with the referring clinician at the time of dictation. 2. Bilateral nonobstructive renal calculi are demonstrated. **This report has been created using voice recognition software. It may contain minor errors which are inherent in voice recognition technology. **      Final report electronically signed by Dr. Casi Padilla on 6/20/2019 1:55 PM      CT GUIDED NEEDLE PLACEMENT   Final Result   Status post successful abscess drainage procedure. .               **This report has been created using voice recognition software. It may contain minor errors which are inherent in voice recognition technology. **         Final report electronically signed by Dr Kevin Rick on 6/19/2019 8:06 AM      CT ABSCESS DRAINAGE SOFT TISSUE   Final Result   Status post successful abscess drainage procedure. .               **This report has been created using voice recognition software. It may contain minor errors which are inherent in voice recognition technology. **         Final report electronically signed by Dr Kevin Rick on 6/19/2019 8:06 AM      CT HIP LEFT W CONTRAST   Final Result   1. Mild degenerative changes left hip. 2. 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 As mentioned, this also extends into the left iliacus and psoas muscles. .               **This report has been created using voice recognition software. It may contain minor errors which are inherent in voice recognition technology. **      Final report electronically signed by Dr. Zahra Cardoza on 6/18/2019 1:25 PM      XR HIP 2-3 VW W PELVIS LEFT   Final Result   Mild degenerative changes both hips. Study otherwise unremarkable. No acute findings. **This report has been created using voice recognition software. It may contain minor errors which are inherent in voice recognition technology. **      Final report electronically signed by Dr. Zahra Cardoza on 6/18/2019 10:44 AM      XR CHEST 1 VW   Final Result   1. Suboptimal inflation lungs. Mild cardiomegaly. 2. Minimal subsegmental atelectasis left midlung. No definite alveolar infiltrates or effusions are seen.       Final report electronically signed by Dr. Ambika Simon on 6/18/2019 10:42 AM        DVT prophylaxis: [] Lovenox                                 [] SCDs                                 [] SQ Heparin                                 [] Encourage ambulation           [x] Already on Anticoagulation     Code Status: Full Code    PT/OT Eval Status: consulted    Tele:   [] yes             [] no    Active Hospital Problems    Diagnosis Date Noted    Pain, joint, hip, left [M25.552]     Sepsis due to methicillin resistant Staphylococcus aureus (MRSA) ([facility] [A41.02]     Decreased range of motion of left lower extremity [M25.662]     Abscess of male pelvis ([facility] [K65.1] 06/18/2019       Electronically signed by Fatoumata Farias MD on 6/23/2019 at [address] PM

## 2019-06-24 ENCOUNTER — TELEPHONE (OUTPATIENT)
Dept: FAMILY MEDICINE CLINIC | Age: 69
End: 2019-06-24

## 2019-06-24 ENCOUNTER — TELEPHONE (OUTPATIENT)
Dept: UROLOGY | Age: 69
End: 2019-06-24

## 2019-06-24 VITALS
RESPIRATION RATE: 18 BRPM | TEMPERATURE: 97.1 F | OXYGEN SATURATION: 97 % | DIASTOLIC BLOOD PRESSURE: 78 MMHG | HEIGHT: 72 IN | SYSTOLIC BLOOD PRESSURE: 136 MMHG | WEIGHT: 247.3 LBS | HEART RATE: 72 BPM | BODY MASS INDEX: 33.49 KG/M2

## 2019-06-24 DIAGNOSIS — I89.8 LYMPHOCELE: Primary | ICD-10-CM

## 2019-06-24 LAB
AEROBIC CULTURE: ABNORMAL
AEROBIC CULTURE: ABNORMAL
ANAEROBIC CULTURE: ABNORMAL
ANION GAP SERPL CALCULATED.3IONS-SCNC: 9 MEQ/L (ref 8–16)
BLOOD CULTURE, ROUTINE: NORMAL
BLOOD CULTURE, ROUTINE: NORMAL
BUN BLDV-MCNC: 7 MG/DL (ref 7–22)
CALCIUM SERPL-MCNC: 8.6 MG/DL (ref 8.5–10.5)
CHLORIDE BLD-SCNC: 102 MEQ/L (ref 98–111)
CO2: 28 MEQ/L (ref 23–33)
CREAT SERPL-MCNC: 0.5 MG/DL (ref 0.4–1.2)
ERYTHROCYTE [DISTWIDTH] IN BLOOD BY AUTOMATED COUNT: 12.4 % (ref 11.5–14.5)
ERYTHROCYTE [DISTWIDTH] IN BLOOD BY AUTOMATED COUNT: 45.6 FL (ref 35–45)
GFR SERPL CREATININE-BSD FRML MDRD: > 90 ML/MIN/1.73M2
GLUCOSE BLD-MCNC: 105 MG/DL (ref 70–108)
GRAM STAIN RESULT: ABNORMAL
HCT VFR BLD CALC: 36.3 % (ref 42–52)
HEMOGLOBIN: 12 GM/DL (ref 14–18)
MCH RBC QN AUTO: 33.1 PG (ref 26–33)
MCHC RBC AUTO-ENTMCNC: 33.1 GM/DL (ref 32.2–35.5)
MCV RBC AUTO: 100.3 FL (ref 80–94)
ORGANISM: ABNORMAL
PLATELET # BLD: 310 THOU/MM3 (ref 130–400)
PMV BLD AUTO: 8.7 FL (ref 9.4–12.4)
POTASSIUM SERPL-SCNC: 3.8 MEQ/L (ref 3.5–5.2)
RBC # BLD: 3.62 MILL/MM3 (ref 4.7–6.1)
SODIUM BLD-SCNC: 139 MEQ/L (ref 135–145)
WBC # BLD: 11.5 THOU/MM3 (ref 4.8–10.8)

## 2019-06-24 PROCEDURE — 80048 BASIC METABOLIC PNL TOTAL CA: CPT

## 2019-06-24 PROCEDURE — 6360000002 HC RX W HCPCS: Performed by: NURSE PRACTITIONER

## 2019-06-24 PROCEDURE — 6370000000 HC RX 637 (ALT 250 FOR IP): Performed by: INTERNAL MEDICINE

## 2019-06-24 PROCEDURE — 99239 HOSP IP/OBS DSCHRG MGMT >30: CPT | Performed by: FAMILY MEDICINE

## 2019-06-24 PROCEDURE — 99232 SBSQ HOSP IP/OBS MODERATE 35: CPT | Performed by: NURSE PRACTITIONER

## 2019-06-24 PROCEDURE — 36415 COLL VENOUS BLD VENIPUNCTURE: CPT

## 2019-06-24 PROCEDURE — 2709999900 HC NON-CHARGEABLE SUPPLY

## 2019-06-24 PROCEDURE — 6360000002 HC RX W HCPCS: Performed by: INTERNAL MEDICINE

## 2019-06-24 PROCEDURE — 85027 COMPLETE CBC AUTOMATED: CPT

## 2019-06-24 PROCEDURE — 2580000003 HC RX 258: Performed by: INTERNAL MEDICINE

## 2019-06-24 RX ORDER — SULFAMETHOXAZOLE AND TRIMETHOPRIM 800; 160 MG/1; MG/1
1 TABLET ORAL 2 TIMES DAILY
Qty: 50 TABLET | Refills: 0 | Status: SHIPPED | OUTPATIENT
Start: 2019-06-24 | End: 2019-07-19

## 2019-06-24 RX ORDER — OXYCODONE HYDROCHLORIDE AND ACETAMINOPHEN 5; 325 MG/1; MG/1
1 TABLET ORAL EVERY 4 HOURS PRN
Qty: 10 TABLET | Refills: 0 | Status: SHIPPED | OUTPATIENT
Start: 2019-06-24 | End: 2019-06-27

## 2019-06-24 RX ADMIN — VANCOMYCIN HYDROCHLORIDE 1250 MG: 5 INJECTION, POWDER, LYOPHILIZED, FOR SOLUTION INTRAVENOUS at 09:29

## 2019-06-24 RX ADMIN — ASPIRIN 81 MG: 81 TABLET ORAL at 09:32

## 2019-06-24 RX ADMIN — APIXABAN 5 MG: 5 TABLET, FILM COATED ORAL at 11:47

## 2019-06-24 RX ADMIN — METOPROLOL SUCCINATE 50 MG: 50 TABLET, FILM COATED, EXTENDED RELEASE ORAL at 11:47

## 2019-06-24 RX ADMIN — FAMOTIDINE 20 MG: 20 TABLET, FILM COATED ORAL at 09:32

## 2019-06-24 RX ADMIN — KETOROLAC TROMETHAMINE 15 MG: 30 INJECTION, SOLUTION INTRAMUSCULAR at 02:19

## 2019-06-24 RX ADMIN — VANCOMYCIN HYDROCHLORIDE 1250 MG: 5 INJECTION, POWDER, LYOPHILIZED, FOR SOLUTION INTRAVENOUS at 00:16

## 2019-06-24 ASSESSMENT — PAIN SCALES - GENERAL: PAINLEVEL_OUTOF10: 6

## 2019-06-24 NOTE — PROGRESS NOTES
Discussed case with urology. They are following drain care and do not need us to see. Will cancel the consult for general surgery.

## 2019-06-24 NOTE — TELEPHONE ENCOUNTER
Patient being discharged today with tolu close drain in for lymphocele. Patient needs CT of Pelvis scheduled in 1-2 weeks with a follow up in the office after.    Can we please call patient to coordinate

## 2019-06-24 NOTE — PROGRESS NOTES
Progress note: Infectious diseases    Patient - Stacai Schreiber,  Age - 79 y.o.    - 1950      Room Number - 5K-18/018-A   MRN -  [id number]   Acct # - [de-identified]  Date of Admission -  2019  8:52 AM    SUBJECTIVE:   He has no new complaints. OBJECTIVE   VITALS    height is 6' (1.829 m) and weight is 247 lb 4.8 oz (112.2 kg). His oral temperature is 98.1 °F (36.7 °C). His blood pressure is 130/84 and his pulse is 85. His respiration is 16 and oxygen saturation is 96%. Wt Readings from Last 3 Encounters:   19 247 lb 4.8 oz (112.2 kg)   19 244 lb (110.7 kg)   19 244 lb (110.7 kg)       I/O (24 Hours)    Intake/Output Summary (Last 24 hours) at 2019 0827  Last data filed at 2019 0418  Gross per 24 hour   Intake 2499.22 ml   Output 2100 ml   Net 399.22 ml       General Appearance  Awake, alert, oriented,  not  In acute distress  HEENT - normocephalic, atraumatic, pink conjunctiva,  anicteric sclera, scabbed herpetic blisters on the lips  Neck - Supple, no mass  Lungs -  Bilateral   air entry, no rhonchi, no wheeze  Cardiovascular - Heart sounds are normal.     Abdomen - soft, not distended, nontender, drain in place. Still actively draining  Neurologic - oriented.  Left lower leg noted to be weak on abduction, adduction  Skin - No bruising or bleeding  Extremities - No edema, no cyanosis, clubbing     MEDICATIONS:      lidocaine  2 patch Transdermal Daily    vancomycin  1,250 mg Intravenous Q8H    apixaban  5 mg Oral BID    aspirin  81 mg Oral Daily    atorvastatin  20 mg Oral Nightly    famotidine  20 mg Oral BID    metoprolol succinate  50 mg Oral Daily    sodium chloride flush  10 mL Intravenous 2 times per day    vancomycin (VANCOCIN) intermittent dosing (placeholder)   Other RX Placeholder      sodium chloride 75 mL/hr at 19 1814     ketorolac, oxyCODONE-acetaminophen **OR** [DISCONTINUED] oxyCODONE-acetaminophen, sodium chloride flush, magnesium hydroxide, ondansetron      LABS:     CBC:   Recent Labs     06/22/19  0536 06/23/19  0637 06/24/19  0629   WBC 15.7* 14.3* 11.5*   HGB 12.0* 13.3* 12.0*    181 310     BMP:    Recent Labs     06/22/19  0536 06/24/19  0629    139   K 4.1 3.8   CL 99 102   CO2 28 28   BUN 9 7   CREATININE 0.5 0.5   GLUCOSE 113* 105     Calcium:  Recent Labs     06/24/19  0629   CALCIUM 8.6         Problem list of patient:     Patient Active Problem List   Diagnosis Code    Benign prostatic hyperplasia N40.0    Hyperlipidemia E78.5    CVA [facility]cere[facility] vascular accident) ([facility] I63.9    Hypertension I10    Obesity E66.9    FH: heart disease Z82.49    Bilateral carotid artery stenosis I65.23    Atrial fibrillation ([facility]) I48.91    Coronary artery disease involving coronary bypass graft of native heart without angina pectoris I25.810    PVD (peripheral vascular disease) ([facility]) I73.9    Prostate cancer ([facility] C61    Ileus, postoperative ([facility]) K91.89, K56.7    Abscess of male pelvis ([facility]) K65.1    Decreased range of motion of left lower extremity M25.662    Pain, joint, hip, left M25.552    Sepsis due to methicillin resistant Staphylococcus aureus (MRSA) ([facility]) A41.02         ASSESSMENT/PLAN   MRSA infection of pelvic lymphocele: will discharge with oral bactrim for 4 wks  Left lower extremity weakness  Prostate cancer s/p Robotic surgery.   Continue current antibiotic     Dion Jimenez MD, Alberto Brooks 6/24/2019 8:27 AM

## 2019-06-24 NOTE — CONSULTS
NEUROLOGY CONSULT NOTE      Requesting Physician: Kim Simons MD    Reason for Consult:  Evaluate for left leg unable to adduct well and pain    History of Present Illness:  Keila Ching is a 79 y.o. male admitted to [facility] on 6/18/2019.       79year old man with BPH, had a prostate surgery 3 months ago, since that time, patient c/o that he couldn't adduct his left leg, he could abduct well but couldn't bring it back in, he was told that a nerve could have been damaged during the surgery, over time, he started improving, he is able walk, still feel the weakness in adducting. About a week ago, patient stated feeling malaise, lethargic, he still go to work as usual, he c/o a lot pain on his left leg especially near the hip area. He went to his PCP and was told to come here, he was found to have a large left pelvic abscess. On admission, aside from his baseline adduction weakness on the left leg, what was bothering most was the left hip/upper leg pain, he couldn't walk well because of pain. He now has a drain for the abseess and is on vanco and zosyn, he felt significantly improved since abx and the drain, he could walk well now. Reports no chest pain. No shortness of breath with exertion. Reports no neck pain. No vision changes. No dysphagia. No fever. No rash. No weight loss. Past Medical History:        Diagnosis Date    BPH (benign prostatic hyperplasia)     CAD (coronary artery disease)     CVA (cerebral vascular accident) ([facility] [address]    FH: heart disease     Hyperlipidemia     Hypertension     Obesity            Procedure Laterality Date    CARDIAC VALVE REPLACEMENT  April 2011    [place], [place]    CARDIOVASCULAR STRESS TEST  1 05 2011    Cannot exclude slight inferobasal lateral stress-induced ischemia in a relatively small-sized area. EF 68%. Mildly abnormal nuclear scan. Lexiscan test associated with nonspecific symptoms.  EKG nondiagnostic with nonspecific ST-T wave changes.  CAROTID ENDARTERECTOMY  2 08 2011    Right carotid endarterectomy with patch angioplasty with convention patch angioplasty.  COLONOSCOPY      CORONARY ARTERY BYPASS GRAFT      DIAGNOSTIC CARDIAC CATH LAB PROCEDURE  3 24 2011    LV end-diastolic pressure was 12 mmHg with no change before and after contrast injection. There was no significant gradient across the aortic valve to signify aortic stenosis. LV function was within normal limits, EF 55%. 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 Nonobstructive diffuse disease in a small sized RCA. Nomral LV function.  HERNIA REPAIR      Inguinal hernia repair.  PROSTATECTOMY N/A 3/20/2019    PROSTATECTOMY LAPAROSCOPIC ROBOTIC performed by Gonzalez Clement MD at [address] [place] [address] ECHOCARDIOGRAM  12 21 2010    Size was normal. Systolic function was normal. EF was estimated in the range of 55-65%. There were no regional wall motion abnormalities. Wall thickness was normal. Doppler - LV diastolic function parameters were normal. Mild MR. The aortic valve was trileaflet. Leaflets exhibited mildly increased thickness, mild calcification, normal cuspal separation, and sclerosis.  Mild TR.    VASCULAR SURGERY      cabg harvests from chest       Allergies:    No Known Allergies     Current Medications:     ketorolac (TORADOL) injection 15 mg Q6H PRN   oxyCODONE-acetaminophen (PERCOCET) 5-325 MG per tablet 1 tablet Q4H PRN   lidocaine 4 % external patch 2 patch Daily   vancomycin (VANCOCIN) 1,250 mg in dextrose 5 % 250 mL IVPB Q8H   0.9 % sodium chloride infusion Continuous   apixaban (ELIQUIS) tablet 5 mg BID   aspirin EC tablet 81 mg Daily   atorvastatin (LIPITOR) tablet 20 mg Nightly   famotidine (PEPCID) tablet 20 mg BID   metoprolol succinate (TOPROL XL) extended release tablet 50 mg Daily   sodium chloride flush 0.9 % injection 10 mL 2 times per day   sodium chloride flush 0.9 % injection 10 mL PRN   magnesium hydroxide (MILK OF MAGNESIA) 400 MG/5ML suspension 30 mL Daily PRN   ondansetron (ZOFRAN) injection 4 mg Q6H PRN   vancomycin (VANCOCIN) intermittent dosing (placeholder) RX Placeholder        Social History:  Social History     Tobacco Use   Smoking Status Never Smoker   Smokeless Tobacco Former User    Types: Chew     Social History     Substance and Sexual Activity   Alcohol Use No    Alcohol/week: 0.0 oz     Social History     Substance and Sexual Activity   Drug Use No         Family History:       Problem Relation Age of Onset    Heart Disease Mother     Diabetes Mother     Heart Disease Father        Review of Systems:  All systems reviewed are negative except what is mentioned in history of present illness. Physical Exam:  /82   Pulse 83   Temp 97.4 °F (36.3 °C) (Oral)   Resp 16   Ht 6' (1.829 m)   Wt 247 lb 4.8 oz (112.2 kg)   SpO2 97%   BMI 33.54 kg/m²  I Body mass index is 33.54 kg/m². I Wt Readings from Last 1 Encounters:   06/18/19 247 lb 4.8 oz (112.2 kg)           General appearance - alert, well appearing, and in no distress, oriented to person, place, and time and weight  Mental status -Level of Alertness: awake  Orientation: person, place, time  Memory: normal  Fund of Knowledge: normal  Attention/Concentration: normal  Language: normal. Mood is Anxious  Neck - supple, no significant adenopathy, carotids upstroke normal bilaterally, no carotid bruits. There is no LAP in the neck. There is no thyroid enlargement.    Neurological -   Cranial Dlizij-IA-DPM:   Cranial nerve II: Normal. There is full visual fields  Cranial nerve III: Pupils: equal, round, reactive to light   Cranial nerves III, IV, VI: Extraocular Movements: intact   Cranial nerve V: Facial sensation: intact   Cranial nerve VII:Facial strength: intact   Cranial nerve VIII: Hearing: intact   Cranial nerve IX: Palate Elevation intact bilaterally  Cranial nerve XI: Shoulder the mid calvarium, after the uneventful administration of IV contrast (Optiray-350, 100 mL) for maximal arterial   opacification. 3 mm reconstructed axial images were submitted for interpretation, as well as multiple two-dimensional and three-dimensional reconstructed images for CT angiographic purposes. All CT scans at this facility use dose modulation, iterative   reconstruction, and/or weight-based dosing when appropriate to reduce the radiation dose to as low as reasonably achievable. FINDINGS: Once again, there is mild atherosclerotic disease of the imaged portions of the thoracic aorta, very similar to the prior exam. Stable appearance of the imaged portions of the thoracic aorta and the great vessel origins. Stable appearance of the left carotid system, demonstrating widely patent common carotid artery. 50 present stenosis of the left carotid bulb is again demonstrated, very similar to the prior exam. Cervical left internal carotid artery has mild   atherosclerotic plaque in the proximal aspect, stable. Distally, minimal, if any plaque appreciated. Calcific plaque is again evident in the petrous and cavernous segment of the left internal carotid artery, producing no significant stenosis. The innominate artery and the right common carotid artery have minimal atherosclerotic disease, stable. Surgical changes of the right carotid bifurcation are again evident and the vessel lumen remain widely patent. There is persistent though stable wall   thickening of the vessels. No hemodynamically significant stenosis appreciated in the right carotid bifurcation. Cervical right internal carotid artery is satisfactory. Stable calcific atherosclerotic plaque demonstrated in the petrous and cavernous   segment of the right infraorbital artery, without hemodynamically significant stenosis produced.     Stable appearance of both subclavian arteries and the origins of both vertebral arteries, again with characteristics of a dominant left vertebral artery. There is heterogeneous plaque with possible filling defect again demonstrated at the junction of the   left vertebral artery V1 and V2 segments. Approximately 70% stenosis results, though this is overall unchanged. There is a diminutive right vertebral artery, poorly defined by CTA, again showing no evidence of hemodynamically significant stenosis. Calcific plaque is demonstrated to the left vertebral artery V2 segment producing no hemodynamically significant stenosis, appearing grossly stable also. The V3 segment has very mild calcific atherosclerotic plaque which also extends into the left   vertebral artery V4 segment. This produces approximately 45% stenosis at the level of the dural interface of the left vertebral artery, stable. Limited detail of the basilar artery is consistent with a developmentally hypoplastic vessel. Stable   appearance of the vertebral basilar system. The soft tissues of the neck are nonspecific. The apices of the lungs have no concerning findings, grossly stable. There is a stable appearance of the osseous framework. Mild degenerative cervical spine characteristics again evident. Impression STABLE EXAM. POSTSURGICAL CHANGES OF THE RIGHT CAROTID BIFURCATION WITH WIDELY PATENT VESSEL LUMEN REMAINING. STABLE 50% BULBAR STENOSIS OF THE LEFT CAROTID SYSTEM. STENOSIS OF THE DOMINANT VERTEBRAL ARTERY, THE LEFT VERTEBRAL ARTERY, AT THE   JUNCTION OF THE LEFT V1/V2 SEGMENTS, 70%, STABLE. **This report has been created using voice recognition software. It may contain minor errors which are inherent in voice recognition technology. **          Final report electronically signed by Dr. Leslye Jennings on 1/12/2017 1:26 PM       Results for orders placed during the hospital encounter of 06/18/14   MRI brain without contrast    Narrative       [address] [address]          Patient Name: Miki TAO        Patient Type: O P - A 1 demonstrated. Small amount of cortical gliosis and cystic encephalomalacia of the left       frontal lobe is again evident. No strong evidence of encephalomalacia of       the posterior fossa parenchyma, though hyperintense T2 signal in the       pontine white matter is again evident. There is no evidence of restricted diffusion. There is no acute       hemorrhage. There is no strong evidence of abnormal mass. The ventricles       and extra-axial spaces are acceptable. Vascular structures are very       similar to the previous showing normal T2 flow voids and the arterial       structures at the base of the brain and the major dural venous sinuses. Page 1 of 2              Print date/time:6/19/2014 11:22 AM              [address] [address]          Patient Name: Milena Valdez        Patient Type: Lorna Duncan           MRN:  [id number]        Gender:  Male                     Account Number:  [de-identified]        [address]:  Fairy Rubinstein YOB: 1950        M.D. Pt Loc:  Outpatient                             R a d i o l o g y   R e p o r t         Accession Number:  Procedure Name:             Exam Date/Time:       [id number]      MRI Brain B Stem WO Contr   6/18/2014 4:00:00 PM           Images of the cranial cervical junction and the cervical canal show nothing       of concern. Images through the pituitary are unremarkable grossly. Paranasal sinuses, mastoid air cells and middle ear cavities are clear. IMPRESSION:  There is no evidence of acute abnormality. However, comparing       to the previous MRI, there has been interval progression of likely vascular       disease, likely intracranial atherosclerosis sequelae, with areas of small       gliottic foci encephalomalacia in the right more than left cerebral       hemispheres to indicate sequelae of remote vascular insults.   Slight       progression of white matter abnormalities imply progression of small vessel       disease sequelae also. FINAL REPORT       Dictated By :  Taran Perez M.D. Certified Electronic Signature       [address]       Dictated Date and Time:  18-JUN-2014 16:49       Transcribed By:  Melinda Chiu       Transcribed Date and Time:  19-JUN-2014 09:30       Approved By:  Taran Perez M.D. Approve Date and Time:  19-JUN-2014 11:22         Technologist:  Nick ROMAN(VIKA)(MR)           Page 2 of 2              Print date/time:6/19/2014 11:22 AM     No results found for this or any previous visit. No results found for this or any previous visit. No results found for this or any previous visit. We reviewed the patient records and available information in the EHR       Impression:    Active Problems:    Abscess of male pelvis (HCC)    Decreased range of motion of left lower extremity    Pain, joint, hip, left    Sepsis due to methicillin resistant Staphylococcus aureus (MRSA) ([address]  Resolved Problems:    * No resolved hospital problems. [de-identified]    79year old man with recent prostate surgery, since then have trouble adducting left leg, now with left pelvic absess, c/o left leg and hip pain, improved after abx and drainage.     Recommendations:  - patient neurological exam is grossly normal  - his left abscess likely put pressure on his left lumbarsacral plexuses and therefore causing him  - his weakness in left leg adduction is not that pronounced anymore, in fact he is improving, strength is 4+/5, this could be due the prostate surgery or the abscess  - reflexes are normal, so as sensation, therefore, this is not from the spinal cord or lumbar spine  - no further neurological workup needed, patient is already improving and feels good about it  - will aggressive PT, he should be able to be back to his baseline or even better, especially once the abscess is completely removed  - neurology will sign

## 2019-06-24 NOTE — PROGRESS NOTES
Urology Progress Note    Chief Complaint: Left pelvic pain    Subjective:     Patient is resting in bed, voiding spontaneously, +flatus, +BM, ambulating with assistance, tolerating regular diet, denies any nausea or vomiting. There are complaints of no pain at this time. Patient still with urinary incontinence. States it is worse since being in the hospital. He has not been doing pelvic floor exercises. Left leg movement is still minimal. No pain. No fever or chills. He is ready to go home. Vitals:  /70   Pulse 78   Temp 97.1 °F (36.2 °C) (Oral)   Resp 18   Ht 6' (1.829 m)   Wt 247 lb 4.8 oz (112.2 kg)   SpO2 97%   BMI 33.54 kg/m²   Temp  Av.6 °F (36.4 °C)  Min: 97.1 °F (36.2 °C)  Max: 98.1 °F (36.7 °C)    Intake/Output Summary (Last 24 hours) at 2019 1121  Last data filed at 2019 0936  Gross per 24 hour   Intake 2859.22 ml   Output 2750 ml   Net 109.22 ml       Social History     Socioeconomic History    Marital status:       Spouse name: Not on file    Number of children: 3    Years of education: Not on file    Highest education level: Not on file   Occupational History    Not on file   Social Needs    Financial resource strain: Not on file    Food insecurity:     Worry: Not on file     Inability: Not on file    Transportation needs:     Medical: Not on file     Non-medical: Not on file   Tobacco Use    Smoking status: Never Smoker    Smokeless tobacco: Former User     Types: Chew   Substance and Sexual Activity    Alcohol use: No     Alcohol/week: 0.0 oz    Drug use: No    Sexual activity: Not Currently   Lifestyle    Physical activity:     Days per week: Not on file     Minutes per session: Not on file    Stress: Not on file   Relationships    Social connections:     Talks on phone: Not on file     Gets together: Not on file     Attends Taoism service: Not on file     Active member of club or organization: Not on file     Attends meetings of clubs or organizations: Not on file     Relationship status: Not on file    Intimate partner violence:     Fear of current or ex partner: Not on file     Emotionally abused: Not on file     Physically abused: Not on file     Forced sexual activity: Not on file   Other Topics Concern    Not on file   Social History Narrative    Not on file     Family History   Problem Relation Age of Onset    Heart Disease Mother     Diabetes Mother     Heart Disease Father      No Known Allergies      Constitutional: Alert and oriented times x3, no acute distress, and cooperative to examination with appropriate mood and affect. HEENT:   Head:         Normocephalic and atraumatic. Mucous membranes are normal.   Eyes:         EOM are normal. No scleral icterus. Nose:    The external appearance of the nose is normal  Ears: The ears appear normal to external inspection. Cardiovascular:       Normal rate, regular rhythm. Pulmonary/Chest:  Normal respiratory rate and rhthym. No use of accessory muscles. Lungs clear bilaterally. Abdominal:          Soft. No tenderness. Active bowel sounds. Left lower quadrant with tolu close drain in place, draining yellow/green fluid with some debris. Musculoskeletal:    He exhibits no edema or tenderness of lower extremities. Extremities:    No cyanosis, clubbing, or edema present. Neurological:    Alert and oriented.      Labs:  WBC:    Lab Results   Component Value Date    WBC 11.5 06/24/2019     Hemoglobin/Hematocrit:    Lab Results   Component Value Date    HGB 12.0 06/24/2019    HCT 36.3 06/24/2019     BMP:    Lab Results   Component Value Date     06/24/2019    K 3.8 06/24/2019    K 3.1 06/19/2019     06/24/2019    CO2 28 06/24/2019    BUN 7 06/24/2019    LABALBU 2.3 06/18/2019    LABALBU 4.5 11/25/2011    CREATININE 0.5 06/24/2019    CALCIUM 8.6 06/24/2019    LABGLOM >90 06/24/2019       CT of Pelvis:    CT scans at this facility use dose modulation, iterative reconstruction, and/or weight-based dosing when appropriate to reduce radiation dose to as low as reasonably achievable.       FINDINGS:       There is redemonstration of a low attenuating collection containing bubbles of gas within the left retroperitoneum posterior to the left psoas muscle and iliac vessels. These structures are again noted to be displaced anteriorly secondary to the fluid    collection. The collection measures approximately 5.1 x 4.0 x 12.0 cm. A pigtail catheter is present within the inferior aspect of the collection. Overall, the collection appears smaller compared to the prior exam.  Stranding is noted surrounding the    collection and along the left pelvic sidewall.       Thickening is again noted of the left side of the bladder which may correspond to reactive changes or underdistention. The visualized bowel loops are unremarkable. Atherosclerotic calcification is noted within the aorta and iliac arteries.       Degenerative spondylotic changes are again noted within the lower lumbar spine. Mild degenerative bony spurring is also present within the bilateral hip joints. No suspicious osseous lesion is identified.       Impression       1. There has been interval decrease in size of a low attenuating fluid collection containing bubbles of gas within the left peritoneum posterior to the left psoas muscle and iliac vessels. This is again most consistent with an abscess. A pigtail catheter    is in satisfactory position within the inferior aspect of the collection.       2. Stranding and inflammatory change is noted surrounding the described abscess. There is thickening of the left side of the bladder wall which may be reactive versus secondary to underdistention.                                Impression:    1. Infected L lymphocele s/p drainage 6/18/19 & tip replacement 6/20/19  2. Sepsis secondary to above  3. Left hip and pelvic pain-- improved  4. Decreased ROM LLE  5.  Prostate cancer [id number]--Last PSA 0.07, high risk Decipher--planning adjuvant radiation once continent  6. Bilateral renal calculi      Plan:    CT reviewed, still with significant amount of fluid, although it is decreasing in size. Roberto close had 200 ml out last 8 hours. Discussed with Dr. Adal Connell. Continue drain at discharge. Repeat CT in 1-2 weeks, followed by a follow up in the office. Patient will be discharged on 4 weeks of Bactrim per Dr. Saroj Williamson. Patient to continue pelvic floor exercises when at home. Patient with worsening incontinence due to lymphocele. Follow up in 1-2 weeks with CT scan prior.     DOREEN Gonzalez  06/24/19 11:21 AM  Urology

## 2019-06-24 NOTE — TELEPHONE ENCOUNTER
.Transition of Care visit scheduled.   7/2/2019  Patient is being discharged to home  Date of discharge 06-24-19  Discharge from facility [facility]  Reason for admission hip abscess

## 2019-06-24 NOTE — PROGRESS NOTES
Roberto-close drain flushed with 10 ml NS. Old dressing removed. New gauze sponge and tegaderm placed. Patient tolerated well.

## 2019-06-24 NOTE — CARE COORDINATION
Discharge orders on chart. Met with [place] Katie to review need for [facility] since going home with a truclose drain in place. His preference was to use [facility]. [facility]. Referral made, spoke to [facility]. Medicare Rights updated. Fede Wilson will leave when his transportation to home arrives. 6/24/19, 11:17 AM    Discharge plan discussed by  and . Discharge plan reviewed with patient/ family. Patient/ family verbalize understanding of discharge plan and are in agreement with plan. Understanding was demonstrated using the teach back method.        IMM Letter  IMM Letter given to Patient/Family/Significant other/Guardian/POA/by[de-identified] updated  IMM Letter date given[de-identified] 06/24/19  IMM Letter time given[de-identified] [id number]

## 2019-06-24 NOTE — DISCHARGE INSTR - DIET

## 2019-06-24 NOTE — PROGRESS NOTES
Progress note: Infectious diseases    Patient - Kristen Martínez,  Age - 79 y.o.    - 1950      Room Number - 5K-18/018-A   MRN -  [id number]   Acct # - [de-identified]  Date of Admission -  2019  8:52 AM    SUBJECTIVE:   He has no new complaints. OBJECTIVE   VITALS    height is 6' (1.829 m) and weight is 247 lb 4.8 oz (112.2 kg). His oral temperature is 98.1 °F (36.7 °C). His blood pressure is 130/84 and his pulse is 85. His respiration is 16 and oxygen saturation is 96%. Wt Readings from Last 3 Encounters:   19 247 lb 4.8 oz (112.2 kg)   19 244 lb (110.7 kg)   19 244 lb (110.7 kg)       I/O (24 Hours)    Intake/Output Summary (Last 24 hours) at 2019 0834  Last data filed at 2019 0418  Gross per 24 hour   Intake 2499.22 ml   Output 2100 ml   Net 399.22 ml       General Appearance  Awake, alert, oriented,  not  In acute distress  HEENT - normocephalic, atraumatic, pink conjunctiva,  anicteric sclera, scabbed herpetic blisters on the lips  Neck - Supple, no mass  Lungs -  Bilateral   air entry, no rhonchi, no wheeze  Cardiovascular - Heart sounds are normal.     Abdomen - soft, not distended, nontender, drain in place. Still actively draining  Neurologic - oriented.  Left lower leg noted to be weak on abduction, adduction  Skin - No bruising or bleeding  Extremities - No edema, no cyanosis, clubbing     MEDICATIONS:      lidocaine  2 patch Transdermal Daily    vancomycin  1,250 mg Intravenous Q8H    apixaban  5 mg Oral BID    aspirin  81 mg Oral Daily    atorvastatin  20 mg Oral Nightly    famotidine  20 mg Oral BID    metoprolol succinate  50 mg Oral Daily    sodium chloride flush  10 mL Intravenous 2 times per day    vancomycin (VANCOCIN) intermittent dosing (placeholder)   Other RX Placeholder      sodium chloride 75 mL/hr at 19 1814     ketorolac, oxyCODONE-acetaminophen **OR** [DISCONTINUED] oxyCODONE-acetaminophen, sodium chloride flush, magnesium hydroxide, ondansetron      LABS:     CBC:   Recent Labs     06/22/19  0536 06/23/19  0637 06/24/19  0629   WBC 15.7* 14.3* 11.5*   HGB 12.0* 13.3* 12.0*    181 310     BMP:    Recent Labs     06/22/19  0536 06/24/19  0629    139   K 4.1 3.8   CL 99 102   CO2 28 28   BUN 9 7   CREATININE 0.5 0.5   GLUCOSE 113* 105     Calcium:  Recent Labs     06/24/19  0629   CALCIUM 8.6         Problem list of patient:     Patient Active Problem List   Diagnosis Code    Benign prostatic hyperplasia N40.0    Hyperlipidemia E78.5    [facility] I63.9    Hypertension I10    Obesity E66.9    FH: heart disease Z82.49    Bilateral carotid artery stenosis I65.23    Atrial fibrillation ([facility]) I48.91    Coronary artery disease involving coronary bypass graft of native heart without angina pectoris I25.810    PVD (peripheral vascular disease) ([facility]) I73.9    Prostate cancer [facility] C61    Ileus, postoperative ([facility]) K91.89, K56.7    Abscess of male pelvis ([facility]) K65.1    Decreased range of motion of left lower extremity M25.662    Pain, joint, hip, left M25.552    Sepsis due to methicillin resistant Staphylococcus aureus (MRSA) ([facility]) A41.02         ASSESSMENT/PLAN   MRSA infection of pelvic lymphocele: will discharge with oral bactrim for 4 wks  Left lower extremity weakness  Prostate cancer s/p Robotic surgery.   Continue current antibiotic     Veronica Boudreaux MD, [address] 6/24/2019 8:34 AM

## 2019-06-24 NOTE — DISCHARGE SUMMARY
Hospital Medicine Discharge Summary      Patient Identification:   Kati Eisenberg   : 1950  MRN: [id number]   Account: [de-identified]      Patient's PCP: Dwight Mishra MD    Admit Date: 2019     Discharge Date:   2019    Admitting Physician: Kasie Lugo MD     Discharge Physician: Abdirashid Barron MD     Discharge Diagnoses: Active Hospital Problems    Diagnosis Date Noted    Pain, joint, hip, left [M25.552]     Sepsis due to methicillin resistant Staphylococcus aureus (MRSA) ([facility]) [A41.02]     Decreased range of motion of left lower extremity [M25.662]     Abscess of male pelvis ([address] [K65.1] 2019       The patient was seen and examined on day of discharge and this discharge summary is in conjunction with any daily progress note from day of discharge. Hospital Course:   1) Left Pelvic abscess/left lymphocele   - Urology consulted  - s/p drainage by IR drainage  & tip replacement 2019   - Cultures grew MRSA  - Pt was treated with IV  Vanco.  Per ID pt will be sent home on bactrim at discharge  - Repeat CT of Pelvis showed decrease in fluid. Per urology, pt to go home with drain with a repeat CT pelvis in 1 week.    2) CAD/HLD/HTN  - Pt was continued on ASA, Lipitor  and BB      3)  History of CVA improved stable     4) Acute pain 2/2 to #1  - Pt was placed on IV morphine which was then D/C and patient was placed on PRN oxycodone     5) Prostate cancer [id number]  - Last PSA 0.07  - high risk decipher - planning adjuvant radiation once continent     6) Weakness of left leg adduction 2/2 to recent prostate surgery vs abscess  - PT consulted who stated pt can go home  - Neuro was consulted who felt pt should improve when abscess improves.     Exam:     Vitals:  Vitals:    19 1521 19 1900 19 0357 19 0924   BP: 133/82 (!) 143/81 130/84 138/70   Pulse: 83 73 85 78   Resp: 16 16 16 18   Temp: 97.4 °F (36.3 °C) 97.6 °F (36.4 °C) 98.1 °F (36.7 °C) 97.1 °F (36.2 °C)   TempSrc: Oral Oral Oral Oral   SpO2: 97% 96% 96% 97%   Weight:       Height:         Weight: Weight: 247 lb 4.8 oz (112.2 kg)     24 hour intake/output:    Intake/Output Summary (Last 24 hours) at 6/24/2019 0956  Last data filed at 6/24/2019 8265  Gross per 24 hour   Intake 2859.22 ml   Output 2750 ml   Net 109.22 ml     General appearance: No apparent distress, appears stated age and cooperative. HEENT: Pupils equal, round, and reactive to light. Conjunctivae/corneas clear. Neck: Supple, with full range of motion. No jugular venous distention. Trachea midline. Respiratory:  Normal respiratory effort. Clear to auscultation, bilaterally without Rales/Wheezes/Rhonchi. Cardiovascular: Regular rate and rhythm with normal S1/S2 without murmurs, rubs or gallops. Abdomen: Soft, non-tender, non-distended with normal bowel sounds. Musculoskeletal: Left hip demonstrated LOM, UE WNL  Skin: Skin color, texture, turgor normal.  No rashes or lesions. Psychiatric: Alert and oriented, thought content appropriate, normal insight  Peripheral Pulses: +2 palpable, equal bilaterally     Labs: For convenience and continuity at follow-up the following most recent labs are provided:    CBC:    Lab Results   Component Value Date    WBC 11.5 06/24/2019    HGB 12.0 06/24/2019    HCT 36.3 06/24/2019     06/24/2019       Renal:    Lab Results   Component Value Date     06/24/2019    K 3.8 06/24/2019    K 3.1 06/19/2019     06/24/2019    CO2 28 06/24/2019    BUN 7 06/24/2019    CREATININE 0.5 06/24/2019    CALCIUM 8.6 06/24/2019         Significant Diagnostic Studies    Radiology:   CT PELVIS W CONTRAST Additional Contrast? None   Final Result       1. There has been interval decrease in size of a low attenuating fluid collection containing bubbles of gas within the left peritoneum posterior to the left psoas muscle and iliac vessels. This is again most consistent with an abscess.  A pigtail catheter    is in satisfactory position within the inferior aspect of the collection. 2. Stranding and inflammatory change is noted surrounding the described abscess. There is thickening of the left side of the bladder wall which may be reactive versus secondary to underdistention. **This report has been created using voice recognition software. It may contain minor errors which are inherent in voice recognition technology. **      Final report electronically signed by Dr. Nyla Rosales on 6/23/2019 3:43 PM      CT ABSCESS DRAINAGE SOFT TISSUE   Final Result   Status post successful abscess drainage procedure. .               **This report has been created using voice recognition software. It may contain minor errors which are inherent in voice recognition technology. **         Final report electronically signed by Dr. Sylwia Cabrera on 6/21/2019 8:52 AM      CT DRAINAGE HEMATOMA/SEROMA/FLUID COLLECTION   Final Result   Status post successful abscess drainage procedure. .               **This report has been created using voice recognition software. It may contain minor errors which are inherent in voice recognition technology. **         Final report electronically signed by Dr. Sylwia Cabrera on 6/21/2019 8:52 AM      CT ABDOMEN PELVIS W IV CONTRAST Additional Contrast? None   Final Result   1. Left pelvic sidewall abscess is again seen. There has been interval retraction of the drainage catheter which is outside of the lumen of the abscess. Recommend replacement of catheter into left pelvic sidewall abscess. This was discussed with the    referring clinician at the time of dictation. 2. Bilateral nonobstructive renal calculi are demonstrated. **This report has been created using voice recognition software. It may contain minor errors which are inherent in voice recognition technology. **      Final report electronically signed by Dr. Sylwia Cabrera on 6/20/2019 1:55 PM      CT GUIDED NEEDLE PLACEMENT Final Result   Status post successful abscess drainage procedure. .               **This report has been created using voice recognition software. It may contain minor errors which are inherent in voice recognition technology. **         Final report electronically signed by Dr Vivien Hedrick on 6/19/2019 8:06 AM      CT ABSCESS DRAINAGE SOFT TISSUE   Final Result   Status post successful abscess drainage procedure. .               **This report has been created using voice recognition software. It may contain minor errors which are inherent in voice recognition technology. **         Final report electronically signed by Dr Vivien Hedrick on 6/19/2019 8:06 AM      CT HIP LEFT W CONTRAST   Final Result   1. Mild degenerative changes left hip. 2. 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 As mentioned, this also extends into the left iliacus and psoas muscles. .               **This report has been created using voice recognition software. It may contain minor errors which are inherent in voice recognition technology. **      Final report electronically signed by Dr. Thee Nickerson on 6/18/2019 1:25 PM      XR HIP 2-3 VW W PELVIS LEFT   Final Result   Mild degenerative changes both hips. Study otherwise unremarkable. No acute findings. **This report has been created using voice recognition software. It may contain minor errors which are inherent in voice recognition technology. **      Final report electronically signed by Dr. Thee Nickerson on 6/18/2019 10:44 AM      XR CHEST 1 VW   Final Result   1. Suboptimal inflation lungs. Mild cardiomegaly. 2. Minimal subsegmental atelectasis left midlung. No definite alveolar infiltrates or effusions are seen.       Final report electronically signed by Dr. Thee Nickerson on 6/18/2019 10:42 AM         Consults:     PHARMACY TO DOSE VANCOMYCIN  IP CONSULT TO UROLOGY  IP CONSULT TO the opportunity to be involved in this patient's care.     Electronically signed by Doroteo Dixon MD on 6/24/2019 at 9:56 AM

## 2019-06-25 ENCOUNTER — TELEPHONE (OUTPATIENT)
Dept: FAMILY MEDICINE CLINIC | Age: 69
End: 2019-06-25

## 2019-06-25 ENCOUNTER — CARE COORDINATION (OUTPATIENT)
Dept: CASE MANAGEMENT | Age: 69
End: 2019-06-25

## 2019-06-25 DIAGNOSIS — A41.02 SEPSIS DUE TO METHICILLIN RESISTANT STAPHYLOCOCCUS AUREUS (MRSA) (HCC): Primary | ICD-10-CM

## 2019-06-25 NOTE — TELEPHONE ENCOUNTER
Patient scheduled for his CT scan pelvis w/ IV contrast on 07- at 9:00am with an arrival of 8:30am at [address] [place] [address]. Vianey Patient notified and voiced understanding.

## 2019-06-25 NOTE — CARE COORDINATION
by a [facility] Pharmacist?:  No  Have you scheduled your follow up appointment?:  Yes  How are you going to get to your appointment?:  Car - family or friend to transport  Were you discharged with any Home Care or Post Acute Services:  Yes  Post Acute Services:  [address] (Comment: [facility])  Do you feel like you have everything you need to keep you well at home?:  Yes  Care Transitions Interventions  No Identified Needs       Patient presented to the ED on 06/18/2019 with report of left hip pain x 1 week. Patient reports falling 06/17/2019. PMH includes CVA, CAD, BPH, HLD, and HTN. In ED, WBC 33.3 thou/mm3 and CT showed interval development of a large post prostatectomy lymphocele left inguinal region. Patient admitted for Abscess of male pelvis ([address] Fluid drained though Interventional Radiology with Roberto-Close drain placement. Patient discharged home alone with new referral to [facility]. Patient states he is doing well, states he \"feels fine\". Patient denies chest pain, SOB, fever, chills, and N/V/D. Patient reports intermittent left hip pain, denies pain this morning. Patient states Roberto-Close intact and reports yellow/green drainage. Discussed how to empty and flush drain, verbalized understanding. Patient states dressing is dry and intact, denies visible drainage. Patient states he is eating/drinking and reports normal bowel movements. Patient denies bleeding or bloody stools due to anticoagulant therapy. Discussed use of probiotics with ATB therapy, patient states his son bought him Airborn Probiotic gummies. Probiotic added to [facility]. Discussed pelvic floor exercises, verbalized understanding. Reviewed medications, 1111F completed. Reviewed upcoming appointments, verbalized understanding. Reviewed pending OP CT scan to be completed in 1-2 weeks. Sedalia Homans with [facility] notified of patient discharge. Patient denies additional needs or concerns at this time.  Patient instructed to notify Pre-Service or CTN for any new or worsening symptoms, contact information provided. Patient verbalized understanding. CTN will continue to follow.       Follow Up  Future Appointments   Date Time Provider Meggan Bulli   7/2/2019  8:20 AM Maia Fritz MD SR[facility]X [facility]   7/3/2019  8:00 AM Paulette Wellington Urology [facility]   7/17/2019  8:45 AM Paulette Wellington Urology [facility]   10/3/2019  8:30 AM Vic Chong MD [address] [facility] - Tomy Abebe, RN  Care Transition Nurse  [phone]  21

## 2019-06-25 NOTE — TELEPHONE ENCOUNTER
[facility] Initial [facility] Up [facility]    Call within 2 business days of discharge: Yes     Patient: Kristen Martínez Patient : 1950   MRN: [id number]  Reason for Admission: abscess  Discharge Date: 19 RARS: Readmission Risk Score: 12       Spoke with: patient--doing well.  No concerns    Discharge department/facility: [facility]    Non-face-to-face services provided:  Scheduled appointment with PCP-ADVISED TO KEEP F/U APPT    Follow Up  Future Appointments   Date Time Provider Meggan Ramirez   2019  8:20 AM [facility] [place]   7/3/2019  8:00 AM [facility] [place] [facility] [place]   2019  8:45 AM Paulette Lee [facility] [place]   10/3/2019  8:30 AM Jelena Churchill MD SRPX [address] ([address] [place] [address]

## 2019-06-26 ENCOUNTER — TELEPHONE (OUTPATIENT)
Dept: UROLOGY | Age: 69
End: 2019-06-26

## 2019-06-26 RX ORDER — SODIUM CHLORIDE 0.9 % (FLUSH) 0.9 %
10 SYRINGE (ML) INJECTION 2 TIMES DAILY
Qty: 30 SYRINGE | Refills: 1 | Status: SHIPPED | OUTPATIENT
Start: 2019-06-26 | End: 2019-06-26

## 2019-06-26 RX ORDER — SODIUM CHLORIDE 0.9 % (FLUSH) 0.9 %
10 SYRINGE (ML) INJECTION 2 TIMES DAILY
Qty: 30 SYRINGE | Refills: 1 | Status: SHIPPED | OUTPATIENT
Start: 2019-06-26 | End: 2019-09-09 | Stop reason: ALTCHOICE

## 2019-06-26 NOTE — TELEPHONE ENCOUNTER
Ester Singh from [facility] left a message stating the saline would need to be ordered from from [id number] [facility] [employer] [facility] or another home infusion company unless you want to order something else. Please advise. Thank you.

## 2019-06-26 NOTE — TELEPHONE ENCOUNTER
Home health can't provide the sterile saline syringes, 10 ml, for each flush? Same ones they would use for IV flush. Script sent.

## 2019-06-26 NOTE — TELEPHONE ENCOUNTER
Jessi Hernandez from [facility] said they needed a prescription for it but, [facility] left a message stating they do not carry the saline and deleted the prescription. I called the patient and he had me change the pharmacy to [place]. Please resend the prescription. Thank you.

## 2019-06-26 NOTE — TELEPHONE ENCOUNTER
I advised Nikki Rajan at [facility] [phone] of the message from Jung CNP. Dressing changes as IR suggested, weekly and PRN.     Change bag when it is full. She voiced understanding.

## 2019-06-28 ENCOUNTER — CARE COORDINATION (OUTPATIENT)
Dept: CASE MANAGEMENT | Age: 69
End: 2019-06-28

## 2019-06-28 NOTE — CARE COORDINATION
Di[facility] 45 Transitions Follow Up Call    2019    Patient: Zina Flowers  Patient : 1950   MRN: [id number]  Reason for Admission:Abscess of male pelvis    Discharge Date: 19 RARS: Readmission Risk Score: 15    Spoke with: Brie García J Transitions Subsequent and Final Call    Subsequent and Final Calls  Do you have any ongoing symptoms?:  Yes  Onset of Patient-reported symptoms:  Other  Patient-reported symptoms:  Other  Have your medications changed?:  No  Do you have any questions related to your medications?:  No  Do you currently have any active services?:  Yes  Are you currently active with any services?:  Home Health  Do you have any needs or concerns that I can assist you with?:  No  Identified Barriers:  None  Care Transitions Interventions  No Identified Needs  Other Interventions:        Called pt for the sub transition call. Pt stated the nurse was out today & changed the dressing. The Roberto Close drain is \"about 1/2 full, pea soup color\"  Pt stated is has not been drained since going home. Pt denied any left hip pain since they placed the drain. Pt stated he is up without any issues. Pt denied any needs or concerns. CTC will continue to follow.     Follow Up  Future Appointments   Date Time Provider Meggan Ramirez   2019  9:00 AM STR CT IMAGING RM1  [facility] OUT EXP STR Radiolog   2019  1:20 PM DOREEN Agosto - CNP SRPX [facility] [place]   7/3/2019  8:00 AM Paulette Garcia Urology [facility] [place]   2019  8:45 AM Paulette Garcia [facility]rology [facility] [place]   10/3/2019  8:30 AM Angela Chapman MD [address] [place] [facility] - Chelsea Valderrama RN  Care Transition Coordinator  [phone]

## 2019-07-02 ENCOUNTER — HOSPITAL ENCOUNTER (OUTPATIENT)
Dept: CT IMAGING | Age: 69
Discharge: HOME | End: 2019-07-02
Payer: MEDICARE

## 2019-07-02 ENCOUNTER — OFFICE VISIT (OUTPATIENT)
Dept: FAMILY MEDICINE CLINIC | Age: 69
End: 2019-07-02
Payer: MEDICARE

## 2019-07-02 ENCOUNTER — CARE COORDINATION (OUTPATIENT)
Dept: CASE MANAGEMENT | Age: 69
End: 2019-07-02

## 2019-07-02 ENCOUNTER — HOSPITAL ENCOUNTER (OUTPATIENT)
Age: 69
Discharge: HOME | End: 2019-07-02
Payer: MEDICARE

## 2019-07-02 VITALS
RESPIRATION RATE: 16 BRPM | WEIGHT: 225 LBS | TEMPERATURE: 98 F | HEART RATE: 80 BPM | SYSTOLIC BLOOD PRESSURE: 116 MMHG | DIASTOLIC BLOOD PRESSURE: 76 MMHG | BODY MASS INDEX: 31.5 KG/M2 | HEIGHT: 71 IN

## 2019-07-02 DIAGNOSIS — C61 PROSTATE CA (HCC): ICD-10-CM

## 2019-07-02 DIAGNOSIS — K65.1 ABSCESS OF MALE PELVIS (HCC): ICD-10-CM

## 2019-07-02 DIAGNOSIS — I89.8 LYMPHOCELE: ICD-10-CM

## 2019-07-02 DIAGNOSIS — A41.02 SEPSIS DUE TO METHICILLIN RESISTANT STAPHYLOCOCCUS AUREUS (MRSA) (HCC): Primary | ICD-10-CM

## 2019-07-02 DIAGNOSIS — Z90.79 HISTORY OF ROBOT-ASSISTED LAPAROSCOPIC RADICAL PROSTATECTOMY: ICD-10-CM

## 2019-07-02 LAB — PROSTATE SPECIFIC ANTIGEN: 0.04 NG/ML (ref 0–1)

## 2019-07-02 PROCEDURE — 36415 COLL VENOUS BLD VENIPUNCTURE: CPT

## 2019-07-02 PROCEDURE — 72193 CT PELVIS W/DYE: CPT

## 2019-07-02 PROCEDURE — 84153 ASSAY OF PSA TOTAL: CPT

## 2019-07-02 PROCEDURE — 6360000004 HC RX CONTRAST MEDICATION: Performed by: NURSE PRACTITIONER

## 2019-07-02 PROCEDURE — 99495 TRANSJ CARE MGMT MOD F2F 14D: CPT | Performed by: NURSE PRACTITIONER

## 2019-07-02 RX ADMIN — IOPAMIDOL 85 ML: 755 INJECTION, SOLUTION INTRAVENOUS at 08:58

## 2019-07-02 NOTE — CARE COORDINATION
[address] Transitions Interview     2019    Patient: Celso Mcghee Patient : 1950   MRN: [id number]  Reason for Admission: abscess of male pelvis  RARS: Readmission Risk Score: 15         Spoke with: name  Patient stated he is doing fine. Denies pain. He had his drain removed today. Reviewed appts. Home health will continue to come out a couple more times. Denies concerns or issues. Readmission Risk  Patient Active Problem List   Diagnosis    Benign prostatic hyperplasia    Hyperlipidemia    CVA (cerebral vascular accident) ([address]    Hypertension    Obesity    FH: heart disease    Bilateral carotid artery stenosis    Atrial fibrillation ([address]    Coronary artery disease involving coronary bypass graft of native heart without angina pectoris    PVD (peripheral vascular disease) (H[facility])    [facility]    Ileus, postoperative ([address]    Abscess of [facility] ([facility]    Decreased range of motion of left lower extremity    Pain, joint, hip, left    Sepsis due to methicillin resistant Staphylococcus aureus (MRSA) [facility])       Inpatient Assessment  Care Transitions Summary    Care Transitions Inpatient Review  Medication Review  Do you have all of your prescriptions and are they filled?:  Yes   Housing Review  Social Support  Durable Medical Equipment  Functional Review  Hearing and Vision  Care Transitions Interventions         Follow Up  Future Appointments   Date Time Provider Meggan Ramirez   7/3/2019  8:00 AM Paulette Berumen Urology [facility] [place]   2019  8:45 AM Paulette Berumen Urology [facility] [place]   10/3/2019  8:30 AM Constantino Casanova MD [address] Maintenance  There are no preventive care reminders to display for this patient.     Carly Kohli RN

## 2019-07-03 ENCOUNTER — TELEPHONE (OUTPATIENT)
Dept: UROLOGY | Age: 69
End: 2019-07-03

## 2019-07-03 ENCOUNTER — OFFICE VISIT (OUTPATIENT)
Dept: UROLOGY | Age: 69
End: 2019-07-03
Payer: MEDICARE

## 2019-07-03 VITALS
SYSTOLIC BLOOD PRESSURE: 118 MMHG | BODY MASS INDEX: 30.89 KG/M2 | HEIGHT: 72 IN | DIASTOLIC BLOOD PRESSURE: 68 MMHG | WEIGHT: 228.1 LBS

## 2019-07-03 DIAGNOSIS — K65.1 PELVIC ABSCESS IN MALE (HCC): ICD-10-CM

## 2019-07-03 DIAGNOSIS — C61 PROSTATE CA (HCC): Primary | ICD-10-CM

## 2019-07-03 LAB
BILIRUBIN URINE: NEGATIVE
BLOOD URINE, POC: NEGATIVE
CHARACTER, URINE: CLEAR
COLOR, URINE: YELLOW
GLUCOSE URINE: NEGATIVE MG/DL
KETONES, URINE: NEGATIVE
LEUKOCYTE CLUMPS, URINE: NEGATIVE
NITRITE, URINE: NEGATIVE
PH, URINE: 6 (ref 5–9)
PROTEIN, URINE: NEGATIVE MG/DL
SPECIFIC GRAVITY, URINE: 1.02 (ref 1–1.03)
UROBILINOGEN, URINE: 0.2 EU/DL (ref 0–1)

## 2019-07-03 PROCEDURE — G8598 ASA/ANTIPLAT THER USED: HCPCS | Performed by: NURSE PRACTITIONER

## 2019-07-03 PROCEDURE — 1123F ACP DISCUSS/DSCN MKR DOCD: CPT | Performed by: NURSE PRACTITIONER

## 2019-07-03 PROCEDURE — G8417 CALC BMI ABV UP PARAM F/U: HCPCS | Performed by: NURSE PRACTITIONER

## 2019-07-03 PROCEDURE — 81003 URINALYSIS AUTO W/O SCOPE: CPT | Performed by: NURSE PRACTITIONER

## 2019-07-03 PROCEDURE — 3017F COLORECTAL CA SCREEN DOC REV: CPT | Performed by: NURSE PRACTITIONER

## 2019-07-03 PROCEDURE — G8428 CUR MEDS NOT DOCUMENT: HCPCS | Performed by: NURSE PRACTITIONER

## 2019-07-03 PROCEDURE — 99214 OFFICE O/P EST MOD 30 MIN: CPT | Performed by: NURSE PRACTITIONER

## 2019-07-03 PROCEDURE — 1111F DSCHRG MED/CURRENT MED MERGE: CPT | Performed by: NURSE PRACTITIONER

## 2019-07-03 PROCEDURE — 1036F TOBACCO NON-USER: CPT | Performed by: NURSE PRACTITIONER

## 2019-07-03 PROCEDURE — 4040F PNEUMOC VAC/ADMIN/RCVD: CPT | Performed by: NURSE PRACTITIONER

## 2019-07-03 NOTE — TELEPHONE ENCOUNTER
Patient scheduled for a CT scan pelvis w/ IV contrast only on 08- at 7:40am with an arrival of 7:10am at [facility]. NPO 4-hours prior.

## 2019-07-03 NOTE — PROGRESS NOTES
[address] [place]  Dept: [phone]  Dept Fax: [phone] : [phone]      Visit Date: 7/3/2019    HPI:     Rejeana Olszewski presents for follow-up of prostate cancer. On 3/20/19 he underwent Robot-Assisted Laparoscopic Radical Prostatectomy (RALRP) and bilateral pelvic lymph node dissection and bilateral nerve wrap per Dr. Teofilo Greco. Pathology showed Tip 3+4=7 prostate cancer with tertiary 5, perineural invasion and right bladder neck involvement, pT3a N0. He was recently admitted at [facility] due to lethargy, malaise, and syncopal episode. He was found to have an infected left lymphocele. A drain was placed on 6/18/19. The drain was removed yesterday and the cavity had completely resolved. Culture from drainage grew Staphylococcus aureus, heavy growth of MRSA. He was discharged with Bactrim daily for 50 days per Dr. Calista Tello. He continues to take this. He reports that incontinence has gotten worse since hospital admission. He reports that pelvic pain has completely improved. The LLE pain has also improved. He is feeling well. Past Medical History:   Diagnosis Date    BPH (benign prostatic hyperplasia)     CAD (coronary artery disease)     CVA (cerebral vascular accident) ([facility] [address] 200    FH: heart disease     Hyperlipidemia     Hypertension     Obesity       Past Surgical History:   Procedure Laterality Date    CARDIAC VALVE REPLACEMENT  April 2011    [place], [place]    CARDIOVASCULAR STRESS TEST  1 05 2011    Cannot exclude slight inferobasal lateral stress-induced ischemia in a relatively small-sized area. EF 68%. Mildly abnormal nuclear scan. Lexiscan test associated with nonspecific symptoms. EKG nondiagnostic with nonspecific ST-T wave changes.  CAROTID ENDARTERECTOMY  2 08 2011    Right carotid endarterectomy with patch angioplasty with convention patch angioplasty.      COLONOSCOPY      CORONARY extremities. Neurological: The patient denies any symptoms of neurological impairment or TIA's; no history of stroke. Lymphatic: Denies swollen glands in neck, axillary or inguinal areas. Psychiatric: Denies anxiety or depression. Skin: Denies rash or lesions. The remainder of the complete ROS is negative    PHYSICAL EXAM:  VITALS:  /68   Ht 6' (1.829 m)   Wt 228 lb 1.6 oz (103.5 kg)   BMI 30.94 kg/m² . Constitutional:    Alert and oriented times 3, no acute distress and cooperative to examination with appropriate mood and affect. HEENT:   Head:         Normocephalic and atraumatic. Mouth/Throat:          Mucous membranes are normal.   Eyes:         EOM are normal. No scleral icterus. Nose:    The external appearance of the nose is normal  Ears: The ears appear normal to external inspection   Neck:         Supple, symmetrical, trachea midline, no adenopathy, thyroid symmetric, not enlarged and no tenderness. Cardiovascular:        Normal rate, regular rhythm, S1 S2 heart sounds. Pulmonary/Chest:       Chest symmetric with normal A/P diameter, no wheezes, rales, or rhonchi noted. Normal respiratory rate and rhthym. No use of accessory muscles. Abdominal:          Soft. No tenderness. Active bowel sounds. Musculoskeletal:    Normal range of motion. She exhibits no edema or tenderness of lower extremities. Extremities:    No cyanosis, clubbing, or edema present. Neurological:    Alert and oriented. No cranial nerve deficit. There are no focalizing motor or sensory deficits.     DATA:  CBC:   Lab Results   Component Value Date    WBC 11.5 06/24/2019    RBC 3.62 06/24/2019    HGB 12.0 06/24/2019    HCT 36.3 06/24/2019    .3 06/24/2019    MCH 33.1 06/24/2019    MCHC 33.1 06/24/2019    RDW 12.1 04/01/2015     06/24/2019    MPV 8.7 06/24/2019     BMP:    Lab Results   Component Value Date     06/24/2019    K 3.8 06/24/2019    K 3.1 06/19/2019     06/24/2019 CO2 28 06/24/2019    BUN 7 06/24/2019    CREATININE 0.5 06/24/2019    CALCIUM 8.6 06/24/2019    LABGLOM >90 06/24/2019    GLUCOSE 105 06/24/2019     BUN/Creatinine:    Lab Results   Component Value Date    BUN 7 06/24/2019    CREATININE 0.5 06/24/2019     Magnesium:    Lab Results   Component Value Date    MG 1.9 06/19/2019     Phosphorus:  No results found for: PHOS  PT/INR:    Lab Results   Component Value Date    INR 1.05 03/20/2019     U/A:    Lab Results   Component Value Date    COLORU Yellow 07/03/2019    COLORU DK YELLOW 06/18/2019    PHUR 6.0 06/18/2019    WBCUA 2-4 06/18/2019    RBCUA 3-5 06/18/2019    YEAST NONE SEEN 06/18/2019    BACTERIA NONE 06/18/2019    LEUKOCYTESUR TRACE 06/18/2019    UROBILINOGEN 0.20 07/03/2019    BILIRUBINUR Negative 07/03/2019    BLOODU Negative 07/03/2019    BLOODU NEGATIVE 06/18/2019    GLUCOSEU Negative 07/03/2019       Clinical Information: PROSTATE CANCER    FINAL DIAGNOSIS:  A.  Prostate, radical prostatectomy:      Adenocarcinoma of the prostate, [place] score 3+4 =7 with tertiary      5, involving approximately 30% of prostatic tissue, with perineural      invasion and right bladder neck involvement, stage pT3a N0.   Margin positive for carcinoma, right bladder neck. B.  Lymph nodes, left pelvic, excision:   Two lymph nodes, negative for malignancy (0/2). C.  Lymph nodes, right pelvic, excision:   Three lymph nodes, negative for malignancy (0/3).     Lab Results   Component Value Date    PSA 0.04 07/02/2019    PSA 0.07 05/03/2019    PSA 8.28 (H) 09/06/2018     Results for POC orders placed in visit on 07/03/19   POCT Urinalysis No Micro (Auto)   Result Value Ref Range    Glucose, Ur Negative NEGATIVE mg/dl    Bilirubin Urine Negative     Ketones, Urine Negative NEGATIVE    Specific Gravity, Urine 1.025 1.002 - 1.03    Blood, UA POC Negative NEGATIVE    pH, Urine 6.00 5.0 - 9.0    Protein, Urine Negative NEGATIVE mg/dl    Urobilinogen, Urine 0.20 0.0 - 1.0 eu/dl

## 2019-07-09 ENCOUNTER — CARE COORDINATION (OUTPATIENT)
Dept: CASE MANAGEMENT | Age: 69
End: 2019-07-09

## 2019-07-09 NOTE — CARE COORDINATION
[facility] Transitions Follow Up Call    2019    Patient: Ruslan Bedoya  Patient : 1950   MRN: [id number]  Reason for Admission:   Discharge Date: 19 RARS: Readmission Risk Score: 12       Attempted sub transition call.  Left message to return call to [phone]      Follow Up  Future Appointments   Date Time Provider Meggan Ramirez   2019  7:40 AM STR CT IMAGING RM1  [facility] OUT EXP STR Radiolog   2019  8:00 AM Paulette Morataya [facility] - EFFIE KNOX AM [place] [address]   10/3/2019  8:30 AM Matilde Rodas MD [facility] - Michell Sifuentes, RN

## 2019-07-16 ASSESSMENT — ENCOUNTER SYMPTOMS
WHEEZING: 0
PHOTOPHOBIA: 0
EYE PAIN: 0
RHINORRHEA: 0
SHORTNESS OF BREATH: 0
EYE DISCHARGE: 0
CHEST TIGHTNESS: 0
COUGH: 0
ABDOMINAL PAIN: 0
BACK PAIN: 0
APNEA: 0
ABDOMINAL DISTENTION: 0

## 2019-07-16 NOTE — PROGRESS NOTES
07/03/19 228 lb 1.6 oz (103.5 kg)   07/02/19 225 lb (102.1 kg)   06/18/19 247 lb 4.8 oz (112.2 kg)     BP Readings from Last 3 Encounters:   07/03/19 118/68   07/02/19 116/76   06/24/19 136/78       Physical Exam   Constitutional: He is oriented to person, place, and time. Vital signs are normal. He appears well-developed and well-nourished. HENT:   Head: Normocephalic and atraumatic. Not macrocephalic and not microcephalic. Right Ear: Hearing, tympanic membrane, external ear and ear canal normal. No drainage or tenderness. Tympanic membrane is not injected, not scarred, not perforated and not bulging. No middle ear effusion. No hemotympanum. Left Ear: Hearing, tympanic membrane, external ear and ear canal normal. No drainage or tenderness. Tympanic membrane is not injected, not scarred, not perforated and not bulging. No middle ear effusion. No hemotympanum. Nose: No mucosal edema, rhinorrhea, nasal deformity or septal deviation. No epistaxis. No foreign bodies. Right sinus exhibits no maxillary sinus tenderness and no frontal sinus tenderness. Left sinus exhibits no maxillary sinus tenderness and no frontal sinus tenderness. Mouth/Throat: Oropharynx is clear and moist and mucous membranes are normal. He does not have dentures. No oral lesions. Normal dentition. No dental abscesses or dental caries. No oropharyngeal exudate, posterior oropharyngeal edema, posterior oropharyngeal erythema or tonsillar abscesses. Eyes: Conjunctivae, EOM and lids are normal. Right conjunctiva is not injected. Left conjunctiva is not injected. No scleral icterus. Right eye exhibits normal extraocular motion. Left eye exhibits normal extraocular motion. Neck: Trachea normal and normal range of motion. Neck supple. No JVD present. Carotid bruit is not present. No edema, no erythema and normal range of motion present. No thyroid mass and no thyromegaly present.    Cardiovascular: Normal rate, regular rhythm, S1 normal, S2 normal and normal heart sounds. Exam reveals no gallop and no friction rub. No murmur heard. Pulmonary/Chest: Effort normal and breath sounds normal. No respiratory distress. He has no wheezes. He has no rhonchi. He has no rales. He exhibits no tenderness. Abdominal: Soft. Normal appearance and bowel sounds are normal. He exhibits no mass. There is no hepatosplenomegaly, splenomegaly or hepatomegaly. There is no rebound and no guarding. No hernia. Hernia confirmed negative in the ventral area, confirmed negative in the right inguinal area and confirmed negative in the left inguinal area. Musculoskeletal: Normal range of motion. He exhibits no edema or tenderness. Lymphadenopathy:        Head (right side): No submental, no submandibular, no tonsillar, no preauricular, no posterior auricular and no occipital adenopathy present. Head (left side): No submental, no submandibular, no tonsillar, no preauricular, no posterior auricular and no occipital adenopathy present. He has no cervical adenopathy. Right cervical: No superficial cervical, no deep cervical and no posterior cervical adenopathy present. Left cervical: No superficial cervical, no deep cervical and no posterior cervical adenopathy present. Right axillary: No pectoral and no lateral adenopathy present. Left axillary: No pectoral and no lateral adenopathy present. Right: No inguinal and no supraclavicular adenopathy present. Left: No inguinal and no supraclavicular adenopathy present. Neurological: He is alert and oriented to person, place, and time. He displays normal reflexes. No cranial nerve deficit. He exhibits normal muscle tone. Coordination normal.   Skin: Skin is warm, dry and intact. No bruising, no ecchymosis, no laceration, no lesion and no rash noted. He is not diaphoretic. No cyanosis. No pallor. Nails show no clubbing. Psychiatric: He has a normal mood and affect.  His speech is

## 2019-08-12 NOTE — PROGRESS NOTES
pain, anorexia, nausea or vomiting. : See HPI  Musculoskeletal: Patient denies low back pain or painful or reduced ROM of the back or extremities. Neurological: The patient denies any symptoms of neurological impairment or TIA's; no history of stroke. Lymphatic: Denies swollen glands in neck, axillary or inguinal areas. Psychiatric: Denies anxiety or depression. Skin: Denies rash or lesions. The remainder of the complete ROS is negative    PHYSICAL EXAM:  VITALS:  /70   Ht 6' (1.829 m)   Wt 258 lb (117 kg)   BMI 34.99 kg/m² . Constitutional:    Alert and oriented times 3, no acute distress and cooperative to examination with appropriate mood and affect. HEENT:   Head:         Normocephalic and atraumatic. Mouth/Throat:          Mucous membranes are normal.   Eyes:         EOM are normal. No scleral icterus. Nose:    The external appearance of the nose is normal  Ears: The ears appear normal to external inspection   Neck:         Supple, symmetrical, trachea midline, no adenopathy, thyroid symmetric, not enlarged and no tenderness. Cardiovascular:        Normal rate, regular rhythm, S1 S2 heart sounds. Pulmonary/Chest:       Chest symmetric with normal A/P diameter, no wheezes, rales, or rhonchi noted. Normal respiratory rate and rhthym. No use of accessory muscles. Abdominal:          Soft. No tenderness. Active bowel sounds. Genitalia: Normal rectal anal sphincter tone. The prostate is 4x3 cm, soft and benign in consistency. The median furrow is palpable. The lateral sulci are easily appreciated. The prostate is freely moveable in the pelvis. There are no other rectal masses. Musculoskeletal:    Normal range of motion. She exhibits no edema or tenderness of lower extremities. Extremities:    No cyanosis, clubbing, or edema present. Neurological:    Alert and oriented. No cranial nerve deficit. There are no focalizing motor or sensory deficits.     DATA:  CBC:   Lab Yes

## 2019-08-27 ENCOUNTER — HOSPITAL ENCOUNTER (OUTPATIENT)
Age: 69
Discharge: HOME | End: 2019-08-27
Payer: MEDICARE

## 2019-08-27 DIAGNOSIS — C61 PROSTATE CA (HCC): ICD-10-CM

## 2019-08-27 LAB — PROSTATE SPECIFIC ANTIGEN: 0.15 NG/ML (ref 0–1)

## 2019-08-27 PROCEDURE — 84153 ASSAY OF PSA TOTAL: CPT

## 2019-08-27 PROCEDURE — 36415 COLL VENOUS BLD VENIPUNCTURE: CPT

## 2019-08-28 ENCOUNTER — HOSPITAL ENCOUNTER (OUTPATIENT)
Dept: CT IMAGING | Age: 69
Discharge: HOME | End: 2019-08-28
Payer: MEDICARE

## 2019-08-28 DIAGNOSIS — K65.1 PELVIC ABSCESS IN MALE (HCC): ICD-10-CM

## 2019-08-28 DIAGNOSIS — C61 PROSTATE CA (HCC): ICD-10-CM

## 2019-08-28 LAB — POC CREATININE WHOLE BLOOD: 0.8 MG/DL (ref 0.5–1.2)

## 2019-08-28 PROCEDURE — 72193 CT PELVIS W/DYE: CPT

## 2019-08-28 PROCEDURE — 6360000004 HC RX CONTRAST MEDICATION: Performed by: NURSE PRACTITIONER

## 2019-08-28 PROCEDURE — 82565 ASSAY OF CREATININE: CPT

## 2019-08-28 RX ADMIN — IOPAMIDOL 85 ML: 755 INJECTION, SOLUTION INTRAVENOUS at 07:49

## 2019-09-01 ENCOUNTER — HOSPITAL ENCOUNTER (EMERGENCY)
Age: 69
Discharge: HOME | End: 2019-09-01
Payer: MEDICARE

## 2019-09-01 ENCOUNTER — APPOINTMENT (OUTPATIENT)
Dept: GENERAL RADIOLOGY | Age: 69
End: 2019-09-01
Payer: MEDICARE

## 2019-09-01 VITALS
WEIGHT: 230 LBS | TEMPERATURE: 97.9 F | HEIGHT: 72 IN | BODY MASS INDEX: 31.15 KG/M2 | OXYGEN SATURATION: 100 % | RESPIRATION RATE: 16 BRPM | HEART RATE: 78 BPM | SYSTOLIC BLOOD PRESSURE: 125 MMHG | DIASTOLIC BLOOD PRESSURE: 83 MMHG

## 2019-09-01 DIAGNOSIS — R10.31 RIGHT INGUINAL PAIN: Primary | ICD-10-CM

## 2019-09-01 DIAGNOSIS — M25.551 HIP PAIN, ACUTE, RIGHT: ICD-10-CM

## 2019-09-01 LAB
ANION GAP SERPL CALCULATED.3IONS-SCNC: 12 MEQ/L (ref 8–16)
BASOPHILS # BLD: 0.4 %
BASOPHILS ABSOLUTE: 0 THOU/MM3 (ref 0–0.1)
BUN BLDV-MCNC: 16 MG/DL (ref 7–22)
CALCIUM SERPL-MCNC: 9.5 MG/DL (ref 8.5–10.5)
CHLORIDE BLD-SCNC: 97 MEQ/L (ref 98–111)
CO2: 28 MEQ/L (ref 23–33)
CREAT SERPL-MCNC: 0.7 MG/DL (ref 0.4–1.2)
EOSINOPHIL # BLD: 0.6 %
EOSINOPHILS ABSOLUTE: 0.1 THOU/MM3 (ref 0–0.4)
ERYTHROCYTE [DISTWIDTH] IN BLOOD BY AUTOMATED COUNT: 11.9 % (ref 11.5–14.5)
ERYTHROCYTE [DISTWIDTH] IN BLOOD BY AUTOMATED COUNT: 43.1 FL (ref 35–45)
GFR SERPL CREATININE-BSD FRML MDRD: > 90 ML/MIN/1.73M2
GLUCOSE BLD-MCNC: 122 MG/DL (ref 70–108)
HCT VFR BLD CALC: 40.3 % (ref 42–52)
HEMOGLOBIN: 13.6 GM/DL (ref 14–18)
IMMATURE GRANS (ABS): 0.18 THOU/MM3 (ref 0–0.07)
IMMATURE GRANULOCYTES: 2 %
LYMPHOCYTES # BLD: 8.7 %
LYMPHOCYTES ABSOLUTE: 0.9 THOU/MM3 (ref 1–4.8)
MCH RBC QN AUTO: 33.2 PG (ref 26–33)
MCHC RBC AUTO-ENTMCNC: 33.7 GM/DL (ref 32.2–35.5)
MCV RBC AUTO: 98.3 FL (ref 80–94)
MONOCYTES # BLD: 10.4 %
MONOCYTES ABSOLUTE: 1.1 THOU/MM3 (ref 0.4–1.3)
NUCLEATED RED BLOOD CELLS: 0 /100 WBC
OSMOLALITY CALCULATION: 276.3 MOSMOL/KG (ref 275–300)
PLATELET # BLD: 225 THOU/MM3 (ref 130–400)
PMV BLD AUTO: 9 FL (ref 9.4–12.4)
POTASSIUM REFLEX MAGNESIUM: 3.8 MEQ/L (ref 3.5–5.2)
RBC # BLD: 4.1 MILL/MM3 (ref 4.7–6.1)
SEG NEUTROPHILS: 78.3 %
SEGMENTED NEUTROPHILS ABSOLUTE COUNT: 8.5 THOU/MM3 (ref 1.8–7.7)
SODIUM BLD-SCNC: 137 MEQ/L (ref 135–145)
WBC # BLD: 10.9 THOU/MM3 (ref 4.8–10.8)

## 2019-09-01 PROCEDURE — 80048 BASIC METABOLIC PNL TOTAL CA: CPT

## 2019-09-01 PROCEDURE — 36415 COLL VENOUS BLD VENIPUNCTURE: CPT

## 2019-09-01 PROCEDURE — 99284 EMERGENCY DEPT VISIT MOD MDM: CPT

## 2019-09-01 PROCEDURE — 73502 X-RAY EXAM HIP UNI 2-3 VIEWS: CPT

## 2019-09-01 PROCEDURE — 85025 COMPLETE CBC W/AUTO DIFF WBC: CPT

## 2019-09-01 ASSESSMENT — ENCOUNTER SYMPTOMS
EYE DISCHARGE: 0
ABDOMINAL PAIN: 0
EYE REDNESS: 0
DIARRHEA: 0
RHINORRHEA: 0
NAUSEA: 0
SORE THROAT: 0
CONSTIPATION: 1
BACK PAIN: 0
VOMITING: 0
WHEEZING: 0
SHORTNESS OF BREATH: 0
COUGH: 0

## 2019-09-01 ASSESSMENT — PAIN DESCRIPTION - FREQUENCY
FREQUENCY: CONTINUOUS
FREQUENCY: CONTINUOUS

## 2019-09-01 ASSESSMENT — PAIN DESCRIPTION - DESCRIPTORS: DESCRIPTORS: ACHING

## 2019-09-01 ASSESSMENT — LIFESTYLE VARIABLES: HISTORY_ALCOHOL_USE: NO

## 2019-09-01 ASSESSMENT — PAIN DESCRIPTION - LOCATION
LOCATION: GROIN
LOCATION: GROIN

## 2019-09-01 ASSESSMENT — PAIN SCALES - GENERAL
PAINLEVEL_OUTOF10: 6
PAINLEVEL_OUTOF10: 6

## 2019-09-01 ASSESSMENT — SLEEP AND FATIGUE QUESTIONNAIRES
DO YOU HAVE DIFFICULTY SLEEPING: NO
AVERAGE NUMBER OF SLEEP HOURS: 8
DO YOU USE A SLEEP AID: NO

## 2019-09-01 ASSESSMENT — PAIN DESCRIPTION - ORIENTATION
ORIENTATION: RIGHT
ORIENTATION: RIGHT

## 2019-09-01 ASSESSMENT — PAIN DESCRIPTION - ONSET: ONSET: ON-GOING

## 2019-09-01 ASSESSMENT — PAIN DESCRIPTION - PROGRESSION: CLINICAL_PROGRESSION: NOT CHANGED

## 2019-09-01 ASSESSMENT — PAIN DESCRIPTION - PAIN TYPE
TYPE: ACUTE PAIN
TYPE: ACUTE PAIN

## 2019-09-02 NOTE — ED PROVIDER NOTES
muscle tone. Coordination normal.   Skin: Skin is warm and dry. No rash noted. He is not diaphoretic. Nursing note and vitals reviewed. DIFFERENTIAL DIAGNOSIS:   Depression, osteoarthritis, inguinal abscess    DIAGNOSTIC RESULTS       RADIOLOGY: non-plainfilm images(s) such as CT, Ultrasound and MRI are read by the radiologist.    XR HIP 2-3 VW W PELVIS RIGHT   Final Result   1. Degenerative changes of the skeleton. Negative exam for acute appearing pathology. Final report electronically signed by Dr. Jg Cavanaugh on 9/1/2019 9:29 PM          LABS:     Labs Reviewed   CBC WITH AUTO DIFFERENTIAL - Abnormal; Notable for the following components:       Result Value    WBC 10.9 (*)     RBC 4.10 (*)     Hemoglobin 13.6 (*)     Hematocrit 40.3 (*)     MCV 98.3 (*)     MCH 33.2 (*)     MPV 9.0 (*)     Segs Absolute 8.5 (*)     Lymphocytes Absolute 0.9 (*)     Immature Grans (Abs) 0.18 (*)     All other components within normal limits   BASIC METABOLIC PANEL W/ REFLEX TO MG FOR LOW K - Abnormal; Notable for the following components:    Chloride 97 (*)     Glucose 122 (*)     All other components within normal limits   ANION GAP   GLOMERULAR FILTRATION RATE, ESTIMATED   OSMOLALITY       EMERGENCY DEPARTMENT COURSE:   Vitals:    Vitals:    09/01/19 2028 09/01/19 2124   BP: 126/70 125/83   Pulse: 74 78   Resp: 18 16   Temp: 97.9 °F (36.6 °C)    TempSrc: Oral    SpO2: 97% 100%   Weight: 230 lb (104.3 kg)    Height: 6' (1.829 m)        8:51 PM: The patient was seen and evaluated. MDM:  Patient seen and evaluated for concerns of right groin/hip pain going on for the past 3 days. Patient expresses concern as he states that he had similar pain on the opposite side when he was diagnosed with an abdominal abscess and had to be hospitalized for drain and antibiotics. She has no reproducible pain swelling or tenderness. Patient had CT scan performed on August 28.   Reviewed results showed resolution of abscess

## 2019-09-02 NOTE — BH NOTE
Provisional Diagnosis: Unspecified Depressive Episode     Risk, Psychosocial and Contextual Factors: Death of wife 2 years ago, medical concerns and physical pain    Current MH Treatment: Client denies. Present Suicidal Behavior:      Verbal: Client denies. Attempt: Client denies. Access to Weapons: Client denies. Current Suicide Risk: Low, Moderate or High: Low. Past Suicidal Behavior:       Verbal: Client denies. Attempt: Client denies. Self-Injurious/Self-Mutilation: Client denies    Traumatic Event Within Past 2 Weeks: Client denies. Current Abuse: Client denies. Legal: Client denies. Violence: Client denies. Protective Factors:  Supportive family, judgment and insight are both good    Housing: Lives alone with a dog. Wife  in 2017. Clinical Summary:      Patient is a 81year old male who presents to the ED voluntarily due to pain in groin. Depressive symptoms mentioned upon intake and thus NADEEM paged and assessment completed. Suicidal thoughts, past or present, denied. Homicidal thoughts and/or plans (past or present) denied. No delusions noted. Hallucinations denied. No history of delusions or hallucinations. AOD denied. Legal issues denied. Violence denied. Patient denies trauma history. No sleep issues indicated (averaging 8 hours per night). PHQ-9 score was 0. Patient declined that depression was an issue impacting his functioning. Declined resources. Patient reports that if he feels his depression worsens, he would talk to his primary care physician. Level of Care Disposition:      Consulted with medical provider. Consulted with Dr. Godwin Andrew who stated patient does not need inpatient psychiatric care. Let medical provider and nursing staff know. Let patient know who was in agreement with psychiatrist's disposition.

## 2019-09-02 NOTE — ED NOTES
Pt reassessed. Resting on cot, talking with family, respirations even and unlabored. States continued pain in his right groin 6/10 that is \"aching. \"  Updated on POC, no complaints. SR up x2, call light in reach, will continue to monitor.      Dayan Durbin RN  09/01/19 2124

## 2019-09-04 ENCOUNTER — TELEPHONE (OUTPATIENT)
Dept: FAMILY MEDICINE CLINIC | Age: 69
End: 2019-09-04

## 2019-09-09 ENCOUNTER — OFFICE VISIT (OUTPATIENT)
Dept: FAMILY MEDICINE CLINIC | Age: 69
End: 2019-09-09
Payer: MEDICARE

## 2019-09-09 VITALS
TEMPERATURE: 98.2 F | SYSTOLIC BLOOD PRESSURE: 138 MMHG | WEIGHT: 239.4 LBS | HEART RATE: 66 BPM | DIASTOLIC BLOOD PRESSURE: 72 MMHG | BODY MASS INDEX: 32.47 KG/M2 | RESPIRATION RATE: 16 BRPM

## 2019-09-09 DIAGNOSIS — I48.91 ATRIAL FIBRILLATION, UNSPECIFIED TYPE (HCC): ICD-10-CM

## 2019-09-09 DIAGNOSIS — M51.36 DDD (DEGENERATIVE DISC DISEASE), LUMBAR: Primary | ICD-10-CM

## 2019-09-09 DIAGNOSIS — I10 ESSENTIAL HYPERTENSION: ICD-10-CM

## 2019-09-09 PROCEDURE — G8417 CALC BMI ABV UP PARAM F/U: HCPCS | Performed by: FAMILY MEDICINE

## 2019-09-09 PROCEDURE — G0008 ADMIN INFLUENZA VIRUS VAC: HCPCS | Performed by: FAMILY MEDICINE

## 2019-09-09 PROCEDURE — G8598 ASA/ANTIPLAT THER USED: HCPCS | Performed by: FAMILY MEDICINE

## 2019-09-09 PROCEDURE — 3017F COLORECTAL CA SCREEN DOC REV: CPT | Performed by: FAMILY MEDICINE

## 2019-09-09 PROCEDURE — 1123F ACP DISCUSS/DSCN MKR DOCD: CPT | Performed by: FAMILY MEDICINE

## 2019-09-09 PROCEDURE — 4040F PNEUMOC VAC/ADMIN/RCVD: CPT | Performed by: FAMILY MEDICINE

## 2019-09-09 PROCEDURE — 1036F TOBACCO NON-USER: CPT | Performed by: FAMILY MEDICINE

## 2019-09-09 PROCEDURE — 90653 IIV ADJUVANT VACCINE IM: CPT | Performed by: FAMILY MEDICINE

## 2019-09-09 PROCEDURE — G8427 DOCREV CUR MEDS BY ELIG CLIN: HCPCS | Performed by: FAMILY MEDICINE

## 2019-09-09 PROCEDURE — 99213 OFFICE O/P EST LOW 20 MIN: CPT | Performed by: FAMILY MEDICINE

## 2019-09-09 ASSESSMENT — ENCOUNTER SYMPTOMS
ABDOMINAL PAIN: 0
NAUSEA: 0
COUGH: 0
BACK PAIN: 1
SINUS PRESSURE: 0
ABDOMINAL DISTENTION: 0
SHORTNESS OF BREATH: 0
RHINORRHEA: 0
DIARRHEA: 0
CONSTIPATION: 0
SORE THROAT: 0
EYE PAIN: 0

## 2019-09-09 NOTE — PROGRESS NOTES
disease), lumbar    2. Atrial fibrillation, unspecified type ([facility] [address]    3. Essential hypertension      Requested Prescriptions     Signed Prescriptions Disp Refills    Elastic Bandages & Supports (LUMBAR BACK BRACE/SUPPORT PAD) MISC 1 each 0     Sig: Dx lumbar stenosis     Orders Placed This Encounter   Procedures    INFLUENZA, TRIV, INACTIVATED, SUBUNIT, ADJUVANTED, 65 YRS AND OLDER, IM, PREFILL SYR, 0.5ML (FLUAD TRIV)       Patient giveneducational materials - see patient instructions. Discussed use, benefit, and side effects of prescribed medications. All patient questions answered. Pt voiced understanding. Reviewed health maintenance. Patient agreedwith treatment plan. Follow up as directed. **This report has been created using voice recognition software. It may contain minor errorswhich are inherent in voice recognition technology. **       Electronically signed by Wanda Mills MD on 9/9/2019 at 10:01 AM

## 2019-09-11 ENCOUNTER — OFFICE VISIT (OUTPATIENT)
Dept: UROLOGY | Age: 69
End: 2019-09-11
Payer: MEDICARE

## 2019-09-11 VITALS
BODY MASS INDEX: 31.42 KG/M2 | SYSTOLIC BLOOD PRESSURE: 114 MMHG | DIASTOLIC BLOOD PRESSURE: 78 MMHG | HEIGHT: 73 IN | WEIGHT: 237.1 LBS

## 2019-09-11 DIAGNOSIS — C61 PROSTATE CA (HCC): Primary | ICD-10-CM

## 2019-09-11 LAB
BILIRUBIN URINE: NEGATIVE
BLOOD URINE, POC: NEGATIVE
CHARACTER, URINE: CLEAR
COLOR, URINE: YELLOW
GLUCOSE URINE: NEGATIVE MG/DL
KETONES, URINE: NEGATIVE
LEUKOCYTE CLUMPS, URINE: NEGATIVE
NITRITE, URINE: NEGATIVE
PH, URINE: 6 (ref 5–9)
PROTEIN, URINE: 30 MG/DL
SPECIFIC GRAVITY, URINE: 1.02 (ref 1–1.03)
UROBILINOGEN, URINE: 0.2 EU/DL (ref 0–1)

## 2019-09-11 PROCEDURE — G8417 CALC BMI ABV UP PARAM F/U: HCPCS | Performed by: NURSE PRACTITIONER

## 2019-09-11 PROCEDURE — G8598 ASA/ANTIPLAT THER USED: HCPCS | Performed by: NURSE PRACTITIONER

## 2019-09-11 PROCEDURE — 96402 CHEMO HORMON ANTINEOPL SQ/IM: CPT | Performed by: NURSE PRACTITIONER

## 2019-09-11 PROCEDURE — G8427 DOCREV CUR MEDS BY ELIG CLIN: HCPCS | Performed by: NURSE PRACTITIONER

## 2019-09-11 PROCEDURE — 81003 URINALYSIS AUTO W/O SCOPE: CPT | Performed by: NURSE PRACTITIONER

## 2019-09-11 PROCEDURE — 99214 OFFICE O/P EST MOD 30 MIN: CPT | Performed by: NURSE PRACTITIONER

## 2019-09-11 PROCEDURE — 1036F TOBACCO NON-USER: CPT | Performed by: NURSE PRACTITIONER

## 2019-09-11 PROCEDURE — 1123F ACP DISCUSS/DSCN MKR DOCD: CPT | Performed by: NURSE PRACTITIONER

## 2019-09-11 PROCEDURE — 4040F PNEUMOC VAC/ADMIN/RCVD: CPT | Performed by: NURSE PRACTITIONER

## 2019-09-11 PROCEDURE — 3017F COLORECTAL CA SCREEN DOC REV: CPT | Performed by: NURSE PRACTITIONER

## 2019-09-16 RX ORDER — ATORVASTATIN CALCIUM 20 MG/1
TABLET, FILM COATED ORAL
Qty: 90 TABLET | Refills: 0 | Status: SHIPPED | OUTPATIENT
Start: 2019-09-16 | End: 2019-12-08 | Stop reason: SDUPTHER

## 2019-09-16 RX ORDER — APIXABAN 5 MG/1
TABLET, FILM COATED ORAL
Qty: 180 TABLET | Refills: 0 | Status: SHIPPED | OUTPATIENT
Start: 2019-09-16 | End: 2019-09-25 | Stop reason: SDUPTHER

## 2019-09-19 ENCOUNTER — TELEPHONE (OUTPATIENT)
Dept: CARDIOLOGY CLINIC | Age: 69
End: 2019-09-19

## 2019-09-19 ENCOUNTER — CLINICAL DOCUMENTATION (OUTPATIENT)
Dept: ONCOLOGY | Age: 69
End: 2019-09-19

## 2019-09-19 ENCOUNTER — HOSPITAL ENCOUNTER (OUTPATIENT)
Dept: RADIATION ONCOLOGY | Age: 69
Discharge: HOME | End: 2019-09-19
Payer: MEDICARE

## 2019-09-19 VITALS
HEART RATE: 63 BPM | WEIGHT: 233 LBS | OXYGEN SATURATION: 96 % | BODY MASS INDEX: 31.56 KG/M2 | TEMPERATURE: 95.2 F | SYSTOLIC BLOOD PRESSURE: 102 MMHG | HEIGHT: 72 IN | RESPIRATION RATE: 18 BRPM | DIASTOLIC BLOOD PRESSURE: 62 MMHG

## 2019-09-19 PROCEDURE — 99202 OFFICE O/P NEW SF 15 MIN: CPT | Performed by: RADIOLOGY

## 2019-09-19 NOTE — PROGRESS NOTES
Name: Ruslan Bedoya  : 1950  MRN: [id number]    Oncology Navigation- Initial Note:    Intake-  Contact Type: Radiation Oncology    Diagnosis: Prostate    Home Disposition: Lives alone    Patient needs and barriers to care: Symptom Management     Referral Source: Outpatient    Receptive to Advanced Care Planning/ Palliative Care:  deferred    Interventions-   General Interventions: Navigation Welcome packet given and program explained       Continuum of Care: Diagnosis/Active Treatment    Notes:   Met with Chastity Brown before his consultation with Dr. Aubrie Valdes for prostate cancer. (Seen for Ina Nicholas, RN Navigator for Prostate) 2019 he had a Radical Prostatectomy (RALRP) and bilateral pelvic lymph node dissection and bilateral nerve wrap per Dr. Harish Washington. Pathology showed [place] 3+4=7 prostate cancer with tertiary 5, perineural invasion and right bladder neck involvement, pT3a N0. After, he began to have leg pain, he said due to them \"clipping a nerve\". Then he developed severe pelvic pain and it was learned he had a huge abscess. This resulted in a 2 week hospital stay with drain and ATB. He feels he is better now other than knowing his PSA is rising, now 0.15. Per urology office, he has started [place]. Please see Dr. Aubrie Valdes notes for more details. He is scheduled for fiducial placement 10/3 possible . Chastity Brown is  (lost his spouse to sarcoidosis of the lung a little over 2 years ago). He has three children. A Son and Daughter live in [place] and a son here in [place], [place] who visits daily. He is close with other children also. He goes to his daughter's 2-3 times a week to [place] grandchildren to sport/school events as she is a Nurse who works nights at [employer]. 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 Very active gentleman.      Contact information provided and he knows he can call with any questions or

## 2019-09-25 ENCOUNTER — OFFICE VISIT (OUTPATIENT)
Dept: CARDIOLOGY CLINIC | Age: 69
End: 2019-09-25
Payer: MEDICARE

## 2019-09-25 VITALS
HEIGHT: 72 IN | SYSTOLIC BLOOD PRESSURE: 122 MMHG | DIASTOLIC BLOOD PRESSURE: 84 MMHG | BODY MASS INDEX: 31.99 KG/M2 | HEART RATE: 68 BPM | WEIGHT: 236.2 LBS

## 2019-09-25 DIAGNOSIS — I10 ESSENTIAL HYPERTENSION: ICD-10-CM

## 2019-09-25 DIAGNOSIS — I25.810 CORONARY ARTERY DISEASE INVOLVING CORONARY BYPASS GRAFT OF NATIVE HEART WITHOUT ANGINA PECTORIS: ICD-10-CM

## 2019-09-25 DIAGNOSIS — I48.0 PAROXYSMAL ATRIAL FIBRILLATION (HCC): Primary | ICD-10-CM

## 2019-09-25 PROCEDURE — G8598 ASA/ANTIPLAT THER USED: HCPCS | Performed by: NUCLEAR MEDICINE

## 2019-09-25 PROCEDURE — 4040F PNEUMOC VAC/ADMIN/RCVD: CPT | Performed by: NUCLEAR MEDICINE

## 2019-09-25 PROCEDURE — 3017F COLORECTAL CA SCREEN DOC REV: CPT | Performed by: NUCLEAR MEDICINE

## 2019-09-25 PROCEDURE — G8417 CALC BMI ABV UP PARAM F/U: HCPCS | Performed by: NUCLEAR MEDICINE

## 2019-09-25 PROCEDURE — G8427 DOCREV CUR MEDS BY ELIG CLIN: HCPCS | Performed by: NUCLEAR MEDICINE

## 2019-09-25 PROCEDURE — 1036F TOBACCO NON-USER: CPT | Performed by: NUCLEAR MEDICINE

## 2019-09-25 PROCEDURE — 99213 OFFICE O/P EST LOW 20 MIN: CPT | Performed by: NUCLEAR MEDICINE

## 2019-09-25 PROCEDURE — 1123F ACP DISCUSS/DSCN MKR DOCD: CPT | Performed by: NUCLEAR MEDICINE

## 2019-09-25 NOTE — PROGRESS NOTES
[facility],[address]  [address] [place] [address]  Dept: [phone]  Dept Fax: [phone]  Loc: [phone]    Visit Date: 9/25/2019    Farrah Zhao is a 81 y.o. male who presents todayfor:  Chief Complaint   Patient presents with    1 Year Follow Up    Coronary Artery Disease    Check-Up    Atrial Fibrillation    Hypertension     Going for radiation for prostate cancer   Known A fib and CABG   No chest pain   No changes in breathing  No chest pain   BP is stable          HPI:  HPI  Past Medical History:   Diagnosis Date    BPH (benign prostatic hyperplasia)     CAD (coronary artery disease)     CVA (cerebral vascular accident) ([facility] [address] 200    FH: heart disease     Hyperlipidemia     Hypertension     Obesity     Prostate cancer ([facility] [address] 2019      Past Surgical History:   Procedure Laterality Date    CARDIAC VALVE REPLACEMENT  April 2011    [address] [place], [place] (Pt unsure if part of CABG)    CARDIOVASCULAR STRESS TEST  1 05 2011    Cannot exclude slight inferobasal lateral stress-induced ischemia in a relatively small-sized area. EF 68%. Mildly abnormal nuclear scan. Lexiscan test associated with nonspecific symptoms. EKG nondiagnostic with nonspecific ST-T wave changes.  CAROTID ENDARTERECTOMY  2 08 2011    Right carotid endarterectomy with patch angioplasty with convention patch angioplasty.  COLONOSCOPY      CORONARY ARTERY BYPASS GRAFT      DIAGNOSTIC CARDIAC CATH LAB PROCEDURE  3 24 2011    LV end-diastolic pressure was 12 mmHg with no change before and after contrast injection. There was no significant gradient across the aortic valve to signify aortic stenosis. LV function was within normal limits, EF 55%. 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 Nonobstructive diffuse disease in a small sized RCA. Nomral LV function.  HERNIA REPAIR      Inguinal hernia repair. Known Allergies  Health Maintenance   Topic Date Due    DTaP/Tdap/Td vaccine (1 - Tdap) 08/03/1971    Shingles Vaccine (1 of 2) 08/03/2002    Colon cancer screen colonoscopy  08/03/2002    Pneumococcal 65+ years Vaccine (1 of 2 - PCV13) 08/03/2017    Annual Wellness Visit (AWV)  08/07/2019    A1C test (Diabetic or Prediabetic)  09/06/2019    Lipid screen  09/06/2019    Flu vaccine  Completed    Hepatitis C screen  Completed       Subjective:  Review of Systems  General:   No fever, no chills, No fatigue or weight loss  Pulmonary:    No dyspnea, no wheezing  Cardiac:    Denies recent chest pain,   GI:     No nausea or vomiting, no abdominal pain  Neuro:    No dizziness or light headedness,   Musculoskeletal:  No recent active issues  Extremities:   No edema, no obvious claudication       Objective:  Physical Exam  /84   Pulse 68   Ht 6' (1.829 m)   Wt 236 lb 3.2 oz (107.1 kg)   BMI 32.03 kg/m²   General:   Well developed, well nourished  Lungs:   Clear to auscultation  Heart:    Normal S1 S2, Slight murmur. no rubs, no gallops  Abdomen:   Soft, non tender, no organomegalies, positive bowel sounds  Extremities:   No edema, no cyanosis, good peripheral pulses  Neurological:   Awake, alert, oriented. No obvious focal deficits  Musculoskelatal:  No obvious deformities    Assessment:      Diagnosis Orders   1. Paroxysmal atrial fibrillation (HCC)     2. Essential hypertension     3. Coronary artery disease involving coronary bypass graft of native heart without angina pectoris     cardiac fair for now     Plan:  No follow-ups on file. As above  Clear for surgery   Continue risk factor modification and medical management  Thank you for allowing me to participate in the care of your patient. Please don't hesitate to contact me regarding any further issues related to the patient care    Orders Placed:  No orders of the defined types were placed in this encounter.       Medications Prescribed:  No orders of

## 2019-09-27 NOTE — CONSULTS
RADIATION ONCOLOGY NEW PATIENT EVALUATION    PATIENT: Maria Ines Mo OF BIRTH: 1950  CSN: [id number]    DATE OF CONSULTATION: 9/27/2019    PCP: Celena Simon MD  CONSULTANT: April Browning M.D. REASON FOR CONSULT:  Diagnosed with prostate cancer. Seen to discuss potential role of radiation in management of their disease. DX: Cancer Staging  Prostate cancer [facility])  Staging form: Prostate, AJCC 8th Edition  - Clinical stage from 1/30/2019: Stage IIC (cT2b, cN0, cM0, PSA: 8.3, Grade Group: 3) - Signed by Justen Ly MD on 9/27/2019  - Pathologic stage from 3/20/2019: Stage IIIB (ypT3a, pN0, cM0, PSA: 8.3, Grade Group: 3) - Signed by Justen Ly MD on 9/27/2019  Staging comments: Post-Op PSA:    5-3-2019    0.07   1st                                       8-  0.15   Most recent      CHIEF COMPLAINT:     They say my cancer is not gone    HISTORY OF PRESENT ILLNESS:     HPI: Mr. Marilee Guaman is a 81 y.o. [address] [facility] with a history of a modestly elevated PSA that remained stable for approximately 9 years. In September 2018 it had risen to 8.28 and he completed a sextant biopsy on 11/19/2018. At that time he was found with a [place] 6 adenocarcinoma involving 3 of the 12 biopsy samples. He continued under observation and completed a re-biopsy on January 30, 2019. At that time he was found with mixed grade adenocarcinoma involving 3 of the 6 sectors sampled. Maximum [place] was (4+3) 7, group 3. He went on to complete a robot-assisted laparoscopic prostatectomy on March 20, 2019 and was found with a grossly positive margin in the right bladder neck. Final pathology graded the tumor as a [place] (3+4)7 however with focal group 5 involvement. A sample was sent for decipher score evaluation and returned a value of 0.88, therefore high risk of progression. First postoperative PSA in May 2019 was 0.07. Most recent PSA on 8/27/2019 was 0.15.   He is seen to discuss the role of adjuvant radiation at this time. PAST MEDICAL/SURGICAL HISTORY:     Past Medical History:   Diagnosis Date    BPH (benign prostatic hyperplasia)     CAD (coronary artery disease)     CVA (cerebral vascular accident) ([facility] [address] 200    FH: heart disease     Hyperlipidemia     Hypertension     Obesity     Prostate cancer ([facility] [address] 2019     Past Surgical History:   Procedure Laterality Date    CARDIAC VALVE REPLACEMENT  April 2011    Shannen Simon (Pt unsure if part of CABG)    CARDIOVASCULAR STRESS TEST  1 05 2011    Cannot exclude slight inferobasal lateral stress-induced ischemia in a relatively small-sized area. EF 68%. Mildly abnormal nuclear scan. Lexiscan test associated with nonspecific symptoms. EKG nondiagnostic with nonspecific ST-T wave changes.  CAROTID ENDARTERECTOMY  2 08 2011    Right carotid endarterectomy with patch angioplasty with convention patch angioplasty.  COLONOSCOPY      CORONARY ARTERY BYPASS GRAFT      DIAGNOSTIC CARDIAC CATH LAB PROCEDURE  3 24 2011    LV end-diastolic pressure was 12 mmHg with no change before and after contrast injection. There was no significant gradient across the aortic valve to signify aortic stenosis. LV function was within normal limits, EF 55%. 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 Nonobstructive diffuse disease in a small sized RCA. Nomral LV function.  HERNIA REPAIR      Inguinal hernia repair.  PROSTATECTOMY N/A 3/20/2019    PROSTATECTOMY LAPAROSCOPIC ROBOTIC performed by Ricka Primrose, MD at [address] [place] [address] ECHOCARDIOGRAM  12 21 2010    Size was normal. Systolic function was normal. EF was estimated in the range of 55-65%. There were no regional wall motion abnormalities. Wall thickness was normal. Doppler - LV diastolic function parameters were normal. Mild MR. The aortic valve was trileaflet.  Leaflets exhibited mildly increased thickness, mild 3/20/2019: Stage IIIB (ypT3a, pN0, cM0, PSA: 8.3, Grade Group: 3)   Staging comments: Post-Op PSA:  5-3-2019    0.07   1st                                 8-  0.15   Most recent       ASSESSMENT:   ASSESSMENT AND PLAN  Mr. Felicity Morgan is a 81 y.o. man found with prostate cancer with his staging and history summarized above. I discussed his choices for an approach adjuvant treatment at this time versus waiting to meet salvage criteria within the context of the NCCN guidelines, AUA recommendations and LADY policy. We also discussed simple observation and management with androgen deprivation. He has indicated a clear understanding of his options and wishes to move forward with IGRT as the recommended adjuvant treatment. He has signed consent and will will be scheduled for fiducial marker placement prior to completing treatment planning for his image guided intensity modulated arc radiotherapy. Thank you for the opportunity to become involved with sharing in Mr. Lera Cabot care. SIGNATURES:  SIGNATURE:    Halley Christensen  CC:  Shelbi Fraire MD      Attestation ([id number]): Over 50 minutes was spent in consultation of which >50% of the time was spent in face-to-face discussion of his disease, clinical status and ongoing management.

## 2019-09-30 RX ORDER — METOPROLOL SUCCINATE 50 MG/1
25 TABLET, EXTENDED RELEASE ORAL DAILY
Qty: 45 TABLET | Refills: 1 | Status: SHIPPED | OUTPATIENT
Start: 2019-09-30 | End: 2019-09-30 | Stop reason: SDUPTHER

## 2019-09-30 RX ORDER — METOPROLOL SUCCINATE 50 MG/1
25 TABLET, EXTENDED RELEASE ORAL DAILY
Qty: 45 TABLET | Refills: 1 | Status: SHIPPED | OUTPATIENT
Start: 2019-09-30 | End: 2020-04-01

## 2019-10-03 ENCOUNTER — HOSPITAL ENCOUNTER (OUTPATIENT)
Dept: RADIATION ONCOLOGY | Age: 69
Discharge: HOME | End: 2019-10-03
Attending: RADIOLOGY
Payer: MEDICARE

## 2019-10-03 VITALS
WEIGHT: 237.88 LBS | OXYGEN SATURATION: 98 % | TEMPERATURE: 96.1 F | SYSTOLIC BLOOD PRESSURE: 135 MMHG | HEART RATE: 69 BPM | DIASTOLIC BLOOD PRESSURE: 87 MMHG | BODY MASS INDEX: 32.26 KG/M2 | RESPIRATION RATE: 18 BRPM

## 2019-10-03 DIAGNOSIS — C61 PROSTATE CANCER (HCC): Primary | ICD-10-CM

## 2019-10-03 PROCEDURE — 76942 ECHO GUIDE FOR BIOPSY: CPT | Performed by: RADIOLOGY

## 2019-10-03 PROCEDURE — 55876 PLACE RT DEVICE/MARKER PROS: CPT | Performed by: RADIOLOGY

## 2019-10-08 ENCOUNTER — NURSE ONLY (OUTPATIENT)
Dept: LAB | Age: 69
End: 2019-10-08

## 2019-10-08 DIAGNOSIS — C61 PROSTATE CA (HCC): ICD-10-CM

## 2019-10-08 LAB — PROSTATE SPECIFIC ANTIGEN: < 0.02 NG/ML (ref 0–1)

## 2019-10-09 ENCOUNTER — NURSE ONLY (OUTPATIENT)
Dept: LAB | Age: 69
End: 2019-10-09

## 2019-10-09 ENCOUNTER — NURSE ONLY (OUTPATIENT)
Dept: UROLOGY | Age: 69
End: 2019-10-09
Payer: MEDICARE

## 2019-10-09 DIAGNOSIS — C61 PROSTATE CA (HCC): Primary | ICD-10-CM

## 2019-10-09 DIAGNOSIS — C61 PROSTATE CANCER (HCC): ICD-10-CM

## 2019-10-09 LAB
CREAT SERPL-MCNC: 0.8 MG/DL (ref 0.4–1.2)
GFR SERPL CREATININE-BSD FRML MDRD: > 90 ML/MIN/1.73M2

## 2019-10-09 PROCEDURE — 96402 CHEMO HORMON ANTINEOPL SQ/IM: CPT | Performed by: NURSE PRACTITIONER

## 2019-10-10 ENCOUNTER — HOSPITAL ENCOUNTER (OUTPATIENT)
Dept: CT IMAGING | Age: 69
Discharge: HOME | End: 2019-10-10
Payer: MEDICARE

## 2019-10-10 ENCOUNTER — HOSPITAL ENCOUNTER (OUTPATIENT)
Dept: RADIATION ONCOLOGY | Age: 69
Discharge: HOME | End: 2019-10-10
Attending: RADIOLOGY
Payer: MEDICARE

## 2019-10-10 DIAGNOSIS — C61 MALIGNANT NEOPLASM OF PROSTATE (HCC): ICD-10-CM

## 2019-10-10 PROCEDURE — 77334 RADIATION TREATMENT AID(S): CPT | Performed by: RADIOLOGY

## 2019-10-10 PROCEDURE — 3209999900 CT GUIDE RADIATION THERAPY NO CHARGE

## 2019-10-22 ENCOUNTER — HOSPITAL ENCOUNTER (OUTPATIENT)
Dept: RADIATION ONCOLOGY | Age: 69
End: 2019-10-22
Attending: RADIOLOGY
Payer: MEDICARE

## 2019-10-22 DIAGNOSIS — C61 PROSTATE CANCER (HCC): Primary | ICD-10-CM

## 2019-10-22 PROCEDURE — 77301 RADIOTHERAPY DOSE PLAN IMRT: CPT | Performed by: RADIOLOGY

## 2019-10-22 PROCEDURE — 77300 RADIATION THERAPY DOSE PLAN: CPT | Performed by: RADIOLOGY

## 2019-10-23 ENCOUNTER — OFFICE VISIT (OUTPATIENT)
Dept: FAMILY MEDICINE CLINIC | Age: 69
End: 2019-10-23
Payer: MEDICARE

## 2019-10-23 ENCOUNTER — HOSPITAL ENCOUNTER (OUTPATIENT)
Dept: RADIATION ONCOLOGY | Age: 69
Discharge: HOME | End: 2019-10-23
Attending: RADIOLOGY
Payer: MEDICARE

## 2019-10-23 ENCOUNTER — NURSE ONLY (OUTPATIENT)
Dept: LAB | Age: 69
End: 2019-10-23

## 2019-10-23 VITALS
TEMPERATURE: 97.5 F | SYSTOLIC BLOOD PRESSURE: 116 MMHG | BODY MASS INDEX: 33.44 KG/M2 | OXYGEN SATURATION: 98 % | HEART RATE: 80 BPM | RESPIRATION RATE: 16 BRPM | WEIGHT: 246.6 LBS | DIASTOLIC BLOOD PRESSURE: 74 MMHG

## 2019-10-23 DIAGNOSIS — H69.83 DYSFUNCTION OF BOTH EUSTACHIAN TUBES: Primary | ICD-10-CM

## 2019-10-23 DIAGNOSIS — C61 PROSTATE CANCER (HCC): ICD-10-CM

## 2019-10-23 LAB — PROSTATE SPECIFIC ANTIGEN: < 0.02 NG/ML (ref 0–1)

## 2019-10-23 PROCEDURE — 99213 OFFICE O/P EST LOW 20 MIN: CPT | Performed by: NURSE PRACTITIONER

## 2019-10-23 PROCEDURE — 77385 HC NTSTY MODUL RAD TX DLVR SMPL: CPT | Performed by: RADIOLOGY

## 2019-10-23 PROCEDURE — 3017F COLORECTAL CA SCREEN DOC REV: CPT | Performed by: NURSE PRACTITIONER

## 2019-10-23 PROCEDURE — G8598 ASA/ANTIPLAT THER USED: HCPCS | Performed by: NURSE PRACTITIONER

## 2019-10-23 PROCEDURE — G8482 FLU IMMUNIZE ORDER/ADMIN: HCPCS | Performed by: NURSE PRACTITIONER

## 2019-10-23 PROCEDURE — 1123F ACP DISCUSS/DSCN MKR DOCD: CPT | Performed by: NURSE PRACTITIONER

## 2019-10-23 PROCEDURE — 4040F PNEUMOC VAC/ADMIN/RCVD: CPT | Performed by: NURSE PRACTITIONER

## 2019-10-23 PROCEDURE — 1036F TOBACCO NON-USER: CPT | Performed by: NURSE PRACTITIONER

## 2019-10-23 PROCEDURE — 77338 DESIGN MLC DEVICE FOR IMRT: CPT | Performed by: RADIOLOGY

## 2019-10-23 PROCEDURE — G8417 CALC BMI ABV UP PARAM F/U: HCPCS | Performed by: NURSE PRACTITIONER

## 2019-10-23 PROCEDURE — G8427 DOCREV CUR MEDS BY ELIG CLIN: HCPCS | Performed by: NURSE PRACTITIONER

## 2019-10-23 RX ORDER — CETIRIZINE HYDROCHLORIDE, PSEUDOEPHEDRINE HYDROCHLORIDE 5; 120 MG/1; MG/1
1 TABLET, FILM COATED, EXTENDED RELEASE ORAL DAILY
Qty: 30 TABLET | Refills: 0 | Status: SHIPPED | OUTPATIENT
Start: 2019-10-23 | End: 2019-11-22

## 2019-10-23 ASSESSMENT — ENCOUNTER SYMPTOMS
SORE THROAT: 0
EYE DISCHARGE: 0
ABDOMINAL PAIN: 0
EYE PAIN: 0
RHINORRHEA: 0
DIARRHEA: 0
CONSTIPATION: 0
BACK PAIN: 0
ALLERGIC/IMMUNOLOGIC NEGATIVE: 1
NAUSEA: 0
TROUBLE SWALLOWING: 0
COUGH: 0
VOMITING: 0
WHEEZING: 0
EYE REDNESS: 0
SHORTNESS OF BREATH: 0

## 2019-10-24 ENCOUNTER — HOSPITAL ENCOUNTER (OUTPATIENT)
Dept: RADIATION ONCOLOGY | Age: 69
Discharge: HOME | End: 2019-10-24
Attending: RADIOLOGY
Payer: MEDICARE

## 2019-10-24 PROCEDURE — 77385 HC NTSTY MODUL RAD TX DLVR SMPL: CPT | Performed by: RADIOLOGY

## 2019-10-25 ENCOUNTER — HOSPITAL ENCOUNTER (OUTPATIENT)
Dept: RADIATION ONCOLOGY | Age: 69
Discharge: HOME | End: 2019-10-25
Attending: RADIOLOGY
Payer: MEDICARE

## 2019-10-25 PROCEDURE — 77385 HC NTSTY MODUL RAD TX DLVR SMPL: CPT | Performed by: RADIOLOGY

## 2019-10-28 ENCOUNTER — HOSPITAL ENCOUNTER (OUTPATIENT)
Dept: RADIATION ONCOLOGY | Age: 69
Discharge: HOME | End: 2019-10-28
Attending: RADIOLOGY
Payer: MEDICARE

## 2019-10-28 PROCEDURE — 77385 HC NTSTY MODUL RAD TX DLVR SMPL: CPT | Performed by: RADIOLOGY

## 2019-10-28 PROCEDURE — 77336 RADIATION PHYSICS CONSULT: CPT | Performed by: RADIOLOGY

## 2019-10-29 ENCOUNTER — HOSPITAL ENCOUNTER (OUTPATIENT)
Dept: RADIATION ONCOLOGY | Age: 69
Discharge: HOME | End: 2019-10-29
Attending: RADIOLOGY
Payer: MEDICARE

## 2019-10-29 PROCEDURE — 77385 HC NTSTY MODUL RAD TX DLVR SMPL: CPT | Performed by: RADIOLOGY

## 2019-10-30 ENCOUNTER — CLINICAL DOCUMENTATION (OUTPATIENT)
Dept: NUTRITION | Age: 69
End: 2019-10-30

## 2019-10-30 ENCOUNTER — HOSPITAL ENCOUNTER (OUTPATIENT)
Dept: RADIATION ONCOLOGY | Age: 69
Discharge: HOME | End: 2019-10-30
Attending: RADIOLOGY
Payer: MEDICARE

## 2019-10-30 PROCEDURE — 77385 HC NTSTY MODUL RAD TX DLVR SMPL: CPT | Performed by: RADIOLOGY

## 2019-10-31 ENCOUNTER — HOSPITAL ENCOUNTER (OUTPATIENT)
Dept: RADIATION ONCOLOGY | Age: 69
Discharge: HOME | End: 2019-10-31
Attending: RADIOLOGY
Payer: MEDICARE

## 2019-10-31 ENCOUNTER — HOSPITAL ENCOUNTER (OUTPATIENT)
Dept: PHYSICAL THERAPY | Age: 69
Setting detail: THERAPIES SERIES
Discharge: HOME | End: 2019-10-31

## 2019-10-31 PROCEDURE — 77385 HC NTSTY MODUL RAD TX DLVR SMPL: CPT | Performed by: RADIOLOGY

## 2019-10-31 PROCEDURE — 9990000039 HC MT MASSAGE PER 15MIN

## 2019-11-01 ENCOUNTER — HOSPITAL ENCOUNTER (OUTPATIENT)
Dept: RADIATION ONCOLOGY | Age: 69
Discharge: HOME | End: 2019-11-01
Attending: RADIOLOGY
Payer: MEDICARE

## 2019-11-01 PROCEDURE — 77385 HC NTSTY MODUL RAD TX DLVR SMPL: CPT | Performed by: RADIOLOGY

## 2019-11-01 PROCEDURE — 77014 PR CT GUIDANCE PLACEMENT RAD THERAPY FIELDS: CPT | Performed by: RADIOLOGY

## 2019-11-04 ENCOUNTER — HOSPITAL ENCOUNTER (OUTPATIENT)
Dept: RADIATION ONCOLOGY | Age: 69
Discharge: HOME | End: 2019-11-04
Attending: RADIOLOGY
Payer: MEDICARE

## 2019-11-04 PROCEDURE — 77014 PR CT GUIDANCE PLACEMENT RAD THERAPY FIELDS: CPT | Performed by: RADIOLOGY

## 2019-11-04 PROCEDURE — 77336 RADIATION PHYSICS CONSULT: CPT | Performed by: RADIOLOGY

## 2019-11-04 PROCEDURE — 77385 HC NTSTY MODUL RAD TX DLVR SMPL: CPT | Performed by: RADIOLOGY

## 2019-11-05 ENCOUNTER — HOSPITAL ENCOUNTER (OUTPATIENT)
Dept: RADIATION ONCOLOGY | Age: 69
Discharge: HOME | End: 2019-11-05
Attending: RADIOLOGY
Payer: MEDICARE

## 2019-11-05 PROCEDURE — 77014 PR CT GUIDANCE PLACEMENT RAD THERAPY FIELDS: CPT | Performed by: RADIOLOGY

## 2019-11-05 PROCEDURE — 77385 HC NTSTY MODUL RAD TX DLVR SMPL: CPT | Performed by: RADIOLOGY

## 2019-11-06 ENCOUNTER — HOSPITAL ENCOUNTER (OUTPATIENT)
Dept: RADIATION ONCOLOGY | Age: 69
Discharge: HOME | End: 2019-11-06
Attending: RADIOLOGY
Payer: MEDICARE

## 2019-11-06 ENCOUNTER — CLINICAL DOCUMENTATION (OUTPATIENT)
Dept: NUTRITION | Age: 69
End: 2019-11-06

## 2019-11-06 PROCEDURE — 77385 HC NTSTY MODUL RAD TX DLVR SMPL: CPT | Performed by: RADIOLOGY

## 2019-11-06 PROCEDURE — 77014 PR CT GUIDANCE PLACEMENT RAD THERAPY FIELDS: CPT | Performed by: RADIOLOGY

## 2019-11-07 ENCOUNTER — HOSPITAL ENCOUNTER (OUTPATIENT)
Dept: PHYSICAL THERAPY | Age: 69
Setting detail: THERAPIES SERIES
Discharge: HOME | End: 2019-11-07

## 2019-11-07 ENCOUNTER — HOSPITAL ENCOUNTER (OUTPATIENT)
Dept: RADIATION ONCOLOGY | Age: 69
Discharge: HOME | End: 2019-11-07
Attending: RADIOLOGY
Payer: MEDICARE

## 2019-11-07 VITALS
HEART RATE: 74 BPM | OXYGEN SATURATION: 97 % | SYSTOLIC BLOOD PRESSURE: 123 MMHG | WEIGHT: 239.2 LBS | RESPIRATION RATE: 16 BRPM | DIASTOLIC BLOOD PRESSURE: 82 MMHG | BODY MASS INDEX: 32.44 KG/M2 | TEMPERATURE: 95.5 F

## 2019-11-07 PROCEDURE — 77385 HC NTSTY MODUL RAD TX DLVR SMPL: CPT | Performed by: RADIOLOGY

## 2019-11-07 PROCEDURE — 9990000039 HC MT MASSAGE PER 15MIN

## 2019-11-07 PROCEDURE — 77014 PR CT GUIDANCE PLACEMENT RAD THERAPY FIELDS: CPT | Performed by: RADIOLOGY

## 2019-11-08 ENCOUNTER — HOSPITAL ENCOUNTER (OUTPATIENT)
Dept: RADIATION ONCOLOGY | Age: 69
Discharge: HOME | End: 2019-11-08
Attending: RADIOLOGY
Payer: MEDICARE

## 2019-11-08 ENCOUNTER — NURSE ONLY (OUTPATIENT)
Dept: LAB | Age: 69
End: 2019-11-08

## 2019-11-08 DIAGNOSIS — C61 PROSTATE CA (HCC): ICD-10-CM

## 2019-11-08 PROCEDURE — 77385 HC NTSTY MODUL RAD TX DLVR SMPL: CPT | Performed by: RADIOLOGY

## 2019-11-08 PROCEDURE — 77014 PR CT GUIDANCE PLACEMENT RAD THERAPY FIELDS: CPT | Performed by: RADIOLOGY

## 2019-11-10 LAB — TESTOSTERONE TOTAL: < 3 NG/DL (ref 300–720)

## 2019-11-11 ENCOUNTER — HOSPITAL ENCOUNTER (OUTPATIENT)
Dept: RADIATION ONCOLOGY | Age: 69
Discharge: HOME | End: 2019-11-11
Attending: RADIOLOGY
Payer: MEDICARE

## 2019-11-11 PROCEDURE — 77014 PR CT GUIDANCE PLACEMENT RAD THERAPY FIELDS: CPT | Performed by: RADIOLOGY

## 2019-11-11 PROCEDURE — 77336 RADIATION PHYSICS CONSULT: CPT | Performed by: RADIOLOGY

## 2019-11-11 PROCEDURE — 77385 HC NTSTY MODUL RAD TX DLVR SMPL: CPT | Performed by: RADIOLOGY

## 2019-11-12 ENCOUNTER — HOSPITAL ENCOUNTER (OUTPATIENT)
Dept: RADIATION ONCOLOGY | Age: 69
Discharge: HOME | End: 2019-11-12
Attending: RADIOLOGY
Payer: MEDICARE

## 2019-11-12 PROCEDURE — 77385 HC NTSTY MODUL RAD TX DLVR SMPL: CPT | Performed by: RADIOLOGY

## 2019-11-12 PROCEDURE — 77014 PR CT GUIDANCE PLACEMENT RAD THERAPY FIELDS: CPT | Performed by: RADIOLOGY

## 2019-11-13 ENCOUNTER — HOSPITAL ENCOUNTER (OUTPATIENT)
Dept: RADIATION ONCOLOGY | Age: 69
Discharge: HOME | End: 2019-11-13
Attending: RADIOLOGY
Payer: MEDICARE

## 2019-11-13 ENCOUNTER — OFFICE VISIT (OUTPATIENT)
Dept: UROLOGY | Age: 69
End: 2019-11-13
Payer: MEDICARE

## 2019-11-13 VITALS
WEIGHT: 245.9 LBS | HEIGHT: 72 IN | BODY MASS INDEX: 33.31 KG/M2 | DIASTOLIC BLOOD PRESSURE: 68 MMHG | SYSTOLIC BLOOD PRESSURE: 122 MMHG

## 2019-11-13 DIAGNOSIS — M81.8 IDIOPATHIC OSTEOPOROSIS: Primary | ICD-10-CM

## 2019-11-13 DIAGNOSIS — C61 PROSTATE CA (HCC): ICD-10-CM

## 2019-11-13 LAB
BILIRUBIN URINE: NEGATIVE
BLOOD URINE, POC: NEGATIVE
CHARACTER, URINE: CLEAR
COLOR, URINE: YELLOW
GLUCOSE URINE: NEGATIVE MG/DL
KETONES, URINE: NEGATIVE
LEUKOCYTE CLUMPS, URINE: NEGATIVE
NITRITE, URINE: NEGATIVE
PH, URINE: 5.5 (ref 5–9)
PROTEIN, URINE: NEGATIVE MG/DL
SPECIFIC GRAVITY, URINE: >= 1.03 (ref 1–1.03)
UROBILINOGEN, URINE: 0.2 EU/DL (ref 0–1)

## 2019-11-13 PROCEDURE — 99214 OFFICE O/P EST MOD 30 MIN: CPT | Performed by: NURSE PRACTITIONER

## 2019-11-13 PROCEDURE — G8417 CALC BMI ABV UP PARAM F/U: HCPCS | Performed by: NURSE PRACTITIONER

## 2019-11-13 PROCEDURE — 96401 CHEMO ANTI-NEOPL SQ/IM: CPT | Performed by: NURSE PRACTITIONER

## 2019-11-13 PROCEDURE — G8598 ASA/ANTIPLAT THER USED: HCPCS | Performed by: NURSE PRACTITIONER

## 2019-11-13 PROCEDURE — 4040F PNEUMOC VAC/ADMIN/RCVD: CPT | Performed by: NURSE PRACTITIONER

## 2019-11-13 PROCEDURE — G8427 DOCREV CUR MEDS BY ELIG CLIN: HCPCS | Performed by: NURSE PRACTITIONER

## 2019-11-13 PROCEDURE — 3017F COLORECTAL CA SCREEN DOC REV: CPT | Performed by: NURSE PRACTITIONER

## 2019-11-13 PROCEDURE — 96402 CHEMO HORMON ANTINEOPL SQ/IM: CPT | Performed by: NURSE PRACTITIONER

## 2019-11-13 PROCEDURE — 77385 HC NTSTY MODUL RAD TX DLVR SMPL: CPT | Performed by: RADIOLOGY

## 2019-11-13 PROCEDURE — G8482 FLU IMMUNIZE ORDER/ADMIN: HCPCS | Performed by: NURSE PRACTITIONER

## 2019-11-13 PROCEDURE — 77014 PR CT GUIDANCE PLACEMENT RAD THERAPY FIELDS: CPT | Performed by: RADIOLOGY

## 2019-11-13 PROCEDURE — 1123F ACP DISCUSS/DSCN MKR DOCD: CPT | Performed by: NURSE PRACTITIONER

## 2019-11-13 PROCEDURE — 1036F TOBACCO NON-USER: CPT | Performed by: NURSE PRACTITIONER

## 2019-11-13 RX ORDER — LOPERAMIDE HYDROCHLORIDE 2 MG/1
2 CAPSULE ORAL 4 TIMES DAILY PRN
COMMUNITY
End: 2020-05-13

## 2019-11-14 ENCOUNTER — HOSPITAL ENCOUNTER (OUTPATIENT)
Dept: RADIATION ONCOLOGY | Age: 69
Discharge: HOME | End: 2019-11-14
Attending: RADIOLOGY
Payer: MEDICARE

## 2019-11-14 PROCEDURE — 77014 PR CT GUIDANCE PLACEMENT RAD THERAPY FIELDS: CPT | Performed by: RADIOLOGY

## 2019-11-14 PROCEDURE — 77385 HC NTSTY MODUL RAD TX DLVR SMPL: CPT | Performed by: RADIOLOGY

## 2019-11-14 PROCEDURE — 77427 RADIATION TX MANAGEMENT X5: CPT | Performed by: RADIOLOGY

## 2019-11-15 ENCOUNTER — HOSPITAL ENCOUNTER (OUTPATIENT)
Dept: RADIATION ONCOLOGY | Age: 69
Discharge: HOME | End: 2019-11-15
Attending: RADIOLOGY
Payer: MEDICARE

## 2019-11-15 PROCEDURE — 77014 PR CT GUIDANCE PLACEMENT RAD THERAPY FIELDS: CPT | Performed by: RADIOLOGY

## 2019-11-15 PROCEDURE — 77385 HC NTSTY MODUL RAD TX DLVR SMPL: CPT | Performed by: RADIOLOGY

## 2019-11-18 ENCOUNTER — HOSPITAL ENCOUNTER (OUTPATIENT)
Dept: RADIATION ONCOLOGY | Age: 69
Discharge: HOME | End: 2019-11-18
Attending: RADIOLOGY
Payer: MEDICARE

## 2019-11-18 PROCEDURE — 77336 RADIATION PHYSICS CONSULT: CPT | Performed by: RADIOLOGY

## 2019-11-18 PROCEDURE — 77385 HC NTSTY MODUL RAD TX DLVR SMPL: CPT | Performed by: RADIOLOGY

## 2019-11-19 ENCOUNTER — HOSPITAL ENCOUNTER (OUTPATIENT)
Dept: RADIATION ONCOLOGY | Age: 69
Discharge: HOME | End: 2019-11-19
Attending: RADIOLOGY
Payer: MEDICARE

## 2019-11-19 PROCEDURE — 77385 HC NTSTY MODUL RAD TX DLVR SMPL: CPT | Performed by: RADIOLOGY

## 2019-11-19 PROCEDURE — 99999 PR OFFICE/OUTPT VISIT,PROCEDURE ONLY: CPT | Performed by: RADIOLOGY

## 2019-11-20 ENCOUNTER — CLINICAL DOCUMENTATION (OUTPATIENT)
Dept: NUTRITION | Age: 69
End: 2019-11-20

## 2019-11-20 ENCOUNTER — HOSPITAL ENCOUNTER (OUTPATIENT)
Dept: RADIATION ONCOLOGY | Age: 69
Discharge: HOME | End: 2019-11-20
Attending: RADIOLOGY
Payer: MEDICARE

## 2019-11-20 PROCEDURE — 77385 HC NTSTY MODUL RAD TX DLVR SMPL: CPT | Performed by: RADIOLOGY

## 2019-11-20 PROCEDURE — 77014 PR CT GUIDANCE PLACEMENT RAD THERAPY FIELDS: CPT | Performed by: RADIOLOGY

## 2019-11-21 ENCOUNTER — HOSPITAL ENCOUNTER (OUTPATIENT)
Dept: RADIATION ONCOLOGY | Age: 69
Discharge: HOME | End: 2019-11-21
Attending: RADIOLOGY
Payer: MEDICARE

## 2019-11-21 PROCEDURE — 77385 HC NTSTY MODUL RAD TX DLVR SMPL: CPT | Performed by: RADIOLOGY

## 2019-11-21 PROCEDURE — 77014 PR CT GUIDANCE PLACEMENT RAD THERAPY FIELDS: CPT | Performed by: RADIOLOGY

## 2019-11-22 ENCOUNTER — HOSPITAL ENCOUNTER (OUTPATIENT)
Dept: PHYSICAL THERAPY | Age: 69
Setting detail: THERAPIES SERIES
Discharge: HOME | End: 2019-11-22

## 2019-11-22 ENCOUNTER — HOSPITAL ENCOUNTER (OUTPATIENT)
Dept: RADIATION ONCOLOGY | Age: 69
Discharge: HOME | End: 2019-11-22
Attending: RADIOLOGY
Payer: MEDICARE

## 2019-11-22 PROCEDURE — 99999 PR OFFICE/OUTPT VISIT,PROCEDURE ONLY: CPT | Performed by: RADIOLOGY

## 2019-11-22 PROCEDURE — 77385 HC NTSTY MODUL RAD TX DLVR SMPL: CPT | Performed by: RADIOLOGY

## 2019-11-22 PROCEDURE — 9990000039 HC MT MASSAGE PER 15MIN

## 2019-11-24 ENCOUNTER — HOSPITAL ENCOUNTER (OUTPATIENT)
Dept: RADIATION ONCOLOGY | Age: 69
Discharge: HOME | End: 2019-11-24
Attending: RADIOLOGY
Payer: MEDICARE

## 2019-11-24 PROCEDURE — 77014 PR CT GUIDANCE PLACEMENT RAD THERAPY FIELDS: CPT | Performed by: RADIOLOGY

## 2019-11-24 PROCEDURE — 77385 HC NTSTY MODUL RAD TX DLVR SMPL: CPT | Performed by: RADIOLOGY

## 2019-11-25 ENCOUNTER — HOSPITAL ENCOUNTER (OUTPATIENT)
Dept: RADIATION ONCOLOGY | Age: 69
Discharge: HOME | End: 2019-11-25
Attending: RADIOLOGY
Payer: MEDICARE

## 2019-11-25 PROCEDURE — 77014 PR CT GUIDANCE PLACEMENT RAD THERAPY FIELDS: CPT | Performed by: RADIOLOGY

## 2019-11-25 PROCEDURE — 77385 HC NTSTY MODUL RAD TX DLVR SMPL: CPT | Performed by: RADIOLOGY

## 2019-11-25 PROCEDURE — 77300 RADIATION THERAPY DOSE PLAN: CPT | Performed by: RADIOLOGY

## 2019-11-26 ENCOUNTER — HOSPITAL ENCOUNTER (OUTPATIENT)
Dept: RADIATION ONCOLOGY | Age: 69
Discharge: HOME | End: 2019-11-26
Attending: RADIOLOGY
Payer: MEDICARE

## 2019-11-26 PROCEDURE — 77014 PR CT GUIDANCE PLACEMENT RAD THERAPY FIELDS: CPT | Performed by: RADIOLOGY

## 2019-11-26 PROCEDURE — 77385 HC NTSTY MODUL RAD TX DLVR SMPL: CPT | Performed by: RADIOLOGY

## 2019-11-27 ENCOUNTER — HOSPITAL ENCOUNTER (OUTPATIENT)
Dept: RADIATION ONCOLOGY | Age: 69
Discharge: HOME | End: 2019-11-27
Attending: RADIOLOGY
Payer: MEDICARE

## 2019-11-27 PROCEDURE — 77385 HC NTSTY MODUL RAD TX DLVR SMPL: CPT | Performed by: RADIOLOGY

## 2019-11-27 PROCEDURE — 77338 DESIGN MLC DEVICE FOR IMRT: CPT | Performed by: RADIOLOGY

## 2019-11-27 PROCEDURE — 77014 PR CT GUIDANCE PLACEMENT RAD THERAPY FIELDS: CPT | Performed by: RADIOLOGY

## 2019-12-02 ENCOUNTER — HOSPITAL ENCOUNTER (OUTPATIENT)
Dept: RADIATION ONCOLOGY | Age: 69
Discharge: HOME | End: 2019-12-02
Attending: RADIOLOGY
Payer: MEDICARE

## 2019-12-02 PROCEDURE — 77385 HC NTSTY MODUL RAD TX DLVR SMPL: CPT | Performed by: RADIOLOGY

## 2019-12-02 PROCEDURE — 77336 RADIATION PHYSICS CONSULT: CPT | Performed by: RADIOLOGY

## 2019-12-02 PROCEDURE — 77014 PR CT GUIDANCE PLACEMENT RAD THERAPY FIELDS: CPT | Performed by: RADIOLOGY

## 2019-12-03 ENCOUNTER — HOSPITAL ENCOUNTER (OUTPATIENT)
Dept: RADIATION ONCOLOGY | Age: 69
Discharge: HOME | End: 2019-12-03
Attending: RADIOLOGY
Payer: MEDICARE

## 2019-12-03 PROCEDURE — 77014 PR CT GUIDANCE PLACEMENT RAD THERAPY FIELDS: CPT | Performed by: RADIOLOGY

## 2019-12-03 PROCEDURE — 77385 HC NTSTY MODUL RAD TX DLVR SMPL: CPT | Performed by: RADIOLOGY

## 2019-12-04 ENCOUNTER — HOSPITAL ENCOUNTER (OUTPATIENT)
Dept: RADIATION ONCOLOGY | Age: 69
Discharge: HOME | End: 2019-12-04
Attending: RADIOLOGY
Payer: MEDICARE

## 2019-12-04 PROCEDURE — 77014 PR CT GUIDANCE PLACEMENT RAD THERAPY FIELDS: CPT | Performed by: RADIOLOGY

## 2019-12-04 PROCEDURE — 77385 HC NTSTY MODUL RAD TX DLVR SMPL: CPT | Performed by: RADIOLOGY

## 2019-12-05 ENCOUNTER — HOSPITAL ENCOUNTER (OUTPATIENT)
Dept: RADIATION ONCOLOGY | Age: 69
Discharge: HOME | End: 2019-12-05
Attending: RADIOLOGY
Payer: MEDICARE

## 2019-12-05 PROCEDURE — 77385 HC NTSTY MODUL RAD TX DLVR SMPL: CPT | Performed by: RADIOLOGY

## 2019-12-05 PROCEDURE — 99999 PR OFFICE/OUTPT VISIT,PROCEDURE ONLY: CPT | Performed by: RADIOLOGY

## 2019-12-06 ENCOUNTER — HOSPITAL ENCOUNTER (OUTPATIENT)
Dept: RADIATION ONCOLOGY | Age: 69
Discharge: HOME | End: 2019-12-06
Attending: RADIOLOGY
Payer: MEDICARE

## 2019-12-06 PROCEDURE — 77385 HC NTSTY MODUL RAD TX DLVR SMPL: CPT | Performed by: RADIOLOGY

## 2019-12-06 PROCEDURE — 77014 PR CT GUIDANCE PLACEMENT RAD THERAPY FIELDS: CPT | Performed by: RADIOLOGY

## 2019-12-09 ENCOUNTER — HOSPITAL ENCOUNTER (OUTPATIENT)
Dept: RADIATION ONCOLOGY | Age: 69
Discharge: HOME | End: 2019-12-09
Attending: RADIOLOGY
Payer: MEDICARE

## 2019-12-09 PROCEDURE — 77385 HC NTSTY MODUL RAD TX DLVR SMPL: CPT | Performed by: RADIOLOGY

## 2019-12-09 PROCEDURE — 99999 PR OFFICE/OUTPT VISIT,PROCEDURE ONLY: CPT | Performed by: RADIOLOGY

## 2019-12-09 PROCEDURE — 77336 RADIATION PHYSICS CONSULT: CPT | Performed by: RADIOLOGY

## 2019-12-09 RX ORDER — ATORVASTATIN CALCIUM 20 MG/1
TABLET, FILM COATED ORAL
Qty: 90 TABLET | Refills: 4 | Status: SHIPPED | OUTPATIENT
Start: 2019-12-09 | End: 2020-12-30

## 2019-12-10 ENCOUNTER — HOSPITAL ENCOUNTER (OUTPATIENT)
Dept: RADIATION ONCOLOGY | Age: 69
Discharge: HOME | End: 2019-12-10
Attending: RADIOLOGY
Payer: MEDICARE

## 2019-12-10 PROCEDURE — 77385 HC NTSTY MODUL RAD TX DLVR SMPL: CPT | Performed by: RADIOLOGY

## 2019-12-10 PROCEDURE — 99999 PR OFFICE/OUTPT VISIT,PROCEDURE ONLY: CPT | Performed by: RADIOLOGY

## 2019-12-11 ENCOUNTER — HOSPITAL ENCOUNTER (OUTPATIENT)
Dept: RADIATION ONCOLOGY | Age: 69
Discharge: HOME | End: 2019-12-11
Attending: RADIOLOGY
Payer: MEDICARE

## 2019-12-11 PROCEDURE — 77014 PR CT GUIDANCE PLACEMENT RAD THERAPY FIELDS: CPT | Performed by: RADIOLOGY

## 2019-12-11 PROCEDURE — 77385 HC NTSTY MODUL RAD TX DLVR SMPL: CPT | Performed by: RADIOLOGY

## 2019-12-12 ENCOUNTER — HOSPITAL ENCOUNTER (OUTPATIENT)
Dept: RADIATION ONCOLOGY | Age: 69
Discharge: HOME | End: 2019-12-12
Attending: RADIOLOGY
Payer: MEDICARE

## 2019-12-12 PROCEDURE — 77427 RADIATION TX MANAGEMENT X5: CPT | Performed by: RADIOLOGY

## 2019-12-12 PROCEDURE — 77014 PR CT GUIDANCE PLACEMENT RAD THERAPY FIELDS: CPT | Performed by: RADIOLOGY

## 2019-12-12 PROCEDURE — 77385 HC NTSTY MODUL RAD TX DLVR SMPL: CPT | Performed by: RADIOLOGY

## 2019-12-13 ENCOUNTER — HOSPITAL ENCOUNTER (OUTPATIENT)
Dept: RADIATION ONCOLOGY | Age: 69
Discharge: HOME | End: 2019-12-13
Attending: RADIOLOGY
Payer: MEDICARE

## 2019-12-13 PROCEDURE — 77014 PR CT GUIDANCE PLACEMENT RAD THERAPY FIELDS: CPT | Performed by: RADIOLOGY

## 2019-12-13 PROCEDURE — 77385 HC NTSTY MODUL RAD TX DLVR SMPL: CPT | Performed by: RADIOLOGY

## 2019-12-16 ENCOUNTER — HOSPITAL ENCOUNTER (OUTPATIENT)
Dept: RADIATION ONCOLOGY | Age: 69
Discharge: HOME | End: 2019-12-16
Attending: RADIOLOGY
Payer: MEDICARE

## 2019-12-16 PROCEDURE — 77336 RADIATION PHYSICS CONSULT: CPT | Performed by: RADIOLOGY

## 2019-12-16 PROCEDURE — 77385 HC NTSTY MODUL RAD TX DLVR SMPL: CPT | Performed by: RADIOLOGY

## 2019-12-16 PROCEDURE — 77014 PR CT GUIDANCE PLACEMENT RAD THERAPY FIELDS: CPT | Performed by: RADIOLOGY

## 2019-12-16 RX ORDER — FAMOTIDINE 20 MG/1
TABLET, FILM COATED ORAL
Qty: 180 TABLET | Refills: 1 | Status: SHIPPED | OUTPATIENT
Start: 2019-12-16 | End: 2020-06-25 | Stop reason: SDUPTHER

## 2019-12-17 ENCOUNTER — APPOINTMENT (OUTPATIENT)
Dept: RADIATION ONCOLOGY | Age: 69
End: 2019-12-17
Attending: RADIOLOGY
Payer: MEDICARE

## 2019-12-18 ENCOUNTER — TELEPHONE (OUTPATIENT)
Dept: FAMILY MEDICINE CLINIC | Age: 69
End: 2019-12-18

## 2020-01-31 ENCOUNTER — HOSPITAL ENCOUNTER (OUTPATIENT)
Age: 70
Discharge: HOME | End: 2020-01-31
Payer: MEDICARE

## 2020-01-31 ENCOUNTER — HOSPITAL ENCOUNTER (OUTPATIENT)
Dept: RADIATION ONCOLOGY | Age: 70
Discharge: HOME | End: 2020-01-31
Attending: RADIOLOGY
Payer: MEDICARE

## 2020-01-31 VITALS
DIASTOLIC BLOOD PRESSURE: 67 MMHG | RESPIRATION RATE: 16 BRPM | SYSTOLIC BLOOD PRESSURE: 129 MMHG | BODY MASS INDEX: 33.7 KG/M2 | WEIGHT: 250.22 LBS | TEMPERATURE: 95.9 F | OXYGEN SATURATION: 96 % | HEART RATE: 72 BPM

## 2020-01-31 LAB — PROSTATE SPECIFIC ANTIGEN: < 0.02 NG/ML (ref 0–1)

## 2020-01-31 PROCEDURE — 99212 OFFICE O/P EST SF 10 MIN: CPT | Performed by: NURSE PRACTITIONER

## 2020-01-31 PROCEDURE — 84153 ASSAY OF PSA TOTAL: CPT

## 2020-01-31 PROCEDURE — 36415 COLL VENOUS BLD VENIPUNCTURE: CPT

## 2020-01-31 RX ORDER — TAMSULOSIN HYDROCHLORIDE 0.4 MG/1
0.4 CAPSULE ORAL DAILY
Qty: 30 CAPSULE | Refills: 5 | Status: SHIPPED | OUTPATIENT
Start: 2020-01-31 | End: 2020-06-09 | Stop reason: ALTCHOICE

## 2020-01-31 NOTE — PROGRESS NOTES
[facility] [address], [address]  Phone: [phone]   Toll Free: [phone]   Fax: [phone]    RADIATION ONCOLOGY FOLLOW UP REPORT    PATIENT NAME:  Margaux Nava     : 1950  MEDICAL RECORD NO: [id number]    LOCATION: [facility] NO: [id number]      PROVIDER: DOREEN Bolanos CNP        DATE OF SERVICE: 2020    FOLLOW UP PHYSICIANS: Dr. Gallegos ; Monie LOGAN-ASHLEY    DIAGNOSIS:  C61 -- Malignant neoplasm of the prostate; Adenocarcinoma, Danese's 3+4=7, Grade Group 3; pT3a pN0 M0, Stage IIIB with detectable and increasing PSA after prostatectomy. PSA at diagnosis 8.3; Pre-RT PSA 0.15     DATE OF DIAGNOSIS: 2018    END OF TREATMENT DATE: 2019    ECOG PERFORMANCE STATUS: 1    PAIN: Denies    CHAPERONE: Declined      HPI: Missy Garcia is a [place]  with a history of a modestly elevated PSA that remained stable for approximately 9 years.  In 2018 it had risen to 8.28 and he completed a sextant biopsy on 2018, showing a Danese 6 adenocarcinoma involving 3 of the 12 biopsy samples. Gama Howard was initially managed with active surveillance, but MRI scan obtained 2019 was concerning for multiple finding suggesting a high probability of clinically significant cancer. He underwent re-biopsy on 2019.  At that time he was found with mixed grade adenocarcinoma involving 3 of the 6 sectors sampled.  Maximum [place] was (4+3) 7, group 3.  He went on to complete a robot-assisted laparoscopic prostatectomy on 2019.  Final pathology graded the tumor as a Tip (3+4) 7 with tertiary pattern 5.   The pathologic stage is shown above. Other findings included right bladder neck involvement, perineural invasion and a positive right bladder neck surgical margin. All of the sampled lymph nodes were free of malignancy.   A sample was sent for decipher score evaluation and returned a value of 0.88, therefore high risk of progression.   the initial postoperative PSA in May 2019 was detectable at 0.07.   a subsequent PSA was read as 0.04, but  the PSA obtained on 8/27/2019 was 0.15, showing clear PSA progression with a doubling time of less than 6 months.   Mr. Siddiqui was seen by Dr. Parul Najera to discuss the role of adjuvant radiation therapy, to which he was agreeable. He was initiated on androgen deprivation therapy, receiving Vernadine Castles in September 2019. A repeat PSA obtained in October 2019 was undetectable at <0.02.    Martine Moreira tolerated his radiation therapy well. He had some initial dysuria that was very mild, and also some increased frequency of bowel movements and increased urinary frequency. He was able to continue with all of his normal activities, and did not have any unexpected side effects related to radiation therapy. INTERVAL HISTORY: Martine Moreira returns to [address] [place] [address] for a post treatment follow up after undergoing adjuvant radiation therapy. Martine Moreira states he is still experiencing urinary urgency, frequency, nocturia x2 and occasional leaking (stress incontinence). He states he wears a pad and some days he does not have to change it other times he may have to change it twice in a days time. He denies hematuria, dysuria or weak stream. He admits to occasional loose stools otherwise denies blood in stools or painful bowel movements. He did ask if he should go back on his Flomax states he stopped it during radiation treatment. LAB RESULTS:   PSA:   1/31/2020: <0.02 ---- First PSA s/p adjuvant radiation  8/27/19: 0.15  7/2/19: 0.04  5/3/19: 0.07 ---- First PSA s/p prostatectomy (3/20/19)  9/6/18: 8.28  4/1/15: 5.68  1/24/14: 7.02  3/25/13: 5.51  6/2/2009: 6.2       RADIOLOGY RESULTS:   None completed since completing treatment.       MEDICATIONS:   Current Outpatient Medications   Medication Sig Dispense Refill    famotidine (PEPCID) 20 MG tablet TAKE 1 TABLET

## 2020-03-02 ENCOUNTER — OFFICE VISIT (OUTPATIENT)
Dept: FAMILY MEDICINE CLINIC | Age: 70
End: 2020-03-02
Payer: MEDICARE

## 2020-03-02 VITALS
WEIGHT: 273 LBS | TEMPERATURE: 97.9 F | HEIGHT: 71 IN | DIASTOLIC BLOOD PRESSURE: 82 MMHG | SYSTOLIC BLOOD PRESSURE: 142 MMHG | BODY MASS INDEX: 38.22 KG/M2 | RESPIRATION RATE: 16 BRPM | HEART RATE: 78 BPM

## 2020-03-02 PROCEDURE — G8427 DOCREV CUR MEDS BY ELIG CLIN: HCPCS | Performed by: NURSE PRACTITIONER

## 2020-03-02 PROCEDURE — 1036F TOBACCO NON-USER: CPT | Performed by: NURSE PRACTITIONER

## 2020-03-02 PROCEDURE — G8482 FLU IMMUNIZE ORDER/ADMIN: HCPCS | Performed by: NURSE PRACTITIONER

## 2020-03-02 PROCEDURE — 1123F ACP DISCUSS/DSCN MKR DOCD: CPT | Performed by: NURSE PRACTITIONER

## 2020-03-02 PROCEDURE — 99214 OFFICE O/P EST MOD 30 MIN: CPT | Performed by: NURSE PRACTITIONER

## 2020-03-02 PROCEDURE — 4040F PNEUMOC VAC/ADMIN/RCVD: CPT | Performed by: NURSE PRACTITIONER

## 2020-03-02 PROCEDURE — G8417 CALC BMI ABV UP PARAM F/U: HCPCS | Performed by: NURSE PRACTITIONER

## 2020-03-02 PROCEDURE — 3017F COLORECTAL CA SCREEN DOC REV: CPT | Performed by: NURSE PRACTITIONER

## 2020-03-02 RX ORDER — AMOXICILLIN 875 MG/1
875 TABLET, COATED ORAL 2 TIMES DAILY
Qty: 20 TABLET | Refills: 0 | Status: SHIPPED | OUTPATIENT
Start: 2020-03-02 | End: 2020-03-12

## 2020-03-02 ASSESSMENT — PATIENT HEALTH QUESTIONNAIRE - PHQ9
SUM OF ALL RESPONSES TO PHQ QUESTIONS 1-9: 2
SUM OF ALL RESPONSES TO PHQ9 QUESTIONS 1 & 2: 2
1. LITTLE INTEREST OR PLEASURE IN DOING THINGS: 1
2. FEELING DOWN, DEPRESSED OR HOPELESS: 1
SUM OF ALL RESPONSES TO PHQ QUESTIONS 1-9: 2

## 2020-03-02 ASSESSMENT — ENCOUNTER SYMPTOMS
VOMITING: 0
ABDOMINAL PAIN: 0
COUGH: 0
SWOLLEN GLANDS: 0
NAUSEA: 0
VISUAL CHANGE: 0
SORE THROAT: 0

## 2020-03-02 NOTE — PROGRESS NOTES
FE) MG tablet, Take 1 tablet by mouth daily (with breakfast). , Disp: 30 tablet, Rfl: 11    Krill Oil 500 MG CAPS, Take  by mouth daily. , Disp: , Rfl:     famotidine (PEPCID) 20 MG tablet, TAKE 1 TABLET BY MOUTH TWICE A DAY, Disp: 180 tablet, Rfl: 1    Elastic Bandages & Supports (LUMBAR BACK BRACE/SUPPORT PAD) MISC, Dx lumbar stenosis, Disp: 1 each, Rfl: 0    The patient has No Known Allergies. Past Medical History  Isaura Salazar  has a past medical history of BPH (benign prostatic hyperplasia), CAD (coronary artery disease), CVA (cerebral vascular accident) ([facility] [address] FH: heart disease, Hyperlipidemia, Hypertension, Obesity, and Prostate cancer ([address] Subjective:      Review of Systems   Constitutional: Negative for chills, diaphoresis, fatigue and fever. HENT: Negative for congestion and sore throat. Respiratory: Negative for cough. Gastrointestinal: Negative for abdominal pain, anorexia, nausea and vomiting. Musculoskeletal: Negative for arthralgias and neck pain. Skin: Negative for rash. Neurological: Positive for dizziness and vertigo. Negative for weakness, numbness and headaches. Objective:     BP (!) 142/82   Pulse 78   Temp 97.9 °F (36.6 °C) (Oral)   Resp 16   Ht 5' 10.75\" (1.797 m)   Wt 273 lb (123.8 kg)   BMI 38.35 kg/m²     Physical Exam    Assessment/Plan:      Isaura Salazar was seen today for dizziness. Diagnoses and all orders for this visit:    Non-recurrent acute serous otitis media of left ear  -     amoxicillin (AMOXIL) 875 MG tablet; Take 1 tablet by mouth 2 times daily for 10 days    PVD (peripheral vascular disease) ([facility] [address]  stable    Paroxysmal atrial fibrillation ([address]  stable    Morbidly obese (HCC)  stable        No follow-ups on file. Patient instructions given jessica.         Electronically signed by DOREEN Muhammad CNP on 3/2/2020 at 4:51 PM

## 2020-03-12 ENCOUNTER — OFFICE VISIT (OUTPATIENT)
Dept: FAMILY MEDICINE CLINIC | Age: 70
End: 2020-03-12
Payer: MEDICARE

## 2020-03-12 ENCOUNTER — HOSPITAL ENCOUNTER (OUTPATIENT)
Age: 70
Discharge: HOME | End: 2020-03-12
Payer: MEDICARE

## 2020-03-12 ENCOUNTER — HOSPITAL ENCOUNTER (OUTPATIENT)
Dept: GENERAL RADIOLOGY | Age: 70
Discharge: HOME | End: 2020-03-12
Payer: MEDICARE

## 2020-03-12 VITALS
BODY MASS INDEX: 37.64 KG/M2 | HEART RATE: 69 BPM | DIASTOLIC BLOOD PRESSURE: 88 MMHG | SYSTOLIC BLOOD PRESSURE: 130 MMHG | RESPIRATION RATE: 16 BRPM | OXYGEN SATURATION: 97 % | WEIGHT: 268 LBS | TEMPERATURE: 97.8 F

## 2020-03-12 PROCEDURE — 72100 X-RAY EXAM L-S SPINE 2/3 VWS: CPT

## 2020-03-12 PROCEDURE — G8482 FLU IMMUNIZE ORDER/ADMIN: HCPCS | Performed by: FAMILY MEDICINE

## 2020-03-12 PROCEDURE — 3017F COLORECTAL CA SCREEN DOC REV: CPT | Performed by: FAMILY MEDICINE

## 2020-03-12 PROCEDURE — G8427 DOCREV CUR MEDS BY ELIG CLIN: HCPCS | Performed by: FAMILY MEDICINE

## 2020-03-12 PROCEDURE — 1036F TOBACCO NON-USER: CPT | Performed by: FAMILY MEDICINE

## 2020-03-12 PROCEDURE — 1123F ACP DISCUSS/DSCN MKR DOCD: CPT | Performed by: FAMILY MEDICINE

## 2020-03-12 PROCEDURE — 96372 THER/PROPH/DIAG INJ SC/IM: CPT | Performed by: FAMILY MEDICINE

## 2020-03-12 PROCEDURE — 99213 OFFICE O/P EST LOW 20 MIN: CPT | Performed by: FAMILY MEDICINE

## 2020-03-12 PROCEDURE — G8417 CALC BMI ABV UP PARAM F/U: HCPCS | Performed by: FAMILY MEDICINE

## 2020-03-12 PROCEDURE — 4040F PNEUMOC VAC/ADMIN/RCVD: CPT | Performed by: FAMILY MEDICINE

## 2020-03-12 RX ORDER — METHYLPREDNISOLONE ACETATE 80 MG/ML
160 INJECTION, SUSPENSION INTRA-ARTICULAR; INTRALESIONAL; INTRAMUSCULAR; SOFT TISSUE ONCE
Status: COMPLETED | OUTPATIENT
Start: 2020-03-12 | End: 2020-03-12

## 2020-03-12 RX ORDER — TRAMADOL HYDROCHLORIDE 50 MG/1
50 TABLET ORAL EVERY 6 HOURS PRN
Qty: 28 TABLET | Refills: 0 | Status: SHIPPED | OUTPATIENT
Start: 2020-03-12 | End: 2020-04-01

## 2020-03-12 RX ADMIN — METHYLPREDNISOLONE ACETATE 160 MG: 80 INJECTION, SUSPENSION INTRA-ARTICULAR; INTRALESIONAL; INTRAMUSCULAR; SOFT TISSUE at 12:02

## 2020-03-14 ASSESSMENT — ENCOUNTER SYMPTOMS
NAUSEA: 0
ABDOMINAL PAIN: 0
BACK PAIN: 1
EYE PAIN: 0
COUGH: 0
DIARRHEA: 0
RHINORRHEA: 0
SINUS PRESSURE: 0
SORE THROAT: 0
ABDOMINAL DISTENTION: 0
CONSTIPATION: 0
SHORTNESS OF BREATH: 0

## 2020-03-14 NOTE — PROGRESS NOTES
Administrations This Visit     methylPREDNISolone acetate (DEPO-MEDROL) injection 160 mg     Admin Date  03/12/2020  12:02 Action  Given Dose  160 mg Route  Intramuscular Site  Deltoid Right Administered By  Yaritza Colvin CMA ([address] [place] [address])    Ordering Provider:  Silvina Lowry MD    NDC:  2563-7309-80    Lot#:  [id number]    :  [address]     Patient Supplied?:  No
stress-induced ischemia in a relatively small-sized area. EF 68%. Mildly abnormal nuclear scan. Lexiscan test associated with nonspecific symptoms. EKG nondiagnostic with nonspecific ST-T wave changes.  CAROTID ENDARTERECTOMY  2 08 2011    Right carotid endarterectomy with patch angioplasty with convention patch angioplasty.  COLONOSCOPY      CORONARY ARTERY BYPASS GRAFT      DIAGNOSTIC CARDIAC CATH LAB PROCEDURE  3 24 2011    LV end-diastolic pressure was 12 mmHg with no change before and after contrast injection. There was no significant gradient across the aortic valve to signify aortic stenosis. LV function was within normal limits, EF 55%. 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 Nonobstructive diffuse disease in a small sized RCA. Nomral LV function.  HERNIA REPAIR      Inguinal hernia repair.  PROSTATECTOMY N/A 3/20/2019    PROSTATECTOMY LAPAROSCOPIC ROBOTIC performed by Nicole Collazo MD at [address] [place] [address] ECHOCARDIOGRAM  12 21 2010    Size was normal. Systolic function was normal. EF was estimated in the range of 55-65%. There were no regional wall motion abnormalities. Wall thickness was normal. Doppler - LV diastolic function parameters were normal. Mild MR. The aortic valve was trileaflet. Leaflets exhibited mildly increased thickness, mild calcification, normal cuspal separation, and sclerosis.  Mild TR.    VASCULAR SURGERY      cabg harvests from chest     Family History   Problem Relation Age of Onset    Heart Disease Mother     Diabetes Mother     High Blood Pressure Mother     Heart Disease Father     High Blood Pressure Father     High Blood Pressure Sister      Social History     Tobacco Use    Smoking status: Never Smoker    Smokeless tobacco: Former User     Types: Chew   Substance Use Topics    Alcohol use: No     Alcohol/week: 0.0 standard drinks      Current Outpatient Medications   Medication Sig

## 2020-03-17 ENCOUNTER — TELEPHONE (OUTPATIENT)
Dept: FAMILY MEDICINE CLINIC | Age: 70
End: 2020-03-17

## 2020-03-26 ENCOUNTER — TELEPHONE (OUTPATIENT)
Dept: FAMILY MEDICINE CLINIC | Age: 70
End: 2020-03-26

## 2020-03-26 NOTE — TELEPHONE ENCOUNTER
Pt called in to review his xray results. Pt verbalized understanding. He states Depo Medrol shot helped mildly. He also says tramadol helps mildly. He is ok walking thru his home, but takes his dog for a walk and comes back from that walk in extreme pain. Pt uses [facility].

## 2020-04-01 RX ORDER — TRAMADOL HYDROCHLORIDE 50 MG/1
TABLET ORAL
Qty: 28 TABLET | Refills: 0 | Status: SHIPPED | OUTPATIENT
Start: 2020-04-01 | End: 2020-04-08

## 2020-04-01 RX ORDER — METOPROLOL SUCCINATE 50 MG/1
TABLET, EXTENDED RELEASE ORAL
Qty: 45 TABLET | Refills: 2 | Status: SHIPPED | OUTPATIENT
Start: 2020-04-01 | End: 2020-12-21 | Stop reason: SDUPTHER

## 2020-04-23 ENCOUNTER — TELEPHONE (OUTPATIENT)
Dept: ADMINISTRATIVE | Age: 70
End: 2020-04-23

## 2020-04-27 ENCOUNTER — OFFICE VISIT (OUTPATIENT)
Dept: CARDIOLOGY CLINIC | Age: 70
End: 2020-04-27
Payer: MEDICARE

## 2020-04-27 VITALS
DIASTOLIC BLOOD PRESSURE: 74 MMHG | SYSTOLIC BLOOD PRESSURE: 120 MMHG | BODY MASS INDEX: 34.24 KG/M2 | HEART RATE: 74 BPM | HEIGHT: 72 IN | WEIGHT: 252.8 LBS

## 2020-04-27 PROCEDURE — G8417 CALC BMI ABV UP PARAM F/U: HCPCS | Performed by: NUCLEAR MEDICINE

## 2020-04-27 PROCEDURE — 4040F PNEUMOC VAC/ADMIN/RCVD: CPT | Performed by: NUCLEAR MEDICINE

## 2020-04-27 PROCEDURE — 99214 OFFICE O/P EST MOD 30 MIN: CPT | Performed by: NUCLEAR MEDICINE

## 2020-04-27 PROCEDURE — 1123F ACP DISCUSS/DSCN MKR DOCD: CPT | Performed by: NUCLEAR MEDICINE

## 2020-04-27 PROCEDURE — 1036F TOBACCO NON-USER: CPT | Performed by: NUCLEAR MEDICINE

## 2020-04-27 PROCEDURE — 3017F COLORECTAL CA SCREEN DOC REV: CPT | Performed by: NUCLEAR MEDICINE

## 2020-04-27 PROCEDURE — G8427 DOCREV CUR MEDS BY ELIG CLIN: HCPCS | Performed by: NUCLEAR MEDICINE

## 2020-04-27 RX ORDER — TRAMADOL HYDROCHLORIDE 50 MG/1
50 TABLET ORAL EVERY 6 HOURS PRN
COMMUNITY
End: 2020-06-22 | Stop reason: SDUPTHER

## 2020-04-27 NOTE — PROGRESS NOTES
No obvious focal deficits  Musculoskelatal:  No obvious deformities    Assessment:      Diagnosis Orders   1. Coronary artery disease involving coronary bypass graft of native heart without angina pectoris     2. Diaphoresis     3. Essential hypertension     4. Paroxysmal atrial fibrillation (HCC)     5. Familial hypercholesterolemia     concerning patient  Higher risk   More symptoms as above      Plan:  No follow-ups on file. Discussed  Monitor the BP  Non invasive cardiac testing will be ordered to further evaluate for any ischemic or structural heart disease as a cause of the patient symptoms. We will proceed with a Stress Cardiolite test and echo soon. Continue risk factor modification and medical management  Thank you for allowing me to participate in the care of your patient. Please don't hesitate to contact me regarding any further issues related to the patient care    Orders Placed:  No orders of the defined types were placed in this encounter. Medications Prescribed:  No orders of the defined types were placed in this encounter. Discussed use, benefit, and side effects of prescribed medications. All patient questions answered. Pt voicedunderstanding. Instructed to continue current medications, diet and exercise. Continue risk factor modification and medical management. Patient agreed with treatment plan. Follow up as directed.     Electronically signedby Carolina Galeano MD on 4/27/2020 at 9:21 AM

## 2020-04-28 ENCOUNTER — HOSPITAL ENCOUNTER (OUTPATIENT)
Dept: NON INVASIVE DIAGNOSTICS | Age: 70
Discharge: HOME | End: 2020-04-28
Payer: MEDICARE

## 2020-04-28 VITALS — WEIGHT: 252 LBS | HEIGHT: 73 IN | BODY MASS INDEX: 33.4 KG/M2

## 2020-04-28 LAB
LV EF: 53 %
LV EF: 54 %
LVEF MODALITY: NORMAL
LVEF MODALITY: NORMAL

## 2020-04-28 PROCEDURE — A9500 TC99M SESTAMIBI: HCPCS | Performed by: NUCLEAR MEDICINE

## 2020-04-28 PROCEDURE — 78452 HT MUSCLE IMAGE SPECT MULT: CPT

## 2020-04-28 PROCEDURE — 93017 CV STRESS TEST TRACING ONLY: CPT | Performed by: NUCLEAR MEDICINE

## 2020-04-28 PROCEDURE — 3430000000 HC RX DIAGNOSTIC RADIOPHARMACEUTICAL: Performed by: NUCLEAR MEDICINE

## 2020-04-28 PROCEDURE — 93306 TTE W/DOPPLER COMPLETE: CPT

## 2020-04-28 RX ADMIN — Medication 32.5 MILLICURIE: at 09:55

## 2020-04-28 RX ADMIN — Medication 9.6 MILLICURIE: at 08:50

## 2020-04-29 ENCOUNTER — TELEPHONE (OUTPATIENT)
Dept: CARDIOLOGY CLINIC | Age: 70
End: 2020-04-29

## 2020-04-30 ENCOUNTER — HOSPITAL ENCOUNTER (OUTPATIENT)
Age: 70
Discharge: HOME | End: 2020-04-30
Payer: MEDICARE

## 2020-04-30 DIAGNOSIS — C61 PROSTATE CA (HCC): ICD-10-CM

## 2020-04-30 LAB — PROSTATE SPECIFIC ANTIGEN: < 0.02 NG/ML (ref 0–1)

## 2020-04-30 PROCEDURE — 36415 COLL VENOUS BLD VENIPUNCTURE: CPT

## 2020-04-30 PROCEDURE — 84153 ASSAY OF PSA TOTAL: CPT

## 2020-05-01 ENCOUNTER — HOSPITAL ENCOUNTER (OUTPATIENT)
Dept: RADIATION ONCOLOGY | Age: 70
Discharge: HOME | End: 2020-05-01
Attending: RADIOLOGY
Payer: MEDICARE

## 2020-05-01 ENCOUNTER — HOSPITAL ENCOUNTER (OUTPATIENT)
Age: 70
Discharge: HOME | End: 2020-05-01
Payer: MEDICARE

## 2020-05-01 ENCOUNTER — TELEPHONE (OUTPATIENT)
Dept: CARDIOLOGY CLINIC | Age: 70
End: 2020-05-01

## 2020-05-01 ENCOUNTER — PREP FOR PROCEDURE (OUTPATIENT)
Dept: CARDIOLOGY | Age: 70
End: 2020-05-01

## 2020-05-01 VITALS
RESPIRATION RATE: 16 BRPM | BODY MASS INDEX: 33.65 KG/M2 | DIASTOLIC BLOOD PRESSURE: 71 MMHG | SYSTOLIC BLOOD PRESSURE: 113 MMHG | OXYGEN SATURATION: 98 % | TEMPERATURE: 97.8 F | HEART RATE: 77 BPM | WEIGHT: 255.07 LBS

## 2020-05-01 PROCEDURE — 99212 OFFICE O/P EST SF 10 MIN: CPT | Performed by: NURSE PRACTITIONER

## 2020-05-01 PROCEDURE — U0002 COVID-19 LAB TEST NON-CDC: HCPCS

## 2020-05-01 RX ORDER — NITROGLYCERIN 0.4 MG/1
0.4 TABLET SUBLINGUAL EVERY 5 MIN PRN
Status: CANCELLED | OUTPATIENT
Start: 2020-05-01

## 2020-05-01 RX ORDER — SODIUM CHLORIDE 0.9 % (FLUSH) 0.9 %
10 SYRINGE (ML) INJECTION PRN
Status: CANCELLED | OUTPATIENT
Start: 2020-05-01

## 2020-05-01 RX ORDER — ASPIRIN 325 MG
325 TABLET ORAL ONCE
Status: CANCELLED | OUTPATIENT
Start: 2020-05-01 | End: 2020-05-01

## 2020-05-01 RX ORDER — DIPHENHYDRAMINE HYDROCHLORIDE 50 MG/ML
50 INJECTION INTRAMUSCULAR; INTRAVENOUS ONCE
Status: CANCELLED | OUTPATIENT
Start: 2020-05-01 | End: 2020-05-01

## 2020-05-01 RX ORDER — SODIUM CHLORIDE 0.9 % (FLUSH) 0.9 %
10 SYRINGE (ML) INJECTION EVERY 12 HOURS SCHEDULED
Status: CANCELLED | OUTPATIENT
Start: 2020-05-01

## 2020-05-01 RX ORDER — SODIUM CHLORIDE 9 MG/ML
INJECTION, SOLUTION INTRAVENOUS CONTINUOUS
Status: CANCELLED | OUTPATIENT
Start: 2020-05-01

## 2020-05-04 ENCOUNTER — HOSPITAL ENCOUNTER (OUTPATIENT)
Dept: INPATIENT UNIT | Age: 70
Discharge: HOME | End: 2020-05-05
Attending: NUCLEAR MEDICINE | Admitting: NUCLEAR MEDICINE
Payer: MEDICARE

## 2020-05-04 ENCOUNTER — TELEPHONE (OUTPATIENT)
Dept: UROLOGY | Age: 70
End: 2020-05-04

## 2020-05-04 PROBLEM — Z98.61 S/P CORONARY ANGIOPLASTY: Status: ACTIVE | Noted: 2020-05-04

## 2020-05-04 LAB
ABO: NORMAL
ACTIVATED CLOTTING TIME: 224 SECONDS (ref 1–150)
ALBUMIN SERPL-MCNC: 4.4 G/DL (ref 3.5–5.1)
ALP BLD-CCNC: 40 U/L (ref 38–126)
ALT SERPL-CCNC: 26 U/L (ref 11–66)
ANION GAP SERPL CALCULATED.3IONS-SCNC: 9 MEQ/L (ref 8–16)
ANTIBODY SCREEN: NORMAL
APTT: 28.9 SECONDS (ref 22–38)
AST SERPL-CCNC: 30 U/L (ref 5–40)
BILIRUB SERPL-MCNC: 0.6 MG/DL (ref 0.3–1.2)
BUN BLDV-MCNC: 18 MG/DL (ref 7–22)
CALCIUM SERPL-MCNC: 10.2 MG/DL (ref 8.5–10.5)
CHLORIDE BLD-SCNC: 101 MEQ/L (ref 98–111)
CHOLESTEROL, TOTAL: 133 MG/DL (ref 100–199)
CO2: 27 MEQ/L (ref 23–33)
CREAT SERPL-MCNC: 0.8 MG/DL (ref 0.4–1.2)
EKG ATRIAL RATE: 71 BPM
EKG Q-T INTERVAL: 402 MS
EKG QRS DURATION: 102 MS
EKG QTC CALCULATION (BAZETT): 414 MS
EKG R AXIS: -18 DEGREES
EKG T AXIS: -12 DEGREES
EKG VENTRICULAR RATE: 64 BPM
ERYTHROCYTE [DISTWIDTH] IN BLOOD BY AUTOMATED COUNT: 11.9 % (ref 11.5–14.5)
ERYTHROCYTE [DISTWIDTH] IN BLOOD BY AUTOMATED COUNT: 44 FL (ref 35–45)
GFR SERPL CREATININE-BSD FRML MDRD: > 90 ML/MIN/1.73M2
GLUCOSE BLD-MCNC: 110 MG/DL (ref 70–108)
HCT VFR BLD CALC: 47.9 % (ref 42–52)
HDLC SERPL-MCNC: 47 MG/DL
HEMOGLOBIN: 15.8 GM/DL (ref 14–18)
INR BLD: 1.09 (ref 0.85–1.13)
LDL CHOLESTEROL CALCULATED: 67 MG/DL
MCH RBC QN AUTO: 33.2 PG (ref 26–33)
MCHC RBC AUTO-ENTMCNC: 33 GM/DL (ref 32.2–35.5)
MCV RBC AUTO: 100.6 FL (ref 80–94)
PERFORMING LAB: NORMAL
PLATELET # BLD: 226 THOU/MM3 (ref 130–400)
PMV BLD AUTO: 8.4 FL (ref 9.4–12.4)
POTASSIUM REFLEX MAGNESIUM: 5.2 MEQ/L (ref 3.5–5.2)
RBC # BLD: 4.76 MILL/MM3 (ref 4.7–6.1)
REPORT: NORMAL
RH FACTOR: NORMAL
SARS-COV-2: NOT DETECTED
SODIUM BLD-SCNC: 137 MEQ/L (ref 135–145)
TOTAL PROTEIN: 7.4 G/DL (ref 6.1–8)
TRIGL SERPL-MCNC: 95 MG/DL (ref 0–199)
WBC # BLD: 7.7 THOU/MM3 (ref 4.8–10.8)

## 2020-05-04 PROCEDURE — 80061 LIPID PANEL: CPT

## 2020-05-04 PROCEDURE — 86850 RBC ANTIBODY SCREEN: CPT

## 2020-05-04 PROCEDURE — 86900 BLOOD TYPING SEROLOGIC ABO: CPT

## 2020-05-04 PROCEDURE — 2709999900 HC NON-CHARGEABLE SUPPLY

## 2020-05-04 PROCEDURE — 85347 COAGULATION TIME ACTIVATED: CPT

## 2020-05-04 PROCEDURE — 93459 L HRT ART/GRFT ANGIO: CPT | Performed by: NUCLEAR MEDICINE

## 2020-05-04 PROCEDURE — 93005 ELECTROCARDIOGRAM TRACING: CPT | Performed by: NURSE PRACTITIONER

## 2020-05-04 PROCEDURE — C1874 STENT, COATED/COV W/DEL SYS: HCPCS

## 2020-05-04 PROCEDURE — 2580000003 HC RX 258: Performed by: NURSE PRACTITIONER

## 2020-05-04 PROCEDURE — 85610 PROTHROMBIN TIME: CPT

## 2020-05-04 PROCEDURE — 6370000000 HC RX 637 (ALT 250 FOR IP): Performed by: INTERNAL MEDICINE

## 2020-05-04 PROCEDURE — C1894 INTRO/SHEATH, NON-LASER: HCPCS

## 2020-05-04 PROCEDURE — C1887 CATHETER, GUIDING: HCPCS

## 2020-05-04 PROCEDURE — C1769 GUIDE WIRE: HCPCS

## 2020-05-04 PROCEDURE — 93010 ELECTROCARDIOGRAM REPORT: CPT | Performed by: NUCLEAR MEDICINE

## 2020-05-04 PROCEDURE — 36415 COLL VENOUS BLD VENIPUNCTURE: CPT

## 2020-05-04 PROCEDURE — 92928 PRQ TCAT PLMT NTRAC ST 1 LES: CPT | Performed by: INTERNAL MEDICINE

## 2020-05-04 PROCEDURE — 85730 THROMBOPLASTIN TIME PARTIAL: CPT

## 2020-05-04 PROCEDURE — 86901 BLOOD TYPING SEROLOGIC RH(D): CPT

## 2020-05-04 PROCEDURE — 80053 COMPREHEN METABOLIC PANEL: CPT

## 2020-05-04 PROCEDURE — 6360000002 HC RX W HCPCS

## 2020-05-04 PROCEDURE — 85027 COMPLETE CBC AUTOMATED: CPT

## 2020-05-04 PROCEDURE — C9600 PERC DRUG-EL COR STENT SING: HCPCS | Performed by: NUCLEAR MEDICINE

## 2020-05-04 PROCEDURE — C1725 CATH, TRANSLUMIN NON-LASER: HCPCS

## 2020-05-04 PROCEDURE — 6370000000 HC RX 637 (ALT 250 FOR IP)

## 2020-05-04 PROCEDURE — 2500000003 HC RX 250 WO HCPCS

## 2020-05-04 PROCEDURE — C1760 CLOSURE DEV, VASC: HCPCS

## 2020-05-04 PROCEDURE — 6360000004 HC RX CONTRAST MEDICATION: Performed by: INTERNAL MEDICINE

## 2020-05-04 RX ORDER — ONDANSETRON 2 MG/ML
4 INJECTION INTRAMUSCULAR; INTRAVENOUS EVERY 6 HOURS PRN
Status: DISCONTINUED | OUTPATIENT
Start: 2020-05-04 | End: 2020-05-05 | Stop reason: HOSPADM

## 2020-05-04 RX ORDER — ATORVASTATIN CALCIUM 20 MG/1
20 TABLET, FILM COATED ORAL DAILY
Status: DISCONTINUED | OUTPATIENT
Start: 2020-05-04 | End: 2020-05-05 | Stop reason: HOSPADM

## 2020-05-04 RX ORDER — CLOPIDOGREL BISULFATE 75 MG/1
75 TABLET ORAL DAILY
Status: DISCONTINUED | OUTPATIENT
Start: 2020-05-05 | End: 2020-05-05 | Stop reason: HOSPADM

## 2020-05-04 RX ORDER — SODIUM CHLORIDE 0.9 % (FLUSH) 0.9 %
10 SYRINGE (ML) INJECTION PRN
Status: DISCONTINUED | OUTPATIENT
Start: 2020-05-04 | End: 2020-05-04 | Stop reason: SDUPTHER

## 2020-05-04 RX ORDER — ASPIRIN 81 MG/1
81 TABLET, CHEWABLE ORAL DAILY
Status: DISCONTINUED | OUTPATIENT
Start: 2020-05-05 | End: 2020-05-05 | Stop reason: HOSPADM

## 2020-05-04 RX ORDER — METOPROLOL SUCCINATE 50 MG/1
50 TABLET, EXTENDED RELEASE ORAL DAILY
Status: DISCONTINUED | OUTPATIENT
Start: 2020-05-05 | End: 2020-05-05 | Stop reason: HOSPADM

## 2020-05-04 RX ORDER — SODIUM CHLORIDE 0.9 % (FLUSH) 0.9 %
10 SYRINGE (ML) INJECTION EVERY 12 HOURS SCHEDULED
Status: DISCONTINUED | OUTPATIENT
Start: 2020-05-04 | End: 2020-05-04 | Stop reason: SDUPTHER

## 2020-05-04 RX ORDER — SODIUM CHLORIDE 9 MG/ML
INJECTION, SOLUTION INTRAVENOUS CONTINUOUS
Status: DISCONTINUED | OUTPATIENT
Start: 2020-05-04 | End: 2020-05-04 | Stop reason: ALTCHOICE

## 2020-05-04 RX ORDER — SODIUM CHLORIDE 9 MG/ML
75 INJECTION, SOLUTION INTRAVENOUS CONTINUOUS
Status: ACTIVE | OUTPATIENT
Start: 2020-05-04 | End: 2020-05-05

## 2020-05-04 RX ORDER — TAMSULOSIN HYDROCHLORIDE 0.4 MG/1
0.4 CAPSULE ORAL DAILY
Status: DISCONTINUED | OUTPATIENT
Start: 2020-05-05 | End: 2020-05-05 | Stop reason: HOSPADM

## 2020-05-04 RX ORDER — ASPIRIN 325 MG
325 TABLET ORAL ONCE
Status: DISCONTINUED | OUTPATIENT
Start: 2020-05-04 | End: 2020-05-05 | Stop reason: HOSPADM

## 2020-05-04 RX ORDER — SODIUM CHLORIDE 0.9 % (FLUSH) 0.9 %
10 SYRINGE (ML) INJECTION EVERY 12 HOURS SCHEDULED
Status: DISCONTINUED | OUTPATIENT
Start: 2020-05-04 | End: 2020-05-05 | Stop reason: HOSPADM

## 2020-05-04 RX ORDER — NITROGLYCERIN 0.4 MG/1
0.4 TABLET SUBLINGUAL EVERY 5 MIN PRN
Status: DISCONTINUED | OUTPATIENT
Start: 2020-05-04 | End: 2020-05-05 | Stop reason: HOSPADM

## 2020-05-04 RX ORDER — SODIUM CHLORIDE 0.9 % (FLUSH) 0.9 %
10 SYRINGE (ML) INJECTION PRN
Status: DISCONTINUED | OUTPATIENT
Start: 2020-05-04 | End: 2020-05-05 | Stop reason: HOSPADM

## 2020-05-04 RX ORDER — TRAMADOL HYDROCHLORIDE 50 MG/1
50 TABLET ORAL EVERY 6 HOURS PRN
Status: DISCONTINUED | OUTPATIENT
Start: 2020-05-04 | End: 2020-05-05 | Stop reason: HOSPADM

## 2020-05-04 RX ORDER — ACETAMINOPHEN 325 MG/1
650 TABLET ORAL EVERY 4 HOURS PRN
Status: DISCONTINUED | OUTPATIENT
Start: 2020-05-04 | End: 2020-05-05 | Stop reason: HOSPADM

## 2020-05-04 RX ADMIN — ATORVASTATIN CALCIUM 20 MG: 20 TABLET, FILM COATED ORAL at 19:56

## 2020-05-04 RX ADMIN — ACETAMINOPHEN 650 MG: 325 TABLET ORAL at 19:55

## 2020-05-04 RX ADMIN — SODIUM CHLORIDE: 9 INJECTION, SOLUTION INTRAVENOUS at 13:44

## 2020-05-04 RX ADMIN — IOPAMIDOL 275 ML: 755 INJECTION, SOLUTION INTRAVENOUS at 15:59

## 2020-05-04 ASSESSMENT — PAIN DESCRIPTION - ORIENTATION
ORIENTATION: LEFT
ORIENTATION: LEFT

## 2020-05-04 ASSESSMENT — PAIN - FUNCTIONAL ASSESSMENT
PAIN_FUNCTIONAL_ASSESSMENT: ACTIVITIES ARE NOT PREVENTED
PAIN_FUNCTIONAL_ASSESSMENT: ACTIVITIES ARE NOT PREVENTED

## 2020-05-04 ASSESSMENT — PAIN DESCRIPTION - FREQUENCY
FREQUENCY: CONTINUOUS
FREQUENCY: CONTINUOUS

## 2020-05-04 ASSESSMENT — PAIN DESCRIPTION - LOCATION
LOCATION: HIP
LOCATION: HIP

## 2020-05-04 ASSESSMENT — PAIN DESCRIPTION - PAIN TYPE
TYPE: CHRONIC PAIN
TYPE: CHRONIC PAIN

## 2020-05-04 ASSESSMENT — PAIN DESCRIPTION - ONSET
ONSET: ON-GOING
ONSET: ON-GOING

## 2020-05-04 ASSESSMENT — PAIN DESCRIPTION - PROGRESSION
CLINICAL_PROGRESSION: NOT CHANGED

## 2020-05-04 ASSESSMENT — PAIN SCALES - GENERAL: PAINLEVEL_OUTOF10: 10

## 2020-05-04 ASSESSMENT — PAIN DESCRIPTION - DESCRIPTORS
DESCRIPTORS: CONSTANT
DESCRIPTORS: CONSTANT

## 2020-05-04 NOTE — OP NOTE
Sedation/Analgesia Post Sedation Record        Pt Name: Froilan Vela  MRN: [id number]  YOB: 1950  Procedure Performed By: Naren Cassidy MD, Tara Lopez3  Primary Care Physician: Kingsley Howell MD        POST-PROCEDURE    Physicians/Assistants: Naren Cassidy MD, [facility], [facility], Tara [address]    Procedure Performed:  PCI to LCx                                  Sedation/Anesthesia:  Local Anesthesia and IV Conscious Sedation with continuous O2 monitoring    Estimated Blood Loss: 10 cc     Specimens Removed:  [x]None []Other:      Disposition of Specimen:  []Pathology []Other        Complications:   [x]None Immediate []Other:       Post Procedure Diagnosis/Findings:  Coronary Artery Disease          Recommendations:    1. DAPT   2. Lipid lowering therapy  3. Aggressive risk factor modification  4. Cardiac rehab  5. Monitor access site closely   6. IV Fluids  7. Follow up with cardiology clinic with Dr. Sadnra Garcias in 1-2 weeks to evaluate further. All questions and concerns were addressed and patient is in agreement with plan.                  Naren Cassidy MD, Tara Lopez  Electronically signed 5/4/2020 at 4:03 PM

## 2020-05-04 NOTE — FLOWSHEET NOTE
1920 Out of bed and ambulated in [place]   Did well   1940 Transferred to [facility] per wc per transport team   Has cell phone, wallet, home meds, clothes with him  No needs

## 2020-05-05 ENCOUNTER — TELEPHONE (OUTPATIENT)
Dept: CARDIOLOGY CLINIC | Age: 70
End: 2020-05-05

## 2020-05-05 ENCOUNTER — TELEPHONE (OUTPATIENT)
Dept: FAMILY MEDICINE CLINIC | Age: 70
End: 2020-05-05

## 2020-05-05 VITALS
HEART RATE: 72 BPM | WEIGHT: 249.6 LBS | TEMPERATURE: 97.7 F | RESPIRATION RATE: 16 BRPM | BODY MASS INDEX: 33.81 KG/M2 | SYSTOLIC BLOOD PRESSURE: 111 MMHG | OXYGEN SATURATION: 97 % | DIASTOLIC BLOOD PRESSURE: 71 MMHG | HEIGHT: 72 IN

## 2020-05-05 PROCEDURE — 6370000000 HC RX 637 (ALT 250 FOR IP): Performed by: INTERNAL MEDICINE

## 2020-05-05 PROCEDURE — 99213 OFFICE O/P EST LOW 20 MIN: CPT | Performed by: NURSE PRACTITIONER

## 2020-05-05 RX ORDER — CLOPIDOGREL BISULFATE 75 MG/1
75 TABLET ORAL DAILY
Qty: 90 TABLET | Refills: 3 | Status: SHIPPED | OUTPATIENT
Start: 2020-05-06 | End: 2022-08-11 | Stop reason: SDUPTHER

## 2020-05-05 RX ORDER — ASPIRIN 81 MG/1
81 TABLET ORAL DAILY
Qty: 90 TABLET | Refills: 0 | COMMUNITY
Start: 2020-05-05 | End: 2020-06-09

## 2020-05-05 RX ORDER — NITROGLYCERIN 0.4 MG/1
TABLET SUBLINGUAL
Qty: 25 TABLET | Refills: 3 | Status: SHIPPED | OUTPATIENT
Start: 2020-05-05 | End: 2020-07-31

## 2020-05-05 RX ADMIN — METOPROLOL SUCCINATE 50 MG: 50 TABLET, EXTENDED RELEASE ORAL at 09:13

## 2020-05-05 RX ADMIN — ACETAMINOPHEN 650 MG: 325 TABLET ORAL at 09:15

## 2020-05-05 RX ADMIN — ASPIRIN 81 MG 81 MG: 81 TABLET ORAL at 09:13

## 2020-05-05 RX ADMIN — CLOPIDOGREL BISULFATE 75 MG: 75 TABLET ORAL at 09:13

## 2020-05-05 ASSESSMENT — PAIN SCALES - GENERAL
PAINLEVEL_OUTOF10: 8
PAINLEVEL_OUTOF10: 0
PAINLEVEL_OUTOF10: 0

## 2020-05-05 ASSESSMENT — PAIN DESCRIPTION - LOCATION: LOCATION: HIP

## 2020-05-05 ASSESSMENT — PAIN DESCRIPTION - ORIENTATION: ORIENTATION: LEFT

## 2020-05-05 ASSESSMENT — PAIN DESCRIPTION - PROGRESSION
CLINICAL_PROGRESSION: GRADUALLY WORSENING

## 2020-05-05 ASSESSMENT — PAIN DESCRIPTION - PAIN TYPE: TYPE: CHRONIC PAIN

## 2020-05-05 ASSESSMENT — PAIN - FUNCTIONAL ASSESSMENT: PAIN_FUNCTIONAL_ASSESSMENT: ACTIVITIES ARE NOT PREVENTED

## 2020-05-05 ASSESSMENT — PAIN DESCRIPTION - DESCRIPTORS: DESCRIPTORS: ACHING;SORE

## 2020-05-05 ASSESSMENT — PAIN DESCRIPTION - ONSET: ONSET: ON-GOING

## 2020-05-05 ASSESSMENT — PAIN DESCRIPTION - FREQUENCY: FREQUENCY: CONTINUOUS

## 2020-05-05 NOTE — PROGRESS NOTES
Inpatient Cardiac Rehabilitation Consult    Received consult for Phase II Cardiac Rehabilitation. Cardiac Rehab education completed with patient. Brochure given. We will follow up with patient at home to schedule.

## 2020-05-05 NOTE — DISCHARGE SUMMARY
Cardiology Discharge Summary      Patient Identification:  Kaushik Henson  : 1950  MRN: [id number]   Account: [de-identified]     Admit date: 2020  Discharge date: 20    Attending provider: No att. providers found        Primary care provider: Jacinto Carlson MD     Admission Diagnoses:  SOB  Fatigue  Diaphoresis  Abnormal stress test  CAD  H/o PCI, CABG  Chronic Atrial fibrillation        Discharge Diagnoses:   SOB  Fatigue  Diaphoresis  Abnormal stress test  CAD  H/o PCI, CABG  Chronic Atrial Fibrillation  S/P LHC/PCI with RENE to the left circumflex       Hospital Course:   Kaushik Henson is a 81 y.o. male with history of CAD h/o prior PCI and CABG experiencing increased fatigue, sob, diaphoresis and abnormal stress test admitted to [facility] on 2020 for cardiac catheterization to  further evaluate coronary anatomy. He was found to have native left circumflex artery disease that was amenable to intervention. Dr. Anai Ricketts performed PCI/RENE to the left circumflex and Mr. Rudy Hutchinson convalesced over night without adverse events an on 20 was hemodynamically stable and agreeable to discharge home. At the time of discharge he was pain free,  tolerating food and fluids, ambulating without difficulty or complaints of. Right groin cath site soft without pain, bleeding, drainage, redness, or warmth. Bilateral extremities with normal color / temperature, evident movement / sensation, and pulses present. The patient has been educated on symptoms of heart disease that include chest pain, passing out, dizziness, etc. And to report them if there is any change or go to the emergency room. Hhe will follow up in cardiology clinic in 1-2 weeks.          Procedures: S/P LHC/PCI with RENE to the left circumflex    Code Status: Full Code     Consults:   none    Examination:  Vitals:/71   Pulse 72   Temp 97.7 °F (36.5 °C) (Oral) Cardiac rehab  5. Monitor access site closely   6. IV Fluids  7. Follow up with cardiology clinic with Dr. Silvina Starr in 1-2 weeks to evaluate further.     All questions and concerns were addressed and patient is in agreement with plan.                                                                               Shaylee Philip MD, Kyra Brittno, [facility]  Electronically signed 5/4/2020 at 4:03 PM    Echo: 4/28/20  Summary   Technically difficult examination. Ejection fraction is visually estimated at 50-55%. Overall left ventricular function is normal.   Aortic valve appears tricuspid. Aortic valve leaflets are somewhat thickened. Aortic valve leaflets are Mildly calcified.       Signature      ----------------------------------------------------------------   Electronically signed by Ekta Moreland MD   Labs:   Recent Results (from the past 67 hour(s))   APTT    Collection Time: 05/04/20  1:22 PM   Result Value Ref Range    aPTT 28.9 22.0 - 38.0 seconds   CBC    Collection Time: 05/04/20  1:22 PM   Result Value Ref Range    WBC 7.7 4.8 - 10.8 thou/mm3    RBC 4.76 4.70 - 6.10 mill/mm3    Hemoglobin 15.8 14.0 - 18.0 gm/dl    Hematocrit 47.9 42.0 - 52.0 %    .6 (H) 80.0 - 94.0 fL    MCH 33.2 (H) 26.0 - 33.0 pg    MCHC 33.0 32.2 - 35.5 gm/dl    RDW-CV 11.9 11.5 - 14.5 %    RDW-SD 44.0 35.0 - 45.0 fL    Platelets 749 484 - 278 thou/mm3    MPV 8.4 (L) 9.4 - 12.4 fL   Comprehensive Metabolic Panel w/ Reflex to MG    Collection Time: 05/04/20  1:22 PM   Result Value Ref Range    Glucose 110 (H) 70 - 108 mg/dL    CREATININE 0.8 0.4 - 1.2 mg/dL    BUN 18 7 - 22 mg/dL    Sodium 137 135 - 145 meq/L    Potassium reflex Magnesium 5.2 3.5 - 5.2 meq/L    Chloride 101 98 - 111 meq/L    CO2 27 23 - 33 meq/L    Calcium 10.2 8.5 - 10.5 mg/dL    AST 30 5 - 40 U/L    Alkaline Phosphatase 40 38 - 126 U/L    Total Protein 7.4 6.1 - 8.0 g/dL    Alb 4.4 3.5 - 5.1 g/dL    Total Bilirubin 0.6 0.3 - 1.2 mg/dL    ALT 26 11 - 66 U/L 5/5/2020 at 12:29 PM

## 2020-05-07 ENCOUNTER — TELEPHONE (OUTPATIENT)
Dept: FAMILY MEDICINE CLINIC | Age: 70
End: 2020-05-07

## 2020-05-08 NOTE — PLAN OF CARE
Hospital Facility-Based Program  [facility] Intensive Cardiac Rehab/Traditional Cardiac Rehab  PHYSICIAN ORDER  Class I Level B based on research  Medical Director:  Dr. Ventura Muro MD     Patient Name: Pili Espinoza : 1950  Referring Physician: Dr. Amaya La  Date: 2020  Allergies: Allergies as of 2020    (No Known Allergies)        Diagnosis:  PCI on 20    [x] [facility] Intensive Cardiac Rehab with telemetry monitoring, resting and exercise        BPs & HRs with each session. Hospital setting for patient safety. [x] 72 sessions: 36 exercise sessions, 36 education sessions   [] 36 sessions: 18 exercise sessions, 18 education sessions  [] Traditional Cardiac Rehab with telemetry monitoring, resting and exercise BPs &       HRs with each session. Hospital setting for patient safety. [] 36 sessions:  32 exercise sessions, 4 education sessions     Per Patient symptoms, proceed with:   [x]Nitroglycerine 0.4mg SL every 5 minutes prn, maximum of 3, for chest pain   [x]12-lead EKG for symptoms of chest pain or noted change in heart rhythm   [x]Administer O2 per nasal cannula for symptoms of chest pain or acute dyspnea    Physician Prescribed Exercise:  Plan of Care:  Patient to attend exercise sessions with aerobic endurance and strength training for a total of 31-60 min/day, 3 days/week with supplemented 30+ minutes of aerobic exercise at home on days not participating in Cardiac Rehab. Aerobic Endurance Training  Aerobic Endurance mode (TM, AD, NS) starting at 5-8 minutes progressing by 2-3 minutes each week to a total of 15-30 minutes 2-3x/week. Arms only 5 min  Stair step increasing to 2 min  Resistance/strength training:  Hand weights starting at 1-5 lbs increasing in weight by 1-2 lbs and/or per patient tolerance weekly. Start with 8 repetitions and increase the repetitions each exercise session per patient tolerance for a total of 15 repetitions.   Measurable Endurance Goal:

## 2020-05-12 ENCOUNTER — HOSPITAL ENCOUNTER (OUTPATIENT)
Dept: CARDIAC REHAB | Age: 70
Setting detail: THERAPIES SERIES
Discharge: HOME | End: 2020-05-12
Payer: MEDICARE

## 2020-05-12 VITALS
BODY MASS INDEX: 33 KG/M2 | HEIGHT: 73 IN | OXYGEN SATURATION: 97 % | HEART RATE: 87 BPM | WEIGHT: 249 LBS | DIASTOLIC BLOOD PRESSURE: 64 MMHG | SYSTOLIC BLOOD PRESSURE: 96 MMHG

## 2020-05-12 PROCEDURE — G0422 INTENS CARDIAC REHAB W/EXERC: HCPCS

## 2020-05-12 PROCEDURE — G0423 INTENS CARDIAC REHAB NO EXER: HCPCS

## 2020-05-12 RX ORDER — OYSTER SHELL CALCIUM WITH VITAMIN D 500; 200 MG/1; [IU]/1
1 TABLET, FILM COATED ORAL DAILY
COMMUNITY

## 2020-05-12 NOTE — PLAN OF CARE
[] No  Type: **  Frequency: **  Duration: ** Home Exercise  [] Yes    [] No  Type: **  Frequency: **  Duration: **   Angina with Activity? [] Yes    [x] No  Angina Management: na Angina with Activity? [] Yes    [] No  Angina Management: ** Angina with Activity? [] Yes    [] No  Angina Management: ** Angina with Activity? [] Yes    [] No  Angina Management: ** Angina with Activity?   [] Yes    [] No  Angina Management: **   EXERCISE PLAN EXERCISE PLAN EXERCISE PLAN EXERCISE PLAN EXERCISE PLAN   *Interventions* *Interventions* *Interventions* *Interventions* *Interventions*   Exercise Prescription  (per physician & CR staff) Exercise Prescription  (per physician & CR staff) Exercise Prescription  (per physician & CR staff) Exercise Prescription  (per physician & CR staff) Exercise Prescription  (per physician & CR staff)   Cardiovascular Cardiovascular Cardiovascular Cardiovascular Cardiovascular   Mode:    [x] Treadmill (TM)  [x] Schwinn Airdyne (AD)  [x] Arms Ergometer (AE)  [x] NuStep  [] Elliptical (E) MODE:    [] Treadmill (TM)  [] Schwinn Airdyne (AD)  [] Arms Ergometer (AE)  [] NuStep  [] Elliptical (E) MODE:    [] Treadmill (TM)  [] Schwinn Airdyne (AD)  [] Arms Ergometer (AE)  [] NuStep  [] Elliptical (E) MODE:    [] Treadmill (TM)  [] Schwinn Airdyne (AD)  [] Arms Ergometer (AE)  [] NuStep  [] Elliptical (E) MODE:    [] Treadmill (TM)  [] Schwinn Airdyne (AD)  [] Arms Ergometer (AE)  [] NuStep  [] Elliptical (E)   Initial Workloads  TM: [email or web address] 2.6 METs  AD: 0.7 level = 2.4 METs  NS: 44  Tse= 2.4 METs  AE: 0.4 level = 1.8 METs Current Workloads  TM:  @ %=  METs  AD:  level =  METs  NS:   Tse=  METs  AE:  level =  METs Current Workloads  TM:  @ %=  METs  AD:  level =  METs  NS:   Tse=  METs  AE:  level =  METs Current Workloads  TM:  @ %=  METs  AD:  level =  METs  NS:   Tse=  METs  AE:  level =  METs Current Workloads  TM:  @ %=  METs  AD:  level =  METs  NS:   Tse=  METs  AE:  level =  METs min  [x] Determine plan of exercise following rehab  [x] Attend Exercise workshops Patrice's plans to:  Kody Bryan achieved exercise goals?     []  Yes    [] No  If no, why?  **  [] Increased 6 min walk distance by 10%  [] Currently exercising 30-60 min/day, 5-7days/wk   [] Plans to continue exercise on own  [] Plans to join a local fitness center to continue exercise  [] Does not plan to continue to exercise after rehab   Return to ADL or Hobbies:  Blessing Rodriguez would like to improve strength and endurance so he is able to return to fishing, walk the dog, riding mower, manual mower Return to ADL or Hobbies:  Blessing Rodriguez would like to improve strength and endurance so he/she is able to return to ** Return to ADL or Hobbies:  Blessing Rodriguez would like to improve strength and endurance so he/she is able to return to ** Return to ADL or Hobbies:  Blessing Rodriguez would like to improve strength and endurance so he/she is able to return to ** Return to ADL or Hobbies:  Blessing Rodriguez would like to improve strength and endurance so he/she is able to return to **    *MET level required for above goal:  5-6 METs MET level Achieved:  **METs MET level Achieved:  **METs MET level Achieved:  **METs MET level Achieved:  **METs     Individual Cardiac Treatment Plan - Nutrition  NUTRITION  ASSESSMENT/PLAN NUTRITION  REASSESSMENT NUTRITION   REASSESSMENT NUTRITION   REASSESSMENT NUTRITION  DISCHARGE/FOLLOW-UP   Stages of Change Stages of Change Stages of Change Stages of Change Stages of Change   [] Pre Contemplation  [] Contemplation  [x] Preparation  [] Action  [] Maintenance  [] Relapse [] Pre Contemplation  [] Contemplation  [] Preparation  [] Action  [] Maintenance  [] Relapse [] Pre Contemplation  [] Contemplation  [] Preparation  [] Action  [] Maintenance  [] Relapse [] Pre Contemplation  [] Contemplation  [] Preparation  [] Action  [] Maintenance  [] Relapse [] Pre Contemplation  [] Contemplation  [] Preparation  [] Action  [] Maintenance  [] Relapse PSYCHOSOCIAL ASSESSMENT PSYCHOSOCIAL ASSESSMENT PSYCHOSOCIAL ASSESSMENT PSYCHOSOCIAL ASSESSMENT PSYCHOSOCIAL ASSESSMENT   Behavioral Outcomes Behavioral Outcomes Behavioral Outcomes Behavioral Outcomes Behavioral Outcomes   Tool Used:  Martha & Odilia, Quality of Life Index, Cardiac Version IV  *Given to patient to complete. Tool Used:    Martha & Odilia, Quality of Life Index, Cardiac Version IV     QOL Index Score: **  HF:**  S&E:**  P&S: **  Family: **   Tool Used:     Martha & Odilia, Quality of Life Index, Cardiac Version IV    QOL Index Score: **  HF:**  S&E:**  P&S: **  Family: **   PHQ-9 score **  Depression Severity  []Minimal  []Mild   []Moderate  []Moderately Severe  []Severe    PHQ-9 score **  Depression Severity  []Minimal  []Mild   []Moderate  []Moderately Severe []Severe   Does patient have Family Support? [x] Yes      [] No  No signs of marital/family distress       Within the Past Month:  *Have you wished you were dead or wished you could go to sleep and not wake up? [] Yes      [x] No  *Have you had any thoughts of killing yourself? [] Yes      [x] No         Using a scale of 0-10, 0=none, 10=very:   Rate your depression: 2  Rate your anxiety:  0  Using a scale of 0-10, 0=none, 10=very:   Rate your depression: **  Rate your anxiety:  ** Using a scale of 0-10, 0=none, 10=very:   Rate your depression: **  Rate your anxiety:  ** Using a scale of 0-10, 0=none, 10=very:   Rate your depression: **  Rate your anxiety:  ** Using a scale of 0-10, 0=none, 10=very:   Rate your depression: **  Rate your anxiety:  **   Signs and Symptoms of Depression Present? [x] Yes      [] No  If yes, please explain:  Slightly since wife's death. Encouraged pt to discuss with PCP Signs and Symptoms of Depression Present? [] Yes      [] No  If yes, please explain:  ** Signs and Symptoms of Depression Present? [] Yes      [] No  If yes, please explain:  ** Signs and Symptoms of Depression Present?     [] Yes Literacy  [x] Hearing  [] Cognitive  [x] Vision  [x] Ready to Learn Learning Barriers Addressed:   [] Yes      [] No   Learning Barriers Addressed:   [] Yes      [] No   Learning Barriers Addressed:  [] Yes      [] No Learning Barriers Addressed:  [] Yes      [] No     RISK FACTOR/EDUCATION PLAN RISK FACTOR/EDUCATION PLAN RISK FACTOR/EDUCATION PLAN RISK FACTOR/EDUCATION PLAN RISK FACTOR/EDUCATION PLAN   *Interventions* *Interventions* *Interventions* *Interventions* *Interventions*   Recommended Educational Videos    [x] Overview of The Pritikin Eating Plan  [x] Becoming A Pritikin   [x] Diseases of Our Time-Part 1  [x] Calorie Density  [x] Label Reading-Part 1  [x] Move It! [x] Healthy Minds, Bodies, Hearts  [x] Dining Out-Part 1  [x] Heart Disease Risk Reduction  [x] Metabolic Syndrome & Belly Fat  [x] Facts on Fat  [x] Diseases of Our Times-Part 2  [x] Biology of Weight Control  [x] Biomechanical Limitations  [x] Nurtition Action Plan   Completed Videos/Date      5/12/20  Overview of The Pritikin Eating Plan Completed Videos/Date Completed Videos/Date Recommended Educational Videos Completed    [] Yes      [] No    **If not completed, Why? **          Smoking Cessation/Relaspe Prevention Intervention needed? [] Yes      [x] No   Smoking Cessation/Relapse Prevention Scheduled? [] Yes      [] No  Date:  ** Smoking Cessation/Relapse Prevention completed?    [] Yes      [] No  Date: **    Smoking Cessation Counseling attended  [] Yes      [] No  **If not completed, Why? **   Professional Referrals:  *Please check if needed  [] Diabetes Clinic  [] Lipid Clinic   [] Other:     Professional Referrals:  *Please check if needed  [] Diabetes Clinic  [] Lipid Clinic   [] Other:   Preventative Medication Preventative Medication Preventative Medication Preventative Medication Preventative Medication   Aspirin  [x] Yes    [] No  Blood Thinner: Clopidogrel/Effient/Brillinta  [x] Yes    [] No  Beta Blocker  [x] Yes    [] follows:    [x] Optimal BP <140/90  [] Blood Sugar <120  [x] Attend recommended video education sessions  [x] Takes medications as prescribed 100% of the time   [] ** Patrice's risk factor/education goals are as follows:    [x] Optimal BP <140/90  [] Blood Sugar <120  [x] Attend recommended video education sessions  [x] Takes medications as prescribed 100% of the time   [] ** Patrice achieved risk factor goals?   [] Yes    [] No  If no, why?  **     Monitored telemetry has revealed At- fib   Monitored telemetry has revealed **  [] documented arrhythmia at increasing workloads  [] associated symptoms ** Monitored telemetry has revealed  [] documented arrhythmia at increasing workloads  [] associated symptoms ** Monitored telemetry has revealed **  [] documented arrhythmia at increasing workloads  [] associated symptoms ** Monitored telemetry has revealed **  [] documented arrhythmia at increasing workloads  [] associated symptoms **   Physician Response    [x] Cardiac rehab is reasonably and medically necessary for continuous cardiac monitoring surveillance  of patient's cardiac activity  [x] Initiate continuous telemerty monitoring and notify me with any concerns  [] Other   Physician Response    [x] Cardiac rehab is reasonably and medically necessary for continuous cardiac monitoring surveillance  of patient's cardiac activity  [x] Continue continuous telemerty monitoring and notify me with any concerns  [] Other     Physician Response    [x] Cardiac rehab is reasonably and medically necessary for continuous cardiac monitoring surveillance  of patient's cardiac activity  [x] Continue continuous telemerty monitoring and notify me with any concerns   [] Other     Physician Response    [x] Cardiac rehab is reasonably and medically necessary for continuous cardiac monitoring surveillance  of patient's cardiac activity  [x] Continue continuous telemerty monitoring and notify me with any concerns   [] Other

## 2020-05-13 ENCOUNTER — OFFICE VISIT (OUTPATIENT)
Dept: UROLOGY | Age: 70
End: 2020-05-13
Payer: MEDICARE

## 2020-05-13 VITALS — TEMPERATURE: 94.8 F | HEIGHT: 72 IN | BODY MASS INDEX: 33.72 KG/M2 | WEIGHT: 249 LBS

## 2020-05-13 PROCEDURE — 3017F COLORECTAL CA SCREEN DOC REV: CPT | Performed by: UROLOGY

## 2020-05-13 PROCEDURE — 99214 OFFICE O/P EST MOD 30 MIN: CPT | Performed by: UROLOGY

## 2020-05-13 PROCEDURE — 96402 CHEMO HORMON ANTINEOPL SQ/IM: CPT | Performed by: UROLOGY

## 2020-05-13 PROCEDURE — G8417 CALC BMI ABV UP PARAM F/U: HCPCS | Performed by: UROLOGY

## 2020-05-13 PROCEDURE — 1123F ACP DISCUSS/DSCN MKR DOCD: CPT | Performed by: UROLOGY

## 2020-05-13 PROCEDURE — 96401 CHEMO ANTI-NEOPL SQ/IM: CPT | Performed by: UROLOGY

## 2020-05-13 PROCEDURE — 4040F PNEUMOC VAC/ADMIN/RCVD: CPT | Performed by: UROLOGY

## 2020-05-13 PROCEDURE — 1036F TOBACCO NON-USER: CPT | Performed by: UROLOGY

## 2020-05-13 PROCEDURE — G8427 DOCREV CUR MEDS BY ELIG CLIN: HCPCS | Performed by: UROLOGY

## 2020-05-13 NOTE — PROGRESS NOTES
[de-identified]    (Patient's old records have been requested, reviewed and pertinent findings summarized in today's note.)    Procedures Today: N/A    Last several PSA's:  Lab Results   Component Value Date    PSA <0.02 04/30/2020    PSA <0.02 01/31/2020    PSA <0.02 10/23/2019       Last total testosterone:  Lab Results   Component Value Date    TESTOSTERONE < 3 (L) 11/08/2019       Urinalysis today:  No results found for this visit on 05/13/20. Last BUN and creatinine:  Lab Results   Component Value Date    BUN 18 05/04/2020     Lab Results   Component Value Date    CREATININE 0.8 05/04/2020       Imaging Reviewed during this Office Visit:   Chad Nails MD independently reviewed the images and verified the radiology reports from:    No results found. PAST MEDICAL, FAMILY AND SOCIAL HISTORY:  Past Medical History:   Diagnosis Date    Arthritis     back    BPH (benign prostatic hyperplasia)     CAD (coronary artery disease)     CVA (cerebral vascular accident) ([address] 2010    Disease of blood and blood forming organ     anemia    FH: heart disease     GERD (gastroesophageal reflux disease)     Hyperlipidemia     Hypertension     Obesity     Prostate cancer ([address] 2019     Past Surgical History:   Procedure Laterality Date    CARDIAC VALVE REPLACEMENT  April 2011    [address] [place] [address] (Pt unsure if part of CABG)    CARDIOVASCULAR STRESS TEST  1 05 2011    Cannot exclude slight inferobasal lateral stress-induced ischemia in a relatively small-sized area. EF 68%. Mildly abnormal nuclear scan. Lexiscan test associated with nonspecific symptoms. EKG nondiagnostic with nonspecific ST-T wave changes.  CAROTID ENDARTERECTOMY  2 08 2011    Right carotid endarterectomy with patch angioplasty with convention patch angioplasty.      COLONOSCOPY      CORONARY ARTERY BYPASS GRAFT      DIAGNOSTIC CARDIAC CATH LAB PROCEDURE  3 24 2011    LV end-diastolic pressure was 12 mmHg with no change before and after

## 2020-05-14 ENCOUNTER — OFFICE VISIT (OUTPATIENT)
Dept: FAMILY MEDICINE CLINIC | Age: 70
End: 2020-05-14
Payer: MEDICARE

## 2020-05-14 VITALS
DIASTOLIC BLOOD PRESSURE: 64 MMHG | WEIGHT: 251.6 LBS | RESPIRATION RATE: 16 BRPM | SYSTOLIC BLOOD PRESSURE: 128 MMHG | BODY MASS INDEX: 35.22 KG/M2 | HEART RATE: 72 BPM | TEMPERATURE: 97.3 F | HEIGHT: 71 IN

## 2020-05-14 PROCEDURE — 1111F DSCHRG MED/CURRENT MED MERGE: CPT | Performed by: FAMILY MEDICINE

## 2020-05-14 PROCEDURE — 99495 TRANSJ CARE MGMT MOD F2F 14D: CPT | Performed by: FAMILY MEDICINE

## 2020-05-14 RX ORDER — SULFAMETHOXAZOLE AND TRIMETHOPRIM 800; 160 MG/1; MG/1
1 TABLET ORAL 2 TIMES DAILY
Qty: 20 TABLET | Refills: 0 | Status: SHIPPED | OUTPATIENT
Start: 2020-05-14 | End: 2020-05-24

## 2020-05-14 NOTE — PROGRESS NOTES
Post-Discharge Transitional Care Management Services or Hospital Follow Up      Ashwin Miller   YOB: 1950    Date of Office Visit:  5/14/2020  Date of Hospital Admission: 5/4/20  Date of Hospital Discharge: 5/5/20  Risk of hospital readmission (high >=14%.  Medium >=10%) :Readmission Risk Score: 12      Care management risk score Rising risk (score 2-5) and Complex Care (Scores >=6): 8     Non face to face  following discharge, date last encounter closed (first attempt may have been earlier): 5/7/2020 11:29 AM    Call initiated 2 business days of discharge: Yes    Patient Active Problem List   Diagnosis    Benign prostatic hyperplasia    Hyperlipidemia    CVA (cerebral vascular accident) ([address]    Hypertension    Morbidly obese ([address]    FH: heart disease    Bilateral carotid artery stenosis    Atrial fibrillation ([address]    Coronary artery disease involving coronary bypass graft of native heart without angina pectoris    PVD (peripheral vascular disease) ([address]    Prostate cancer ([address]    Ileus, postoperative ([address]    Abscess of male pelvis ([address]    Decreased range of motion of left lower extremity    Pain, joint, hip, left    Sepsis due to methicillin resistant Staphylococcus aureus (MRSA) ([address]    Angina of effort ([address]    S/P coronary angioplasty       No Known Allergies    Medications listed as ordered at the time of discharge from [facility]   Patrice Hastings   [place] Medication Instructions PRABHJOT:    Printed on:05/14/20 0300   Medication Information                      apixaban (ELIQUIS) 5 MG TABS tablet  Take 1 tablet by mouth 2 times daily             aspirin EC 81 MG EC tablet  Take 1 tablet by mouth daily             atorvastatin (LIPITOR) 20 MG tablet  TAKE 1 TABLET BY MOUTH EVERY DAY             calcium-vitamin D (OSCAL-500) 500-200 MG-UNIT per tablet  Take 1 tablet by mouth daily Indications: 200 mg             Cetirizine HCl (ZYRTEC ALLERGY PO)  Take 1 tablet by mouth ALLERGY PO) Take 1 tablet by mouth daily      apixaban (ELIQUIS) 5 MG TABS tablet Take 1 tablet by mouth 2 times daily 180 tablet 3    Coenzyme Q10 (COQ10 PO) Take 500 mg by mouth daily      Elastic Bandages & Supports (LUMBAR BACK BRACE/SUPPORT PAD) MISC Dx lumbar stenosis 1 each 0    ferrous sulfate 325 (65 FE) MG tablet Take 1 tablet by mouth daily (with breakfast). 30 tablet 11    Krill Oil 500 MG CAPS Take  by mouth daily. Medications patient taking as of now reconciled against medications ordered at time of hospital discharge: Yes    Chief Complaint   Patient presents with    Follow-Up from [address] 5/5/20 heart cath, 2 stents-says site looks fine    Cyst     painful cyst on coccyx    Other     discuss back xrays from 3/2020       History of Present illness - Follow up of Hospital diagnosis(es):    Diagnosis Orders   1. [facility]     2. S/P coronary angioplasty     3. Lumbar radiculopathy           Inpatient course: Discharge summary reviewed- see chart. Interval history/Current status: caridac sx stable, still having lumbar radicular sx    A comprehensive review of systems was negative except for what was noted in the HPI. Vitals:    05/14/20 1251   BP: 128/64   Site: Left Upper Arm   Pulse: 72   Resp: 16   Temp: 97.3 °F (36.3 °C)   TempSrc: Oral   Weight: 251 lb 9.6 oz (114.1 kg)   Height: 5' 11\" (1.803 m)     Body mass index is 35.09 kg/m².    Wt Readings from Last 3 Encounters:   05/14/20 251 lb 9.6 oz (114.1 kg)   05/13/20 249 lb (112.9 kg)   05/12/20 249 lb (112.9 kg)     BP Readings from Last 3 Encounters:   05/14/20 128/64   05/12/20 96/64   05/05/20 111/71        Physical Exam:  General Appearance: alert and oriented to person, place and time, well developed and well- nourished, in no acute distress  Skin: warm and dry, no rash or erythema  Head: normocephalic and atraumatic  Eyes: pupils equal, round, and reactive to light, extraocular eye movements

## 2020-05-15 ENCOUNTER — HOSPITAL ENCOUNTER (OUTPATIENT)
Dept: CARDIAC REHAB | Age: 70
Setting detail: THERAPIES SERIES
Discharge: HOME | End: 2020-05-15
Payer: MEDICARE

## 2020-05-15 ENCOUNTER — APPOINTMENT (OUTPATIENT)
Dept: CARDIAC REHAB | Age: 70
End: 2020-05-15
Payer: MEDICARE

## 2020-05-15 PROCEDURE — G0422 INTENS CARDIAC REHAB W/EXERC: HCPCS

## 2020-05-15 PROCEDURE — G0423 INTENS CARDIAC REHAB NO EXER: HCPCS

## 2020-05-15 NOTE — PROCEDURES
closure  device. He was transferred back to his room in stable condition. Estimated blood loss: 10 cc    SUMMARY:  Successful PCI of left circumflex, mid and distal portions. RECOMMENDATIONS:  1. DAPT for at least one year. 2.  Lipid-lowering therapy. 3.  Aggressive risk factor modifications. 4.  Monitor groin access site for bleeding. 5.  Routine post-PCI care. 6.  Consider cardiac rehab.  7.  Follow up with Dr. Milagro Fisher or myself in one to two weeks to  evaluate symptoms further.         Fiordaliza Kennedy MD    D: 05/14/2020 22:45:10       T: 05/15/2020 0:30:37     MARY [email or web address]  Job#: [id number]     Doc#: [id number]    CC:

## 2020-05-18 ENCOUNTER — APPOINTMENT (OUTPATIENT)
Dept: CARDIAC REHAB | Age: 70
End: 2020-05-18
Payer: MEDICARE

## 2020-05-18 ENCOUNTER — HOSPITAL ENCOUNTER (OUTPATIENT)
Dept: CARDIAC REHAB | Age: 70
Setting detail: THERAPIES SERIES
Discharge: HOME | End: 2020-05-18
Payer: MEDICARE

## 2020-05-18 PROCEDURE — G0422 INTENS CARDIAC REHAB W/EXERC: HCPCS

## 2020-05-18 PROCEDURE — G0423 INTENS CARDIAC REHAB NO EXER: HCPCS

## 2020-05-18 NOTE — PROGRESS NOTES
Video Education Report - ICR/CR    Name:  Maik Valentino     Date:  5/18/2020  MRN: [id number]     Session #:  3  Session Length: 45 min    Recommended Videos        []01 [employer] - Program Overview   34:22    []02 Overview of Pritikin Eating Plan   34:10    []03 Becoming a Tyrone Churchman   33:08     [x]04 Diseases of Our Time - Part 1   34:22    []05 Calorie Density     33:39   []06 Label Reading - Part 1    32:15   []07 Move it      32.54   []08 Healthy Minds, Bodies, Hearts   32:14   []09 Dining Out - Part 1    32:28   []10 Heart Disease Risk Reduction   68:86   []11 Metabolic Syndrome and Belly Fat  31:52   []12 Facts on Fat     35:29   []13 Diseases of Our Time - Part 2   33:07   []14 Biology of Weight Control   32:36   []15 Biomechanical Limitations   35:20   []16 Nutrition Action Plan    34:23   Comments:  Video completed,

## 2020-05-20 ENCOUNTER — APPOINTMENT (OUTPATIENT)
Dept: CARDIAC REHAB | Age: 70
End: 2020-05-20
Payer: MEDICARE

## 2020-05-20 ENCOUNTER — HOSPITAL ENCOUNTER (OUTPATIENT)
Dept: CARDIAC REHAB | Age: 70
Setting detail: THERAPIES SERIES
Discharge: HOME | End: 2020-05-20
Payer: MEDICARE

## 2020-05-20 PROCEDURE — G0422 INTENS CARDIAC REHAB W/EXERC: HCPCS

## 2020-05-20 PROCEDURE — G0423 INTENS CARDIAC REHAB NO EXER: HCPCS

## 2020-05-20 NOTE — PROGRESS NOTES
Hospital Facility-Based Program  Phase 2 Cardiac Rehab Weekly Progress Report      Patient prescribed exercise:  2:00 class. 3 times per week in rehab, 1-4 times per week at home for the amount of sessions/weeks specified by insurance. Current Levels: Treadmill: 2. 2mph/0% for 8 minutes, Schwinn Airdyne: 40 Wattsfor 8 minutes x 2,  UBE: 20 Tse for 5 minutes. Progression Discussion: Maintain/Increase Aerobic exercise 10 minutes to work on endurance. Attempt to increase intensity by 5-20% for each modality this week. Try to increase intensities until Patrice rates the exercises a 13-17 on Ariane RPE.

## 2020-05-22 ENCOUNTER — APPOINTMENT (OUTPATIENT)
Dept: CARDIAC REHAB | Age: 70
End: 2020-05-22
Payer: MEDICARE

## 2020-05-22 ENCOUNTER — HOSPITAL ENCOUNTER (OUTPATIENT)
Dept: CARDIAC REHAB | Age: 70
Setting detail: THERAPIES SERIES
Discharge: HOME | End: 2020-05-22
Payer: MEDICARE

## 2020-05-22 PROCEDURE — G0422 INTENS CARDIAC REHAB W/EXERC: HCPCS

## 2020-05-22 PROCEDURE — G0423 INTENS CARDIAC REHAB NO EXER: HCPCS

## 2020-05-25 ENCOUNTER — APPOINTMENT (OUTPATIENT)
Dept: CARDIAC REHAB | Age: 70
End: 2020-05-25
Payer: MEDICARE

## 2020-05-25 ENCOUNTER — HOSPITAL ENCOUNTER (OUTPATIENT)
Dept: CARDIAC REHAB | Age: 70
Setting detail: THERAPIES SERIES
End: 2020-05-25
Payer: MEDICARE

## 2020-05-27 ENCOUNTER — HOSPITAL ENCOUNTER (OUTPATIENT)
Dept: CARDIAC REHAB | Age: 70
Setting detail: THERAPIES SERIES
Discharge: HOME | End: 2020-05-27
Payer: MEDICARE

## 2020-05-27 ENCOUNTER — APPOINTMENT (OUTPATIENT)
Dept: CARDIAC REHAB | Age: 70
End: 2020-05-27
Payer: MEDICARE

## 2020-05-27 PROCEDURE — G0422 INTENS CARDIAC REHAB W/EXERC: HCPCS

## 2020-05-27 PROCEDURE — G0423 INTENS CARDIAC REHAB NO EXER: HCPCS

## 2020-05-27 NOTE — PROGRESS NOTES
Hospital Facility-Based Program  Phase 2 Cardiac Rehab Weekly Progress Report      Patient prescribed exercise:  2:00 class. 3 times per week in rehab, 1-4 times per week at home for the amount of sessions/weeks specified by insurance. Current Levels: Treadmill: 2. 4mph/0% for 10 minutes, Schwinn Airdyne: Level   for   minutes, NuStep:  43 Tse for 10 minutes, UBE: Level 26W for 5 minutes. Progression Discussion: Maintain/Increase Aerobic exercise 4 minutes to work on endurance. Attempt to increase intensity by 5-20% for each modality this week. Try to increase intensities until Patrice rates the exercises a 13-17 on Ariane RPE.

## 2020-05-29 ENCOUNTER — APPOINTMENT (OUTPATIENT)
Dept: CARDIAC REHAB | Age: 70
End: 2020-05-29
Payer: MEDICARE

## 2020-05-29 ENCOUNTER — HOSPITAL ENCOUNTER (OUTPATIENT)
Dept: CARDIAC REHAB | Age: 70
Setting detail: THERAPIES SERIES
Discharge: HOME | End: 2020-05-29
Payer: MEDICARE

## 2020-05-29 PROCEDURE — G0422 INTENS CARDIAC REHAB W/EXERC: HCPCS

## 2020-05-29 PROCEDURE — G0423 INTENS CARDIAC REHAB NO EXER: HCPCS

## 2020-05-29 NOTE — PROGRESS NOTES
Video Education Report - ICR/CR    Name:  Davey Leal     Date: 5/29/20 5/29/2020  MRN: [id number]     Session #:  7  Session Length: 55 min    Recommended Videos        []01 [employer] - Program Overview   34:22    []02 Overview of Pritikin Eating Plan   34:10    []03 Becoming a Verla Berliner   33:08     []04 Diseases of Our Time - Part 1   34:22    []05 Calorie Density     33:39   []06 Label Reading - Part 1    32:15   []07 Move it      32.54   [x]08 Healthy Minds, Bodies, Hearts   32:14   []09 Dining Out - Part 1    32:28   []10 Heart Disease Risk Reduction   52:92   []14 Metabolic Syndrome and Belly Fat  31:52   []12 Facts on Fat     35:29   []13 Diseases of Our Time - Part 2   33:07   []14 Biology of Weight Control   32:36   []15 Biomechanical Limitations   35:20   []16 Nutrition Action Plan    34:23    Additional Videos         []17 Hypertension & Heart Disease   32:39        []18 Cooking Breakfasts and Snacks  32:00   []19 Planning Your Eating Strategy   33:30   []20 Label Reading - Part 2    32:36  []21 Cooking Soups and Desserts   31:41  []22 How Our Thoughts Can Heal Our Hearts 33:05  []23 Targeting Your Nutrition Priorities  33:58  []24 Healthy Salads & Dressings   35:32  []25 Dining Out - Part 2    32:35  []26 Cooking Dinner and Sides   35:06  []27 Sleep Disorders     33:14  []28 Menu Workshop     32:06  []29 Decoding Lab Results    32:42     []30 Vitamins and Minerals    32:54  []31 Exercise Action Plan    32:26  []32 Body Composition    34:03  []33 Improving Performance    32:13  []34 Fueling a Healthy Body    31:32  []35 Introduction to Yoga    33:47  []36 Aging-Enhancing the Quality of Your Life 33:22  []37 Smoking Cessation    36:19    Comments:  Video completed, teach back method utilized.   Nice dialogue with patient

## 2020-06-01 ENCOUNTER — HOSPITAL ENCOUNTER (OUTPATIENT)
Dept: CARDIAC REHAB | Age: 70
Setting detail: THERAPIES SERIES
Discharge: HOME | End: 2020-06-01
Payer: MEDICARE

## 2020-06-01 ENCOUNTER — TELEPHONE (OUTPATIENT)
Dept: FAMILY MEDICINE CLINIC | Age: 70
End: 2020-06-01

## 2020-06-01 ENCOUNTER — APPOINTMENT (OUTPATIENT)
Dept: CARDIAC REHAB | Age: 70
End: 2020-06-01
Payer: MEDICARE

## 2020-06-01 PROCEDURE — G0423 INTENS CARDIAC REHAB NO EXER: HCPCS

## 2020-06-01 PROCEDURE — G0422 INTENS CARDIAC REHAB W/EXERC: HCPCS

## 2020-06-01 NOTE — TELEPHONE ENCOUNTER
Daughter calling asking if father can be referred to [facility] for chronic back pain.  Fax [phone]

## 2020-06-01 NOTE — PROGRESS NOTES
Hospital Facility-Based Program  Phase 2 Cardiac Rehab Weekly Progress Report      Patient prescribed exercise:  2:00 class. 3 times per week in rehab, 1-4 times per week at home for the amount of sessions/weeks specified by insurance. Current Levels: Treadmill: 2. 4mph/1% for 12 minutes,  NuStep:  50 Tse for 12 minutes,     Progression Discussion: Maintain/Increase Aerobic exercise 15 minutes to work on endurance. Attempt to increase intensity by 5-20% for each modality this week. Try to increase intensities until Patrice rates the exercises a 13-17 on Ariane RPE.

## 2020-06-01 NOTE — PROGRESS NOTES
Video Education Report - ICR/CR    Name:  Mily Leonard     Date:  6/1/2020  MRN: [id number]     Session #:  8  Session Length: 55 min    Recommended Videos        []01 [employer] - Program Overview   34:22    []02 Overview of Pritikin Eating Plan   34:10    []03 Becoming a Nolia Bowendydon   33:08     []04 Diseases of Our Time - Part 1   34:22    []05 Calorie Density     33:39   []06 Label Reading - Part 1    32:15   []07 Move it      32.54   []08 Healthy Minds, Bodies, Hearts   32:14   [x]09 Dining Out - Part 1    32:28   []10 Heart Disease Risk Reduction   98:92   []83 Metabolic Syndrome and Belly Fat  31:52   []12 Facts on Fat     35:29   []13 Diseases of Our Time - Part 2   33:07   []14 Biology of Weight Control   32:36   []15 Biomechanical Limitations   35:20   []16 Nutrition Action Plan    34:23    Additional Videos         []17 Hypertension & Heart Disease   32:39        []18 Cooking Breakfasts and Snacks  32:00   []19 Planning Your Eating Strategy   33:30   []20 Label Reading - Part 2    32:36  []21 Cooking Soups and Desserts   31:41  []22 How Our Thoughts Can Heal Our Hearts 33:05  []23 Targeting Your Nutrition Priorities  33:58  []24 Healthy Salads & Dressings   35:32  []25 Dining Out - Part 2    32:35  []26 Cooking Dinner and Sides   35:06  []27 Sleep Disorders     33:14  []28 Menu Workshop     32:06  []29 Decoding Lab Results    32:42     []30 Vitamins and Minerals    32:54  []31 Exercise Action Plan    32:26  []32 Body Composition    34:03  []33 Improving Performance    32:13  []34 Fueling a Healthy Body    31:32  []35 Introduction to Yoga    33:47  []36 Aging-Enhancing the Quality of Your Life 33:22  []37 Smoking Cessation    36:19    Comments:  Video completed, teach back method also utilized, patient states he does not eat out much and has no questions or concerns

## 2020-06-03 ENCOUNTER — HOSPITAL ENCOUNTER (OUTPATIENT)
Dept: CARDIAC REHAB | Age: 70
Setting detail: THERAPIES SERIES
Discharge: HOME | End: 2020-06-03
Payer: MEDICARE

## 2020-06-03 ENCOUNTER — APPOINTMENT (OUTPATIENT)
Dept: CARDIAC REHAB | Age: 70
End: 2020-06-03
Payer: MEDICARE

## 2020-06-03 PROCEDURE — G0423 INTENS CARDIAC REHAB NO EXER: HCPCS

## 2020-06-03 PROCEDURE — G0422 INTENS CARDIAC REHAB W/EXERC: HCPCS

## 2020-06-03 NOTE — PROGRESS NOTES
Video Education Report - ICR/CR    Name:  Madison Agrawal     Date:  6/3/2020  MRN: [id number]     Session #:  9  Session Length: 62 min    Recommended Videos        []01 [employer] - Program Overview   34:22    []02 Overview of [employer] Eating Plan   34:10    []03 Becoming a Laverda Sheridan   33:08     []04 Diseases of Our Time - Part 1   34:22    []05 Calorie Density     33:39   []06 Label Reading - Part 1    32:15   [x]07 Move it      32.54   []08 Healthy Minds, Bodies, Hearts   32:14   []09 Dining Out - Part 1    32:28   []10 Heart Disease Risk Reduction   65:50   []79 Metabolic Syndrome and Belly Fat  31:52   []12 Facts on Fat     35:29   []13 Diseases of Our Time - Part 2   33:07   []14 Biology of Weight Control   32:36   []15 Biomechanical Limitations   35:20   []16 Nutrition Action Plan    34:23    Additional Videos         []17 Hypertension & Heart Disease   32:39        []18 Cooking Breakfasts and Snacks  32:00   []19 Planning Your Eating Strategy   33:30   []20 Label Reading - Part 2    32:36  []21 Cooking Soups and Desserts   31:41  []22 How Our Thoughts Can Heal Our Hearts 33:05  []23 Targeting Your Nutrition Priorities  33:58  []24 Healthy Salads & Dressings   35:32  []25 Dining Out - Part 2    32:35  []26 Cooking Dinner and Sides   35:06  []27 Sleep Disorders     33:14  []28 Menu Workshop     32:06  []29 Decoding Lab Results    32:42     []30 Vitamins and Minerals    32:54  []31 Exercise Action Plan    32:26  []32 Body Composition    34:03  []33 Improving Performance    32:13  []34 Fueling a Healthy Body    31:32  []35 Introduction to Yoga    33:47  []36 Aging-Enhancing the Quality of Your Life 33:22  []37 Smoking Cessation    36:19    Comments:  Video completed,teach back method utilized

## 2020-06-05 ENCOUNTER — APPOINTMENT (OUTPATIENT)
Dept: CARDIAC REHAB | Age: 70
End: 2020-06-05
Payer: MEDICARE

## 2020-06-05 ENCOUNTER — HOSPITAL ENCOUNTER (OUTPATIENT)
Dept: CARDIAC REHAB | Age: 70
Setting detail: THERAPIES SERIES
Discharge: HOME | End: 2020-06-05
Payer: MEDICARE

## 2020-06-05 PROCEDURE — G0422 INTENS CARDIAC REHAB W/EXERC: HCPCS

## 2020-06-05 PROCEDURE — G0423 INTENS CARDIAC REHAB NO EXER: HCPCS

## 2020-06-08 ENCOUNTER — APPOINTMENT (OUTPATIENT)
Dept: CARDIAC REHAB | Age: 70
End: 2020-06-08
Payer: MEDICARE

## 2020-06-08 ENCOUNTER — HOSPITAL ENCOUNTER (OUTPATIENT)
Dept: CARDIAC REHAB | Age: 70
Setting detail: THERAPIES SERIES
Discharge: HOME | End: 2020-06-08
Payer: MEDICARE

## 2020-06-08 PROCEDURE — G0423 INTENS CARDIAC REHAB NO EXER: HCPCS

## 2020-06-08 PROCEDURE — G0422 INTENS CARDIAC REHAB W/EXERC: HCPCS

## 2020-06-08 NOTE — PLAN OF CARE
Contemplation  [] Contemplation  [] Preparation  [] Action  [] Maintenance  [] Relapse   EXERCISE ASSESSMENT EXERCISE ASSESSMENT EXERCISE ASSESSMENT EXERCISE ASSESSMENT EXERCISE ASSESSMENT   6 Min Walk Test  Distance walked:   0.18 miles  950 ft.  2.4 METs  Max HR:129 BPM      RPE:  12    THR:  120-133  Rhythm:  At- fib    6 Min Walk Test  Distance walked:   ** miles  ** ft  ** METs  Max HR:** BPM      RPE:  **  %Change ft= **    Rhythm:  **   DASI: 5.1 METs DASI: 8.3 METs DASI: ** METs DASI: ** METs DASI: ** METs   Return to Work  [employer] on returning to work? [] Yes              [] No   [] Disabled     [x] Retired     Return to work:  na Return to work:  na Return to work:  na Return to work:  [address] [place] [address] Limitations/  [x] Yes    [] No  If yes please list:  Hip pain     Orthopedic Limitations  *If patient has orthopedic issue:   Actions/  accomodations needed to make [place] successful : slower speeds on TM, now doing mainly NS Orthopedic Limitations   Orthopedic Limitations   Orthopedic Limitations     Fall Risk  Fall risk assessed? [x] Yes      [] No    Balance Issues? [] Yes      [x] No     [] Walker [] Cane    [x] Safety issues reviewed      Fall Risk  *If patient is a fall risk, action needed to accommodate:  Fall Risk Fall Risk Fall Risk   Home Exercise  [] Yes    [x] No   Home Exercise  [x] Yes    [] No  Type: walking  Frequency: 3d/w  Duration: 30-45 min Home Exercise  [] Yes    [] No  Type: **  Frequency: **  Duration: ** Home Exercise  [] Yes    [] No  Type: **  Frequency: **  Duration: ** Home Exercise  [] Yes    [] No  Type: **  Frequency: **  Duration: **   Angina with Activity? [] Yes    [x] No  Angina Management: na Angina with Activity? [] Yes    [x] No  Angina Management: ** Angina with Activity? [] Yes    [] No  Angina Management: ** Angina with Activity? [] Yes    [] No  Angina Management: ** Angina with Activity?   [] Yes    [] No  Angina Management: **   EXERCISE PLAN EXERCISE PLAN EXERCISE PLAN EXERCISE PLAN EXERCISE PLAN   *Interventions* *Interventions* *Interventions* *Interventions* *Interventions*   Exercise Prescription  (per physician & CR staff) Exercise Prescription  (per physician & CR staff) Exercise Prescription  (per physician & CR staff) Exercise Prescription  (per physician & CR staff) Exercise Prescription  (per physician & CR staff)   Cardiovascular Cardiovascular Cardiovascular Cardiovascular Cardiovascular   Mode:    [x] Treadmill (TM)  [x] Schwinn Airdyne (AD)  [x] Arms Ergometer (AE)  [x] NuStep  [] Elliptical (E) MODE:    [] Treadmill (TM)  [] Schwinn Airdyne (AD)  [x] Arms Ergometer (AE)  [x] NuStep  [] Elliptical (E) MODE:    [] Treadmill (TM)  [] Schwinn Airdyne (AD)  [] Arms Ergometer (AE)  [] NuStep  [] Elliptical (E) MODE:    [] Treadmill (TM)  [] Schwinn Airdyne (AD)  [] Arms Ergometer (AE)  [] NuStep  [] Elliptical (E) MODE:    [] Treadmill (TM)  [] Schwinn Airdyne (AD)  [] Arms Ergometer (AE)  [] NuStep  [] Elliptical (E)   Initial Workloads  TM: [email or web address] 2.6 METs  AD: 0.7 level = 2.4 METs  NS: 44  Tse= 2.4 METs  AE: 0.4 level = 1.8 METs Current Workloads  TM:  @ %=  METs  AD:  level =  METs  NS: 56-60 Tse= 2.5  METs  AE: 38W level Current Workloads  TM:  @ %=  METs  AD:  level =  METs  NS:   Tse=  METs  AE:  level =  METs Current Workloads  TM:  @ %=  METs  AD:  level =  METs  NS:   Tse=  METs  AE:  level =  METs Current Workloads  TM:  @ %=  METs  AD:  level =  METs  NS:   Tse=  METs  AE:  level =  METs     Frequency:    ICR: 3x/week  Home: 2-3x/wk Frequency:   ICR: 3x/week  Home: 3x/wk Frequency:  ICR: 3x/week  Home: 3-4x/wk Frequency:  ICR: 3x/week  Home: 3-4x/wk Frequency:  Vera Butt will continue exercise at  5-7 days/week   Duration:   Total aerobic exercise = 30-45 min    5-8 min/mode Duration:  Total aerobic exercises = 35 min     5-15 min/mode Duration:  Total aerobic exercises = ** min     **min/mode Duration:  Total aerobic exercises = ** min     **min/mode Duration:  Total erobic exercise =  60-90 min   Intensity:   MET Level = 2.4-2.6  RPE = 12-15 Intensity:  Max MET Level = 2.5  RPE = 12-15 Intensity:  Max MET Level = **  RPE = 12-15 Intensity:  Max MET Level = **  RPE = 12-15 Intensity:  Max MET Level = ** RPE = 12-15   Progression: increase aerobic activity up to 15 min over next 4 weeks by increasing time 2-3 min/week. Progression: Increase intensity by 5-10% each week   Progression:   Progression: Progression:  Increase time/intensity when RPE <13, and HR is in [facility]   [x] Yes      [] No  Upper and Lower body strength training 2x/wk    Wt: 2#       Reps:  8-15    *Increase wt. after completing 15 reps with an RPE of <12/13. [] Yes      [] No  -Have not started yet    Upper and Lower body strength training 2x/wk    Wt: **#       Reps:  8-15    *Increase wt. after completing 15 reps with an RPE of <12/13. [] Yes      [] No  Upper and Lower body strength training 2x/wk    Wt: **#       Reps:  8-15    *Increase wt. after completing 15 reps with an RPE of <12/13. [] Yes      [] No  Upper and Lower body strength training 2x/wk    Wt: **#       Reps:  8-15    *Increase wt. after completing 15 reps with an RPE of <12/13. Continue Strength Training at home   [] Exercise Log & Strength training handout given     Wt: **#       Reps:  8-15    *Increase wt. after completing 15 reps with an RPE of <12/13.    Flexibility Flexibility Flexibility Flexibility Flexibility   [x] Yes      [] No  25 min session of Core Strength & Flexibility 1x/per week  Attends Core Strength & Flexibility   [] Yes      [] No  --Have not started yet   Attends Core Strength & Flexibility   [] Yes      [] No Attends Core Strength & Flexibility   [] Yes      [] No Continue Core Strength & Flexibility at home   Home Exercise  *Patrice verbalizes planning to walk 3-4 days/week for 5-10 min on *Goals* *Goals* *Goals* *Goals* *Goals*   Patrice's psychosocial goals are as follows:  Complete HADS & Martha & Odilia, Quality of Life Index, Cardiac Version IV Alicia psychosocial goals are as follows:  [x] Attend Healthy Mind-Set Workshops  [x] Reduce depression symptom severity by 1 level  [] ** PatriceSharris psychosocial goals are as follows:  [] Attend Healthy Mind-Set Workshops  [] Reduce depression symptom severity by 1 level  [] ** PatriceSharris psychosocial goals are as follows:  [] Attend Healthy Mind-Set Workshops  [] Reduce depression symptom severity by 1 level  [] ** Patrice achieved psychosocial goals?   [] Yes    [] No  If no, why?  **  [] Use methods learned to continue to reduce depression symptom severity by 1 level  [] **     Individual Cardiac Treatment Plan - Other:  Risk Factor/Education  RISK FACTOR  ASSESSMENT/PLAN RISK FACTOR  REASSESSMENT  RISK FACTOR  REASSESSMENT RISK FACTOR  REASSESSMENT RISK FACTOR   DISCHARGE/FOLLOW-UP   Stages of Change Stages of Change Stages of Change Stages of Change Stages of Change   [] Pre Contemplation  [x] Contemplation  [] Preparation  [] Action  [] Maintenance  [] Relapse [] Pre Contemplation  [] Contemplation  [x] Preparation  [] Action  [] Maintenance  [] Relapse [] Pre Contemplation  [] Contemplation  [] Preparation  [] Action  [] Maintenance  [] Relapse [] Pre Contemplation  [] Contemplation  [] Preparation  [] Action  [] Maintenance  [] Relapse [] Pre Contemplation  [] Contemplation  [] Preparation  [] Action  [] Maintenance  [] Relapse   RISK FACTOR/EDUCATION ASSESSMENT RISK FACTOR/EDUCATION ASSESSMENT RISK FACTOR/EDUCATION ASSESSMENT RISK FACTOR/EDUCATION ASSESSMENT RISK FACTOR /EDUCATION ASSESSMENT   Hypertension  [x] Yes      [] No    Resting BP: 96/64  Peak Ex BP:142/82  Medication: metoprolol   Hypertension  Resting BP: 116/66  Peak Ex BP:142/62  Medication Changes:  [] Yes      [x] No Hypertension  Resting BP: **  Peak Ex BP:**  Medication medications as prescribed 100% of the time   [] ** Patrice's risk factor/education goals are as follows:    [x] Optimal BP <140/90  [] Blood Sugar <120  [x] Attend recommended video education sessions  [x] Takes medications as prescribed 100% of the time   [] ** Patrice achieved risk factor goals?   [] Yes    [] No  If no, why?  **     Monitored telemetry has revealed At- fib   Monitored telemetry has revealed A-fib  [] documented arrhythmia at increasing workloads  [] associated symptoms ** Monitored telemetry has revealed  [] documented arrhythmia at increasing workloads  [] associated symptoms ** Monitored telemetry has revealed **  [] documented arrhythmia at increasing workloads  [] associated symptoms ** Monitored telemetry has revealed **  [] documented arrhythmia at increasing workloads  [] associated symptoms **   Physician Response    [x] Cardiac rehab is reasonably and medically necessary for continuous cardiac monitoring surveillance  of patient's cardiac activity  [x] Initiate continuous telemerty monitoring and notify me with any concerns  [] Other   Physician Response    [x] Cardiac rehab is reasonably and medically necessary for continuous cardiac monitoring surveillance  of patient's cardiac activity  [x] Continue continuous telemerty monitoring and notify me with any concerns  [] Other     Physician Response    [x] Cardiac rehab is reasonably and medically necessary for continuous cardiac monitoring surveillance  of patient's cardiac activity  [x] Continue continuous telemerty monitoring and notify me with any concerns   [] Other     Physician Response    [x] Cardiac rehab is reasonably and medically necessary for continuous cardiac monitoring surveillance  of patient's cardiac activity  [x] Continue continuous telemerty monitoring and notify me with any concerns   [] Other          Date/Time User Provider Type Action   5/14/2020  7:48 AM Hamilton Acosta MD Physician Cosign   5/12/2020  9:19 AM Carlos Matta

## 2020-06-09 ENCOUNTER — OFFICE VISIT (OUTPATIENT)
Dept: CARDIOLOGY CLINIC | Age: 70
End: 2020-06-09
Payer: MEDICARE

## 2020-06-09 VITALS
DIASTOLIC BLOOD PRESSURE: 76 MMHG | WEIGHT: 248.2 LBS | BODY MASS INDEX: 33.62 KG/M2 | HEIGHT: 72 IN | HEART RATE: 74 BPM | SYSTOLIC BLOOD PRESSURE: 130 MMHG

## 2020-06-09 PROCEDURE — 1123F ACP DISCUSS/DSCN MKR DOCD: CPT | Performed by: NURSE PRACTITIONER

## 2020-06-09 PROCEDURE — 1036F TOBACCO NON-USER: CPT | Performed by: NURSE PRACTITIONER

## 2020-06-09 PROCEDURE — 99213 OFFICE O/P EST LOW 20 MIN: CPT | Performed by: NURSE PRACTITIONER

## 2020-06-09 PROCEDURE — 93000 ELECTROCARDIOGRAM COMPLETE: CPT | Performed by: NURSE PRACTITIONER

## 2020-06-09 PROCEDURE — G8417 CALC BMI ABV UP PARAM F/U: HCPCS | Performed by: NURSE PRACTITIONER

## 2020-06-09 PROCEDURE — G8427 DOCREV CUR MEDS BY ELIG CLIN: HCPCS | Performed by: NURSE PRACTITIONER

## 2020-06-09 PROCEDURE — 3017F COLORECTAL CA SCREEN DOC REV: CPT | Performed by: NURSE PRACTITIONER

## 2020-06-09 PROCEDURE — 4040F PNEUMOC VAC/ADMIN/RCVD: CPT | Performed by: NURSE PRACTITIONER

## 2020-06-09 NOTE — PROGRESS NOTES
abused: None     Forced sexual activity: None   Other Topics Concern    None   Social History Narrative    None       Family History   Problem Relation Age of Onset    Heart Disease Mother     Diabetes Mother     High Blood Pressure Mother     Heart Disease Father     High Blood Pressure Father     High Blood Pressure Sister        Blood pressure 130/76, pulse 74, height 6' (1.829 m), weight 248 lb 3.2 oz (112.6 kg). General:   Well developed, well nourished  Lungs:   Clear to auscultation  Heart:    Irregular, No murmur, rubs, or gallops  Abdomen:   Soft, non tender, no organomegalies, positive bowel sounds  Extremities:   No edema, no cyanosis, good peripheral pulses  Neurological:   Awake, alert, oriented. No obvious focal deficits  Musculoskeletal:  No obvious deformities    EKG: AFB, cvr  LHC: 6/4/20  HEMODYNAMIC RESULTS AND LEFT VENTRICULOGRAM:  Left ventricular end  diastolic pressure was 16 mmHg with no significant change before and  after contrast injection. No significant gradient across the aortic  valve to signify aortic stenosis. Left ventricular function was  globally within lower limits, EF 50%.     NATIVE CORONARY ARTERIES:  1. Left main is patent, gives rise to LAD and left circumflex. 2.  LAD has diffuse nonobstructive disease proximally and then pretty  much totally occluded in the mid segment. 3.  Left circumflex artery is a large size codominant vessel, has a mid  80% stenosis, hazy distal 90% stenosis at the bifurcation of the  nondominant PDA. 4.  Right coronary artery is codominant and is pretty much patent.     BYPASS VISUALIZATION:  VARGAS to LAD is patent and the right internal  mammary artery comes off of the LIMA which is a free graft and it hooks  to a small distal obtuse marginal branch which is also patent. The  native right internal mammary artery was injected and it does show that  it was harvested and used separately.     CONCLUSION:  1. Native vessel disease.   2.

## 2020-06-10 ENCOUNTER — APPOINTMENT (OUTPATIENT)
Dept: CARDIAC REHAB | Age: 70
End: 2020-06-10
Payer: MEDICARE

## 2020-06-10 ENCOUNTER — HOSPITAL ENCOUNTER (OUTPATIENT)
Dept: CARDIAC REHAB | Age: 70
Setting detail: THERAPIES SERIES
Discharge: HOME | End: 2020-06-10
Payer: MEDICARE

## 2020-06-10 PROCEDURE — G0423 INTENS CARDIAC REHAB NO EXER: HCPCS

## 2020-06-10 PROCEDURE — G0422 INTENS CARDIAC REHAB W/EXERC: HCPCS

## 2020-06-12 ENCOUNTER — HOSPITAL ENCOUNTER (OUTPATIENT)
Dept: CARDIAC REHAB | Age: 70
Setting detail: THERAPIES SERIES
Discharge: HOME | End: 2020-06-12
Payer: MEDICARE

## 2020-06-12 ENCOUNTER — APPOINTMENT (OUTPATIENT)
Dept: CARDIAC REHAB | Age: 70
End: 2020-06-12
Payer: MEDICARE

## 2020-06-12 ENCOUNTER — HOSPITAL ENCOUNTER (OUTPATIENT)
Dept: CARDIAC REHAB | Age: 70
Setting detail: THERAPIES SERIES
End: 2020-06-12
Payer: MEDICARE

## 2020-06-12 PROCEDURE — G0423 INTENS CARDIAC REHAB NO EXER: HCPCS

## 2020-06-12 PROCEDURE — G0422 INTENS CARDIAC REHAB W/EXERC: HCPCS

## 2020-06-15 ENCOUNTER — HOSPITAL ENCOUNTER (OUTPATIENT)
Dept: CARDIAC REHAB | Age: 70
Setting detail: THERAPIES SERIES
Discharge: HOME | End: 2020-06-15
Payer: MEDICARE

## 2020-06-15 ENCOUNTER — APPOINTMENT (OUTPATIENT)
Dept: CARDIAC REHAB | Age: 70
End: 2020-06-15
Payer: MEDICARE

## 2020-06-15 PROCEDURE — G0422 INTENS CARDIAC REHAB W/EXERC: HCPCS

## 2020-06-15 PROCEDURE — G0423 INTENS CARDIAC REHAB NO EXER: HCPCS

## 2020-06-16 ENCOUNTER — TELEPHONE (OUTPATIENT)
Dept: FAMILY MEDICINE CLINIC | Age: 70
End: 2020-06-16

## 2020-06-17 ENCOUNTER — HOSPITAL ENCOUNTER (OUTPATIENT)
Dept: CARDIAC REHAB | Age: 70
Setting detail: THERAPIES SERIES
Discharge: HOME | End: 2020-06-17
Payer: MEDICARE

## 2020-06-17 ENCOUNTER — APPOINTMENT (OUTPATIENT)
Dept: CARDIAC REHAB | Age: 70
End: 2020-06-17
Payer: MEDICARE

## 2020-06-17 PROCEDURE — G0423 INTENS CARDIAC REHAB NO EXER: HCPCS

## 2020-06-17 PROCEDURE — G0422 INTENS CARDIAC REHAB W/EXERC: HCPCS

## 2020-06-17 NOTE — PROGRESS NOTES
Video Education Report - ICR/CR    Name:  Dareen Severe     Date:  6/17/2020  MRN: [id number]     Session #:    Session Length: 40 min    Recommended Videos        []01 [employer] - Program Overview   34:22    []02 Overview of [employer] Eating Plan   34:10    []03 Becoming a Dayan Eulalio   33:08     []04 Diseases of Our Time - Part 1   34:22    []05 Calorie Density     33:39   []06 Label Reading - Part 1    32:15   []07 Move it      32.54   []08 Healthy Minds, Bodies, Hearts   32:14   []09 Dining Out - Part 1    32:28   []10 Heart Disease Risk Reduction   03:37   []84 Metabolic Syndrome and Belly Fat  31:52   []12 Facts on Fat     35:29   []13 Diseases of Our Time - Part 2   33:07   []14 Biology of Weight Control   32:36   []15 Biomechanical Limitations   35:20   []16 Nutrition Action Plan    34:23    Additional Videos         []17 Hypertension & Heart Disease   32:39        []18 Cooking Breakfasts and Snacks  32:00   []19 Planning Your Eating Strategy   33:30   []20 Label Reading - Part 2    32:36  []21 Cooking Soups and Desserts   31:41  []22 How Our Thoughts Can Heal Our Hearts 33:05  []23 Targeting Your Nutrition Priorities  33:58  []24 Healthy Salads & Dressings   35:32  []25 Dining Out - Part 2    32:35  []26 Cooking Dinner and Sides   35:06  []27 Sleep Disorders     33:14  [x]28 Menu Workshop     32:06  []29 Decoding Lab Results    32:42     []30 Vitamins and Minerals    32:54  []31 Exercise Action Plan    32:26  []32 Body Composition    34:03  []33 Improving Performance    32:13  []34 Fueling a Healthy Body    31:32  []35 Introduction to Yoga    33:47  []36 Aging-Enhancing the Quality of Your Life 33:22  []37 Smoking Cessation    36:19    Comments:  Video completed,

## 2020-06-19 ENCOUNTER — APPOINTMENT (OUTPATIENT)
Dept: CARDIAC REHAB | Age: 70
End: 2020-06-19
Payer: MEDICARE

## 2020-06-19 ENCOUNTER — HOSPITAL ENCOUNTER (OUTPATIENT)
Dept: CARDIAC REHAB | Age: 70
Setting detail: THERAPIES SERIES
Discharge: HOME | End: 2020-06-19
Payer: MEDICARE

## 2020-06-19 PROCEDURE — G0423 INTENS CARDIAC REHAB NO EXER: HCPCS

## 2020-06-19 PROCEDURE — G0422 INTENS CARDIAC REHAB W/EXERC: HCPCS

## 2020-06-22 ENCOUNTER — OFFICE VISIT (OUTPATIENT)
Dept: FAMILY MEDICINE CLINIC | Age: 70
End: 2020-06-22
Payer: MEDICARE

## 2020-06-22 ENCOUNTER — HOSPITAL ENCOUNTER (OUTPATIENT)
Dept: CARDIAC REHAB | Age: 70
Setting detail: THERAPIES SERIES
Discharge: HOME | End: 2020-06-22
Payer: MEDICARE

## 2020-06-22 ENCOUNTER — APPOINTMENT (OUTPATIENT)
Dept: CARDIAC REHAB | Age: 70
End: 2020-06-22
Payer: MEDICARE

## 2020-06-22 VITALS
HEART RATE: 66 BPM | WEIGHT: 245 LBS | DIASTOLIC BLOOD PRESSURE: 70 MMHG | HEIGHT: 71 IN | RESPIRATION RATE: 16 BRPM | SYSTOLIC BLOOD PRESSURE: 132 MMHG | BODY MASS INDEX: 34.3 KG/M2 | TEMPERATURE: 96.3 F

## 2020-06-22 PROCEDURE — 4040F PNEUMOC VAC/ADMIN/RCVD: CPT | Performed by: FAMILY MEDICINE

## 2020-06-22 PROCEDURE — G0423 INTENS CARDIAC REHAB NO EXER: HCPCS

## 2020-06-22 PROCEDURE — G0438 PPPS, INITIAL VISIT: HCPCS | Performed by: FAMILY MEDICINE

## 2020-06-22 PROCEDURE — 1123F ACP DISCUSS/DSCN MKR DOCD: CPT | Performed by: FAMILY MEDICINE

## 2020-06-22 PROCEDURE — 3017F COLORECTAL CA SCREEN DOC REV: CPT | Performed by: FAMILY MEDICINE

## 2020-06-22 PROCEDURE — G0422 INTENS CARDIAC REHAB W/EXERC: HCPCS

## 2020-06-22 RX ORDER — TRAMADOL HYDROCHLORIDE 50 MG/1
50 TABLET ORAL EVERY 6 HOURS PRN
Qty: 56 TABLET | Refills: 0 | Status: SHIPPED | OUTPATIENT
Start: 2020-06-22 | End: 2020-07-06

## 2020-06-22 ASSESSMENT — PATIENT HEALTH QUESTIONNAIRE - PHQ9
SUM OF ALL RESPONSES TO PHQ QUESTIONS 1-9: 0
SUM OF ALL RESPONSES TO PHQ QUESTIONS 1-9: 0

## 2020-06-22 ASSESSMENT — LIFESTYLE VARIABLES
HOW OFTEN DO YOU HAVE SIX OR MORE DRINKS ON ONE OCCASION: 0
HOW MANY STANDARD DRINKS CONTAINING ALCOHOL DO YOU HAVE ON A TYPICAL DAY: 0
AUDIT-C TOTAL SCORE: 2
HOW OFTEN DO YOU HAVE A DRINK CONTAINING ALCOHOL: 2

## 2020-06-22 NOTE — PROGRESS NOTES
Hospital Facility-Based Program  Phase 2 Cardiac Rehab Weekly Progress Report      Patient prescribed exercise:  2:00 class. 3 times per week in rehab, 1-4 times per week at home for the amount of sessions/weeks specified by insurance. Current Levels: NuStep:  66 Tse for 15 minutes x 2, UBE: 42watts for 5 minutes. Progression Discussion: Maintain/Increase Aerobic exercise 30-35 minutes to work on endurance. Attempt to increase intensity by 5-20% for each modality this week. Try to increase intensities until Patrice rates the exercises a 13-17 on Ariane RPE.

## 2020-06-22 NOTE — PROGRESS NOTES
breakfast). Yes Joana Burger MD   Krill Oil 500 MG CAPS Take  by mouth daily. Yes Historical Provider, MD       Past Medical History:   Diagnosis Date    Arthritis     back    BPH (benign prostatic hyperplasia)     CAD (coronary artery disease)     CVA (cerebral vascular accident) ([facility] 2010    Disease of blood and blood forming organ     anemia    FH: heart disease     GERD (gastroesophageal reflux disease)     Hyperlipidemia     Hypertension     Obesity     Prostate cancer ([facility] [address] [facility] 2019       Past Surgical History:   Procedure Laterality Date    CARDIAC VALVE REPLACEMENT  April 2011    [address], [place] (Pt unsure if part of CABG)    CARDIOVASCULAR STRESS TEST  1 05 2011    Cannot exclude slight inferobasal lateral stress-induced ischemia in a relatively small-sized area. EF 68%. Mildly abnormal nuclear scan. Lexiscan test associated with nonspecific symptoms. EKG nondiagnostic with nonspecific ST-T wave changes.  CAROTID ENDARTERECTOMY  2 08 2011    Right carotid endarterectomy with patch angioplasty with convention patch angioplasty.  COLONOSCOPY      CORONARY ARTERY BYPASS GRAFT      DIAGNOSTIC CARDIAC CATH LAB PROCEDURE  3 24 2011    LV end-diastolic pressure was 12 mmHg with no change before and after contrast injection. There was no significant gradient across the aortic valve to signify aortic stenosis. LV function was within normal limits, EF 55%. 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 Nonobstructive diffuse disease in a small sized RCA. Nomral LV function.  HAND FUSION Right     HERNIA REPAIR      Inguinal hernia repair.  PROSTATECTOMY N/A 3/20/2019    PROSTATECTOMY LAPAROSCOPIC ROBOTIC performed by Elizabeth Souza MD at [address] ECHOCARDIOGRAM  12 21 2010    Size was normal. Systolic function was normal. EF was estimated in the range of 55-65%.  There were no regional wall motion abnormalities. Wall thickness was normal. Doppler - LV diastolic function parameters were normal. Mild MR. The aortic valve was trileaflet. Leaflets exhibited mildly increased thickness, mild calcification, normal cuspal separation, and sclerosis. Mild TR.    VASCULAR SURGERY      cabg harvests from chest       Family History   Problem Relation Age of Onset    Heart Disease Mother     Diabetes Mother     High Blood Pressure Mother     Heart Disease Father     High Blood Pressure Father     High Blood Pressure Sister        CareTeam (Including outside providers/suppliers regularly involved in providing care):   Patient Care Team:  Edmund Fofana MD as PCP - General (Family Medicine)  Edmund Fofana MD as PCP - [address] [place] [address]  [facility] Provider  Anne Washburn MD as Surgeon (Cardiothoracic Surgery)  Santa Benson RN as Nurse Navigator (Oncology)    Wt Readings from Last 3 Encounters:   06/22/20 245 lb (111.1 kg)   06/09/20 248 lb 3.2 oz (112.6 kg)   05/14/20 251 lb 9.6 oz (114.1 kg)     Vitals:    06/22/20 1116   BP: 132/70   Site: Left Upper Arm   Pulse: 66   Resp: 16   Temp: 96.3 °F (35.7 °C)   TempSrc: Temporal   Weight: 245 lb (111.1 kg)   Height: 5' 11\" (1.803 m)     Body mass index is 34.17 kg/m². Based upon direct observation of the patient, evaluation of cognition reveals recent and remote memory intact.     General Appearance: alert and oriented to person, place and time, well developed and well- nourished, in no acute distress  Skin: warm and dry, no rash or erythema  Head: normocephalic and atraumatic  Eyes: pupils equal, round, and reactive to light, extraocular eye movements intact, conjunctivae normal  ENT: tympanic membrane, external ear and ear canal normal bilaterally, nose without deformity, nasal mucosa and turbinates normal without polyps  Neck: supple and non-tender without mass, no thyromegaly or thyroid nodules, no cervical lymphadenopathy  Pulmonary/Chest: clear to auscultation eye exam within the past year?: (!) No  Hearing/Vision Interventions:  · [facility] concerns[facility]  [facility]    Safety:  Safety  Do you have working smoke detectors?: Yes  Have all throw rugs been removed or fastened?: (n/a)  Do you have non-slip mats or surfaces in all bathtubs/showers?: (!) No  Do all of your stairways have a railing or banister?: Yes  Are your doorways, halls and stairs free of clutter?: Yes  Do you always fasten your seatbelt when you are in a car?: Yes  Safety Interventions:  · Home safety tips provided    ADL:  ADLs  In the past 7 days, did you need help from others to perform any of the following everyday activities? Eating, dressing, grooming, bathing, toileting, or walking/balance?: None  In the past 7 days, did you need help from others to take care of any of the following?  Laundry, housekeeping, banking/finances, shopping, telephone use, food preparation, transportation, or taking medications?: (!) Telephone Use  ADL Interventions:  · Patient declines any further evaluation/treatment for this issue    Personalized Preventive Plan   Current Health Maintenance Status  Immunization History   Administered Date(s) Administered    Influenza, Triv, inactivated, subunit, adjuvanted, IM (Fluad 65 yrs and older) 09/09/2019    Tdap (Boostrix, Adacel) 12/20/2019        Health Maintenance   Topic Date Due    Shingles Vaccine (1 of 2) 08/03/2002    Colon cancer screen colonoscopy  08/03/2002    Pneumococcal 65+ years Vaccine (1 of 1 - PPSV23) 08/03/2017    Annual Wellness Visit (AWV)  08/07/2019    A1C test (Diabetic or Prediabetic)  09/06/2019    PSA counseling  04/30/2021    Lipid screen  05/04/2021    DTaP/Tdap/Td vaccine (2 - Td) 12/20/2029    Flu vaccine  Completed    Hepatitis C screen  Completed    Hepatitis A vaccine  Aged Out    Hepatitis B vaccine  Aged Out    Hib vaccine  Aged Out    Meningococcal (ACWY) vaccine  Aged Out     Recommendations for [employer] Due:

## 2020-06-22 NOTE — PATIENT INSTRUCTIONS
Personalized Preventive Plan for Bridgette Knight - 6/22/2020  Medicare offers a range of preventive health benefits. Some of the tests and screenings are paid in full while other may be subject to a deductible, co-insurance, and/or copay. Some of these benefits include a comprehensive review of your medical history including lifestyle, illnesses that may run in your family, and various assessments and screenings as appropriate. After reviewing your medical record and screening and assessments performed today your provider may have ordered immunizations, labs, imaging, and/or referrals for you. A list of these orders (if applicable) as well as your Preventive Care list are included within your After Visit Summary for your review. Other Preventive Recommendations:    · A preventive eye exam performed by an eye specialist is recommended every 1-2 years to screen for glaucoma; cataracts, macular degeneration, and other eye disorders. · A preventive dental visit is recommended every 6 months. · Try to get at least 150 minutes of exercise per week or 10,000 steps per day on a pedometer . · Order or download the FREE \"Exercise & Physical Activity: Your Everyday Guide\" from [employer]. Call 6-361.962.6331 or search [employer] online. · You need 1982-2552 mg of calcium and 0175-8099 IU of vitamin D per day. It is possible to meet your calcium requirement with diet alone, but a vitamin D supplement is usually necessary to meet this goal.  · When exposed to the sun, use a sunscreen that protects against both UVA and UVB radiation with an SPF of 30 or greater. Reapply every 2 to 3 hours or after sweating, drying off with a towel, or swimming. · Always wear a seat belt when traveling in a car. Always wear a helmet when riding a bicycle or motorcycle.

## 2020-06-24 ENCOUNTER — APPOINTMENT (OUTPATIENT)
Dept: CARDIAC REHAB | Age: 70
End: 2020-06-24
Payer: MEDICARE

## 2020-06-24 ENCOUNTER — HOSPITAL ENCOUNTER (OUTPATIENT)
Dept: CARDIAC REHAB | Age: 70
Setting detail: THERAPIES SERIES
Discharge: HOME | End: 2020-06-24
Payer: MEDICARE

## 2020-06-24 PROCEDURE — G0423 INTENS CARDIAC REHAB NO EXER: HCPCS

## 2020-06-24 PROCEDURE — G0422 INTENS CARDIAC REHAB W/EXERC: HCPCS

## 2020-06-25 RX ORDER — FAMOTIDINE 20 MG/1
TABLET, FILM COATED ORAL
Qty: 180 TABLET | Refills: 2 | Status: SHIPPED | OUTPATIENT
Start: 2020-06-25 | End: 2021-01-01

## 2020-06-26 ENCOUNTER — APPOINTMENT (OUTPATIENT)
Dept: CARDIAC REHAB | Age: 70
End: 2020-06-26
Payer: MEDICARE

## 2020-06-26 ENCOUNTER — HOSPITAL ENCOUNTER (OUTPATIENT)
Dept: CARDIAC REHAB | Age: 70
Setting detail: THERAPIES SERIES
End: 2020-06-26
Payer: MEDICARE

## 2020-06-29 ENCOUNTER — HOSPITAL ENCOUNTER (OUTPATIENT)
Dept: CARDIAC REHAB | Age: 70
Setting detail: THERAPIES SERIES
Discharge: HOME | End: 2020-06-29
Payer: MEDICARE

## 2020-06-29 ENCOUNTER — APPOINTMENT (OUTPATIENT)
Dept: CARDIAC REHAB | Age: 70
End: 2020-06-29
Payer: MEDICARE

## 2020-06-29 PROCEDURE — G0422 INTENS CARDIAC REHAB W/EXERC: HCPCS

## 2020-06-29 PROCEDURE — G0423 INTENS CARDIAC REHAB NO EXER: HCPCS

## 2020-06-30 ENCOUNTER — HOSPITAL ENCOUNTER (OUTPATIENT)
Dept: MRI IMAGING | Age: 70
Discharge: HOME | End: 2020-06-30
Payer: MEDICARE

## 2020-06-30 PROCEDURE — 72148 MRI LUMBAR SPINE W/O DYE: CPT

## 2020-07-01 ENCOUNTER — APPOINTMENT (OUTPATIENT)
Dept: CARDIAC REHAB | Age: 70
End: 2020-07-01
Payer: MEDICARE

## 2020-07-01 ENCOUNTER — HOSPITAL ENCOUNTER (OUTPATIENT)
Dept: CARDIAC REHAB | Age: 70
Setting detail: THERAPIES SERIES
Discharge: HOME | End: 2020-07-01
Payer: MEDICARE

## 2020-07-01 ENCOUNTER — TELEPHONE (OUTPATIENT)
Dept: FAMILY MEDICINE CLINIC | Age: 70
End: 2020-07-01

## 2020-07-01 PROCEDURE — G0423 INTENS CARDIAC REHAB NO EXER: HCPCS

## 2020-07-01 PROCEDURE — G0422 INTENS CARDIAC REHAB W/EXERC: HCPCS

## 2020-07-01 NOTE — PROGRESS NOTES
Madison Agrawal YOB: 1950    MRN:  [id number]    Date: 2020      Session Length:  40 min   Session # _______    EXERCISE WORKSHOP:  Resistance Training & Core Strength                        Todays class addressed ways to improve overall muscular fitness and core strength. Reviewed a variety of resistance training, and core strengthening exercises that are designed to improve muscular endurance, strength, stability, and balance. Encouraged participants to incorporate resistance training and core strength exercises into their current home exercise program.      Readiness to change:    ( ) Pre-contemplative   ( ) Contemplative - ambivalent about change    (x ) Action - ready to set action plan and implement   ( ) Maintenance - has made change and is trying, and or practicing different alternative behaviors     Additional Notes:      Taco Jaimes was in the Workshop with the Exercise Physiologist for 40 minutes. The content was presented via Powerpoint, lecture, and patient participation based format. Motivational interviewing was utilized when needed, to promote change. Patient voiced understanding.     Electronically signed by Mal Duran on 2020 at 1:44 PM

## 2020-07-01 NOTE — PLAN OF CARE
[address] [place] [address] Facility-Based Program  Individualized Cardiac Treatment Plan    Patient Name:  Sabina Michele  :  1950  Age:  81 y.o. MRN:  [id number]  Diagnosis: PCI  Date of Event: 20   Physician:  Jennifer Stanford  Next Office Visit:    Date Entered Program: 20  Risk Stratifications: [] Low [x] Intermediate [] High  Allergies: No Known Allergies    COVID -19 Screen  Do you have any of the following symptoms:  [] Fever [] Cough [] SOB [] Muscle/Body Ache [] Loss of taste/smell [x] None    Have you traveled outside of the [place]? [] Yes      [x] No    Have you been around anyone who has tested Positive for COVID 19?  [] Yes      [x] No    The following Education has been completed with patient. [x] Wait in the lobby until we call you back to rehab. [x] You must wear a mask while in the medical center, and in cardiac rehab. Please bring your own. [x] Bring your own bottle of water with you. [x] You can not bring anyone with you into the medical center at this time. They are welcome to sit in their car and wait for you.     Individual Cardiac Treatment Plan -EXERCISE  INITIAL 30 DAY 60 DAY 90 DAY FINAL DAY   EXERCISE  ASSESSMENT/PLAN EXERCISE  REASSESSMENT EXERCISE   REASSESSMENT EXERCISE   REASSESSMENT EXERCISE  DISCHARGE/FOLLOW-UP   Date: 20  Date: 20 Date: 20 Date: Date:   Session #1 Session # 10/20 Session # 45 Session # ** Session # **  Last session completed on **   Stages of Change Stages of Change Stages of Change Stages of Change Stages of Change   [x] Pre Contemplation  [] Contemplation  [] Preparation  [] Action  [] Maintenance  [] Relapse [] Pre Contemplation  [] Contemplation  [x] Preparation  [] Action  [] Maintenance  [] Relapse [] Pre Contemplation  [] Contemplation  [] Preparation  [x] Action  [] Maintenance  [] Relapse [] Pre Contemplation  [] Contemplation  [] Preparation  [] Action  [] Maintenance  [] Relapse [] Yes    [] No  Angina Management: **   EXERCISE PLAN EXERCISE PLAN EXERCISE PLAN EXERCISE PLAN EXERCISE PLAN   *Interventions* *Interventions* *Interventions* *Interventions* *Interventions*   Exercise Prescription  (per physician & CR staff) Exercise Prescription  (per physician & CR staff) Exercise Prescription  (per physician & CR staff) Exercise Prescription  (per physician & CR staff) Exercise Prescription  (per physician & CR staff)   Cardiovascular Cardiovascular Cardiovascular Cardiovascular Cardiovascular   Mode:    [x] Treadmill (TM)  [x] Schwinn Airdyne (AD)  [x] Arms Ergometer (AE)  [x] NuStep  [] Elliptical (E) MODE:    [] Treadmill (TM)  [] Schwinn Airdyne (AD)  [x] Arms Ergometer (AE)  [x] NuStep  [] Elliptical (E) MODE:    [] Treadmill (TM)  [] Schwinn Airdyne (AD)  [x] Arms Ergometer (AE)  [x] NuStep  [] Elliptical (E) MODE:    [] Treadmill (TM)  [] Schwinn Airdyne (AD)  [] Arms Ergometer (AE)  [] NuStep  [] Elliptical (E) MODE:    [] Treadmill (TM)  [] Schwinn Airdyne (AD)  [] Arms Ergometer (AE)  [] NuStep  [] Elliptical (E)   Initial Workloads  TM: [email or web address] 2.6 METs  AD: 0.7 level = 2.4 METs  NS: 44  Tse= 2.4 METs  AE: 0.4 level = 1.8 METs Current Workloads  TM:  @ %=  METs  AD:  level =  METs  NS: 56-60 Tse= 2.5  METs  AE: 38W level Current Workloads    NS: 66  Tse= 2.6 METs  AE: 42 level = 2.2 METs Current Workloads  TM:  @ %=  METs  AD:  level =  METs  NS:   Tse=  METs  AE:  level =  METs Current Workloads  TM:  @ %=  METs  AD:  level =  METs  NS:   Tse=  METs  AE:  level =  METs     Frequency:    ICR: 3x/week  Home: 2-3x/wk Frequency:   ICR: 3x/week  Home: 3x/wk Frequency:  ICR: 3x/week  Home: 3-4x/wk Frequency:  ICR: 3x/week  Home: 3-4x/wk Frequency:  Hetal Alan will continue exercise at  5-7 days/week   Duration:   Total aerobic exercise = 30-45 min    5-8 min/mode Duration:  Total aerobic exercises = 35 min     5-15 min/mode Duration:  Total aerobic exercises = 35 min     15min/mode Flexibility at home   Hailey Kylee verbalizes planning to walk 3-4 days/week for 5-10 min on days not in rehab. Home Exercise  *Patrice verbalizes planning to continue to walk 30-60 min Home Exercise  *Patrice verbalizes planning to walk/ join a fitness center with \"back friendly\" equipment, 3-4 days/week for 20-30 min on days not in rehab. Home Exercise  *Patrice verbalizes planning to ** ** days/week for ** min on days not in rehab. Home Exercise  *Patrice verbalizes his/her plan to ** ** days/week for ** min @ **   *Education* *Education* *Education* *Education* *Education*   RPE Scale  [x] Yes      [] No  Exercise Safety  [x] Yes      [] No  Equipment Orientation  [x] Yes      [] No  S/S to Report  [x] Yes      [] No  Warm Up/Cool Down  [x] Yes      [] No  Home Exercise  [x] Yes      [] No    All Exercise Education Completed  [] Yes      [] No   Exercise Education Recommended    Workshops  [x] Improving Performance  [x] Balance Training & Fall Prevention  [x] Exercise Biomechanics  [x] Resistance Training & Core Strength Exercise Education Attended/Date Exercise Education Attended/Date   6/29/20   Resistance Training & Core Strength Exercise Education Attended/Date All Sessions Completed? [] Yes  [] No   *Goals* *Goals* *Goals* *Goals* *Goals*   Initial Exercise Goals Exercise Goals  Exercise Goals   Exercise Goals  Exercise Goals   Patrice plans to:  [x] Attend exercise sessions 3x/wk  [x] initiate home exercise 2-3x/wk for 10-20 min  [x] Increase 6 min walk distance by 10%  [x] Attend Exercise workshops Patrice plans to:  [x] Attend exercise sessions 3x/wk  [x] continue home exercise 2-3x/wk for 20-30 min  [x] Attend Exercise workshops Patrice's plans to:  [x] Attend exercise sessions 3x/wk  [x] continue home exercise 3-4x/wk for 30-45 min  [x] Determine plan of exercise following rehab  [x] Attend Exercise workshops Patrice's plans to:  Kirsty Boudreaux achieved exercise goals?     []  Yes    [] Management  Weight: 248.2                  Weight Management  Weight: 241.2                  Weight Management  Weight: ** Weight Management  Weight: **                    BMI: **   Eating Plan  Current eating habits: [employer] system Eating Plan  Changes: none listed Eating Plan  Changes: none reported Eating Plan  Changes: Eating Plan Improvements:   Alcohol Use  [] none          [] daily  [] weekly      [x] special          Diet Assessment Tool:  RATE YOUR PLATE  *Given to patient to complete and return. Diet Assessment Tool:    Score: 47/69       Diet Assessment Tool: RATE YOUR PLATE  Score: **/05   NUTRITION PLAN NUTRITION PLAN NUTRITION PLAN NUTRITION PLAN NUTRITION PLAN   *Interventions* *Interventions* *Interventions* *Interventions* *Interventions*   Initial Survey given      Goal Setting Discussion:   [x] Yes      [] No  Follow Up Survey Reviewed & Goals Updated:     Professional Referral  Please check if needed. [] Dietitian Consult   [] Wt. Management Referral  [] Other:  Professional Referral  Please check if needed. [] Dietitian Consult   [] Wt. Management Referral  [] Other: Professional Referral  Please check if needed. [] Dietitian Consult   [] Wt. Management Referral  [] Other: Professional Referral  Please check if needed. [] Dietitian Consult   [] Wt. Management Referral  [] Other: Professional Referral  Please check if needed. [] Dietitian Consult   [] Wt. Management Referral  [] Other:   *Education* *Education* *Education* *Education* *Education*   Nutritional Education Recommended    [x] 1:1 Registered Dietitian    Workshops  [x] Label Reading   [x] Menu  [x] Targeting Nutrition Priorities  [x] Fueling a Healthy Body   Nutritional Education Attended/Date Nutritional Education Attended/Date    7/1/20   1:1 Registered Dietitian Nutritional Education Attended/Date All Sessions Completed?     [] Yes  [] No   Cooking School  Recommended     [x] Adding Flavor  [x] Fast & Healthy     Breakfasts  [x] Salads & Dressings  [x] Soups & Simple     Sauces  [x] Simple Sides  [x] Appetizers &     Snacks  [x] Delicious Desserts  [x] Plant Proteins  [x] Fast Evening Meals  [x] Weekend Breakfasts  [x] Cook once, Eat       twice  [x] [employer] [facility]  Sessions Completed   Cooking School  Sessions Completed    6/12/20  Plant Proteins    6/19/20  Fast Evening Meals Cooking School  Sessions Completed     [facility]    # of sessions completed:  **   *Goals* *Goals* *Goals* *Goals* *Goals*   Patrice's nutritional goals are as follows:  Complete and return diet survey Patrice's nutritional goals are as follows:  [x] Attend Nutrition Workshops  [x] Attend 1:1   [x] Attend Cooking Classes  [] ** Patrice's nutritional goals are as follows:  [x] Attend Nutrition Workshops  [x] Attend 1:1   [x] Attend Cooking Classes  [x] Complete and return diet survey  [] ** Patrice's nutritional goals are as follows:  [] Attend Nutrition Workshops  [] Attend 1:1   [] Attend Cooking Classes  [] ** Patrice achieved nutritional goals   [] Yes    [] No  If no, why?   Use knowledge gained to continue Pritikin eating plan at home       Individual Cardiac Treatment Plan - Psychosocial  PSYCHOSOCIAL  ASSESSMENT/PLAN PSYCHOSOCIAL  REASSESSMENT PSYCHOSOCIAL   REASSESSMENT PSYCHOSOCIAL   REASSESSMENT PSYCHOSOCIAL  DISCHARGE/FOLLOW-UP   Stages of Change Stages of Change Stages of Change Stages of Change Stages of Change   [] Pre Contemplation  [x] Contemplation  [] Preparation  [] Action  [] Maintenance  [] Relapse [] Pre Contemplation  [] Contemplation  [] Preparation  [x] Action  [] Maintenance  [] Relapse [] Pre Contemplation  [] Contemplation  [] Preparation  [] Action  [x] Maintenance  [] Relapse [] Pre Contemplation  [] Contemplation  [] Preparation  [] Action  [] Maintenance  [] Relapse [] Pre Contemplation  [] Contemplation  [] Preparation  [] Action  [] Maintenance  [] Relapse   PSYCHOSOCIAL ASSESSMENT PSYCHOSOCIAL ASSESSMENT PSYCHOSOCIAL ASSESSMENT PSYCHOSOCIAL ASSESSMENT PSYCHOSOCIAL ASSESSMENT   Behavioral Outcomes Behavioral Outcomes Behavioral Outcomes Behavioral Outcomes Behavioral Outcomes   Tool Used:  Martha Hartley, Quality of Life Index, Cardiac Version IV  *Given to patient to complete. Tool Used:    Martha & Odilia, Quality of Life Index, Cardiac Version IV     QOL Index Score: 23.15  HF: 21.3  S&E:25.08  P&S: 23.08  Family: 27.25   Tool Used:     Martha Hartley, Quality of Life Index, Cardiac Version IV    QOL Index Score: **  HF:**  S&E:**  P&S: **  Family: **   PHQ-9 score 3  Depression Severity  [x]Minimal  []Mild   []Moderate  []Moderately Severe  []Severe    PHQ-9 score **  Depression Severity  []Minimal  []Mild   []Moderate  []Moderately Severe []Severe   Does patient have Family Support? [x] Yes      [] No  No signs of marital/family distress       Within the Past Month:  *Have you wished you were dead or wished you could go to sleep and not wake up? [] Yes      [x] No  *Have you had any thoughts of killing yourself? [] Yes      [x] No         Using a scale of 0-10, 0=none, 10=very:   Rate your depression: 2  Rate your anxiety:  0  Using a scale of 0-10, 0=none, 10=very:   Rate your depression: 2  Rate your anxiety:  0 Using a scale of 0-10, 0=none, 10=very:   Rate your depression: 2  Rate your anxiety:  0 Using a scale of 0-10, 0=none, 10=very:   Rate your depression: **  Rate your anxiety:  ** Using a scale of 0-10, 0=none, 10=very:   Rate your depression: **  Rate your anxiety:  **   Signs and Symptoms of Depression Present? [x] Yes      [] No  If yes, please explain:  Slightly since wife's death. Encouraged pt to discuss with PCP Signs and Symptoms of Depression Present? [] Yes      [x] No  If yes, please explain:  ** Signs and Symptoms of Depression Present? [] Yes      [x] No   Signs and Symptoms of Depression Present?     [] Yes      [] No  If yes, please explain:  ** Signs and Symptoms of Depression Present? [] Yes      [] No  If yes, please explain:  **   Signs and Symptoms of Anxiety Present? [] Yes      [x] No   Signs and Symptoms of Anxiety Present? [] Yes      [x] No  If yes, please explain:  ** Signs and Symptoms of Anxiety Present? [] Yes      [x] No   Signs and Symptoms of Anxiety Present? [] Yes      [] No  If yes, please explain:  ** Signs and Symptoms of Anxiety Present? [] Yes      [] No  If yes, please explain:  **   [] Patient has poor eye contact   [] Flat affect present. [] Signs of anxiety, anger or hostility    [] Signs social isolation present. []  Signs of alcohol or substance abuse       PSYCHOSOCIAL PLAN PSYCHOSOCIAL PLAN PSYCHOSOCIAL PLAN PSYCHOSOCIAL PLAN PSYCHOSOCIAL PLAN   *Interventions* *Interventions* *Interventions* *Interventions* *Interventions*   *Please check if needed  [] Psych Consult  [] Physician Referral  [x] Stress Management Skills *Please check if needed  [] Psych Consult  [] Physician Referral  [] Stress Management Skills *Please check if needed  [] Psych Consult  [] Physician Referral  [x] Stress Management Skills *Please check if needed  [] Psych Consult  [] Physician Referral  [] Stress Management Skills *Please check if needed  [] Psych Consult  [] Physician Referral  [] Stress Management Skills   Is patient currently taking anti-depressant or anti-anxiety medications? [] Yes      [x] No   Change in anti-depressant or anti-anxiety medications? [] Yes      [x] No  If yes, please list medications:  ** Change in anti-depressant or anti-anxiety medications? [] Yes      [x] No   Change in anti-depressant or anti-anxiety medications? [] Yes      [] No  If yes, please list medications:  ** Change in anti-depressant or anti-anxiety medications?   [] Yes      [] No  If yes, please list medications:  **   *Education* *Education* *Education* *Education* *Education*   Healthy Mind-Set Workshops Recommended  [x] Stress & Health  [x] Taking Charge of Stress  [x] New Thoughts, New Behaviors  [x] Managing Moods & Relationships Healthy Mind-Set Workshops Attended/Date Healthy Mind-Set Workshops Attended/Date   New Thoughts, New Behaviors Healthy Mind-Set Workshops Attended/Date Healthy Mind-Set Workshops  Completed  [] Yes      [] No   *Goals* *Goals* *Goals* *Goals* *Goals*   Alicia psychosocial goals are as follows:  Complete HADS & Martha & Odilia, Quality of Life Index, Cardiac Version IV Alicia psychosocial goals are as follows:  [x] Attend Healthy Mind-Set Workshops  [x] Reduce depression symptom severity by 1 level  [] ** Alicia psychosocial goals are as follows:  [x] Attend Healthy Mind-Set Workshops  [x] Reduce depression symptom severity by 1 level  [] ** Alicia psychosocial goals are as follows:  [] Attend Healthy Mind-Set Workshops  [] Reduce depression symptom severity by 1 level  [] ** Patrice achieved psychosocial goals?   [] Yes    [] No  If no, why?  **  [] Use methods learned to continue to reduce depression symptom severity by 1 level  [] **     Individual Cardiac Treatment Plan - Other:  Risk Factor/Education  RISK FACTOR  ASSESSMENT/PLAN RISK FACTOR  REASSESSMENT  RISK FACTOR  REASSESSMENT RISK FACTOR  REASSESSMENT RISK FACTOR   DISCHARGE/FOLLOW-UP   Stages of Change Stages of Change Stages of Change Stages of Change Stages of Change   [] Pre Contemplation  [x] Contemplation  [] Preparation  [] Action  [] Maintenance  [] Relapse [] Pre Contemplation  [] Contemplation  [x] Preparation  [] Action  [] Maintenance  [] Relapse [] Pre Contemplation  [] Contemplation  [] Preparation  [x] Action  [] Maintenance  [] Relapse [] Pre Contemplation  [] Contemplation  [] Preparation  [] Action  [] Maintenance  [] Relapse [] Pre Contemplation  [] Contemplation  [] Preparation  [] Action  [] Maintenance  [] Relapse   RISK FACTOR/EDUCATION ASSESSMENT RISK FACTOR/EDUCATION ASSESSMENT RISK FACTOR/EDUCATION ASSESSMENT RISK FACTOR/EDUCATION ASSESSMENT RISK FACTOR /EDUCATION ASSESSMENT   Hypertension  [x] Yes      [] No    Resting BP: 96/64  Peak Ex BP:142/82  Medication: metoprolol   Hypertension  Resting BP: 116/66  Peak Ex BP:142/62  Medication Changes:  [] Yes      [x] No Hypertension  Resting BP: 120/78  Peak Ex BP:160/74  Medication Changes:  [] Yes      [x] No Hypertension  Resting BP: **  Peak Ex BP:**  Medication Changes:  [] Yes      [] No Hypertension  Resting BP: **  Peak Ex BP:**  Medication Changes:  [] Yes      [] No   Lipids  HLD/DLD  [x] Yes      [] No  TOTAL CHOL: 133  HDL:  47  LDL:  67  TRI  Medication: atorvastatin Lipids  Medication Changes:  [] Yes      [x] No     Lipids  Medication Changes:  [] Yes      [x] No     Lipids  Medication Changes:  [] Yes      [] No     Lipids    TOTAL CHOL: **  HDL:  **  LDL:  **  TRIG:  **  Medication Changes:  [] Yes      [] No   Diabetes  [] Yes      [x] No  FBS: 129           HbA1C: 6.3        Monitor BS @ home:   [] Yes      [x] No   Diabetes  Most Recent BS:  BS have been in range  [] Yes      [] No  Medication Changes  [] Yes      [] No   Diabetes  na     Diabetes  Most Recent BS:  BS have been in range  [] Yes      [] No  Medication Changes  [] Yes      [] No     Diabetes  Most Recent BS:  BS have been in range  [] Yes      [] No  Medication Changes  [] Yes      [] No       Tobacco Use  [] Current  [] Former  [x] Never      Smokeless Tobacco use:   [] Yes      [x] No   Tobacco Use  Change in smoking status   [] Yes      [x] No    Quit date: **   Tobacco Use  Change in smoking status   [] Yes      [x] No       Tobacco Use  Change in smoking status   [] Yes      [] No    Quit date: ** Tobacco Use  Change in smoking status   [] Yes      [] No    Quit date: **            Learning Barriers  Please select one:  [] Speech  [] Literacy  [x] Hearing  [] Cognitive  [x] Vision  [x] Ready to Learn Learning Barriers Addressed:  We have to speak louder and sometimes go up to him - pt can read okay  [x] [] No  Blood Thinner: Clopidogrel/Effient/Brillinta  [x] Yes    [] No  Beta Blocker  [x] Yes    [] No  Ace Inhibitor  [] Yes    [x] No  Statin/Lipid Lowering  [x] Yes    [] No Medication Changes? [] Yes    [x] No Medication Changes? [] Yes    [x] No Medication Changes? [] Yes    [] No Medication Changes? [] Yes    [] No   *Education* *Education* *Education* *Education* *Education*   Does Patrice require any additional education? [] Yes    [x] No   Does Patrice require any additional education? [] Yes    [x] No Does Patrice require any additional education? [] Yes    [x] No Does Patrice require any additional education? [] Yes    [] No Does Patrice require any additional education?   [] Yes    [] No   Recommended Additional Educational Videos    Medical  [] Hypertension & Heart Disease  [] Decoding Lab Results  [] Aging Enhancing Your QoL  [] Sleep Disorders  Exercise  [] Body Composition  [] Improve Performance  [] Exercise Action Plan  [] Intro to Yoga  Behavioral  [] How Our Thoughts Can Heal Our Hearts  [] Smoking Cessation  Nutrition  [] Planning Your Eating Strategy  [] Lable Reading- Part 2  [] Dining Out - Part 2  [] Targeting Your Nutrition Priorities  [] Fueling a Healthy Body  [] Menu Workshop  [employer] [] Breakfast & Snacks  [] [employer] Salads & Dressing  [] [address] [place] [address]  [] Soups & Desserts   Additional Educational Videos Completed Additional Educational Videos Completed    6/17/20  Menu Workshop Additional Educational Videos Completed Additional Educational Videos Completed    [] Yes    [] No   *Goals* *Goals* *Goals* *Goals* *Goals*   Patrice's risk factor/education goals are as follows:    [x] Optimal BP <140/90  [] Blood Sugar <120  [x] Attend recommended video education sessions  [x] Takes medications as prescribed 100% of the time   [] ** Patrice's risk factor/education goals are as follows:    [x] Optimal BP <140/90  [] Blood Sugar <120  [x] Attend recommended video education sessions  [x] Takes medications as prescribed 100% of the time   [] ** Patrice's risk factor/education goals are as follows:    [x] Optimal BP <140/90  [] Blood Sugar <120  [x] Attend recommended video education sessions  [x] Takes medications as prescribed 100% of the time   [] ** Patrice's risk factor/education goals are as follows:    [x] Optimal BP <140/90  [] Blood Sugar <120  [x] Attend recommended video education sessions  [x] Takes medications as prescribed 100% of the time   [] ** Patrice achieved risk factor goals?   [] Yes    [] No  If no, why?  **     Monitored telemetry has revealed At- fib   Monitored telemetry has revealed A-fib  [] documented arrhythmia at increasing workloads  [] associated symptoms ** Monitored telemetry has revealed NSR/ At-fib   Monitored telemetry has revealed **  [] documented arrhythmia at increasing workloads  [] associated symptoms ** Monitored telemetry has revealed **  [] documented arrhythmia at increasing workloads  [] associated symptoms **   Physician Response    [x] Cardiac rehab is reasonably and medically necessary for continuous cardiac monitoring surveillance  of patient's cardiac activity  [x] Initiate continuous telemerty monitoring and notify me with any concerns  [] Other   Physician Response    [x] Cardiac rehab is reasonably and medically necessary for continuous cardiac monitoring surveillance  of patient's cardiac activity  [x] Continue continuous telemerty monitoring and notify me with any concerns  [] Other     Physician Response    [x] Cardiac rehab is reasonably and medically necessary for continuous cardiac monitoring surveillance  of patient's cardiac activity  [x] Continue continuous telemerty monitoring and notify me with any concerns   [] Other     Physician Response    [x] Cardiac rehab is reasonably and medically necessary for continuous cardiac monitoring surveillance  of patient's cardiac activity  [x] Continue continuous telemerty monitoring and notify me with any concerns   [] Other          Date/Time User Provider Type Action   5/14/2020  7:48 AM Jose Magana MD Physician Cosign   5/12/2020  9:19 AM Ren Delgado Exercise Physiologist Sign     Cosigned by: Jose Magana MD at 6/9/2020  8:53 AM   Revision History      Date/Time User Provider Type Action   6/9/2020  8:53 AM Jose Magana MD Physician Cosign   6/8/2020  2:31 PM Dilia Avila Exercise Physiologist Sign

## 2020-07-01 NOTE — PROGRESS NOTES
Garry FIERRO.:  1950    Acct Number: [de-identified]   MRN:  [id number]                             [facility] NUTRITION 1:1 Counseling             Date: 2020       Session # _______     Fernando Friend class covered:    1:1 Counseling Session  Receptiveness to education/goals:  (x ) Agreeable ( ) No Interest   ( ) Refused  Evaluation of education:  (x ) Indicates understanding   ( ) Needs reinforcement     () Unsuccessful     Readiness to change:    ( ) Pre-contemplative   ( ) Contemplative - ambivalent about change    ( ) Action - ready to set action plan and implement   (x ) Maintenance - has made change and is trying, and or practicing different alternative behaviors     GOALS:  1. Build strength. Discussed importance of exercise and physical activity on off days of ICR and having a plan for physcial activity when ICR is finished. 2. WEigth loss. Focus on meal planning and having low calorie dense & easy to prep meals available when his [employer] meals run out. Mary Carmen Miller reports making some changes since starting [facility] including eating more produce. Reports his kids usually bring meals to him. Reports he rarely uses salt and never adds sugar. Reports he doesn't know how to cook, but his wife passed ~2 years ago and is trying to learn. Weight loss had slowed, but is starting to lose again (~23 lbs down total). Food Recall:  Breakfast:  4 cantelope, skim milk  AM Snack: Nut[employer]ystem bars  Lunch:   cantelope, celery with PB  Dinner: (Family brought) Skewers with vegetables, chicken, steak, pineapple  Snacks:  Rarely snacks  Main Beverages:  Water through day    Grocery Shopping: does himself  Meal Prep/Cooking:  Family usually preps food for him  Dining Out: Rarely    Mary Carmen Miller was counseled by the Dietitian for 45 minutes. Motivational Interviewing was used to help promote change. Patient voiced understanding.      Electronically signed by Karlos VELAZQUEZ [address] [place]  on 2020 at 2:54 PM

## 2020-07-03 ENCOUNTER — APPOINTMENT (OUTPATIENT)
Dept: CARDIAC REHAB | Age: 70
End: 2020-07-03
Payer: MEDICARE

## 2020-07-03 ENCOUNTER — HOSPITAL ENCOUNTER (OUTPATIENT)
Dept: CARDIAC REHAB | Age: 70
Setting detail: THERAPIES SERIES
End: 2020-07-03
Payer: MEDICARE

## 2020-07-06 ENCOUNTER — HOSPITAL ENCOUNTER (OUTPATIENT)
Dept: CARDIAC REHAB | Age: 70
Setting detail: THERAPIES SERIES
Discharge: HOME | End: 2020-07-06
Payer: MEDICARE

## 2020-07-06 ENCOUNTER — APPOINTMENT (OUTPATIENT)
Dept: CARDIAC REHAB | Age: 70
End: 2020-07-06
Payer: MEDICARE

## 2020-07-06 PROCEDURE — G0422 INTENS CARDIAC REHAB W/EXERC: HCPCS

## 2020-07-06 PROCEDURE — G0423 INTENS CARDIAC REHAB NO EXER: HCPCS

## 2020-07-06 NOTE — PROGRESS NOTES
Hospital Facility-Based Program  Phase 2 Cardiac Rehab Weekly Progress Report      Patient prescribed exercise:  2:00 class. 3 times per week in rehab, 1-4 times per week at home for the amount of sessions/weeks specified by insurance. Current Levels:NuStep:  79 Tse for 10-15 minutes, UBE: 50Watts for 5 minutes. Progression Discussion: Maintain/Increase Aerobic exercise 12-15 minutes to work on endurance. Attempt to increase intensity by 5-20% for each modality this week. Try to increase intensities until Patrice rates the exercises a 13-17 on Ariane RPE.

## 2020-07-06 NOTE — PROGRESS NOTES
Video Education Report - ICR/CR    Name:  Cristina Hazel     Date:  7/6/2020  MRN: [id number]     Session #:    Session Length: 40 min    Recommended Videos        []01 [employer] - Program Overview   34:22    []02 Overview of [employer] Eating Plan   34:10    []03 Becoming a Alise Matute   33:08     []04 Diseases of Our Time - Part 1   34:22    []05 Calorie Density     33:39   []06 Label Reading - Part 1    32:15   []07 Move it      32.54   []08 Healthy Minds, Bodies, Hearts   32:14   []09 Dining Out - Part 1    32:28   []10 Heart Disease Risk Reduction   08:36   []77 Metabolic Syndrome and Belly Fat  31:52   []12 Facts on Fat     35:29   []13 Diseases of Our Time - Part 2   33:07   []14 Biology of Weight Control   32:36   []15 Biomechanical Limitations   35:20   [x]16 Nutrition Action Plan    34:23    Comments:  Video completed,

## 2020-07-08 ENCOUNTER — APPOINTMENT (OUTPATIENT)
Dept: CARDIAC REHAB | Age: 70
End: 2020-07-08
Payer: MEDICARE

## 2020-07-08 ENCOUNTER — HOSPITAL ENCOUNTER (OUTPATIENT)
Dept: CARDIAC REHAB | Age: 70
Setting detail: THERAPIES SERIES
Discharge: HOME | End: 2020-07-08
Payer: MEDICARE

## 2020-07-08 PROCEDURE — G0423 INTENS CARDIAC REHAB NO EXER: HCPCS

## 2020-07-08 PROCEDURE — G0422 INTENS CARDIAC REHAB W/EXERC: HCPCS

## 2020-07-08 NOTE — PROGRESS NOTES
Michael FIERRO.:  1950    Acct Number: [de-identified]   MRN:  [id number]                             [facility] NUTRITION WORKSHOP             Date: 2020        Session # _______    Manishparminder Seats class covered:    ()  Label Reading  ()  Menus  ()  Targeting Nutrition Priorities  (X)  Fueling a Healthy Body    Readiness to change:    ( ) Pre-contemplative   ( ) Contemplative - ambivalent about change    ( ) Action - ready to set action plan and implement   (X ) Maintenance - has made change and is trying, and or practicing different alternative behaviors     Perry Oacmpo was in the Workshop with the Dietitian for 45 minutes. The content was presented via Powerpoint, lecture, and patient participation based format. Motivational interviewing was utilized when needed, to promote change. Patient voiced understanding.     Electronically signed by Carrie Hernandez RD LD [address] [place]  on 2020 at 4:04 PM

## 2020-07-09 ENCOUNTER — OFFICE VISIT (OUTPATIENT)
Dept: FAMILY MEDICINE CLINIC | Age: 70
End: 2020-07-09
Payer: MEDICARE

## 2020-07-09 VITALS
TEMPERATURE: 97.6 F | WEIGHT: 240 LBS | BODY MASS INDEX: 33.47 KG/M2 | SYSTOLIC BLOOD PRESSURE: 134 MMHG | DIASTOLIC BLOOD PRESSURE: 80 MMHG | RESPIRATION RATE: 20 BRPM | HEART RATE: 64 BPM

## 2020-07-09 PROCEDURE — 1036F TOBACCO NON-USER: CPT | Performed by: FAMILY MEDICINE

## 2020-07-09 PROCEDURE — G8417 CALC BMI ABV UP PARAM F/U: HCPCS | Performed by: FAMILY MEDICINE

## 2020-07-09 PROCEDURE — G8427 DOCREV CUR MEDS BY ELIG CLIN: HCPCS | Performed by: FAMILY MEDICINE

## 2020-07-09 PROCEDURE — 3017F COLORECTAL CA SCREEN DOC REV: CPT | Performed by: FAMILY MEDICINE

## 2020-07-09 PROCEDURE — 4040F PNEUMOC VAC/ADMIN/RCVD: CPT | Performed by: FAMILY MEDICINE

## 2020-07-09 PROCEDURE — 99213 OFFICE O/P EST LOW 20 MIN: CPT | Performed by: FAMILY MEDICINE

## 2020-07-09 PROCEDURE — 1123F ACP DISCUSS/DSCN MKR DOCD: CPT | Performed by: FAMILY MEDICINE

## 2020-07-10 ENCOUNTER — APPOINTMENT (OUTPATIENT)
Dept: CARDIAC REHAB | Age: 70
End: 2020-07-10
Payer: MEDICARE

## 2020-07-10 ENCOUNTER — HOSPITAL ENCOUNTER (OUTPATIENT)
Dept: CARDIAC REHAB | Age: 70
Setting detail: THERAPIES SERIES
Discharge: HOME | End: 2020-07-10
Payer: MEDICARE

## 2020-07-10 PROCEDURE — G0422 INTENS CARDIAC REHAB W/EXERC: HCPCS

## 2020-07-10 PROCEDURE — G0423 INTENS CARDIAC REHAB NO EXER: HCPCS

## 2020-07-13 ENCOUNTER — APPOINTMENT (OUTPATIENT)
Dept: CARDIAC REHAB | Age: 70
End: 2020-07-13
Payer: MEDICARE

## 2020-07-13 ENCOUNTER — HOSPITAL ENCOUNTER (OUTPATIENT)
Dept: CARDIAC REHAB | Age: 70
Setting detail: THERAPIES SERIES
Discharge: HOME | End: 2020-07-13
Payer: MEDICARE

## 2020-07-13 PROCEDURE — G0423 INTENS CARDIAC REHAB NO EXER: HCPCS

## 2020-07-13 PROCEDURE — G0422 INTENS CARDIAC REHAB W/EXERC: HCPCS

## 2020-07-13 NOTE — PROGRESS NOTES
Hospital Facility-Based Program  Phase 2 Cardiac Rehab Weekly Progress Report      Patient prescribed exercise:  2:00 class. 3 times per week in rehab, 1-4 times per week at home for the amount of sessions/weeks specified by insurance. Current Levels: NuStep:  74 Cortez for 15 minutes x 2, UBE: 54 cortez for 5 minutes. Progression Discussion: Maintain/Increase Aerobic exercise 15 minutes to work on endurance. Attempt to increase intensity by 5-20% for each modality this week. Try to increase intensities until Patrice rates the exercises a 13-17 on Ariane RPE.

## 2020-07-13 NOTE — PROGRESS NOTES
Video Education Report - ICR/CR    Name:  Michael Stanford     Date:  7/13/2020  MRN: [id number]     Session #:  24  Session Length: 50 min    Recommended Videos        Completed    Additional Videos         []17 Hypertension & Heart Disease   32:39       []18 Cooking Breakfasts and Snacks  32:00   []19 Planning Your Eating Strategy   33:30   []20 Label Reading - Part 2    32:36  []21 Cooking Soups and Desserts   31:41  []22 How Our Thoughts Can Heal Our Hearts 33:05  []23 Targeting Your Nutrition Priorities  33:58  []24 Healthy Salads & Dressings   35:32  []25 Dining Out - Part 2    32:35  []26 Cooking Dinner and Sides   35:06  []27 Sleep Disorders     33:14  []28 Menu Workshop     32:06  []29 Decoding Lab Results    32:42     []30 Vitamins and Minerals    32:54  [x]31 Exercise Action Plan    32:26  []32 Body Composition    34:03  []33 Improving Performance    32:13  []34 Fueling a Healthy Body    31:32  []35 Introduction to Yoga    33:47  []36 Aging-Enhancing the Quality of Your Life 33:22  []37 Smoking Cessation    36:19    Comments:  Video completed, group discussion

## 2020-07-15 ENCOUNTER — APPOINTMENT (OUTPATIENT)
Dept: CARDIAC REHAB | Age: 70
End: 2020-07-15
Payer: MEDICARE

## 2020-07-15 ENCOUNTER — HOSPITAL ENCOUNTER (OUTPATIENT)
Dept: CARDIAC REHAB | Age: 70
Setting detail: THERAPIES SERIES
Discharge: HOME | End: 2020-07-15
Payer: MEDICARE

## 2020-07-15 PROCEDURE — G0422 INTENS CARDIAC REHAB W/EXERC: HCPCS

## 2020-07-15 PROCEDURE — G0423 INTENS CARDIAC REHAB NO EXER: HCPCS

## 2020-07-16 ENCOUNTER — OFFICE VISIT (OUTPATIENT)
Dept: FAMILY MEDICINE CLINIC | Age: 70
End: 2020-07-16
Payer: MEDICARE

## 2020-07-16 ENCOUNTER — TELEPHONE (OUTPATIENT)
Dept: FAMILY MEDICINE CLINIC | Age: 70
End: 2020-07-16

## 2020-07-16 VITALS
RESPIRATION RATE: 16 BRPM | TEMPERATURE: 97.3 F | BODY MASS INDEX: 33.31 KG/M2 | SYSTOLIC BLOOD PRESSURE: 138 MMHG | HEART RATE: 66 BPM | WEIGHT: 238.8 LBS | DIASTOLIC BLOOD PRESSURE: 68 MMHG

## 2020-07-16 PROCEDURE — 1123F ACP DISCUSS/DSCN MKR DOCD: CPT | Performed by: FAMILY MEDICINE

## 2020-07-16 PROCEDURE — 99213 OFFICE O/P EST LOW 20 MIN: CPT | Performed by: FAMILY MEDICINE

## 2020-07-16 PROCEDURE — G8427 DOCREV CUR MEDS BY ELIG CLIN: HCPCS | Performed by: FAMILY MEDICINE

## 2020-07-16 PROCEDURE — G8417 CALC BMI ABV UP PARAM F/U: HCPCS | Performed by: FAMILY MEDICINE

## 2020-07-16 PROCEDURE — 1036F TOBACCO NON-USER: CPT | Performed by: FAMILY MEDICINE

## 2020-07-16 PROCEDURE — 4040F PNEUMOC VAC/ADMIN/RCVD: CPT | Performed by: FAMILY MEDICINE

## 2020-07-16 PROCEDURE — 3017F COLORECTAL CA SCREEN DOC REV: CPT | Performed by: FAMILY MEDICINE

## 2020-07-16 RX ORDER — HYDROCODONE BITARTRATE AND ACETAMINOPHEN 7.5; 325 MG/1; MG/1
1 TABLET ORAL EVERY 6 HOURS PRN
Qty: 28 TABLET | Refills: 0 | Status: SHIPPED | OUTPATIENT
Start: 2020-07-16 | End: 2020-08-19 | Stop reason: SDUPTHER

## 2020-07-16 ASSESSMENT — ENCOUNTER SYMPTOMS
SINUS PRESSURE: 0
DIARRHEA: 0
SHORTNESS OF BREATH: 0
CONSTIPATION: 0
COUGH: 0
RHINORRHEA: 0
NAUSEA: 0
ABDOMINAL DISTENTION: 0
SORE THROAT: 0
ABDOMINAL PAIN: 1
BACK PAIN: 1
EYE PAIN: 0

## 2020-07-16 NOTE — PROGRESS NOTES
[address] [place] [facility] [address]  Dept: [phone]  Dept Fax: [phone]  Loc: [phone]  PROGRESS NOTE      VisitDate: 7/16/2020    Froilan Lechuga is a 81 y.o. male who presents today for:     Chief Complaint   Patient presents with    Hip Pain     Left side pain x1wk, severe pain especially in the morning, take tramadol with some relief, ROM good, feels like pain when he developed abscess previously         Subjective:  HPI  Patient comes in with worsening left lower quadrant pain. He has a history of a pelvic abscess in the same area and he describes his current pain as the same as what he had experienced with that. He also has known spinal stenosis and foraminal stenosis. Movement does not seem to make his pain worse. He is not had fevers or chills. No recent instrumentation or urologic procedure. Review of Systems   Constitutional: Negative for appetite change and fever. HENT: Negative for congestion, ear pain, postnasal drip, rhinorrhea, sinus pressure and sore throat. Eyes: Negative for pain and visual disturbance. Respiratory: Negative for cough and shortness of breath. Cardiovascular: Negative for chest pain. Gastrointestinal: Positive for abdominal pain. Negative for abdominal distention, constipation, diarrhea and nausea. Genitourinary: Negative for dysuria, frequency and urgency. Musculoskeletal: Positive for back pain and gait problem. Negative for arthralgias. Skin: Negative for rash. Neurological: Negative for dizziness.      Past Medical History:   Diagnosis Date    Arthritis     back    BPH (benign prostatic hyperplasia)     CAD (coronary artery disease)     [facility]    Disease of blood and blood forming organ     anemia    FH: heart disease     GERD (gastroesophageal reflux disease)     Hyperlipidemia     Hypertension     Obesity     08/03/2017    A1C test (Diabetic or Prediabetic)  09/06/2019    Flu vaccine (1) 09/01/2020    PSA counseling  04/30/2021    Lipid screen  05/04/2021    Annual Wellness Visit (AWV)  06/23/2021    DTaP/Tdap/Td vaccine (2 - Td) 12/20/2029    Hepatitis C screen  Completed    Hepatitis A vaccine  Aged Out    Hepatitis B vaccine  Aged Out    Hib vaccine  Aged Out    Meningococcal (ACWY) vaccine  Aged Out         Objective:     Physical Exam  Constitutional:       General: He is not in acute distress. Appearance: He is well-developed. He is not diaphoretic. HENT:      Head: Normocephalic and atraumatic. Right Ear: External ear normal.      Left Ear: External ear normal.   Eyes:      Conjunctiva/sclera: Conjunctivae normal.   Neck:      Vascular: No JVD. Cardiovascular:      Rate and Rhythm: Normal rate and regular rhythm. Heart sounds: Normal heart sounds. Pulmonary:      Effort: Pulmonary effort is normal.      Breath sounds: Normal breath sounds. No wheezing or rales. Abdominal:      Tenderness: There is abdominal tenderness. Comments: Tenderness in the left lower quadrant   Musculoskeletal:         General: Tenderness present. Skin:     General: Skin is warm and dry. Coloration: Skin is not pale. Neurological:      Mental Status: He is alert and oriented to person, place, and time. /68 (Site: Left Upper Arm)   Pulse 66   Temp 97.3 °F (36.3 °C) (Temporal)   Resp 16   Wt 238 lb 12.8 oz (108.3 kg)   BMI 33.31 kg/m²       Impression/Plan:  1. LLQ pain    2. Pelvic abscess in male [facility])      Requested Prescriptions     Signed Prescriptions Disp Refills    HYDROcodone-acetaminophen (NORCO) 7.5-325 MG per tablet 28 tablet 0     Sig: Take 1 tablet by mouth every 6 hours as needed for Pain for up to 7 days. Intended supply: 7 days.  Take lowest dose possible to manage pain     Orders Placed This Encounter   Procedures    CT ABDOMEN PELVIS W IV CONTRAST Additional Contrast? Oral     Standing Status:   Future     Standing Expiration Date:   7/16/2021     Order Specific Question:   Additional Contrast?     Answer:   Oral       Patient giveneducational materials - see patient instructions. Discussed use, benefit, and side effects of prescribed medications. All patient questions answered. Pt voiced understanding. Reviewed health maintenance. Patient agreedwith treatment plan. Follow up as directed. **This report has been created using voice recognition software. It may contain minor errorswhich are inherent in voice recognition technology. **       Electronically signed by Rachel Pallas, MD on 7/16/2020 at 6:00 PM

## 2020-07-16 NOTE — TELEPHONE ENCOUNTER
Spoke with Ruby Contreras at [facility] scheduling-pt is scheduled for CT abd/pelvis with oral and iv contrast at [facility] on 7/17/2020 at 10:00. He needs to arrive at 8:30, NPO 4 hrs prior and to have cr on arrival. Pt informed and verbalized understanding.

## 2020-07-17 ENCOUNTER — APPOINTMENT (OUTPATIENT)
Dept: CARDIAC REHAB | Age: 70
End: 2020-07-17
Payer: MEDICARE

## 2020-07-17 ENCOUNTER — HOSPITAL ENCOUNTER (OUTPATIENT)
Dept: CARDIAC REHAB | Age: 70
Setting detail: THERAPIES SERIES
Discharge: HOME | End: 2020-07-17
Payer: MEDICARE

## 2020-07-17 ENCOUNTER — HOSPITAL ENCOUNTER (OUTPATIENT)
Dept: CT IMAGING | Age: 70
Discharge: HOME | End: 2020-07-17
Payer: MEDICARE

## 2020-07-17 PROCEDURE — 74177 CT ABD & PELVIS W/CONTRAST: CPT

## 2020-07-17 PROCEDURE — G0422 INTENS CARDIAC REHAB W/EXERC: HCPCS

## 2020-07-17 PROCEDURE — 6360000004 HC RX CONTRAST MEDICATION: Performed by: FAMILY MEDICINE

## 2020-07-17 PROCEDURE — G0423 INTENS CARDIAC REHAB NO EXER: HCPCS

## 2020-07-17 RX ADMIN — IOHEXOL 50 ML: 240 INJECTION, SOLUTION INTRATHECAL; INTRAVASCULAR; INTRAVENOUS; ORAL at 10:12

## 2020-07-17 RX ADMIN — IOPAMIDOL 85 ML: 755 INJECTION, SOLUTION INTRAVENOUS at 10:13

## 2020-07-17 NOTE — PROGRESS NOTES
Jeferson FIERRO.:  1950  Acct Number: [de-identified]  MRN:  [id number]                  [facility] COOKING SCHOOL WORKSHOP             Date: 2020        Session # ________   Lior Rogers class covered:      () Adding Flavor     () Fast Breakfasts     () Salads and Dressings     () Soups and Sauces     () Simple Sides     () Appetizers and Snacks     () Delicious Desserts     () Plant Based Proteins     () Fast Evening Meals     () Weekend Breakfasts     () Efficiency Cooking     (x) Meat Alternatives     Patients were shown how to choose, prep, and cook; substitutions and other options were given. Samples were offered. Recipes were given and questions answered. The patient above was in the [employer] for 45 minutes.       Electronically signed by Lam VELAZQUEZ [address] on 2020 at 3:16 PM

## 2020-07-19 ASSESSMENT — ENCOUNTER SYMPTOMS
SORE THROAT: 0
EYE PAIN: 0
NAUSEA: 0
SHORTNESS OF BREATH: 0
ABDOMINAL DISTENTION: 0
SINUS PRESSURE: 0
BACK PAIN: 1
DIARRHEA: 0
RHINORRHEA: 0
ABDOMINAL PAIN: 0
COUGH: 0
CONSTIPATION: 0

## 2020-07-19 NOTE — PROGRESS NOTES
[address]  [address] [address]  Dept: [phone]  Dept Fax: [phone]  Loc: [phone]  PROGRESS NOTE      VisitDate: 7/9/2020    Josselin Fall is a 81 y.o. male who presents today for:     Chief Complaint   Patient presents with    Results     Discuss MRI results. 6/1 Ortho referral was refaxed with results to Dr. Martha Nolen at [address]:  HPI  Patient comes in follow-up of MRI of his lower back. Results were reviewed with the patient showing spinal stenosis as well as some foraminal stenosis. He is already been referred to neurosurgery. He continues to have radicular type pain with some decreased sensation and weakness in his lower extremities    Review of Systems   Constitutional: Negative for appetite change and fever. HENT: Negative for congestion, ear pain, postnasal drip, rhinorrhea, sinus pressure and sore throat. Eyes: Negative for pain and visual disturbance. Respiratory: Negative for cough and shortness of breath. Cardiovascular: Negative for chest pain. Gastrointestinal: Negative for abdominal distention, abdominal pain, constipation, diarrhea and nausea. Genitourinary: Negative for dysuria, frequency and urgency. Musculoskeletal: Positive for arthralgias, back pain and gait problem. Skin: Negative for rash. Neurological: Positive for weakness. Negative for dizziness.      Past Medical History:   Diagnosis Date    Arthritis     back    BPH (benign prostatic hyperplasia)     CAD (coronary artery disease)     CVA (cerebral vascular accident) ([address] 2010    Disease of blood and blood forming organ     anemia    FH: heart disease     GERD (gastroesophageal reflux disease)     Hyperlipidemia     Hypertension     Obesity     Prostate cancer ([address] 2019      Past Surgical History:   Procedure Laterality Date   [facility] CARDIAC VALVE REPLACEMENT  April 2011    Niesha Reed (Pt unsure if part of CABG)    CARDIOVASCULAR STRESS TEST  1 05 2011    Cannot exclude slight inferobasal lateral stress-induced ischemia in a relatively small-sized area. EF 68%. Mildly abnormal nuclear scan. Lexiscan test associated with nonspecific symptoms. EKG nondiagnostic with nonspecific ST-T wave changes.  CAROTID ENDARTERECTOMY  2 08 2011    Right carotid endarterectomy with patch angioplasty with convention patch angioplasty.  COLONOSCOPY      CORONARY ARTERY BYPASS GRAFT      DIAGNOSTIC CARDIAC CATH LAB PROCEDURE  3 24 2011    LV end-diastolic pressure was 12 mmHg with no change before and after contrast injection. There was no significant gradient across the aortic valve to signify aortic stenosis. LV function was within normal limits, EF 55%. 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 Nonobstructive diffuse disease in a small sized RCA. Nomral LV function.  HAND FUSION Right     HERNIA REPAIR      Inguinal hernia repair.  PROSTATECTOMY N/A 3/20/2019    PROSTATECTOMY LAPAROSCOPIC ROBOTIC performed by Travis Petty MD at [address] ECHOCARDIOGRAM  12 21 2010    Size was normal. Systolic function was normal. EF was estimated in the range of 55-65%. There were no regional wall motion abnormalities. Wall thickness was normal. Doppler - LV diastolic function parameters were normal. Mild MR. The aortic valve was trileaflet. Leaflets exhibited mildly increased thickness, mild calcification, normal cuspal separation, and sclerosis.  Mild TR.    VASCULAR SURGERY      cabg harvests from chest     Family History   Problem Relation Age of Onset    Heart Disease Mother     Diabetes Mother     High Blood Pressure Mother     Heart Disease Father     High Blood Pressure Father     High Blood Pressure Sister      Social History     Tobacco Use    Smoking status: Never Smoker    Smokeless tobacco: Former User Types: Chew   Substance Use Topics    Alcohol use: No     Alcohol/week: 0.0 standard drinks     Comment: rare      Current Outpatient Medications   Medication Sig Dispense Refill    famotidine (PEPCID) 20 MG tablet TAKE 1 TABLET BY MOUTH TWICE A DAY (Patient taking differently: TAKE 1 TABLET BY MOUTH DAILY) 180 tablet 2    calcium-vitamin D (OSCAL-500) 500-200 MG-UNIT per tablet Take 1 tablet by mouth daily Indications: 200 mg      nitroGLYCERIN (NITROSTAT) 0.4 MG SL tablet up to max of 3 total doses. If no relief after 1 dose, call 911. 25 tablet 3    clopidogrel (PLAVIX) 75 MG tablet Take 1 tablet by mouth daily 90 tablet 3    metoprolol succinate (TOPROL XL) 50 MG extended release tablet TAKE 1/2 TAB BY MOUTH EVERY DAY 45 tablet 2    atorvastatin (LIPITOR) 20 MG tablet TAKE 1 TABLET BY MOUTH EVERY DAY 90 tablet 4    Cetirizine HCl (ZYRTEC ALLERGY PO) Take 1 tablet by mouth daily as needed       apixaban (ELIQUIS) 5 MG TABS tablet Take 1 tablet by mouth 2 times daily 180 tablet 3    Coenzyme Q10 (COQ10 PO) Take 500 mg by mouth daily      ferrous sulfate 325 (65 FE) MG tablet Take 1 tablet by mouth daily (with breakfast). 30 tablet 11    Krill Oil 500 MG CAPS Take  by mouth daily.  HYDROcodone-acetaminophen (NORCO) 7.5-325 MG per tablet Take 1 tablet by mouth every 6 hours as needed for Pain for up to 7 days. Intended supply: 7 days. Take lowest dose possible to manage pain 28 tablet 0    Elastic Bandages & Supports (LUMBAR BACK BRACE/SUPPORT PAD) MISC Dx lumbar stenosis 1 each 0     No current facility-administered medications for this visit.       No Known Allergies  Health Maintenance   Topic Date Due    Shingles Vaccine (1 of 2) 08/03/2002    Colon cancer screen colonoscopy  08/03/2002    Pneumococcal 65+ years Vaccine (1 of 1 - PPSV23) 08/03/2017    A1C test (Diabetic or Prediabetic)  09/06/2019    Flu vaccine (1) 09/01/2020    PSA counseling  04/30/2021    Lipid screen  05/04/2021  Annual Wellness Visit (AWV)  06/23/2021    DTaP/Tdap/Td vaccine (2 - Td) 12/20/2029    Hepatitis C screen  Completed    Hepatitis A vaccine  Aged Out    Hepatitis B vaccine  Aged Out    Hib vaccine  Aged Out    Meningococcal (ACWY) vaccine  Aged Out         Objective:     Physical Exam  Constitutional:       General: He is not in acute distress. Appearance: He is well-developed. He is not diaphoretic. HENT:      Head: Normocephalic and atraumatic. Right Ear: External ear normal.      Left Ear: External ear normal.   Eyes:      Conjunctiva/sclera: Conjunctivae normal.   Neck:      Vascular: No JVD. Cardiovascular:      Rate and Rhythm: Normal rate and regular rhythm. Heart sounds: Normal heart sounds. Pulmonary:      Effort: Pulmonary effort is normal.      Breath sounds: Normal breath sounds. No wheezing or rales. Musculoskeletal:         General: No tenderness. Comments: Mild tenderness in the lower lumbosacral spine paraspinous muscles. Deep tendon reflexes are symmetric and motor power is 4 out of 5 with flexion extension at the knee   Skin:     General: Skin is warm and dry. Coloration: Skin is not pale. Neurological:      Mental Status: He is alert and oriented to person, place, and time. /80   Pulse 64   Temp 97.6 °F (36.4 °C) (Oral)   Resp 20   Wt 240 lb (108.9 kg)   BMI 33.47 kg/m²       Impression/Plan:  1. Spinal stenosis of lumbar region with neurogenic claudication    2. Coronary artery disease involving coronary bypass graft of native heart without angina pectoris    3. Essential hypertension      Requested Prescriptions      No prescriptions requested or ordered in this encounter     No orders of the defined types were placed in this encounter. Patient giveneducational materials - see patient instructions. Discussed use, benefit, and side effects of prescribed medications. All patient questions answered. Pt voiced understanding.

## 2020-07-20 ENCOUNTER — APPOINTMENT (OUTPATIENT)
Dept: CARDIAC REHAB | Age: 70
End: 2020-07-20
Payer: MEDICARE

## 2020-07-20 ENCOUNTER — HOSPITAL ENCOUNTER (OUTPATIENT)
Dept: CARDIAC REHAB | Age: 70
Setting detail: THERAPIES SERIES
Discharge: HOME | End: 2020-07-20
Payer: MEDICARE

## 2020-07-20 PROCEDURE — G0422 INTENS CARDIAC REHAB W/EXERC: HCPCS

## 2020-07-20 PROCEDURE — G0423 INTENS CARDIAC REHAB NO EXER: HCPCS

## 2020-07-20 RX ORDER — TAMSULOSIN HYDROCHLORIDE 0.4 MG/1
CAPSULE ORAL
Qty: 90 CAPSULE | Refills: 1 | Status: SHIPPED | OUTPATIENT
Start: 2020-07-20 | End: 2020-11-12

## 2020-07-20 NOTE — PROGRESS NOTES
Hospital Facility-Based Program  Phase 2 Cardiac Rehab Weekly Progress Report      Patient prescribed exercise:  2:00 class. 3 times per week in rehab, 1-4 times per week at home for the amount of sessions/weeks specified by insurance. Current Levels: NuStep:  78 Cortez for 15 minutes x 2, UBE: 54 cortez for 5 minutes. Progression Discussion: Maintain/Increase Aerobic exercise 15 minutes to work on endurance. Attempt to increase intensity by 5-20% for each modality this week. Try to increase intensities until Patrice rates the exercises a 13-17 on Ariane RPE.

## 2020-07-22 ENCOUNTER — HOSPITAL ENCOUNTER (OUTPATIENT)
Dept: CARDIAC REHAB | Age: 70
Setting detail: THERAPIES SERIES
Discharge: HOME | End: 2020-07-22
Payer: MEDICARE

## 2020-07-22 ENCOUNTER — APPOINTMENT (OUTPATIENT)
Dept: CARDIAC REHAB | Age: 70
End: 2020-07-22
Payer: MEDICARE

## 2020-07-22 PROCEDURE — G0422 INTENS CARDIAC REHAB W/EXERC: HCPCS

## 2020-07-22 PROCEDURE — G0423 INTENS CARDIAC REHAB NO EXER: HCPCS

## 2020-07-22 NOTE — PROGRESS NOTES
Video Education Report - ICR/CR    Name:  Grace Chaidez     Date:  7/22/2020  MRN: [id number]     Session #:    Session Length: 40 min    Recommended Videos        []01 [employer] - Program Overview   34:22    []02 Overview of [employer] Eating Plan   34:10    []03 Becoming a Felicia Rodriguez   33:08     []04 Diseases of Our Time - Part 1   34:22    []05 Calorie Density     33:39   []06 Label Reading - Part 1    32:15   []07 Move it      32.54   []08 Healthy Minds, Bodies, Hearts   32:14   []09 Dining Out - Part 1    32:28   []10 Heart Disease Risk Reduction   77:42   []87 Metabolic Syndrome and Belly Fat  31:52   []12 Facts on Fat     35:29   []13 Diseases of Our Time - Part 2   33:07   []14 Biology of Weight Control   32:36   []15 Biomechanical Limitations   35:20   []16 Nutrition Action Plan    34:23    Additional Videos         []17 Hypertension & Heart Disease   32:39        []18 Cooking Breakfasts and Snacks  32:00   []19 Planning Your Eating Strategy   33:30   []20 Label Reading - Part 2    32:36  []21 Cooking Soups and Desserts   31:41  []22 How Our Thoughts Can Heal Our Hearts 33:05  []23 Targeting Your Nutrition Priorities  33:58  []24 Healthy Salads & Dressings   35:32  [x]25 Dining Out - Part 2    32:35  []26 Cooking Dinner and Sides   35:06      Comments:  Video completed,

## 2020-07-24 ENCOUNTER — APPOINTMENT (OUTPATIENT)
Dept: CARDIAC REHAB | Age: 70
End: 2020-07-24
Payer: MEDICARE

## 2020-07-24 ENCOUNTER — HOSPITAL ENCOUNTER (OUTPATIENT)
Dept: CARDIAC REHAB | Age: 70
Setting detail: THERAPIES SERIES
End: 2020-07-24
Payer: MEDICARE

## 2020-07-27 ENCOUNTER — APPOINTMENT (OUTPATIENT)
Dept: CARDIAC REHAB | Age: 70
End: 2020-07-27
Payer: MEDICARE

## 2020-07-27 ENCOUNTER — HOSPITAL ENCOUNTER (OUTPATIENT)
Dept: CARDIAC REHAB | Age: 70
Setting detail: THERAPIES SERIES
Discharge: HOME | End: 2020-07-27
Payer: MEDICARE

## 2020-07-27 PROCEDURE — G0422 INTENS CARDIAC REHAB W/EXERC: HCPCS

## 2020-07-27 PROCEDURE — G0423 INTENS CARDIAC REHAB NO EXER: HCPCS

## 2020-07-27 NOTE — PROGRESS NOTES
Hospital Facility-Based Program  Phase 2 Cardiac Rehab Weekly Progress Report      Patient prescribed exercise:  2:00 class. 3 times per week in rehab, 1-4 times per week at home for the amount of sessions/weeks specified by insurance. Current Levels: NuStep:  78 Cortez for 15 minutes x 2, UBE: 70 cortez for 5 minutes. Progression Discussion: Maintain/Increase Aerobic exercise 15 minutes to work on endurance. Attempt to increase intensity by 5-20% for each modality this week. Try to increase intensities until Patrice rates the exercises a 13-17 on Ariane RPE.

## 2020-07-27 NOTE — PLAN OF CARE
[address] [place] [address] Facility-Based Program  Individualized Cardiac Treatment Plan    Patient Name:  Genaro Snowden  :  1950  Age:  81 y.o. MRN:  [id number]  Diagnosis: PCI  Date of Event: 20   Physician:  [facility]  Next Office Visit:    Date Entered Program: 20  Risk Stratifications: [] Low [x] Intermediate [] High  Allergies: No Known Allergies    COVID -19 Screen  Do you have any of the following symptoms:  [] Fever [] Cough [] SOB [] Muscle/Body Ache [] Loss of taste/smell [x] None    Have you traveled outside of the [place]? [] Yes      [x] No    Have you been around anyone who has tested Positive for COVID 19?  [] Yes      [x] No    The following Education has been completed with patient. [x] Wait in the lobby until we call you back to rehab. [x] You must wear a mask while in the medical center, and in cardiac rehab. Please bring your own. [x] Bring your own bottle of water with you. [x] You can not bring anyone with you into the medical center at this time. They are welcome to sit in their car and wait for you.     Individual Cardiac Treatment Plan -EXERCISE  INITIAL 30 DAY 60 DAY 90 DAY FINAL DAY   EXERCISE  ASSESSMENT/PLAN EXERCISE  REASSESSMENT EXERCISE   REASSESSMENT EXERCISE   REASSESSMENT EXERCISE  DISCHARGE/FOLLOW-UP   Date: 20  Date: 20 Date: 20 Date: 20 Date:   Session #1 Session # 10/20 Session # 45 Session # 62 Session # **  Last session completed on **   Stages of Change Stages of Change Stages of Change Stages of Change Stages of Change   [x] Pre Contemplation  [] Contemplation  [] Preparation  [] Action  [] Maintenance  [] Relapse [] Pre Contemplation  [] Contemplation  [x] Preparation  [] Action  [] Maintenance  [] Relapse [] Pre Contemplation  [] Contemplation  [] Preparation  [x] Action  [] Maintenance  [] Relapse [] Pre Contemplation  [] Contemplation  [] Preparation  [x] Action  [] Maintenance  [] Relapse [] Pre Contemplation  [] Contemplation  [] Preparation  [] Action  [] Maintenance  [] Relapse   EXERCISE ASSESSMENT EXERCISE ASSESSMENT EXERCISE ASSESSMENT EXERCISE ASSESSMENT EXERCISE ASSESSMENT   6 Min Walk Test  Distance walked:   0.18 miles  950 ft.  2.4 METs  Max HR:129 BPM      RPE:  12    THR:  120-133  Rhythm:  At- fib    6 Min Walk Test  Distance walked:   ** miles  ** ft  ** METs  Max HR:** BPM      RPE:  **  %Change ft= **    Rhythm:  **   DASI: 5.1 METs DASI: 8.3 METs DASI: 9.2 METs DASI: 9.25 METs DASI: ** METs   Return to Work  [employer] on returning to work? [] Yes              [] No   [] Disabled     [x] Retired     Return to work:  na Return to work:  na Return to work:  na Return to work:  [address] [facility] Limitations/  [x] Yes    [] No  If yes please list:  Hip pain     Orthopedic Limitations  *If patient has orthopedic issue:   Actions/  accomodations needed to [employer] successful : slower speeds on TM, now doing mainly NS Orthopedic Limitations  Pt does NS due to back pain Orthopedic Limitations    Pt uses NuStep. Pt gets back pain with other equipment. Orthopedic Limitations     Fall Risk  Fall risk assessed? [x] Yes      [] No    Balance Issues? [] Yes      [x] No     [] Walker [] Cane    [x] Safety issues reviewed      Fall Risk  *If patient is a fall risk, action needed to accommodate:  Fall Risk  na Fall Risk  n/a Fall Risk   Home Exercise  [] Yes    [x] No   Home Exercise  [x] Yes    [] No  Type: walking  Frequency: 3d/w  Duration: 30-45 min Home Exercise  [x] Yes    [] No  Type: walking   Frequency: 2 x per week  Duration: 10 min Home Exercise  [x] Yes    [] No  Type: walking  Frequency:3x/wk  Duration: 30 min Home Exercise  [] Yes    [] No  Type: **  Frequency: **  Duration: **   Angina with Activity? [] Yes    [x] No  Angina Management: na Angina with Activity? [] Yes    [x] No  Angina Management: ** Angina with Activity?   [] Yes    [x] No  Angina Management: na Angina with Activity? [] Yes    [x] No  Angina Management: n/a Angina with Activity?   [] Yes    [] No  Angina Management: **   EXERCISE PLAN EXERCISE PLAN EXERCISE PLAN EXERCISE PLAN EXERCISE PLAN   *Interventions* *Interventions* *Interventions* *Interventions* *Interventions*   Exercise Prescription  (per physician & CR staff) Exercise Prescription  (per physician & CR staff) Exercise Prescription  (per physician & CR staff) Exercise Prescription  (per physician & CR staff) Exercise Prescription  (per physician & CR staff)   Cardiovascular Cardiovascular Cardiovascular Cardiovascular Cardiovascular   Mode:    [x] Treadmill (TM)  [x] Schwinn Airdyne (AD)  [x] Arms Ergometer (AE)  [x] NuStep  [] Elliptical (E) MODE:    [] Treadmill (TM)  [] Schwinn Airdyne (AD)  [x] Arms Ergometer (AE)  [x] NuStep  [] Elliptical (E) MODE:    [] Treadmill (TM)  [] Schwinn Airdyne (AD)  [x] Arms Ergometer (AE)  [x] NuStep  [] Elliptical (E) MODE:      [x] Arms Ergometer (AE)  [x] NuStep   MODE:    [] Treadmill (TM)  [] Schwinn Airdyne (AD)  [] Arms Ergometer (AE)  [] NuStep  [] Elliptical (E)   Initial Workloads  TM: [email or web address] 2.6 METs  AD: 0.7 level = 2.4 METs  NS: 44  Tse= 2.4 METs  AE: 0.4 level = 1.8 METs Current Workloads  TM:  @ %=  METs  AD:  level =  METs  NS: 56-60 Tse= 2.5  METs  AE: 38W level Current Workloads    NS: 66  Tse= 2.6 METs  AE: 42 level = 2.2 METs Current Workloads    NS:  78 Tse= 2.8 METs  AE: 70 level = 2.7 METs Current Workloads  TM:  @ %=  METs  AD:  level =  METs  NS:   Tse=  METs  AE:  level =  METs     Frequency:    ICR: 3x/week  Home: 2-3x/wk Frequency:   ICR: 3x/week  Home: 3x/wk Frequency:  ICR: 3x/week  Home: 3-4x/wk Frequency:  ICR: 3x/week  Home: 3-4x/wk Frequency:  Nieves Sims will continue exercise at  5-7 days/week   Duration:   Total aerobic exercise = 30-45 min    5-8 min/mode Duration:  Total aerobic exercises = 35 min     5-15 min/mode Duration:  Total aerobic exercises = 35 min 15min/mode Duration:  Total aerobic exercises = 30-45 min     5-15 min/mode Duration:  Total erobic exercise =  60-90 min   Intensity:   MET Level = 2.4-2.6  RPE = 12-15 Intensity:  Max MET Level = 2.5  RPE = 12-15 Intensity:  Max MET Level = 2.6  RPE = 12-15 Intensity:  Max MET Level = 2.8  RPE = 12-15 Intensity:  Max MET Level = ** RPE = 12-15   Progression: increase aerobic activity up to 15 min over next 4 weeks by increasing time 2-3 min/week. Progression: Increase intensity by 5-10% each week   Progression: Increase intensity 5-10% over the next 4 weeks as tolerated   Progression:  Increase intensity 5-10% over the next 4 weeks as tolerated. Progression:  Increase time/intensity when RPE <13, and HR is in [facility]   [x] Yes      [] No  Upper and Lower body strength training 2x/wk    Wt: 2#       Reps:  8-15    *Increase wt. after completing 15 reps with an RPE of <12/13. [] Yes      [] No  -Have not started yet    Upper and Lower body strength training 2x/wk    Wt: **#       Reps:  8-15    *Increase wt. after completing 15 reps with an RPE of <12/13. [x] Yes      [] No  Upper and Lower body strength training 2x/wk    Wt:      4#       Reps:  8-15    *Increase wt. after completing 15 reps with an RPE of <12/13. [x] Yes      [] No  Upper and Lower body strength training 2x/wk    Wt: 5#       Reps:  8-15    *Increase wt. after completing 15 reps with an RPE of <12/13. Continue Strength Training at home   [] Exercise Log & Strength training handout given     Wt: **#       Reps:  8-15    *Increase wt. after completing 15 reps with an RPE of <12/13.    Flexibility Flexibility Flexibility Flexibility Flexibility   [x] Yes      [] No  25 min session of Core Strength & Flexibility 1x/per week  Attends Core Strength & Flexibility   [] Yes      [] No  --Have not started yet   Attends Core Strength & Flexibility   [x] Yes      [] No Attends Core Strength & Flexibility   [x] Yes      [] No Continue Core Strength & Flexibility at home   Home Exercise  *Patrice verbalizes planning to walk 3-4 days/week for 5-10 min on days not in rehab. Home Exercise  *Patrice verbalizes planning to continue to walk 30-60 min Home Exercise  *Patrice verbalizes planning to walk/ join a fitness center with \"back friendly\" equipment, 3-4 days/week for 20-30 min on days not in rehab. Home Exercise  *Patrice verbalizes planning to walk/ join a fitness center with \"back friendly\" equipment, 3-4 days/week for 20-30 min on days not in rehab. Home Exercise  *Patrice verbalizes his/her plan to ** ** days/week for ** min @ **   *Education* *Education* *Education* *Education* *Education*   RPE Scale  [x] Yes      [] No  Exercise Safety  [x] Yes      [] No  Equipment Orientation  [x] Yes      [] No  S/S to Report  [x] Yes      [] No  Warm Up/Cool Down  [x] Yes      [] No  Home Exercise  [x] Yes      [] No    All Exercise Education Completed  [] Yes      [] No   Exercise Education Recommended    Workshops  [x] Improving Performance  [x] Balance Training & Fall Prevention  [x] Exercise Biomechanics  [x] Resistance Training & Core Strength Exercise Education Attended/Date Exercise Education Attended/Date     6/29/20  Resistance Training & Core Strength    7/20/20  Improve Performance Exercise Education Attended/Date All Sessions Completed?     [] Yes  [] No   *Goals* *Goals* *Goals* *Goals* *Goals*   Initial Exercise Goals Exercise Goals  Exercise Goals   Exercise Goals  Exercise Goals   Patrice plans to:  [x] Attend exercise sessions 3x/wk  [x] initiate home exercise 2-3x/wk for 10-20 min  [x] Increase 6 min walk distance by 10%  [x] Attend Exercise workshops Patrice plans to:  [x] Attend exercise sessions 3x/wk  [x] continue home exercise 2-3x/wk for 20-30 min  [x] Attend Exercise workshops Patrice's plans to:  [x] Attend exercise sessions 3x/wk  [x] continue home exercise 3-4x/wk for 30-45 min  [x] Determine plan of exercise following rehab  [x] Attend Exercise workshops Patrice's plans to:    Continue home exercise 3-4x/wk for 30-45 min Patrice achieved exercise goals? []  Yes    [] No  If no, why?  **  [] Increased 6 min walk distance by 10%  [] Currently exercising 30-60 min/day, 5-7days/wk   [] Plans to continue exercise on own  [] Plans to join a local fitness center to continue exercise  [] Does not plan to continue to exercise after rehab   Return to ADL or Hobbies:  Phil Arauz would like to improve strength and endurance so he is able to return to fishing, walk the dog, riding mower, manual mower Return to ADL or Hobbies:  Phil Arauz is progressing, but his hip/back hurts Return to ADL or Hobbies:  Phil Arauz would like to improve strength and endurance so he is able to return to all previous activities Return to ADL or Hobbies:  Phil Arauz would like to improve strength and endurance so he is able to return to his normal routine.  Return to ADL or Hobbies:  Phil Arauz would like to improve strength and endurance so he/she is able to return to **    *MET level required for above goal:  5-6 METs MET level Achieved: 2.5 METs MET level Achieved:  2.6 METs MET level Achieved: 2.8 METs MET level Achieved:  **METs     Individual Cardiac Treatment Plan - Nutrition  NUTRITION  ASSESSMENT/PLAN NUTRITION  REASSESSMENT NUTRITION   REASSESSMENT NUTRITION   REASSESSMENT NUTRITION  DISCHARGE/FOLLOW-UP   Stages of Change Stages of Change Stages of Change Stages of Change Stages of Change   [] Pre Contemplation  [] Contemplation  [x] Preparation  [] Action  [] Maintenance  [] Relapse [] Pre Contemplation  [] Contemplation  [x] Preparation  [] Action  [] Maintenance  [] Relapse [] Pre Contemplation  [] Contemplation  [x] Preparation  [] Action  [] Maintenance  [] Relapse [] Pre Contemplation  [] Contemplation  [x] Preparation  [] Action  [] Maintenance  [] Relapse [] Pre Contemplation  [] Attended/Date Nutritional Education Attended/Date    7/1/20   1:1 Registered Dietitian Nutritional Education Attended/Date    6/17/20  Menu (video)    7/8/20  Fueling a Healthy Body All Sessions Completed? [] Yes  [] No   Cooking School  Recommended     [x] Adding Flavor  [x] Fast & Healthy     Breakfasts  [x] Salads & Dressings  [x] Soups & Simple     Sauces  [x] Simple Sides  [x] Appetizers &     Snacks  [x] Delicious Desserts  [x] Plant Proteins  [x] Fast Evening Meals  [x] Weekend Breakfasts  [x] Cook once, Eat       twice  [x] [place] [employer] [facility] School  Sessions Completed   Cooking School  Sessions Completed    6/12/20  Plant Proteins    6/19/20  Fast Evening Meals Cooking School  Sessions Completed    7/10/20  Beulah Charity once, eat twice    7/19/20  Meat alternatives Cooking School    # of sessions completed:  **   *Goals* *Goals* *Goals* *Goals* *Goals*   aPtrice's nutritional goals are as follows:  Complete and return diet survey Patrice's nutritional goals are as follows:  [x] Attend Nutrition Workshops  [x] Attend 1:1   [x] Attend Cooking Classes  [] ** Patrice's nutritional goals are as follows:  [x] Attend Nutrition Workshops  [x] Attend 1:1   [x] Attend Cooking Classes  [x] Complete and return diet survey  [] ** Patrice's nutritional goals are as follows:  [x] Attend Nutrition Workshops  [x] Attend Cooking Classes   Patrice achieved nutritional goals   [] Yes    [] No  If no, why?   Use knowledge gained to continue Pritikin eating plan at home       Individual Cardiac Treatment Plan - Psychosocial  PSYCHOSOCIAL  ASSESSMENT/PLAN PSYCHOSOCIAL  REASSESSMENT PSYCHOSOCIAL   REASSESSMENT PSYCHOSOCIAL   REASSESSMENT PSYCHOSOCIAL  DISCHARGE/FOLLOW-UP   Stages of Change Stages of Change Stages of Change Stages of Change Stages of Change   [] Pre Contemplation  [x] Contemplation  [] Preparation  [] Action  [] Maintenance  [] Relapse [] Pre Contemplation  [] Contemplation  [] Preparation  [x] Action  [] please explain:  Slightly since wife's death. Encouraged pt to discuss with PCP Signs and Symptoms of Depression Present? [] Yes      [x] No  If yes, please explain:  ** Signs and Symptoms of Depression Present? [] Yes      [x] No   Signs and Symptoms of Depression Present? [] Yes      [x] No   Signs and Symptoms of Depression Present? [] Yes      [] No  If yes, please explain:  **   Signs and Symptoms of Anxiety Present? [] Yes      [x] No   Signs and Symptoms of Anxiety Present? [] Yes      [x] No  If yes, please explain:  ** Signs and Symptoms of Anxiety Present? [] Yes      [x] No   Signs and Symptoms of Anxiety Present? [] Yes      [x] No   Signs and Symptoms of Anxiety Present? [] Yes      [] No  If yes, please explain:  **   [] Patient has poor eye contact   [] Flat affect present. [] Signs of anxiety, anger or hostility    [] Signs social isolation present. []  Signs of alcohol or substance abuse       PSYCHOSOCIAL PLAN PSYCHOSOCIAL PLAN PSYCHOSOCIAL PLAN PSYCHOSOCIAL PLAN PSYCHOSOCIAL PLAN   *Interventions* *Interventions* *Interventions* *Interventions* *Interventions*   *Please check if needed  [] Psych Consult  [] Physician Referral  [x] Stress Management Skills *Please check if needed  [] Psych Consult  [] Physician Referral  [] Stress Management Skills *Please check if needed  [] Psych Consult  [] Physician Referral  [x] Stress Management Skills *Please check if needed  [] Psych Consult  [] Physician Referral  [] Stress Management Skills *Please check if needed  [] Psych Consult  [] Physician Referral  [] Stress Management Skills   Is patient currently taking anti-depressant or anti-anxiety medications? [] Yes      [x] No   Change in anti-depressant or anti-anxiety medications? [] Yes      [x] No  If yes, please list medications:  ** Change in anti-depressant or anti-anxiety medications? [] Yes      [x] No   Change in anti-depressant or anti-anxiety medications?   [] Yes      [x] No   Change in anti-depressant or anti-anxiety medications? [] Yes      [] No  If yes, please list medications:  **   *Education* *Education* *Education* *Education* *Education*   Healthy Mind-Set Workshops Recommended  [x] Stress & Health  [x] Taking Charge of Stress  [x] New Thoughts, New Behaviors  [x] Managing Moods & Relationships Healthy Mind-Set Workshops Attended/Date Healthy Mind-Set Workshops Attended/Date     6/24/20  New Thoughts, New Behaviors Healthy Mind-Set Workshops Attended/Date    7/15/20  Managing Moods Healthy Mind-Set Workshops  Completed  [] Yes      [] No   *Goals* *Goals* *Goals* *Goals* *Goals*   Patrice's psychosocial goals are as follows:  Complete HADS & Martha & Odilia, Quality of Life Index, Cardiac Version IV Alicia psychosocial goals are as follows:  [x] Attend Healthy Mind-Set Workshops  [x] Reduce depression symptom severity by 1 level  [] ** Patrice's psychosocial goals are as follows:  [x] Attend Healthy Mind-Set Workshops  [x] Reduce depression symptom severity by 1 level  [] ** Patrice's psychosocial goals are as follows:  [x] Attend Healthy Mind-Set Workshops  [x] Reduce depression symptom severity by 1 level   Patrice achieved psychosocial goals?   [] Yes    [] No  If no, why?  **  [] Use methods learned to continue to reduce depression symptom severity by 1 level  [] **     Individual Cardiac Treatment Plan - Other:  Risk Factor/Education  RISK FACTOR  ASSESSMENT/PLAN RISK FACTOR  REASSESSMENT  RISK FACTOR  REASSESSMENT RISK FACTOR  REASSESSMENT RISK FACTOR   DISCHARGE/FOLLOW-UP   Stages of Change Stages of Change Stages of Change Stages of Change Stages of Change   [] Pre Contemplation  [x] Contemplation  [] Preparation  [] Action  [] Maintenance  [] Relapse [] Pre Contemplation  [] Contemplation  [x] Preparation  [] Action  [] Maintenance  [] Relapse [] Pre Contemplation  [] Contemplation  [] Preparation  [x] Action  [] Maintenance  [] Relapse [] Pre Contemplation  [] Contemplation  [] Preparation  [x] Action  [] Maintenance  [] Relapse [] Pre Contemplation  [] Contemplation  [] Preparation  [] Action  [] Maintenance  [] Relapse   RISK FACTOR/EDUCATION ASSESSMENT RISK FACTOR/EDUCATION ASSESSMENT RISK FACTOR/EDUCATION ASSESSMENT RISK FACTOR/EDUCATION ASSESSMENT RISK FACTOR /EDUCATION ASSESSMENT   Hypertension  [x] Yes      [] No    Resting BP: 96/64  Peak Ex BP:142/82  Medication: metoprolol   Hypertension  Resting BP: 116/66  Peak Ex BP:142/62  Medication Changes:  [] Yes      [x] No Hypertension  Resting BP: 120/78  Peak Ex BP:160/74  Medication Changes:  [] Yes      [x] No Hypertension  Resting BP: 112/70  Peak Ex BP: 140/80  Medication Changes:  [] Yes      [x] No Hypertension  Resting BP: **  Peak Ex BP:**  Medication Changes:  [] Yes      [] No   Lipids  HLD/DLD  [x] Yes      [] No  TOTAL CHOL: 133  HDL:  47  LDL:  67  TRI  Medication: atorvastatin Lipids  Medication Changes:  [] Yes      [x] No     Lipids  Medication Changes:  [] Yes      [x] No     Lipids  Medication Changes:  [] Yes      [x] No     Lipids    TOTAL CHOL: **  HDL:  **  LDL:  **  TRIG:  **  Medication Changes:  [] Yes      [] No   Diabetes  [] Yes      [x] No  FBS: 129           HbA1C: 6.3        Monitor BS @ home:   [] Yes      [x] No   Diabetes  Most Recent BS:  BS have been in range  [] Yes      [] No  Medication Changes  [] Yes      [] No   Diabetes  na     Diabetes  [] Yes      [x] No     Diabetes  Most Recent BS:  BS have been in range  [] Yes      [] No  Medication Changes  [] Yes      [] No       Tobacco Use  [] Current  [] Former  [x] Never      Smokeless Tobacco use:   [] Yes      [x] No   Tobacco Use  Change in smoking status   [] Yes      [x] No    Quit date: **   Tobacco Use  Change in smoking status   [] Yes      [x] No       Tobacco Use  Change in smoking status   [] Yes      [x] No   Tobacco Use  Change in smoking status   [] Yes      [] No    Quit date: ** Learning Barriers  Please select one:  [] Speech  [] Literacy  [x] Hearing  [] Cognitive  [x] Vision  [x] Ready to Learn Learning Barriers Addressed: We have to speak louder and sometimes go up to him - pt can read okay  [x] Yes      [] No   Learning Barriers Addressed:   [x] Yes      [] No   Learning Barriers Addressed:  [x] Yes      [] No Learning Barriers Addressed:  [] Yes      [] No     RISK FACTOR/EDUCATION PLAN RISK FACTOR/EDUCATION PLAN RISK FACTOR/EDUCATION PLAN RISK FACTOR/EDUCATION PLAN RISK FACTOR/EDUCATION PLAN   *Interventions* *Interventions* *Interventions* *Interventions* *Interventions*   Recommended Educational Videos    [x] Overview of The Pritikin Eating Plan  [x] Becoming A Pritikin   [x] Diseases of Our Time-Part 1  [x] Calorie Density  [x] Label Reading-Part 1  [x] Move It! [x] Healthy Minds, Bodies, Hearts  [x] Dining Out-Part 1  [x] Heart Disease Risk Reduction  [x] Metabolic Syndrome & Belly Fat  [x] Facts on Fat  [x] Diseases of Our Times-Part 2  [x] Biology of Weight Control  [x] Biomechanical Limitations  [x] Nurtition Action Plan   Completed Videos/Date      5/12/20  Overview of The Pritikin Eating Plan    5/27 Pritikin     5/18 Disease 1    5/20 Calorie Density    5/22 Labels 1    6/3 Move it    5/29 [employer]    6/1 Dining out 1    5/15 Heart Disease RR    6/5 Met. Syndr Completed Videos/Date      6/10/20  Facts on Fat    6/8/20   Biology of Weight Control    6/22/20 Biomechanical Limitations Completed Videos/Date      7/22/20  Diseases of Our Times-Part 2    7/6/20  Nurtition Action Plan Recommended Educational Videos Completed    [] Yes      [] No    **If not completed, Why? **          Smoking Cessation/Relaspe Prevention Intervention needed? [] Yes      [x] No   Smoking Cessation/Relapse Prevention Scheduled?    [] Yes      [x] No  Date:  **     Smoking Cessation Counseling attended  [] Yes      [] No  **If not completed, Why? **   Professional Referrals:  *Please check if needed  [] Diabetes Clinic  [] Lipid Clinic   [] Other:     Professional Referrals:  *Please check if needed  [] Diabetes Clinic  [] Lipid Clinic   [] Other:   Preventative Medication Preventative Medication Preventative Medication Preventative Medication Preventative Medication   Aspirin  [x] Yes    [] No  Blood Thinner: Clopidogrel/Effient/Brillinta  [x] Yes    [] No  Beta Blocker  [x] Yes    [] No  Ace Inhibitor  [] Yes    [x] No  Statin/Lipid Lowering  [x] Yes    [] No Medication Changes? [] Yes    [x] No Medication Changes? [] Yes    [x] No Medication Changes? [] Yes    [x] No Medication Changes? [] Yes    [] No   *Education* *Education* *Education* *Education* *Education*   Does Patrice require any additional education? [] Yes    [x] No   Does Patrice require any additional education? [] Yes    [x] No Does Patrice require any additional education? [] Yes    [x] No Does Patrice require any additional education? [] Yes    [x] No Does Patrice require any additional education?   [] Yes    [] No   Recommended Additional Educational Videos    Medical  [] Hypertension & Heart Disease  [] Decoding Lab Results  [] Aging Enhancing Your QoL  [] Sleep Disorders  Exercise  [] Body Composition  [] Improve Performance  [] Exercise Action Plan  [] Intro to Yoga  Behavioral  [] How Our Thoughts Can Heal Our Hearts  [] Smoking Cessation  Nutrition  [] Planning Your Eating Strategy  [] Lable Reading- Part 2  [] Dining Out - Part 2  [] Targeting Your Nutrition Priorities  [] Fueling a Healthy Body  [] Menu Workshop  [employer] [] Breakfast & Snacks  [] [employer] Salads & Dressing  [] [address] [place] [address]  [] Soups & Desserts   Additional Educational Videos Completed Additional Educational Videos Completed     Additional Educational Videos Completed    7/13/20  Exercise Action Plan    7/27/20  Aging Enhancing Your QoL Additional Educational Videos Completed    [] Yes    [] No   *Goals* *Goals* *Goals* *Goals* *Goals*   Patrice's risk factor/education goals are as follows:    [x] Optimal BP <140/90  [] Blood Sugar <120  [x] Attend recommended video education sessions  [x] Takes medications as prescribed 100% of the time   [] ** Patrice's risk factor/education goals are as follows:    [x] Optimal BP <140/90  [] Blood Sugar <120  [x] Attend recommended video education sessions  [x] Takes medications as prescribed 100% of the time   [] ** Patrice's risk factor/education goals are as follows:    [x] Optimal BP <140/90  [] Blood Sugar <120  [x] Attend recommended video education sessions  [x] Takes medications as prescribed 100% of the time   [] ** Patrice's risk factor/education goals are as follows:    [x] Optimal BP <140/90  [] Blood Sugar <120  [x] Attend recommended video education sessions  [x] Takes medications as prescribed 100% of the time   [] ** Patrice achieved risk factor goals?   [] Yes    [] No  If no, why?  **     Monitored telemetry has revealed At- fib   Monitored telemetry has revealed A-fib   Monitored telemetry has revealed NSR/ At-fib   Monitored telemetry has revealed A-fib   Monitored telemetry has revealed **  [] documented arrhythmia at increasing workloads  [] associated symptoms **   Physician Response    [x] Cardiac rehab is reasonably and medically necessary for continuous cardiac monitoring surveillance  of patient's cardiac activity  [x] Initiate continuous telemerty monitoring and notify me with any concerns  [] Other   Physician Response    [x] Cardiac rehab is reasonably and medically necessary for continuous cardiac monitoring surveillance  of patient's cardiac activity  [x] Continue continuous telemerty monitoring and notify me with any concerns  [] Other     Physician Response    [x] Cardiac rehab is reasonably and medically necessary for continuous cardiac monitoring surveillance  of patient's cardiac activity  [x] Continue continuous telemerty monitoring and notify me with any concerns [] Other     Physician Response    [x] Cardiac rehab is reasonably and medically necessary for continuous cardiac monitoring surveillance  of patient's cardiac activity  [x] Continue continuous telemerty monitoring and notify me with any concerns   [] Other          Date/Time User Provider Type Action   5/14/2020  7:48 AM Nikhil Perez MD Physician Cosign   5/12/2020  9:19 AM Malcolm Wood Exercise Physiologist Sign     Cosigned by: Nikhil Perez MD at 6/9/2020  8:53 AM   Revision History      Date/Time User Provider Type Action   6/9/2020  8:53 AM Nikhil Perez MD Physician Cosign   6/8/2020  2:31 PM Kenny Estes Exercise Physiologist Sign     Cosigned by:  Nikhil Perez MD at 7/2/2020 12:28 AM    Revision History     Date/Time  User  Provider Type  Action    7/2/2020 12:28 AM  Nikhil Perez MD  Physician  Cosign    7/1/2020  2:42 PM  Malcolm Wood  Exercise Physiologist

## 2020-07-27 NOTE — PROGRESS NOTES
Video Education Report - ICR/CR    Name:  Hansa Cabral     Date:  7/27/2020  MRN: [id number]     Session #:  34  Session Length: 45 min        Additional Videos         []17 Hypertension & Heart Disease   32:39       []18 Cooking Breakfasts and Snacks  32:00   []19 Planning Your Eating Strategy   33:30   []20 Label Reading - Part 2    32:36  []21 Cooking Soups and Desserts   31:41  []22 How Our Thoughts Can Heal Our Hearts 33:05  []23 Targeting Your Nutrition Priorities  33:58  []24 Healthy Salads & Dressings   35:32  []25 Dining Out - Part 2    32:35  []26 Cooking Dinner and Sides   35:06  []27 Sleep Disorders     33:14  []28 Menu Workshop     32:06  []29 Decoding Lab Results    32:42     []30 Vitamins and Minerals    32:54  []31 Exercise Action Plan    32:26  []32 Body Composition    34:03  []33 Improving Performance    32:13  []34 Fueling a Healthy Body    31:32  []35 Introduction to Yoga    33:47  [x]36 Aging-Enhancing the Quality of Your Life 33:22  []37 Smoking Cessation    36:19    Comments:  Video completed, group discussion

## 2020-07-29 ENCOUNTER — APPOINTMENT (OUTPATIENT)
Dept: CARDIAC REHAB | Age: 70
End: 2020-07-29
Payer: MEDICARE

## 2020-07-29 ENCOUNTER — HOSPITAL ENCOUNTER (OUTPATIENT)
Dept: CARDIAC REHAB | Age: 70
Setting detail: THERAPIES SERIES
Discharge: HOME | End: 2020-07-29
Payer: MEDICARE

## 2020-07-29 ENCOUNTER — OFFICE VISIT (OUTPATIENT)
Dept: FAMILY MEDICINE CLINIC | Age: 70
End: 2020-07-29
Payer: MEDICARE

## 2020-07-29 VITALS
SYSTOLIC BLOOD PRESSURE: 120 MMHG | BODY MASS INDEX: 33.05 KG/M2 | DIASTOLIC BLOOD PRESSURE: 70 MMHG | HEART RATE: 68 BPM | RESPIRATION RATE: 16 BRPM | TEMPERATURE: 97.8 F | WEIGHT: 237 LBS

## 2020-07-29 PROCEDURE — 99214 OFFICE O/P EST MOD 30 MIN: CPT | Performed by: NURSE PRACTITIONER

## 2020-07-29 PROCEDURE — 1123F ACP DISCUSS/DSCN MKR DOCD: CPT | Performed by: NURSE PRACTITIONER

## 2020-07-29 PROCEDURE — 96372 THER/PROPH/DIAG INJ SC/IM: CPT | Performed by: NURSE PRACTITIONER

## 2020-07-29 PROCEDURE — G0423 INTENS CARDIAC REHAB NO EXER: HCPCS

## 2020-07-29 PROCEDURE — 3017F COLORECTAL CA SCREEN DOC REV: CPT | Performed by: NURSE PRACTITIONER

## 2020-07-29 PROCEDURE — 1036F TOBACCO NON-USER: CPT | Performed by: NURSE PRACTITIONER

## 2020-07-29 PROCEDURE — 4040F PNEUMOC VAC/ADMIN/RCVD: CPT | Performed by: NURSE PRACTITIONER

## 2020-07-29 PROCEDURE — G8417 CALC BMI ABV UP PARAM F/U: HCPCS | Performed by: NURSE PRACTITIONER

## 2020-07-29 PROCEDURE — G0422 INTENS CARDIAC REHAB W/EXERC: HCPCS

## 2020-07-29 PROCEDURE — G8427 DOCREV CUR MEDS BY ELIG CLIN: HCPCS | Performed by: NURSE PRACTITIONER

## 2020-07-29 RX ORDER — METHYLPREDNISOLONE ACETATE 80 MG/ML
120 INJECTION, SUSPENSION INTRA-ARTICULAR; INTRALESIONAL; INTRAMUSCULAR; SOFT TISSUE ONCE
Status: COMPLETED | OUTPATIENT
Start: 2020-07-29 | End: 2020-07-29

## 2020-07-29 RX ORDER — PREDNISONE 20 MG/1
20 TABLET ORAL 2 TIMES DAILY
Qty: 14 TABLET | Refills: 0 | Status: SHIPPED | OUTPATIENT
Start: 2020-07-29 | End: 2020-08-05

## 2020-07-29 RX ADMIN — METHYLPREDNISOLONE ACETATE 120 MG: 80 INJECTION, SUSPENSION INTRA-ARTICULAR; INTRALESIONAL; INTRAMUSCULAR; SOFT TISSUE at 08:45

## 2020-07-29 ASSESSMENT — ENCOUNTER SYMPTOMS
EYE REDNESS: 0
COUGH: 0
ALLERGIC/IMMUNOLOGIC NEGATIVE: 1
EYE PAIN: 0
RHINORRHEA: 0
WHEEZING: 0
EYE DISCHARGE: 0
DIARRHEA: 0
SHORTNESS OF BREATH: 0
CONSTIPATION: 0
VOMITING: 0
BACK PAIN: 0
SORE THROAT: 0
TROUBLE SWALLOWING: 0
NAUSEA: 0
ABDOMINAL PAIN: 0

## 2020-07-29 NOTE — PROGRESS NOTES
Gina FIERRO.:  1950    Acct Number: [de-identified]   MRN:  [id number]                             [facility] NUTRITION WORKSHOP             Date: 2020        Session # _______    Betty Hardy class covered:    ()  Label Reading  ()  Menus  (X)  Targeting Nutrition Priorities  ()  Fueling a Healthy Body    Readiness to change:    ( ) Pre-contemplative   ( ) Contemplative - ambivalent about change    ( ) Action - ready to set action plan and implement   (X ) Maintenance - has made change and is trying, and or practicing different alternative behaviors       Jasmin Mishra was in the Workshop with the Dietitian for 45 minutes. The content was presented via Powerpoint, lecture, and patient participation based format. Motivational interviewing was utilized when needed, to promote change. Patient voiced understanding.     Electronically signed by Danyelle VELAZQUEZ [address] [place]  on 2020 at 3:58 PM

## 2020-07-29 NOTE — PROGRESS NOTES
[address] [address] [address]  [facility] [address]  Dept: [phone]  Dept Fax: [phone]  Loc: [phone]     Visit Date:  7/29/2020      Patient:  Kirill Renteria  YOB: 1950    HPI:     Chief Complaint   Patient presents with    Facial Swelling     bilateral eyelids L>R, left cheek is red and swollen, few red spots on chin and neck. Sx for 3-4 days. States he went hiking in woods and possibly came in contact with poison ivy. Has used calamine and benadryl       HPI    Medications    Current Outpatient Medications:     predniSONE (DELTASONE) 20 MG tablet, Take 1 tablet by mouth 2 times daily for 7 days, Disp: 14 tablet, Rfl: 0    hydrocortisone 2.5 % cream, Apply topically 2 times daily for 7 days Apply topically 2 times daily. , Disp: 1 Tube, Rfl: 0    tamsulosin (FLOMAX) 0.4 MG capsule, TAKE 1 CAPSULE BY MOUTH EVERY DAY, Disp: 90 capsule, Rfl: 1    famotidine (PEPCID) 20 MG tablet, TAKE 1 TABLET BY MOUTH TWICE A DAY (Patient taking differently: TAKE 1 TABLET BY MOUTH DAILY), Disp: 180 tablet, Rfl: 2    calcium-vitamin D (OSCAL-500) 500-200 MG-UNIT per tablet, Take 1 tablet by mouth daily Indications: 200 mg, Disp: , Rfl:     nitroGLYCERIN (NITROSTAT) 0.4 MG SL tablet, up to max of 3 total doses.  If no relief after 1 dose, call 911., Disp: 25 tablet, Rfl: 3    clopidogrel (PLAVIX) 75 MG tablet, Take 1 tablet by mouth daily, Disp: 90 tablet, Rfl: 3    metoprolol succinate (TOPROL XL) 50 MG extended release tablet, TAKE 1/2 TAB BY MOUTH EVERY DAY, Disp: 45 tablet, Rfl: 2    atorvastatin (LIPITOR) 20 MG tablet, TAKE 1 TABLET BY MOUTH EVERY DAY, Disp: 90 tablet, Rfl: 4    apixaban (ELIQUIS) 5 MG TABS tablet, Take 1 tablet by mouth 2 times daily, Disp: 180 tablet, Rfl: 3    Coenzyme Q10 (COQ10 PO), Take 500 mg by mouth daily, Disp: , Rfl:     ferrous sulfate 325 (65 FE) MG tablet, Take 1 tablet by mouth daily (with Conjunctivae normal.      Pupils: Pupils are equal, round, and reactive to light. Neck:      Musculoskeletal: Normal range of motion. Vascular: No JVD. Cardiovascular:      Rate and Rhythm: Normal rate and regular rhythm. Pulmonary:      Effort: Pulmonary effort is normal. No respiratory distress. Musculoskeletal: Normal range of motion. General: No tenderness or deformity. Skin:     General: Skin is warm and dry. Capillary Refill: Capillary refill takes less than 2 seconds. Coloration: Skin is not pale. Findings: No erythema or rash. Neurological:      Mental Status: He is alert and oriented to person, place, and time. Coordination: Coordination normal.   Psychiatric:         Behavior: Behavior normal.         Thought Content: Thought content normal.         Judgment: Judgment normal.         Assessment/Plan:              Gilbert De was seen today for facial swelling. Diagnoses and all orders for this visit:    Rhus dermatitis  -     methylPREDNISolone acetate (DEPO-MEDROL) injection 120 mg  -     predniSONE (DELTASONE) 20 MG tablet; Take 1 tablet by mouth 2 times daily for 7 days  -     hydrocortisone 2.5 % cream; Apply topically 2 times daily for 7 days Apply topically 2 times daily. Health maintenance items reviewed with patient. He will return for an outpatient visit to get his pneumonia vaccine completed soon. Deferred today due to prednisone treatment. No follow-ups on file. Patient instructions given amid.         Electronically signed by DOREEN De Oliveira CNP on 7/29/2020 at 9:36 AM

## 2020-07-29 NOTE — PROGRESS NOTES
Administrations This Visit     methylPREDNISolone acetate (DEPO-MEDROL) injection 120 mg     Admin Date  07/29/2020  08:45 Action  Given Dose  120 mg Route  Intramuscular Site  Deltoid Right Administered By  Kameron Conroy RN    Ordering Provider:  DOREEN De Oliveira CNP    NDC:  4770-6646-35    Lot#:  [id number]    :  [employer]    Patient Supplied?:  No    Comments:  EXP 6/2021

## 2020-07-31 ENCOUNTER — HOSPITAL ENCOUNTER (OUTPATIENT)
Dept: CARDIAC REHAB | Age: 70
Setting detail: THERAPIES SERIES
Discharge: HOME | End: 2020-07-31
Payer: MEDICARE

## 2020-07-31 ENCOUNTER — APPOINTMENT (OUTPATIENT)
Dept: CARDIAC REHAB | Age: 70
End: 2020-07-31
Payer: MEDICARE

## 2020-07-31 PROCEDURE — G0423 INTENS CARDIAC REHAB NO EXER: HCPCS

## 2020-07-31 PROCEDURE — G0422 INTENS CARDIAC REHAB W/EXERC: HCPCS

## 2020-08-01 RX ORDER — NITROGLYCERIN 0.4 MG/1
TABLET SUBLINGUAL
Qty: 25 TABLET | Refills: 1 | Status: SHIPPED | OUTPATIENT
Start: 2020-08-01

## 2020-08-03 ENCOUNTER — APPOINTMENT (OUTPATIENT)
Dept: CARDIAC REHAB | Age: 70
End: 2020-08-03
Payer: MEDICARE

## 2020-08-03 ENCOUNTER — HOSPITAL ENCOUNTER (OUTPATIENT)
Dept: CARDIAC REHAB | Age: 70
Setting detail: THERAPIES SERIES
Discharge: HOME | End: 2020-08-03
Payer: MEDICARE

## 2020-08-03 PROCEDURE — G0423 INTENS CARDIAC REHAB NO EXER: HCPCS

## 2020-08-03 PROCEDURE — G0422 INTENS CARDIAC REHAB W/EXERC: HCPCS

## 2020-08-05 ENCOUNTER — HOSPITAL ENCOUNTER (OUTPATIENT)
Dept: CARDIAC REHAB | Age: 70
Setting detail: THERAPIES SERIES
Discharge: HOME | End: 2020-08-05
Payer: MEDICARE

## 2020-08-05 PROCEDURE — G0422 INTENS CARDIAC REHAB W/EXERC: HCPCS

## 2020-08-07 ENCOUNTER — HOSPITAL ENCOUNTER (OUTPATIENT)
Dept: CARDIAC REHAB | Age: 70
Setting detail: THERAPIES SERIES
Discharge: HOME | End: 2020-08-07
Payer: MEDICARE

## 2020-08-07 PROCEDURE — G0423 INTENS CARDIAC REHAB NO EXER: HCPCS

## 2020-08-07 PROCEDURE — G0422 INTENS CARDIAC REHAB W/EXERC: HCPCS

## 2020-08-07 NOTE — PROGRESS NOTES
Josselin FIERRO.:  1950  Acct Number: [de-identified]  MRN:  [id number]                  [facility] COOKING SCHOOL WORKSHOP             Date: 2020        Session # ________   Stephanie Metzger class covered:      () Adding Flavor     () Fast Breakfasts     (x) Salads and Dressings     () Soups and Sauces     () Simple Sides     () Appetizers and Snacks     () Delicious Desserts     () Plant Based Proteins     () Fast Evening Meals     () Weekend Breakfasts     () Efficiency Cooking     () Meat Alternatives     Patients were shown how to choose, prep, and cook; substitutions and other options were given. Samples were offered. Recipes were given and questions answered. The patient above was in the [employer] for 45  minutes.       Electronically signed by Ana VELAZQUZE [address] [place]  on 2020 at 3:36 PM

## 2020-08-10 ENCOUNTER — HOSPITAL ENCOUNTER (OUTPATIENT)
Dept: CARDIAC REHAB | Age: 70
Setting detail: THERAPIES SERIES
Discharge: HOME | End: 2020-08-10
Payer: MEDICARE

## 2020-08-10 PROCEDURE — G0423 INTENS CARDIAC REHAB NO EXER: HCPCS

## 2020-08-10 PROCEDURE — G0422 INTENS CARDIAC REHAB W/EXERC: HCPCS

## 2020-08-10 NOTE — PROGRESS NOTES
Hospital Facility-Based Program  Phase 2 Cardiac Rehab Weekly Progress Report      Patient prescribed exercise:  2:00 class. 3 times per week in rehab, 1-4 times per week at home for the amount of sessions/weeks specified by insurance. Current Levels:  NuStep:  96 Tse for 30 minutes, UBE: 80 Tse for 5 minutes. Progression Discussion: Maintain Aerobic exercise 35 minutes to work on endurance. Attempt to increase intensity by 5-20% for each modality this week. Try to increase intensities until Patrice rates the exercises a 13-17 on Ariane RPE.

## 2020-08-10 NOTE — PROGRESS NOTES
Sabina FIERRO.:  1950    MRN:  [id number]    Date: 8/10/2020      Session Length:  35 min   Session # _______    EXERCISE WORKSHOP:  Balance Training and Fall Prevention                        Todays class addressed the importance of patient's sensorimotor skills (vision, proprioception, and the vestibular system) in maintaining their ability to balance at any age. Reviewed a variety of balancing and strength training exercises that are appropriate for patient's current level of function. Reviewed common causes of poor balance, possible solutions to these problems, and ways to modify the physical environment in order to minimize fall risks. Readiness to change:    ( ) Pre-contemplative   ( ) Contemplative - ambivalent about change    (x) Action - ready to set action plan and implement   ( ) Maintenance - has made change and is trying, and or practicing different alternative behaviors     Additional Notes:      Amor Ng was in the Workshop with the Exercise Physiologist for 35 minutes. The content was presented via Powerpoint, lecture, and patient participation based format. Motivational interviewing was utilized when needed, to promote change. Patient voiced understanding.     Electronically signed by Efrain Singh on 8/10/2020 at 2:49 PM

## 2020-08-17 NOTE — PROGRESS NOTES
Hospital Facility-Based Program  Phase 2 Cardiac Rehab Weekly Progress Report      Patient prescribed exercise:  2:00 class. 3 times per week in rehab, 1-4 times per week at home for the amount of sessions/weeks specified by insurance. Current Levels: Treadmill:  Mph/ % for   minutes, [employer] Airdyne: Level   for   minutes, NuStep:  70 Tse for 15-30 minutes,. Progression Discussion: Maintain/Increase Aerobic exercise to work on endurance. Attempt to increase intensity by 5-20% for each modality this week. Try to increase intensities until Patrice rates the exercises a 13-17 on Ariane RPE.

## 2020-08-19 RX ORDER — HYDROCODONE BITARTRATE AND ACETAMINOPHEN 7.5; 325 MG/1; MG/1
1 TABLET ORAL EVERY 6 HOURS PRN
Qty: 28 TABLET | Refills: 0 | Status: SHIPPED | OUTPATIENT
Start: 2020-08-19 | End: 2020-12-31 | Stop reason: SDUPTHER

## 2020-08-19 NOTE — PLAN OF CARE
[address] [place] [address] Facility-Based Program  Individualized Cardiac Treatment Plan    Patient Name:  Ankit Lu  :  1950  Age:  78 y.o. MRN:  [id number]  Diagnosis: PCI  Date of Event: 20   Physician:  Robinson Saleem Office Visit:    Date Entered Program: 20  Risk Stratifications: [] Low [x] Intermediate [] High  Allergies: No Known Allergies    COVID -19 Screen  Do you have any of the following symptoms:  [] Fever [] Cough [] SOB [] Muscle/Body Ache [] Loss of taste/smell [x] None    Have you traveled outside of the [place]? [] Yes      [x] No    Have you been around anyone who has tested Positive for COVID 19?  [] Yes      [x] No    The following Education has been completed with patient. [x] Wait in the lobby until we call you back to rehab. [x] You must wear a mask while in the medical center, and in cardiac rehab. Please bring your own. [x] Bring your own bottle of water with you. [x] You can not bring anyone with you into the medical center at this time. They are welcome to sit in their car and wait for you.     Individual Cardiac Treatment Plan -EXERCISE  INITIAL 30 DAY 60 DAY 90 DAY FINAL DAY   EXERCISE  ASSESSMENT/PLAN EXERCISE  REASSESSMENT EXERCISE   REASSESSMENT EXERCISE   REASSESSMENT EXERCISE  DISCHARGE/FOLLOW-UP   Date: 20  Date: 20 Date: 20 Date: 20 Date: 2020   Session #1 Session # 10/20 Session # 45 Session # 62 Session # 79  Last session completed on 8/ 10/2020    Jarad Decker had to stop the program early due to back injury   Stages of Change Stages of Change Stages of Change Stages of Change Stages of Change   [x] Pre Contemplation  [] Contemplation  [] Preparation  [] Action  [] Maintenance  [] Relapse [] Pre Contemplation  [] Contemplation  [x] Preparation  [] Action  [] Maintenance  [] Relapse [] Pre Contemplation  [] Contemplation  [] Preparation  [x] Action  [] Maintenance  [] Relapse [] Pre Contemplation  [] Contemplation  [] Preparation  [x] Action  [] Maintenance  [] Relapse [] Pre Contemplation  [] Contemplation  [] Preparation  [x] Action  [] Maintenance  [] Relapse   EXERCISE ASSESSMENT EXERCISE ASSESSMENT EXERCISE ASSESSMENT EXERCISE ASSESSMENT EXERCISE ASSESSMENT   6 Min Walk Test  Distance walked:   0.18 miles  950 ft.  2.4 METs  Max HR:129 BPM      RPE:  12    THR:  120-133  Rhythm:  At- fib       DASI: 5.1 METs DASI: 8.3 METs DASI: 9.2 METs DASI: 9.25 METs DASI:    Return to Work  [employer] on returning to work? [] Yes              [] No   [] Disabled     [x] Retired     Return to work:  na Return to work:  na Return to work:  na Return to work:  [facility] Limitations/  [x] Yes    [] No  If yes please list:  Hip pain     Orthopedic Limitations  *If patient has orthopedic issue:   Actions/  accomodations needed to [employer] successful : slower speeds on TM, now doing mainly NS Orthopedic Limitations  Pt does NS due to back pain Orthopedic Limitations    Pt uses NuStep. Pt gets back pain with other equipment. Orthopedic Limitations   Nustep only   Fall Risk  Fall risk assessed? [x] Yes      [] No    Balance Issues? [] Yes      [x] No     [] Walker [] Cane    [x] Safety issues reviewed      Fall Risk  *If patient is a fall risk, action needed to accommodate:  Fall Risk  na Fall Risk  n/a Fall Risk     Home Exercise  [] Yes    [x] No   Home Exercise  [x] Yes    [] No  Type: walking  Frequency: 3d/w  Duration: 30-45 min Home Exercise  [x] Yes    [] No  Type: walking   Frequency: 2 x per week  Duration: 10 min Home Exercise  [x] Yes    [] No  Type: walking  Frequency:3x/wk  Duration: 30 min Home Exercise  [x] Yes    [] No  Type: walking  Frequency: 3x/wk  Duration: 30min   Angina with Activity? [] Yes    [x] No  Angina Management: na Angina with Activity? [] Yes    [x] No  Angina Management: ** Angina with Activity?   [] Yes    [x] No  Angina Management: na Angina with Activity? [] Yes    [x] No  Angina Management: n/a Angina with Activity?   [] Yes    [x] No     EXERCISE PLAN EXERCISE PLAN EXERCISE PLAN EXERCISE PLAN EXERCISE PLAN   *Interventions* *Interventions* *Interventions* *Interventions* *Interventions*   Exercise Prescription  (per physician & CR staff) Exercise Prescription  (per physician & CR staff) Exercise Prescription  (per physician & CR staff) Exercise Prescription  (per physician & CR staff) Exercise Prescription  (per physician & CR staff)   Cardiovascular Cardiovascular Cardiovascular Cardiovascular Cardiovascular   Mode:    [x] Treadmill (TM)  [x] Schwinn Airdyne (AD)  [x] Arms Ergometer (AE)  [x] NuStep  [] Elliptical (E) MODE:    [] Treadmill (TM)  [] Schwinn Airdyne (AD)  [x] Arms Ergometer (AE)  [x] NuStep  [] Elliptical (E) MODE:    [] Treadmill (TM)  [] Schwinn Airdyne (AD)  [x] Arms Ergometer (AE)  [x] NuStep  [] Elliptical (E) MODE:      [x] Arms Ergometer (AE)  [x] NuStep   MODE:    [] Arms Ergometer (AE)  [] NuStep   Initial Workloads  TM: [email or web address] 2.6 METs  AD: 0.7 level = 2.4 METs  NS: 44  Tse= 2.4 METs  AE: 0.4 level = 1.8 METs Current Workloads  TM:  @ %=  METs  AD:  level =  METs  NS: 56-60 Tse= 2.5  METs  AE: 38W level Current Workloads    NS: 66  Tse= 2.6 METs  AE: 42 level = 2.2 METs Current Workloads    NS:  78 Tse= 2.8 METs  AE: 70 level = 2.7 METs Current Workloads    NS:96   Tse= 3.1 METs  AE:  80watts= 2.9 METs     Frequency:    ICR: 3x/week  Home: 2-3x/wk Frequency:   ICR: 3x/week  Home: 3x/wk Frequency:  ICR: 3x/week  Home: 3-4x/wk Frequency:  ICR: 3x/week  Home: 3-4x/wk Frequency:  Patrice will continue exercise at  5-7 days/week   Duration:   Total aerobic exercise = 30-45 min    5-8 min/mode Duration:  Total aerobic exercises = 35 min     5-15 min/mode Duration:  Total aerobic exercises = 35 min     15min/mode Duration:  Total aerobic exercises = 30-45 min     5-15 min/mode Duration:  Total erobic exercise =  60-90 min Exercise  *Patrice verbalizes planning to walk 3-4 days/week for 5-10 min on days not in rehab. Home Exercise  *Patrice verbalizes planning to continue to walk 30-60 min Home Exercise  *Patrice verbalizes planning to walk/ join a fitness center with \"back friendly\" equipment, 3-4 days/week for 20-30 min on days not in rehab. Home Exercise  *Patrice verbalizes planning to walk/ join a fitness center with \"back friendly\" equipment, 3-4 days/week for 20-30 min on days not in rehab. Home Exercise  *Hanley Barthel his plan to do physical therapy and continue exercising on his own   *Education* *Education* *Education* *Education* *Education*   RPE Scale  [x] Yes      [] No  Exercise Safety  [x] Yes      [] No  Equipment Orientation  [x] Yes      [] No  S/S to Report  [x] Yes      [] No  Warm Up/Cool Down  [x] Yes      [] No  Home Exercise  [x] Yes      [] No    All Exercise Education Completed  [x] Yes      [] No   Exercise Education Recommended    Workshops  [x] Improving Performance  [x] Balance Training & Fall Prevention  [x] Exercise Biomechanics  [x] Resistance Training & Core Strength Exercise Education Attended/Date Exercise Education Attended/Date     6/29/20  Resistance Training & Core Strength    7/20/20  Improve Performance Exercise Education Attended/Date All Sessions Completed?     [x] Yes  [] No    Balance and fall - 8/10/2020   *Goals* *Goals* *Goals* *Goals* *Goals*   Initial Exercise Goals Exercise Goals  Exercise Goals   Exercise Goals  Exercise Goals   Patrice plans to:  [x] Attend exercise sessions 3x/wk  [x] initiate home exercise 2-3x/wk for 10-20 min  [x] Increase 6 min walk distance by 10%  [x] Attend Exercise workshops Patrice plans to:  [x] Attend exercise sessions 3x/wk  [x] continue home exercise 2-3x/wk for 20-30 min  [x] Attend Exercise workshops Patrice's plans to:  [x] Attend exercise sessions 3x/wk  [x] continue home exercise 3-4x/wk for 30-45 min  [x] Determine plan of exercise following rehab  [x] Attend Exercise workshops Patrice's plans to:    Continue home exercise 3-4x/wk for 30-45 min Patrice achieved exercise goals? [x]  Yes    [] No  If no, why?  **  [] Increased 6 min walk distance by 10%  [x] Currently exercising 30-60 min/day, 5-7days/wk   [x] Plans to continue exercise on own  [] Plans to join a local fitness center to continue exercise  [] Does not plan to continue to exercise after rehab   Return to ADL or Hobbies:  Constantino Jaimes would like to improve strength and endurance so he is able to return to fishing, walk the dog, riding mower, manual mower Return to ADL or Hobbies:  Constantino Jaiems is progressing, but his hip/back hurts Return to ADL or Hobbies:  Constantino Jaimes would like to improve strength and endurance so he is able to return to all previous activities Return to ADL or Hobbies:  Constantino Jaimes would like to improve strength and endurance so he is able to return to his normal routine. Return to ADL or Hobbies:  Constantino Jaimes would like to improve strength and endurance so he is able to return to normal routine   *MET level required for above goal:  5-6 METs MET level Achieved: 2.5 METs MET level Achieved:  2.6 METs MET level Achieved: 2.8 METs MET level Achieved:  3. 1METs     Individual Cardiac Treatment Plan - Nutrition  NUTRITION  ASSESSMENT/PLAN NUTRITION  REASSESSMENT NUTRITION   REASSESSMENT NUTRITION   REASSESSMENT NUTRITION  DISCHARGE/FOLLOW-UP   Stages of Change Stages of Change Stages of Change Stages of Change Stages of Change   [] Pre Contemplation  [] Contemplation  [x] Preparation  [] Action  [] Maintenance  [] Relapse [] Pre Contemplation  [] Contemplation  [x] Preparation  [] Action  [] Maintenance  [] Relapse [] Pre Contemplation  [] Contemplation  [x] Preparation  [] Action  [] Maintenance  [] Relapse [] Pre Contemplation  [] Contemplation  [x] Preparation  [] Action  [] Maintenance  [] Relapse [] Pre Contemplation  [] Contemplation  [] Preparation  [x] Action  [] Attended/Date    7/1/20   1:1 Registered Dietitian Nutritional Education Attended/Date    6/17/20  Menu (video)    7/8/20  Fueling a Healthy Body All Sessions Completed? [x] Yes  [] No    Targeting prior - 7/29   Cooking School  Recommended     [x] Adding Flavor  [x] Fast & Healthy     Breakfasts  [x] Salads & Dressings  [x] Soups & Simple     Sauces  [x] Simple Sides  [x] Appetizers &     Snacks  [x] Delicious Desserts  [x] Plant Proteins  [x] Fast Evening Meals  [x] Weekend Breakfasts  [x] Cook once, Eat       twice  [x] [place] [facility] School  Sessions Completed   Cooking School  Sessions Completed    6/12/20  Plant Proteins    6/19/20  Fast Evening Meals Cooking School  Sessions Completed    7/10/20  Joslyn Knows once, eat twice    7/19/20  Meat alternatives Cooking School    # of sessions completed:  6    Fast break - 7/31    Salads anddres - 8/7/2020   *Goals* *Goals* *Goals* *Goals* *Goals*   Patrice's nutritional goals are as follows:  Complete and return diet survey Patrice's nutritional goals are as follows:  [x] Attend Nutrition Workshops  [x] Attend 1:1   [x] Attend Cooking Classes  [] ** Patrice's nutritional goals are as follows:  [x] Attend Nutrition Workshops  [x] Attend 1:1   [x] Attend Cooking Classes  [x] Complete and return diet survey  [] ** Patrice's nutritional goals are as follows:  [x] Attend Nutrition Workshops  [x] Attend Cooking Classes   Patrice achieved nutritional goals   [] Yes    [] No  If no, why?   Use knowledge gained to continue Pritikin eating plan at home       Individual Cardiac Treatment Plan - Psychosocial  PSYCHOSOCIAL  ASSESSMENT/PLAN PSYCHOSOCIAL  REASSESSMENT PSYCHOSOCIAL   REASSESSMENT PSYCHOSOCIAL   REASSESSMENT PSYCHOSOCIAL  DISCHARGE/FOLLOW-UP   Stages of Change Stages of Change Stages of Change Stages of Change Stages of Change   [] Pre Contemplation  [x] Contemplation  [] Preparation  [] Action  [] Maintenance  [] Relapse [] Pre Contemplation  [] Contemplation  [] Preparation  [x] Action  [] Maintenance  [] Relapse [] Pre Contemplation  [] Contemplation  [] Preparation  [] Action  [x] Maintenance  [] Relapse [] Pre Contemplation  [] Contemplation  [] Preparation  [] Action  [x] Maintenance  [] Relapse [] Pre Contemplation  [] Contemplation  [] Preparation  [x] Action  [] Maintenance  [] Relapse   PSYCHOSOCIAL ASSESSMENT PSYCHOSOCIAL ASSESSMENT PSYCHOSOCIAL ASSESSMENT PSYCHOSOCIAL ASSESSMENT PSYCHOSOCIAL ASSESSMENT   Behavioral Outcomes Behavioral Outcomes Behavioral Outcomes Behavioral Outcomes Behavioral Outcomes   Tool Used:  Martha & Odilia, Quality of Life Index, Cardiac Version IV  *Given to patient to complete. Tool Used:    Martha Hartley, Quality of Life Index, Cardiac Version IV     QOL Index Score: 23.15  HF: 21.3  S&E:25.08  P&S: 23.08  Family: 27.25   Tool Used:     Martha & Odilia, Quality of Life Index, Cardiac Version IV    QOL Index Score: **  HF:**  S&E:**  P&S: **  Family: **    Did not compete   PHQ-9 score 3  Depression Severity  [x]Minimal  []Mild   []Moderate  []Moderately Severe  []Severe    PHQ-9 score **  Depression Severity  []Minimal  []Mild   []Moderate  []Moderately Severe []Severe    Did not complete   Does patient have Family Support? [x] Yes      [] No  No signs of marital/family distress       Within the Past Month:  *Have you wished you were dead or wished you could go to sleep and not wake up? [] Yes      [x] No  *Have you had any thoughts of killing yourself?   [] Yes      [x] No         Using a scale of 0-10, 0=none, 10=very:   Rate your depression: 2  Rate your anxiety:  0  Using a scale of 0-10, 0=none, 10=very:   Rate your depression: 2  Rate your anxiety:  0 Using a scale of 0-10, 0=none, 10=very:   Rate your depression: 2  Rate your anxiety:  0 Using a scale of 0-10, 0=none, 10=very:   Rate your depression: 2  Rate your anxiety:  2 Using a scale of 0-10, 0=none, 10=very:   Rate your depression:   Rate your anxiety:     Signs and Symptoms of Depression Present? [x] Yes      [] No  If yes, please explain:  Slightly since wife's death. Encouraged pt to discuss with PCP Signs and Symptoms of Depression Present? [] Yes      [x] No  If yes, please explain:  ** Signs and Symptoms of Depression Present? [] Yes      [x] No   Signs and Symptoms of Depression Present? [] Yes      [x] No   Signs and Symptoms of Depression Present? [] Yes      [x] No     Signs and Symptoms of Anxiety Present? [] Yes      [x] No   Signs and Symptoms of Anxiety Present? [] Yes      [x] No  If yes, please explain:  ** Signs and Symptoms of Anxiety Present? [] Yes      [x] No   Signs and Symptoms of Anxiety Present? [] Yes      [x] No   Signs and Symptoms of Anxiety Present? [] Yes      [x] No   [] Patient has poor eye contact   [] Flat affect present. [] Signs of anxiety, anger or hostility    [] Signs social isolation present. []  Signs of alcohol or substance abuse       PSYCHOSOCIAL PLAN PSYCHOSOCIAL PLAN PSYCHOSOCIAL PLAN PSYCHOSOCIAL PLAN PSYCHOSOCIAL PLAN   *Interventions* *Interventions* *Interventions* *Interventions* *Interventions*   *Please check if needed  [] Psych Consult  [] Physician Referral  [x] Stress Management Skills *Please check if needed  [] Psych Consult  [] Physician Referral  [] Stress Management Skills *Please check if needed  [] Psych Consult  [] Physician Referral  [x] Stress Management Skills *Please check if needed  [] Psych Consult  [] Physician Referral  [] Stress Management Skills *Please check if needed  [] Psych Consult  [] Physician Referral  [x] Stress Management Skills   Is patient currently taking anti-depressant or anti-anxiety medications? [] Yes      [x] No   Change in anti-depressant or anti-anxiety medications? [] Yes      [x] No  If yes, please list medications:  ** Change in anti-depressant or anti-anxiety medications?   [] Yes      [x] No   Change in anti-depressant or anti-anxiety medications? [] Yes      [x] No   Change in anti-depressant or anti-anxiety medications? [] Yes      [x] No     *Education* *Education* *Education* *Education* *Education*   Healthy Mind-Set Workshops Recommended  [x] Stress & Health  [x] Taking Charge of Stress  [x] New Thoughts, New Behaviors  [x] Managing Moods & Relationships Healthy Mind-Set Workshops Attended/Date Healthy Mind-Set Workshops Attended/Date     6/24/20  New Thoughts, New Behaviors Healthy Mind-Set Workshops Attended/Date    7/15/20  Managing Moods Healthy Mind-Set Workshops  Completed  [x] Yes      [] No    Stress and health - 8/5/2020   *Goals* *Goals* *Goals* *Goals* *Goals*   Patrice's psychosocial goals are as follows:  Complete HADS & Martha & Odilia, Quality of Life Index, Cardiac Version IV Alicia psychosocial goals are as follows:  [x] Attend Healthy Mind-Set Workshops  [x] Reduce depression symptom severity by 1 level  [] ** Patrice's psychosocial goals are as follows:  [x] Attend Healthy Mind-Set Workshops  [x] Reduce depression symptom severity by 1 level  [] ** Kaylenes psychosocial goals are as follows:  [x] Attend Healthy Mind-Set Workshops  [x] Reduce depression symptom severity by 1 level   Patrice achieved psychosocial goals?   [x] Yes    [] No  [x] Use methods learned to continue to reduce depression symptom severity by 1 level       Individual Cardiac Treatment Plan - Other:  Risk Factor/Education  RISK FACTOR  ASSESSMENT/PLAN RISK FACTOR  REASSESSMENT  RISK FACTOR  REASSESSMENT RISK FACTOR  REASSESSMENT RISK FACTOR   DISCHARGE/FOLLOW-UP   Stages of Change Stages of Change Stages of Change Stages of Change Stages of Change   [] Pre Contemplation  [x] Contemplation  [] Preparation  [] Action  [] Maintenance  [] Relapse [] Pre Contemplation  [] Contemplation  [x] Preparation  [] Action  [] Maintenance  [] Relapse [] Pre Contemplation  [] Contemplation  [] Preparation  [x] Action  [] Maintenance  [] Relapse [] Pre Contemplation  [] Contemplation  [] Preparation  [x] Action  [] Maintenance  [] Relapse [] Pre Contemplation  [] Contemplation  [] Preparation  [x] Action  [] Maintenance  [] Relapse   RISK FACTOR/EDUCATION ASSESSMENT RISK FACTOR/EDUCATION ASSESSMENT RISK FACTOR/EDUCATION ASSESSMENT RISK FACTOR/EDUCATION ASSESSMENT RISK FACTOR /EDUCATION ASSESSMENT   Hypertension  [x] Yes      [] No    Resting BP: 96/64  Peak Ex BP:142/82  Medication: metoprolol   Hypertension  Resting BP: 116/66  Peak Ex BP:142/62  Medication Changes:  [] Yes      [x] No Hypertension  Resting BP: 120/78  Peak Ex BP:160/74  Medication Changes:  [] Yes      [x] No Hypertension  Resting BP: 112/70  Peak Ex BP: 140/80  Medication Changes:  [] Yes      [x] No Hypertension  Resting BP: 102/70  Peak Ex BP:126/78  Medication Changes:  [] Yes      [] No   Lipids  HLD/DLD  [x] Yes      [] No  TOTAL CHOL: 133  HDL:  47  LDL:  67  TRI  Medication: atorvastatin Lipids  Medication Changes:  [] Yes      [x] No     Lipids  Medication Changes:  [] Yes      [x] No     Lipids  Medication Changes:  [] Yes      [x] No     Lipids  TOTAL CHOL: 133  HDL:  47  LDL:  67  TRI    Medication Changes:  [] Yes      [x] No   Diabetes  [] Yes      [x] No  FBS: 129           HbA1C: 6.3        Monitor BS @ home:   [] Yes      [x] No   Diabetes  Most Recent BS:  BS have been in range  [] Yes      [] No  Medication Changes  [] Yes      [] No   Diabetes  na     Diabetes  [] Yes      [x] No     Diabetes         Tobacco Use  [] Current  [] Former  [x] Never      Smokeless Tobacco use:   [] Yes      [x] No   Tobacco Use  Change in smoking status   [] Yes      [x] No    Quit date: **   Tobacco Use  Change in smoking status   [] Yes      [x] No       Tobacco Use  Change in smoking status   [] Yes      [x] No   Tobacco Use  Change in smoking status   [] Yes      [x] No              Learning Barriers  Please select one:  [] Speech  [] Literacy  [x] Hearing  [] Cognitive  [x] Vision  [x] Ready to Learn Learning Barriers Addressed: We have to speak louder and sometimes go up to him - pt can read okay  [x] Yes      [] No   Learning Barriers Addressed:   [x] Yes      [] No   Learning Barriers Addressed:  [x] Yes      [] No Learning Barriers Addressed:  [x] Yes      [] No     RISK FACTOR/EDUCATION PLAN RISK FACTOR/EDUCATION PLAN RISK FACTOR/EDUCATION PLAN RISK FACTOR/EDUCATION PLAN RISK FACTOR/EDUCATION PLAN   *Interventions* *Interventions* *Interventions* *Interventions* *Interventions*   Recommended Educational Videos    [x] Overview of The Pritikin Eating Plan  [x] Becoming A Pritikin   [x] Diseases of Our Time-Part 1  [x] Calorie Density  [x] Label Reading-Part 1  [x] Move It! [x] Healthy Minds, Bodies, Hearts  [x] Dining Out-Part 1  [x] Heart Disease Risk Reduction  [x] Metabolic Syndrome & Belly Fat  [x] Facts on Fat  [x] Diseases of Our Times-Part 2  [x] Biology of Weight Control  [x] Biomechanical Limitations  [x] Nurtition Action Plan   Completed Videos/Date      5/12/20  Overview of The Pritikin Eating Plan    5/27 Pritikin     5/18 Disease 1    5/20 Calorie Density    5/22 Labels 1    6/3 Move it    5/29 Equals6    6/1 Dining out 1    5/15 Heart Disease RR    6/5 Met. Ceciliar Completed Videos/Date      6/10/20  Facts on Fat    6/8/20   Biology of Weight Control    6/22/20 Biomechanical Limitations Completed Videos/Date      7/22/20  Diseases of Our Times-Part 2    7/6/20  Nurtition Action Plan Recommended Educational Videos Completed    [x] Yes      [] No            Smoking Cessation/Relaspe Prevention Intervention needed? [] Yes      [x] No   Smoking Cessation/Relapse Prevention Scheduled?    [] Yes      [x] No  Date:  **        Professional Referrals:  *Please check if needed  [] Diabetes Clinic  [] Lipid Clinic   [] Other:     Professional Referrals:  *Please check if needed  [] Diabetes Clinic  [] Lipid Clinic   [] Other:   Preventative Medication Preventative Medication Preventative Medication Preventative Medication Preventative Medication   Aspirin  [x] Yes    [] No  Blood Thinner: Clopidogrel/Effient/Brillinta  [x] Yes    [] No  Beta Blocker  [x] Yes    [] No  Ace Inhibitor  [] Yes    [x] No  Statin/Lipid Lowering  [x] Yes    [] No Medication Changes? [] Yes    [x] No Medication Changes? [] Yes    [x] No Medication Changes? [] Yes    [x] No Medication Changes? [] Yes    [x] No   *Education* *Education* *Education* *Education* *Education*   Does Patrice require any additional education? [] Yes    [x] No   Does Patrice require any additional education? [] Yes    [x] No Does Patrice require any additional education? [] Yes    [x] No Does Patrice require any additional education? [] Yes    [x] No Does Patrice require any additional education?   [] Yes    [x] No   Recommended Additional Educational Videos    Medical  [] Hypertension & Heart Disease  [] Decoding Lab Results  [] Aging Enhancing Your QoL  [] Sleep Disorders  Exercise  [] Body Composition  [] Improve Performance  [] Exercise Action Plan  [] Intro to Yoga  Behavioral  [] How Our Thoughts Can Heal Our Hearts  [] Smoking Cessation  Nutrition  [] Planning Your Eating Strategy  [] Lable Reading- Part 2  [] Dining Out - Part 2  [] Targeting Your Nutrition Priorities  [] Fueling a Healthy Body  [] Menu Workshop  [employer] [] Breakfast & Snacks  [] [employer] Salads & Dressing  [] [address] [place] [address]  [] Soups & Desserts   Additional Educational Videos Completed Additional Educational Videos Completed     Additional Educational Videos Completed    7/13/20  Exercise Action Plan    7/27/20  Aging Enhancing Your QoL Additional Educational Videos Completed    [x] Yes    [] No    htn and hd - 8/3/2020   *Goals* *Goals* *Goals* *Goals* *Goals*   Patrice's risk factor/education goals are as follows:    [x] Optimal BP <140/90  [] Blood Sugar <120  [x] Attend recommended video education sessions  [x] Takes medications as prescribed 100% of the time   [] ** Patrice's risk factor/education goals are as follows:    [x] Optimal BP <140/90  [] Blood Sugar <120  [x] Attend recommended video education sessions  [x] Takes medications as prescribed 100% of the time   [] ** Patrice's risk factor/education goals are as follows:    [x] Optimal BP <140/90  [] Blood Sugar <120  [x] Attend recommended video education sessions  [x] Takes medications as prescribed 100% of the time   [] ** Patrice's risk factor/education goals are as follows:    [x] Optimal BP <140/90  [] Blood Sugar <120  [x] Attend recommended video education sessions  [x] Takes medications as prescribed 100% of the time   [] ** Patrice achieved risk factor goals?   [x] Yes    [] No       Monitored telemetry has revealed At- fib   Monitored telemetry has revealed A-fib   Monitored telemetry has revealed NSR/ At-fib   Monitored telemetry has revealed A-fib   Monitored telemetry has revealed a fib     Physician Response    [x] Cardiac rehab is reasonably and medically necessary for continuous cardiac monitoring surveillance  of patient's cardiac activity  [x] Initiate continuous telemerty monitoring and notify me with any concerns  [] Other   Physician Response    [x] Cardiac rehab is reasonably and medically necessary for continuous cardiac monitoring surveillance  of patient's cardiac activity  [x] Continue continuous telemerty monitoring and notify me with any concerns  [] Other     Physician Response    [x] Cardiac rehab is reasonably and medically necessary for continuous cardiac monitoring surveillance  of patient's cardiac activity  [x] Continue continuous telemerty monitoring and notify me with any concerns   [] Other     Physician Response    [x] Cardiac rehab is reasonably and medically necessary for continuous cardiac monitoring surveillance  of patient's cardiac activity  [x] Continue continuous telemerty monitoring and notify me with any concerns   [] Other          Date/Time User Provider Type Action   5/14/2020  7:48 AM Rock Noemi MD Physician Cosign   5/12/2020  9:19 AM Halle Delgado Exercise Physiologist Sign     Cosigned by: Rock Noemi MD at 6/9/2020  8:53 AM   Revision History      Date/Time User Provider Type Action   6/9/2020  8:53 AM Rock Noemi MD Physician Cosign   6/8/2020  2:31 PM Rebecca Quispe Exercise Physiologist Sign     Cosigned by:  Rock Noemi MD at 7/2/2020 12:28 AM    Revision History     Date/Time  User  Provider Type  Action    7/2/2020 12:28 AM  Rock Noemi MD  Physician  Cosign    7/1/2020  2:42 PM  Malcolm Wood  Exercise Physiologist

## 2020-08-20 ENCOUNTER — TELEPHONE (OUTPATIENT)
Dept: FAMILY MEDICINE CLINIC | Age: 70
End: 2020-08-20

## 2020-08-20 ENCOUNTER — HOSPITAL ENCOUNTER (OUTPATIENT)
Age: 70
Discharge: HOME | End: 2020-08-20
Payer: MEDICARE

## 2020-08-20 PROCEDURE — U0003 INFECTIOUS AGENT DETECTION BY NUCLEIC ACID (DNA OR RNA); SEVERE ACUTE RESPIRATORY SYNDROME CORONAVIRUS 2 (SARS-COV-2) (CORONAVIRUS DISEASE [COVID-19]), AMPLIFIED PROBE TECHNIQUE, MAKING USE OF HIGH THROUGHPUT TECHNOLOGIES AS DESCRIBED BY CMS-2020-01-R: HCPCS

## 2020-08-20 NOTE — TELEPHONE ENCOUNTER
Patient was exposed to daughter in law who tested positive for covid. He also has had a cough for the last couple days. Denies fever. Per Dr Christie Avery: covid testing ordered.

## 2020-08-21 LAB — SARS-COV-2: DETECTED

## 2020-09-14 ENCOUNTER — HOSPITAL ENCOUNTER (OUTPATIENT)
Dept: PHYSICAL THERAPY | Age: 70
Setting detail: THERAPIES SERIES
Discharge: HOME | End: 2020-09-14
Payer: MEDICARE

## 2020-09-14 PROCEDURE — 97162 PT EVAL MOD COMPLEX 30 MIN: CPT

## 2020-09-14 PROCEDURE — 97110 THERAPEUTIC EXERCISES: CPT

## 2020-09-14 NOTE — FLOWSHEET NOTE
** PLEASE SIGN, DATE AND TIME CERTIFICATION BELOW AND RETURN TO [facility] OUTPATIENT REHABILITATION (FAX #: [phone]). ATTEST/CO-SIGN IF ACCESSING VIA [employer]. THANK YOU.**    I certify that I have examined the patient below and determined that Physical Medicine and Rehabilitation service is necessary and that I approve the established plan of care for up to 90 days or as specifically noted. Attestation, signature or co-signature of physician indicates approval of certification requirements.    ________________________ ____________ __________  Physician Signature   Date   Time  [address] [facility]  PHYSICAL THERAPY  [x] EVALUATION  [] DAILY NOTE (LAND) [] DAILY NOTE (AQUATIC ) [] PROGRESS NOTE [] DISCHARGE NOTE    [x] [address]   [] [place] [address]    [] [facility]   []     Date: 2020  Patient Name:  Nas Cole  : 1950  MRN: [id number]    Referring Practitioner Shakir Stanley DO   Diagnosis Lumbago with sciatica, unspecified side [M54.40]    Treatment Diagnosis Acute on chronic left lumbar pain with radiculopathy, core and left hip weakness, impaired posture, abnormal gait   Date of Evaluation 20    Additional Pertinent History MI, HTN, irregular heart beat, incontinence, prostrate cancer, stroke , heart bypass robotic surgery , 2 stents within last year.        Functional Outcome Measure Used [facility]   Functional Outcome Score 15/24 (20)       Insurance: Primary: Payor: Bhumi Mehta /  /  / ,   Secondary: Brayden Holt   Authorization Information: Aquatics and modalities covered except ionto, HP/CP   Visit # 1, 1/10 for progress note   Visits Allowed: Unlimited visits based on medical necessity   Recertification Date:    Physician Follow-Up: Dr. Michael Streeter 20 for possible steroid injection   Physician Orders: 3x/wk for 6 weeks, [place] therapy for low back and lumbar

## 2020-09-16 ENCOUNTER — HOSPITAL ENCOUNTER (OUTPATIENT)
Dept: PHYSICAL THERAPY | Age: 70
Setting detail: THERAPIES SERIES
Discharge: HOME | End: 2020-09-16
Payer: MEDICARE

## 2020-09-16 PROCEDURE — 97110 THERAPEUTIC EXERCISES: CPT

## 2020-09-16 NOTE — PROGRESS NOTES
Time/Technique  Notes   MET for right pelvic upslip 5x5 sec hold  Slight hip ABD and IR with forceful cough               Exercise/Intervention   Notes   Left Hip flexor stretch at step and off EOB 3x15 sec each  x    Left piriformis (knee to opp shoulder) [id number]  x Reviewed for HEP, pt demos understanding   Standing lumbar extension and right lateral glides 10x each  x           Prone lying 5 min  x    Prone press up on elbows Held 2 min.  x Tightness in LB but no pain   Prone glut sets,  HS curl 10x 5sec.hold x    Prone abdominal bracing: UE, LE 10X 5 sec x           Sitting with abdominal bracing: marching, LAQ, adductor squeeze, hip abduction 10x  x    Pt educated: Body mechanics, lifting techniques. x Proper Lifting techniques for 1year old granddaughter            Specific Interventions Next Treatment: review HEP, initiate DLS, piriformis release, modalities as needed    Activity/Treatment Tolerance:  [x]  Patient tolerated treatment well  []  Patient limited by fatigue  []  Patient limited by pain   []  Patient limited by medical complications  []  Other:     Assessment: Pt tolerated prone extension program well. No increase in pain with exercises. Pt has extremely tight hip flexors. GOALS:  Patient Goal: Reduce pain    Short Term Goals:  Time Frame: 6 weeks  Patient will report reduced pain to 2/10 at worst to tolerate sitting, standing, and walking longer durations. Patient will demonstrate good core engagement and postural awareness during therapy session with minimal cues  to carry over to functional activities, lifting, and housework. Patient will have <25% left hip flexor and piriformis tightness for symptom control with ease walking. Patient will improve left hip strength to 5/5 for stabilization when walking, lifting, and cleaning. Long Term Goals:  Time Frame: 12 weeks  Patient will be independent and compliant with HEP daily to achieve above goals.    Patient will reduce Tommie Aldo score from 15/24 to 10/24 to tolerate functional activities and mobility. Patient Education:   [x]  HEP/Education Completed: Prone unloading for pain relief, abdominal bracing, body mechanics and lift techniques for lifting granddaughter. [employer] Access Code: [email or web address]  []  No new Education completed  []  Reviewed Prior HEP      [x]  Patient verbalized and/or demonstrated understanding of education provided. []  Patient unable to verbalize and/or demonstrate understanding of education provided. Will continue education. []  Barriers to learning:     PLAN:  Treatment Recommendations: Strengthening, Range of Motion, Manual Therapy - Soft Tissue Mobilization, Home Exercise Program, Patient Education, Aquatics and Modalities    []  Plan of care initiated. Plan to see patient 3 times per week for 12 weeks to address the treatment planned outlined above.   [x]  Continue with current plan of care  []  Modify plan of care as follows:    []  Hold pending physician visit  []  Discharge    Time In 1015   Time Out 1053   Timed Code Minutes: 38 min   Total Treatment Time: 38 min       Electronically Signed by: Roly Herrera

## 2020-09-18 ENCOUNTER — HOSPITAL ENCOUNTER (OUTPATIENT)
Dept: PHYSICAL THERAPY | Age: 70
Setting detail: THERAPIES SERIES
Discharge: HOME | End: 2020-09-18
Payer: MEDICARE

## 2020-09-18 PROCEDURE — 97110 THERAPEUTIC EXERCISES: CPT

## 2020-09-18 NOTE — PROGRESS NOTES
[address] [facility]  PHYSICAL THERAPY  [] EVALUATION  [x] DAILY NOTE (LAND) [] DAILY NOTE (AQUATIC ) [] PROGRESS NOTE [] DISCHARGE NOTE    [x] OUTPATIENT REHABILITATION [facility]   [] [place] [address]    [] [facility]   [] Monse Roca    Date: 2020  Patient Name:  Ankita Harris  : 1950  MRN: [id number]    Referring Practitioner Halima Euceda DO   Diagnosis Lumbago with sciatica, unspecified side [M54.40]    Treatment Diagnosis Acute on chronic left lumbar pain with radiculopathy, core and left hip weakness, impaired posture, abnormal gait   Date of Evaluation 20    Additional Pertinent History MI, HTN, irregular heart beat, incontinence, prostrate cancer, stroke , heart bypass robotic surgery , 2 stents within last year. Functional Outcome Measure Used [facility]   Functional Outcome Score 1524 (20)       Insurance: Primary: Payor: Gopal Quintero /  /  / ,   Secondary: Mendel Mayhew   Authorization Information: Aquatics and modalities covered except javid, HP/CP   Visit # 3, 3/10 for progress note   Visits Allowed: Unlimited visits based on medical necessity   Recertification Date: 54   Physician Follow-Up: Dr. Beryle Canner 20 for possible steroid injection   Physician Orders: 3x/wk for 6 weeks, [place] therapy for low back and lumbar radic, provide HEP; stretching, PROM, general conditioning; L3-4 stenosis on left   History of Present Illness:      SUBJECTIVE: Pt reports pain in lower back today, rating at 7-8/10. Patient had a  Appointment for back yesterday. Patient states he does his stretches at home, however, did not complete them yesterday. Patient felt really good after last treatment session. [place] standing lumbar extension and right lateral glides when his pain increased to a 8-9/10 and they did not seem to help.  Patient reported [place] pain [place] TREATMENT   Precautions: MRSA   Pain: 7-8/10 lower back     X in shaded column indicates activity completed today   Modalities Parameters/  Location  Notes                     Manual Therapy Time/Technique  Notes   MET for right pelvic upslip 5x5 sec hold  Slight hip ABD and IR with forceful cough               Exercise/Intervention   Notes   Left Hip flexor stretch at step and off EOB 3x15 sec each  x Just off EOB today    Left piriformis (knee to opp shoulder) [id number]   Reviewed for HEP, pt demos understanding   Standing lumbar extension and right lateral glides 10x each  x Just standing lumbar extension today          Prone lying 5 min  x    Prone press up on elbows Held 2 min.  x Tightness in LB but no pain   Prone glut sets,  HS curl 10x 5sec.hold x    Prone abdominal bracing: UE, LE 10X 2-3 sec x Cues for decreased range to prevent body from rotating          Sitting with abdominal bracing: marching, LAQ, adductor squeeze, hip abduction 10x      Pt educated: Body mechanics, lifting techniques. Proper Lifting techniques for 1year old granddaughter            Specific Interventions Next Treatment: review HEP, initiate DLS, piriformis release, modalities as needed    Activity/Treatment Tolerance:  [x]  Patient tolerated treatment well  []  Patient limited by fatigue  []  Patient limited by pain   []  Patient limited by medical complications  []  Other:     Assessment: Patient a little late to session today, limiting number of exercises able to be completed. Pt still presents with tightness in lower back but has no increase in pain with extension exercises. Educated patient on completing stretches at home with good patient tolerance. Patient reported no change in pain at end of session, stating that forward flexion seems to help with pain. Consider introducing flexion exercises next session to help decrease pain.        GOALS:  Patient Goal: Reduce pain    Short Term Goals:  Time Frame: 6 weeks  Patient will report reduced pain to 2/10 at worst to tolerate sitting, standing, and walking longer durations. Patient will demonstrate good core engagement and postural awareness during therapy session with minimal cues  to carry over to functional activities, lifting, and housework. Patient will have <25% left hip flexor and piriformis tightness for symptom control with ease walking. Patient will improve left hip strength to 5/5 for stabilization when walking, lifting, and cleaning. Long Term Goals:  Time Frame: 12 weeks  Patient will be independent and compliant with HEP daily to achieve above goals. Patient will reduce Taffy Sly score from 15/24 to 10/24 to tolerate functional activities and mobility. Patient Education:   [x]  HEP/Education Completed: Hip flexor stretches off EOB and steps, right lateral glides, forward flexion for pain relief   [facility]: [email or web address]  []  No new Education completed  [x]  Reviewed Prior HEP      [x]  Patient verbalized and/or demonstrated understanding of education provided. []  Patient unable to verbalize and/or demonstrate understanding of education provided. Will continue education. []  Barriers to learning:     PLAN:  []  Plan of care initiated. Plan to see patient 3 times per week for 12 weeks to address the treatment planned outlined above. [x]  Continue with current plan of care  []  Modify plan of care as follows:    []  Hold pending physician visit  []  Discharge    Time In 0951   Time Out 1018   Timed Code Minutes: 27 min   Total Treatment Time: 27 min     Patient treated by student PTA under the direct supervision of licensed PT.     Electronically Signed by: KAYLENE Page

## 2020-09-21 ENCOUNTER — HOSPITAL ENCOUNTER (OUTPATIENT)
Dept: PHYSICAL THERAPY | Age: 70
Setting detail: THERAPIES SERIES
Discharge: HOME | End: 2020-09-21
Payer: MEDICARE

## 2020-09-21 PROCEDURE — 97110 THERAPEUTIC EXERCISES: CPT

## 2020-09-23 ENCOUNTER — HOSPITAL ENCOUNTER (OUTPATIENT)
Dept: PHYSICAL THERAPY | Age: 70
Setting detail: THERAPIES SERIES
Discharge: HOME | End: 2020-09-23
Payer: MEDICARE

## 2020-09-23 PROCEDURE — 97110 THERAPEUTIC EXERCISES: CPT

## 2020-09-23 NOTE — PROGRESS NOTES
[address] [facility]  PHYSICAL THERAPY  [] EVALUATION  [x] DAILY NOTE (LAND) [] DAILY NOTE (AQUATIC ) [] PROGRESS NOTE [] DISCHARGE NOTE    [x] OUTPATIENT REHABILITATION [facility]   [] [place] [address]    [] [facility]   [] Valentina Deleons    Date: 2020  Patient Name:  Cristina Hazel  : 1950  MRN: [id number]    Referring Practitioner Brisa Lemus DO   Diagnosis Lumbago with sciatica, unspecified side [M54.40]    Treatment Diagnosis Acute on chronic left lumbar pain with radiculopathy, core and left hip weakness, impaired posture, abnormal gait   Date of Evaluation 20    Additional Pertinent History MI, HTN, irregular heart beat, incontinence, prostrate cancer, stroke , heart bypass robotic surgery , 2 stents within last year. Functional Outcome Measure Used [facility]   Functional Outcome Score 15/24 (20)       Insurance: Primary: Payor: Margi Candelaria /  /  / ,   Secondary: Othelia Olszewski: Aquatics and modalities covered except ionto, HP/CP   Visit # 5, 5/10 for progress note   Visits Allowed: Unlimited visits based on medical necessity   Recertification Date:    Physician Follow-Up: Dr. Melissa Richter 20 for possible steroid injection   Physician Orders: 3x/wk for 6 weeks, [place] therapy for low back and lumbar radic, provide HEP; stretching, PROM, general conditioning; L3-4 stenosis on left   History of Present Illness:      SUBJECTIVE:  Patient states that the home exercises are going well. Reports that the stretches do seem to be helping. Did not do them yesterday because his grand-daughter was over to visit. TREATMENT   Precautions: MRSA   Pain: No pain.      X in shaded column indicates activity completed today   Modalities Parameters/  Location  Notes                     Manual Therapy Time/Technique  Notes   MET for right pelvic upslip 5x5 sec hold  Slight hip ABD and IR with forceful cough               Exercise/Intervention   Notes   Left Hip flexor stretch at step and off EOB 3x 15 seconds  X Just off EOB today    Left piriformis (knee to opp shoulder) 3x 15 seconds X    Supine abdominal bracing  10x  5 seconds X    SKTC, DKTC 3x 15 seconds X    Supine LTR 3x 15 seconds X    Supine with abdominal bracing: marches, shoulder flexion, combo UE/LE 10x each  X    Supine hip adduction with ball, hooklying abduction  10x 5 second hold for adduction  X           Standing lumbar extension and right lateral glides 10x each   Just standing lumbar extension today          Prone lying 5 min      Prone press up on elbows Held 2 min. Tightness in LB but no pain   Prone glut sets,  HS curl 10x 5 sec.hold     Prone abdominal bracing: UE, LE 10X 2-3 sec  Cues for decreased range to prevent body from rotating          Seated with abdominal bracing: marching, LAQ, adductor squeeze with towel roll, hip abduction bilateral/unilateral 10x  X Seated edge of mat table   Seated forward flexion stretch  3x 15 seconds X    Seated hamstring stretch  3x 15 seconds X    Pt educated: Body mechanics, lifting techniques. Proper Lifting techniques for 1year old granddaughter   Standing with abdominal bracing:  Heel raises, marching 10x   X Cues for bracing, felt increased tightness in lower back    Demonstrated standing hip flexor stretches for HEP   X             Specific Interventions Next Treatment: review HEP, initiate DLS, piriformis release, modalities as needed    Activity/Treatment Tolerance:  [x]  Patient tolerated treatment well  []  Patient limited by fatigue  []  Patient limited by pain   []  Patient limited by medical complications  []  Other:     Assessment: Progressed patient to light standing activities today. Fair tolerance. Patient started to having increased tightness in the lower back with standing activities. Moderate cueing needed for abdominal bracing with seated and standing activities. No complaints of increased pain at end of session. Ended session with seated stretches. GOALS:  Patient Goal: Reduce pain    Short Term Goals:  Time Frame: 6 weeks  Patient will report reduced pain to 2/10 at worst to tolerate sitting, standing, and walking longer durations. Patient will demonstrate good core engagement and postural awareness during therapy session with minimal cues  to carry over to functional activities, lifting, and housework. Patient will have <25% left hip flexor and piriformis tightness for symptom control with ease walking. Patient will improve left hip strength to 5/5 for stabilization when walking, lifting, and cleaning. Long Term Goals:  Time Frame: 12 weeks  Patient will be independent and compliant with HEP daily to achieve above goals. Patient will reduce Marcille Ehrich score from 15/24 to 10/24 to tolerate functional activities and mobility. Patient Education:   [x]  HEP/Education Completed: Standing Hip flexor stretch     [employer] Access Code: [email or web address]  []  No new Education completed  [x]  Reviewed Prior HEP      [x]  Patient verbalized and/or demonstrated understanding of education provided. []  Patient unable to verbalize and/or demonstrate understanding of education provided. Will continue education. []  Barriers to learning:     PLAN:  []  Plan of care initiated. Plan to see patient 3 times per week for 12 weeks to address the treatment planned outlined above.   [x]  Continue with current plan of care  []  Modify plan of care as follows:    []  Hold pending physician visit  []  Discharge    Time In 1045   Time Out 1125   Timed Code Minutes: 40 min   Total Treatment Time: 40 min       Electronically Signed by:  Jo De Luna

## 2020-09-25 ENCOUNTER — HOSPITAL ENCOUNTER (OUTPATIENT)
Dept: PHYSICAL THERAPY | Age: 70
Setting detail: THERAPIES SERIES
Discharge: HOME | End: 2020-09-25
Payer: MEDICARE

## 2020-09-25 PROCEDURE — 97110 THERAPEUTIC EXERCISES: CPT

## 2020-09-25 NOTE — PROGRESS NOTES
[address] [facility]  PHYSICAL THERAPY  [] EVALUATION  [x] DAILY NOTE (LAND) [] DAILY NOTE (AQUATIC ) [] PROGRESS NOTE [] DISCHARGE NOTE    [x] OUTPATIENT REHABILITATION [facility]   [] [place] [address]    [] [facility]   [] Desi Baezoba    Date: 2020  Patient Name:  Hansa Cabral   : 1950  MRN: [id number]    Referring Practitioner Ilia Garcia DO   Diagnosis Lumbago with sciatica, unspecified side [M54.40]    Treatment Diagnosis Acute on chronic left lumbar pain with radiculopathy, core and left hip weakness, impaired posture, abnormal gait   Date of Evaluation 20    Additional Pertinent History MI, HTN, irregular heart beat, incontinence, prostrate cancer, stroke , heart bypass robotic surgery , 2 stents within last year. Functional Outcome Measure Used [facility]   Functional Outcome Score 15/24 (20)       Insurance: Primary: Payor: Francie Randall /  /  / ,   Secondary: Dedeep Born: Aquatics and modalities covered except ionto, HP/CP   Visit # 6, 6/10 for progress note   Visits Allowed: Unlimited visits based on medical necessity   Recertification Date: 46   Physician Follow-Up: Dr. Plummer [place] 20 for possible steroid injection   Physician Orders: 3x/wk for 6 weeks, [place] therapy for low back and lumbar radic, provide HEP; stretching, PROM, general conditioning; L3-4 stenosis on left   History of Present Illness:      SUBJECTIVE: Patient reports that he is feeling good today. Patient reports that last night he was able to mow with the rider mower. He notes that the only time he really gets pain is with sitting, standing, or walking for 30 minutes or greater. TREATMENT   Precautions: MRSA   Pain: No pain.      X in shaded column indicates activity completed today   Modalities Parameters/  Location  Notes                     Manual Therapy Time/Technique  Notes   MET for right pelvic upslip 5x5 sec hold  Slight hip ABD and IR with forceful cough               Exercise/Intervention   Notes   Left Hip flexor stretch at step and off EOB 3x 15 seconds  X Just off EOB today    Left piriformis (knee to opp shoulder) 3x 15 seconds X    Supine abdominal bracing  10x  5 seconds X    SKTC, DKTC 3x 15 seconds X    Supine LTR 3x 15 seconds X    Supine with abdominal bracing: marches, shoulder flexion, combo UE/LE 15x each  X    Supine hip adduction with ball, hooklying abduction  15x 5 second hold for adduction  X           Standing lumbar extension and right lateral glides 10x each   Just standing lumbar extension today          Prone lying 5 min      Prone press up on elbows Held 2 min. Tightness in LB but no pain   Prone glut sets,  HS curl 10x 5 sec.hold     Prone abdominal bracing: UE, LE 10X 2-3 sec  Cues for decreased range to prevent body from rotating          Seated with abdominal bracing: marching, LAQ, adductor squeeze with towel roll, hip abduction bilateral/unilateral 10x each  X Seated edge of mat table   Seated forward flexion stretch  3x 15 seconds X    Seated hamstring stretch  3x 15 seconds X    Pt educated: Body mechanics, lifting techniques. Proper Lifting techniques for 1year old granddaughter   Standing with abdominal bracing:  Heel raises, marching 10x   X Cues for bracing, did not note as much tightness in lower back today while completing. Demonstrated standing hip flexor stretches for HEP                Specific Interventions Next Treatment: review HEP, initiate DLS, piriformis release, modalities as needed    Activity/Treatment Tolerance:  [x]  Patient tolerated treatment well  []  Patient limited by fatigue  []  Patient limited by pain   []  Patient limited by medical complications  []  Other:     Assessment: Patient progressed reps with supine therapeutic exercises. Cues for abdominal bracing while completing exercises.  Patient denied any pain with the therapeutic exercises he completed this treatment session. Patient noted that his did not have as much tightness in his lower back with standing exercises today. Patient reports that he is completing his HEP 1-2 times a day. GOALS:  Patient Goal: Reduce pain    Short Term Goals:  Time Frame: 6 weeks  Patient will report reduced pain to 2/10 at worst to tolerate sitting, standing, and walking longer durations. Patient will demonstrate good core engagement and postural awareness during therapy session with minimal cues  to carry over to functional activities, lifting, and housework. Patient will have <25% left hip flexor and piriformis tightness for symptom control with ease walking. Patient will improve left hip strength to 5/5 for stabilization when walking, lifting, and cleaning. Long Term Goals:  Time Frame: 12 weeks  Patient will be independent and compliant with HEP daily to achieve above goals. Patient will reduce Pamela Nigh score from 15/24 to 10/24 to tolerate functional activities and mobility. Patient Education:   []  HEP/Education Completed: Standing Hip flexor stretch     Medbridge Access Code: [email or web address]  []  No new Education completed  [x]  Reviewed Prior HEP      [x]  Patient verbalized and/or demonstrated understanding of education provided. []  Patient unable to verbalize and/or demonstrate understanding of education provided. Will continue education. []  Barriers to learning:     PLAN:  []  Plan of care initiated. Plan to see patient 3 times per week for 12 weeks to address the treatment planned outlined above.   [x]  Continue with current plan of care  []  Modify plan of care as follows:    []  Hold pending physician visit  []  Discharge    Time In 0947   Time Out 1030   Timed Code Minutes: 43 min   Total Treatment Time:  43 min       Electronically Signed by:  Shanta Saab

## 2020-09-28 ENCOUNTER — HOSPITAL ENCOUNTER (OUTPATIENT)
Dept: PHYSICAL THERAPY | Age: 70
Setting detail: THERAPIES SERIES
Discharge: HOME | End: 2020-09-28
Payer: MEDICARE

## 2020-09-28 PROCEDURE — 97110 THERAPEUTIC EXERCISES: CPT

## 2020-09-28 NOTE — PROGRESS NOTES
[address] [facility]  PHYSICAL THERAPY  [] EVALUATION  [x] DAILY NOTE (LAND) [] DAILY NOTE (AQUATIC ) [] PROGRESS NOTE [] DISCHARGE NOTE    [x] OUTPATIENT REHABILITATION CENTER [facility]   [] [place] [address]    [] [facility]   [] Sunny Stone    Date: 2020  Patient Name:  Britany Valdes   : 1950  MRN: [id number]    Referring Practitioner Jenae Wolff DO   Diagnosis Lumbago with sciatica, unspecified side [M54.40]    Treatment Diagnosis Acute on chronic left lumbar pain with radiculopathy, core and left hip weakness, impaired posture, abnormal gait   Date of Evaluation 20    Additional Pertinent History MI, HTN, irregular heart beat, incontinence, prostrate cancer, stroke , heart bypass robotic surgery , 2 stents within last year. Functional Outcome Measure Used Ender Mchugh   Functional Outcome Score 15/24 (20)       Insurance: Primary: Payor: Sang Wilder /  /  / ,   Secondary: Reshma Ly: Aquatics and modalities covered except ionto, HP/CP   Visit # 7, 7/10 for progress note   Visits Allowed: Unlimited visits based on medical necessity   Recertification Date:    Physician Follow-Up: Dr. Prakash Robbins 20 for possible steroid injection   Physician Orders: 3x/wk for 6 weeks, [place] therapy for low back and lumbar radic, provide HEP; stretching, PROM, general conditioning; L3-4 stenosis on left   History of Present Illness:      SUBJECTIVE: Patient reports the past couple days he has had a sharp pain in middle of his back. Pt drove to [facility] yesterday and was sore when he got there but did a couple stretches and felt better. Pt reports he didn't do his whole HEP regimen the past 2 days but did some. Pt feels walking and sitting tolerance is getting better. TREATMENT   Precautions: MRSA   Pain: No pain.      X in shaded column indicates activity completed today   Modalities Parameters/  Location  Notes                     Manual Therapy Time/Technique  Notes   MET for right pelvic upslip 5x5 sec hold  Slight hip ABD and IR with forceful cough               Exercise/Intervention   Notes   Left Hip flexor stretch off EOB 3x 15 seconds  X    Bilat piriformis (knee to opp shoulder) 3x 15 seconds X    Supine abdominal bracing  10x  5 seconds     SKTC, DKTC 3x 15 seconds X    Supine LTR 3x 15 seconds X    Tushar pose 3x 15 sec x    Supine with abdominal bracing: marches, shoulder flexion, combo UE/LE 15x each  X    Supine hip adduction with ball, hooklying abduction  15x 5 second hold for adduction  X           Seated with abdominal bracing: marching, LAQ, adductor squeeze with towel roll, hip abduction bilateral/unilateral 15x each  X Seated edge of mat table   Seated forward flexion stretch  3x 15 seconds     Seated hamstring stretch  3x 15 seconds     Pt educated: Body mechanics, lifting techniques. Proper Lifting techniques for 1year old granddaughter   Standing with abdominal bracing:  Heel raises, marching, squats 10x   X Cues for bracing and proper technique with squats   Demonstrated standing hip flexor stretches for HEP                Specific Interventions Next Treatment: progress as tolerated, modalities as needed    Activity/Treatment Tolerance:  [x]  Patient tolerated treatment well  []  Patient limited by fatigue  []  Patient limited by pain   []  Patient limited by medical complications  []  Other:     Assessment: Progressed reps with seated exercises and added squats to standing exercises with periodic cues needed for abdominal bracing. Introduced tushar pose stretch which alleviated mid back pain. No complaints at end of session. GOALS:  Patient Goal: Reduce pain    Short Term Goals:  Time Frame: 6 weeks  Patient will report reduced pain to 2/10 at worst to tolerate sitting, standing, and walking longer durations.   Patient will demonstrate good core engagement and postural awareness during therapy session with minimal cues  to carry over to functional activities, lifting, and housework. Patient will have <25% left hip flexor and piriformis tightness for symptom control with ease walking. Patient will improve left hip strength to 5/5 for stabilization when walking, lifting, and cleaning. Long Term Goals:  Time Frame: 12 weeks  Patient will be independent and compliant with HEP daily to achieve above goals. Patient will reduce Alessandra Roulette score from 15/24 to 10/24 to tolerate functional activities and mobility. Patient Education:   [x]  HEP/Education Completed: monico pose stretch   Medbridge Access Code: [email or web address]  []  No new Education completed  []  Reviewed Prior HEP      [x]  Patient verbalized and/or demonstrated understanding of education provided. []  Patient unable to verbalize and/or demonstrate understanding of education provided. Will continue education. []  Barriers to learning:     PLAN:  []  Plan of care initiated. Plan to see patient 3 times per week for 12 weeks to address the treatment planned outlined above.   [x]  Continue with current plan of care  []  Modify plan of care as follows:    []  Hold pending physician visit  []  Discharge    Time In 1017   Time Out 1102   Timed Code Minutes: 45 min   Total Treatment Time:  45 min       Electronically Signed by:  Meredeth Cushing

## 2020-09-30 ENCOUNTER — HOSPITAL ENCOUNTER (OUTPATIENT)
Dept: PHYSICAL THERAPY | Age: 70
Setting detail: THERAPIES SERIES
Discharge: HOME | End: 2020-09-30
Payer: MEDICARE

## 2020-09-30 PROCEDURE — 97110 THERAPEUTIC EXERCISES: CPT

## 2020-09-30 NOTE — PROGRESS NOTES
[address] [facility]  PHYSICAL THERAPY  [] EVALUATION  [x] DAILY NOTE (LAND) [] DAILY NOTE (AQUATIC ) [] PROGRESS NOTE [] DISCHARGE NOTE    [x] OUTPATIENT REHABILITATION [facility]   [] [place] [address]    [] [facility]   [] Damian Amerwin    Date: 2020  Patient Name:  Louise Tan   : 1950  MRN: [id number]    Referring Practitioner Samantha Guerra DO   Diagnosis Lumbago with sciatica, unspecified side [M54.40]    Treatment Diagnosis Acute on chronic left lumbar pain with radiculopathy, core and left hip weakness, impaired posture, abnormal gait   Date of Evaluation 20    Additional Pertinent History MI, HTN, irregular heart beat, incontinence, prostrate cancer, stroke , heart bypass robotic surgery , 2 stents within last year. Functional Outcome Measure Used [facility]   Functional Outcome Score 15/24 (20)       Insurance: Primary: Payor: Darlyn Bae /  /  / ,   Secondary: Kendal Valerio: Aquatics and modalities covered except javid HP/CP   Visit # 8, 8/10 for progress note   Visits Allowed: Unlimited visits based on medical necessity   Recertification Date:    Physician Follow-Up: Dr. Caitlyn Talbot 20 for possible steroid injection   Physician Orders: 3x/wk for 6 weeks, [place] therapy for low back and lumbar radic, provide HEP; stretching, PROM, general conditioning; L3-4 stenosis on left   History of Present Illness:      SUBJECTIVE: Patient reports no longer having sharp pain in mid back but still feels a little sore. Pt reports left leg pain less frequent but still occurs with prolonged sitting and walking. TREATMENT   Precautions: MRSA   Pain: No pain.      X in shaded column indicates activity completed today   Modalities Parameters/  Location  Notes                     Manual Therapy Time/Technique  Notes   MET for right pelvic upslip 5x5 sec hold  Slight hip ABD and IR with engagement and postural awareness during therapy session with minimal cues  to carry over to functional activities, lifting, and housework. Patient will have <25% left hip flexor and piriformis tightness for symptom control with ease walking. Patient will improve left hip strength to 5/5 for stabilization when walking, lifting, and cleaning. Long Term Goals:  Time Frame: 12 weeks  Patient will be independent and compliant with HEP daily to achieve above goals. Patient will reduce Leodan Contes score from 15/24 to 10/24 to tolerate functional activities and mobility. Patient Education:   []  HEP/Education Completed: monico pose stretch   [facility] Access Code: [email or web address]  [x]  No new Education completed  []  Reviewed Prior HEP      []  Patient verbalized and/or demonstrated understanding of education provided. []  Patient unable to verbalize and/or demonstrate understanding of education provided. Will continue education. []  Barriers to learning:     PLAN:  []  Plan of care initiated. Plan to see patient 3 times per week for 12 weeks to address the treatment planned outlined above.   [x]  Continue with current plan of care  []  Modify plan of care as follows:    []  Hold pending physician visit  []  Discharge    Time In 0934   Time Out 1025   Timed Code Minutes: 51 min   Total Treatment Time:  51 min       Electronically Signed by:  Reynaldo Salazar

## 2020-10-02 ENCOUNTER — HOSPITAL ENCOUNTER (OUTPATIENT)
Dept: PHYSICAL THERAPY | Age: 70
Setting detail: THERAPIES SERIES
Discharge: HOME | End: 2020-10-02
Payer: MEDICARE

## 2020-10-02 PROCEDURE — 97110 THERAPEUTIC EXERCISES: CPT

## 2020-10-02 NOTE — PROGRESS NOTES
[address] [facility]  PHYSICAL THERAPY  [] EVALUATION  [x] DAILY NOTE (LAND) [] DAILY NOTE (AQUATIC ) [] PROGRESS NOTE [] DISCHARGE NOTE    [x] OUTPATIENT REHABILITATION [facility]   [] [place] [address]    [] [facility]   [] Mel Lights    Date: 10/2/2020  Patient Name:  Will Joaquin   : 1950  MRN: [id number]    Referring Practitioner Reid Montaño DO   Diagnosis Lumbago with sciatica, unspecified side [M54.40]    Treatment Diagnosis Acute on chronic left lumbar pain with radiculopathy, core and left hip weakness, impaired posture, abnormal gait   Date of Evaluation 20    Additional Pertinent History MI, HTN, irregular heart beat, incontinence, prostrate cancer, stroke , heart bypass robotic surgery , 2 stents within last year. [facility]   Functional Outcome Score 15/24 (20)       Insurance: Primary: Payor: Kash Knott /  /  / ,   Secondary: Uriel Martin   Authorization Information: Aquatics and modalities covered except javid, HP/CP   Visit # 9, 10 for progress note   Visits Allowed: Unlimited visits based on medical necessity   Recertification Date:    Physician Follow-Up: Dr. Nora Bejarano 20 for possible steroid injection   Physician Orders: 3x/wk for 6 weeks, Belem therapy for low back and lumbar radic, provide HEP; stretching, PROM, general conditioning; L3-4 stenosis on left   History of Present Illness:      SUBJECTIVE: Patient reports pain in mid back has returned. Pt reports no complaints following last session.      TREATMENT   Precautions: MRSA   Pain: 4-5/10 mid back    X in shaded column indicates activity completed today   Modalities Parameters/  Location  Notes                     Manual Therapy Time/Technique  Notes   MET for right pelvic upslip 5x5 sec hold  Slight hip ABD and IR with forceful cough               Exercise/Intervention   Notes   Left Hip flexor activities, lifting, and housework. Patient will have <25% left hip flexor and piriformis tightness for symptom control with ease walking. Patient will improve left hip strength to 5/5 for stabilization when walking, lifting, and cleaning. Long Term Goals:  Time Frame: 12 weeks  Patient will be independent and compliant with HEP daily to achieve above goals. Patient will reduce Kathryn Spies score from 15/24 to 10/24 to tolerate functional activities and mobility. Patient Education:   [x]  HEP/Education Completed: upper thoracic stretch   Medbridge Access Code: [email or web address]  []  No new Education completed  []  Reviewed Prior HEP      [x]  Patient verbalized and/or demonstrated understanding of education provided. []  Patient unable to verbalize and/or demonstrate understanding of education provided. Will continue education. []  Barriers to learning:     PLAN:  []  Plan of care initiated. Plan to see patient 3 times per week for 12 weeks to address the treatment planned outlined above.   [x]  Continue with current plan of care  []  Modify plan of care as follows:    []  Hold pending physician visit  []  Discharge    Time In 0943   Time Out 1015   Timed Code Minutes: 32 min   Total Treatment Time:  32 min       Electronically Signed by:  Guille Hoang

## 2020-10-05 ENCOUNTER — OFFICE VISIT (OUTPATIENT)
Dept: CARDIOLOGY CLINIC | Age: 70
End: 2020-10-05
Payer: MEDICARE

## 2020-10-05 ENCOUNTER — HOSPITAL ENCOUNTER (OUTPATIENT)
Dept: PHYSICAL THERAPY | Age: 70
Setting detail: THERAPIES SERIES
Discharge: HOME | End: 2020-10-05
Payer: MEDICARE

## 2020-10-05 VITALS
BODY MASS INDEX: 31.97 KG/M2 | DIASTOLIC BLOOD PRESSURE: 78 MMHG | HEIGHT: 72 IN | HEART RATE: 64 BPM | SYSTOLIC BLOOD PRESSURE: 122 MMHG | WEIGHT: 236 LBS

## 2020-10-05 PROCEDURE — 4040F PNEUMOC VAC/ADMIN/RCVD: CPT | Performed by: NUCLEAR MEDICINE

## 2020-10-05 PROCEDURE — 97110 THERAPEUTIC EXERCISES: CPT

## 2020-10-05 PROCEDURE — 3017F COLORECTAL CA SCREEN DOC REV: CPT | Performed by: NUCLEAR MEDICINE

## 2020-10-05 PROCEDURE — G8417 CALC BMI ABV UP PARAM F/U: HCPCS | Performed by: NUCLEAR MEDICINE

## 2020-10-05 PROCEDURE — G8484 FLU IMMUNIZE NO ADMIN: HCPCS | Performed by: NUCLEAR MEDICINE

## 2020-10-05 PROCEDURE — 1036F TOBACCO NON-USER: CPT | Performed by: NUCLEAR MEDICINE

## 2020-10-05 PROCEDURE — 1123F ACP DISCUSS/DSCN MKR DOCD: CPT | Performed by: NUCLEAR MEDICINE

## 2020-10-05 PROCEDURE — 99214 OFFICE O/P EST MOD 30 MIN: CPT | Performed by: NUCLEAR MEDICINE

## 2020-10-05 PROCEDURE — G8427 DOCREV CUR MEDS BY ELIG CLIN: HCPCS | Performed by: NUCLEAR MEDICINE

## 2020-10-05 NOTE — PROGRESS NOTES
[address] [facility],[address]  [address]  [facility] [address]  Dept: [phone]  Dept Fax: [phone]  Loc: [phone]    Visit Date: 10/5/2020    Birtha Sever is a 78 y.o. male who presents todayfor:  Chief Complaint   Patient presents with    1 Year Follow Up    Coronary Artery Disease    Atrial Fibrillation    Check-Up    Hypertension   had stent to the left circ  Did well   Does have [address] and [place] that are open   No chest pain  No changes in breathing   Some sweating at times  Some dizziness  Orthostatic usually   Did have COVID and not required hospitalization   Did well with it   BPis stable  No syncope yet does have some dizziness  No issues with lipid treatment   A fib is chronic and stable   No bleeding issues        HPI:  HPI  Past Medical History:   Diagnosis Date    Arthritis     back    BPH (benign prostatic hyperplasia)     CAD (coronary artery disease)     CVA (cerebral vascular accident) ([facility] [address] 2010    Disease of blood and blood forming organ     anemia    FH: heart disease     GERD (gastroesophageal reflux disease)     Hyperlipidemia     Hypertension     Obesity     Prostate cancer ([facility] [address] 2019      Past Surgical History:   Procedure Laterality Date    CARDIAC VALVE REPLACEMENT  April 2011    [address], [place] (Pt unsure if part of CABG)    CARDIOVASCULAR STRESS TEST  1 05 2011    Cannot exclude slight inferobasal lateral stress-induced ischemia in a relatively small-sized area. EF 68%. Mildly abnormal nuclear scan. Lexiscan test associated with nonspecific symptoms. EKG nondiagnostic with nonspecific ST-T wave changes.  CAROTID ENDARTERECTOMY  2 08 2011    Right carotid endarterectomy with patch angioplasty with convention patch angioplasty.      COLONOSCOPY      CORONARY ARTERY BYPASS GRAFT      DIAGNOSTIC CARDIAC CATH LAB PROCEDURE  3 24 2011    LV end-diastolic pressure was 12 mmHg with no change before and 1 tablet by mouth daily Indications: 200 mg      clopidogrel (PLAVIX) 75 MG tablet Take 1 tablet by mouth daily 90 tablet 3    metoprolol succinate (TOPROL XL) 50 MG extended release tablet TAKE 1/2 TAB BY MOUTH EVERY DAY 45 tablet 2    atorvastatin (LIPITOR) 20 MG tablet TAKE 1 TABLET BY MOUTH EVERY DAY 90 tablet 4    Cetirizine HCl (ZYRTEC ALLERGY PO) Take 1 tablet by mouth daily as needed       apixaban (ELIQUIS) 5 MG TABS tablet Take 1 tablet by mouth 2 times daily 180 tablet 3    Coenzyme Q10 (COQ10 PO) Take 500 mg by mouth daily      ferrous sulfate 325 (65 FE) MG tablet Take 1 tablet by mouth daily (with breakfast). 30 tablet 11    Krill Oil 500 MG CAPS Take  by mouth daily. No current facility-administered medications for this visit.       No Known Allergies  Health Maintenance   Topic Date Due    Shingles Vaccine (1 of 2) 08/03/2002    Colon cancer screen colonoscopy  08/03/2002    Pneumococcal 65+ years Vaccine (1 of 1 - PPSV23) 08/03/2017    A1C test (Diabetic or Prediabetic)  09/06/2019    Statin Therapy  12/09/2020    PSA counseling  04/30/2021    Lipid screen  05/04/2021    Annual Wellness Visit (AWV)  06/23/2021    DTaP/Tdap/Td vaccine (2 - Td) 12/20/2029    Flu vaccine  Completed    Hepatitis C screen  Completed    Hepatitis A vaccine  Aged Out    Hepatitis B vaccine  Aged Out    Hib vaccine  Aged Out    Meningococcal (ACWY) vaccine  Aged Out       Subjective:  Review of Systems  General:   No fever, no chills, some fatigue or weight loss  Pulmonary:    some dyspnea, no wheezing  Cardiac:    Denies recent chest pain,   GI:     No nausea or vomiting, no abdominal pain  Neuro:    some dizziness or light headedness,   Musculoskeletal:  No recent active issues  Extremities:   No edema, no obvious claudication       Objective:  Physical Exam  /78   Pulse 64   Ht 6' (1.829 m)   Wt 236 lb (107 kg)   BMI 32.01 kg/m²   General:   Well developed, well nourished  Lungs: Clear to auscultation  Heart:    Normal S1 S2, Slight murmur. no rubs, no gallops  Abdomen:   Soft, non tender, no organomegalies, positive bowel sounds  Extremities:   No edema, no cyanosis, good peripheral pulses  Neurological:   Awake, alert, oriented. No obvious focal deficits  Musculoskelatal:  No obvious deformities    Assessment:      Diagnosis Orders   1. Essential hypertension     2. Persistent atrial fibrillation ([address]     3. Coronary artery disease involving coronary bypass graft of native heart without angina pectoris     4. Familial hypercholesterolemia     as above  Possible drop in the BP  No angina  Recovering from [facility]:  No follow-ups on file. Change flomax to pm only   Consider lower dose metoprolol   Continue risk factor modification and medical management  Thank you for allowing me to participate in the care of your patient. Please don't hesitate to contact me regarding any further issues related to the patient care    Orders Placed:  No orders of the defined types were placed in this encounter. Medications Prescribed:  No orders of the defined types were placed in this encounter. Discussed use, benefit, and side effects of prescribed medications. All patient questions answered. Pt voicedunderstanding. Instructed to continue current medications, diet and exercise. Continue risk factor modification and medical management. Patient agreed with treatment plan. Follow up as directed.     Electronically signedby Demi Miranda MD on 10/5/2020 at 7:35 AM

## 2020-10-05 NOTE — DISCHARGE SUMMARY
[id number] Replaced by [facility]  PHYSICAL THERAPY  [] EVALUATION  [] DAILY NOTE (LAND) [] DAILY NOTE (AQUATIC ) [] PROGRESS NOTE [x] DISCHARGE NOTE    [x] OUTPATIENT REHABILITATION CENTER [facility]   [] [place] [address]    [] [facility]   [] Cj Harvey    Date: 10/5/2020  Patient Name:  Salud Whittington   : 1950  MRN: [id number]    Referring Practitioner Wanda Reynoso DO   Diagnosis Lumbago with sciatica, unspecified side [M54.40]    Treatment Diagnosis Acute on chronic left lumbar pain with radiculopathy, core and left hip weakness, impaired posture, abnormal gait   Date of Evaluation 20    Additional Pertinent History MI, HTN, irregular heart beat, incontinence, prostrate cancer, stroke , heart bypass robotic surgery , 2 stents within last year. Functional Outcome Measure Used Kathryn Duque   Functional Outcome Score 15/24 (20)       Insurance: Primary: Payor: Lara Willett /  /  / ,   Secondary: Jeni Kelley   Authorization Information: Aquatics and modalities covered except ionto, HP/CP   Visit # 10, 1010 for progress note   Visits Allowed: Unlimited visits based on medical necessity   Recertification Date:    Physician Follow-Up: Dr. Taty Chun 20 for possible steroid injection   Physician Orders: 3x/wk for 6 weeks, [place] therapy for low back and lumbar radic, provide HEP; stretching, PROM, general conditioning; L3-4 stenosis on left   History of Present Illness:      SUBJECTIVE: Patient denies low back pain but c/o pain across back of shoulders for unknown reason. Pt reports doing HEP and always feels better after doing them.      TREATMENT   Precautions: MRSA   Pain: 4-5/10 across back of shoulders    X in shaded column indicates activity completed today   Modalities Parameters/  Location  Notes                     Manual Therapy Time/Technique  Notes   MET for right pelvic upslip 5x5 sec hold  Slight hip ABD and IR with forceful cough               Exercise/Intervention   Notes   Left Hip flexor stretch off EOB 3x 15 seconds      Bilat piriformis (knee to opp shoulder) 3x 15 seconds x    Supine abdominal bracing  10x  5 seconds     SKTC, DKTC 3x 15 seconds X    Supine LTR 3x 15 seconds     Tushar pose 3x 20 sec x    Supine with abdominal bracing: marches, shoulder flexion with red weighted ball, combo UE/LE 15x each      Supine hip adduction with ball, hooklying abduction with tband 15x 5 second hold for adduction   peach          Seated on thera-disc with abdominal bracing: marching, LAQ, adductor squeeze with ball 15x each  X    Seated forward flexion stretch  3x 15 seconds     Seated hamstring stretch  3x 15 seconds     Pt educated: Body mechanics, lifting techniques. Proper Lifting techniques for 1year old granddaughter   Standing with abdominal bracing:  Heel raises, marching, squats 15x   X Cues for bracing and proper technique with squats   3 way hip  15x  x Cues for ab bracing and small range   Standing hip flexor stretch at step, bilat 3x 15 sec x    Nustep S11, A12 x5 min  x Level 5     Specific Interventions Next Treatment: progress as tolerated, modalities as needed    Activity/Treatment Tolerance:  [x]  Patient tolerated treatment well  []  Patient limited by fatigue  []  Patient limited by pain   []  Patient limited by medical complications  []  Other:     Assessment: Pt demos good progress toward goals. Note improved hip flexibility and strength resulting in improved posture. Pt tolerating walking and sitting longer durations but still gets increased pain driving to [facility] but stretches alleviate pain. Pt feels he can manage independently at home so not further PT scheduled at this time. GOALS:  Patient Goal: Reduce pain    Short Term Goals:  Time Frame: 6 weeks  Patient will report reduced pain to 2/10 at worst to tolerate sitting, standing, and walking longer durations.   GOAL NOT MET: Pt continues to elevate with prolonged driving and walking but alleviates with stretches. Discontinue Goal    Patient will demonstrate good core engagement and postural awareness during therapy session with minimal cues  to carry over to functional activities, lifting, and housework. GOAL NOT MET: Pt reports doing abdominal bracing frequently during the day but not too much during activities. Discontinue Goal    Patient will have <25% left hip flexor and piriformis tightness for symptom control with ease walking. GOAL MET:  25% tightness noted in left hip flexor and piriformis with less forward flexed posture. Discontinue Goal    Patient will improve left hip strength to 5/5 for stabilization when walking, lifting, and cleaning. GOAL MET:  left hip 5/5. Discontinue Goal      Long Term Goals:  Time Frame: 12 weeks  Patient will be independent and compliant with HEP daily to achieve above goals. GOAL MET:  pt verbalizes compliance with HEP 1-2x/day, but really trying for 2x. Discontinue Goal    Patient will reduce Vergia Failing score from 15/24 to 10/24 to tolerate functional activities and mobility. GOAL NOT MET: score 11/24. Discontinue Goal        Patient Education:   [x]  HEP/Education Completed: provided updated HEP- adding standing exercises and flexion stretches   Storage Made Easy Access Code: [email or web address]  []  No new Education completed  []  Reviewed Prior HEP      [x]  Patient verbalized and/or demonstrated understanding of education provided. []  Patient unable to verbalize and/or demonstrate understanding of education provided. Will continue education. []  Barriers to learning:     PLAN:  []  Plan of care initiated. Plan to see patient 3 times per week for 12 weeks to address the treatment planned outlined above.   []  Continue with current plan of care  []  Modify plan of care as follows:    []  Hold pending physician visit  [x]  Discharge    Time In 0830   Time Out 0915   Timed Code Minutes: 45 min   Total Treatment Time:  45 min       Electronically Signed by:  Manpreet Lamb

## 2020-10-28 ENCOUNTER — HOSPITAL ENCOUNTER (OUTPATIENT)
Age: 70
Discharge: HOME | End: 2020-10-28
Payer: MEDICARE

## 2020-10-28 LAB — PROSTATE SPECIFIC ANTIGEN: < 0.02 NG/ML (ref 0–1)

## 2020-10-28 PROCEDURE — 84153 ASSAY OF PSA TOTAL: CPT

## 2020-10-28 PROCEDURE — 36415 COLL VENOUS BLD VENIPUNCTURE: CPT

## 2020-11-02 ENCOUNTER — HOSPITAL ENCOUNTER (OUTPATIENT)
Dept: RADIATION ONCOLOGY | Age: 70
Discharge: HOME | End: 2020-11-02
Attending: RADIOLOGY
Payer: MEDICARE

## 2020-11-02 VITALS
RESPIRATION RATE: 16 BRPM | WEIGHT: 242.6 LBS | OXYGEN SATURATION: 98 % | BODY MASS INDEX: 32.9 KG/M2 | DIASTOLIC BLOOD PRESSURE: 64 MMHG | TEMPERATURE: 97 F | SYSTOLIC BLOOD PRESSURE: 116 MMHG | HEART RATE: 79 BPM

## 2020-11-02 PROCEDURE — 99212 OFFICE O/P EST SF 10 MIN: CPT | Performed by: NURSE PRACTITIONER

## 2020-11-02 PROCEDURE — 99214 OFFICE O/P EST MOD 30 MIN: CPT | Performed by: NURSE PRACTITIONER

## 2020-11-02 NOTE — PROGRESS NOTES
[address] [facility], [address]  Phone: [phone]   Toll Free: [phone]   Fax: [phone]    RADIATION ONCOLOGY FOLLOW UP REPORT    PATIENT NAME:  Felton Holter Jorden Pries     : 1950  MEDICAL RECORD NO: [id number]    LOCATION: [facility] NO: [id number]      PROVIDER: DOREEN Vasquez CNP        DATE OF SERVICE: 2020    FOLLOW UP PHYSICIANS: Dr. Yaw HASSAN     DIAGNOSIS:  C61 -- Malignant neoplasm of the prostate;  Adenocarcinoma, Maben's 3+4=7, Grade Group 3; pT3a pN0 M0, Stage IIIB with detectable and increasing PSA after prostatectomy.  PSA at diagnosis 8.3; Pre-RT PSA 0.15      DATE OF DIAGNOSIS: 2018     END OF TREATMENT DATE: 2019     ECOG PERFORMANCE STATUS: 1     PAIN: Denies     CHAPERONE: Declined        HPI:  Patrice is a Dimitrios Nam  with a history of a modestly elevated PSA that remained stable for approximately 9 years.  In 2018 it had risen to 8.28 and he completed a sextant biopsy on 2018, showing a Tip 6 adenocarcinoma involving 3 of the 12 biopsy samples. Amparo Lynne initially managed with active surveillance, but MRI scan obtained 2019 was concerning for multiple finding suggesting a high probability of clinically significant cancer.  He underwent re-biopsy on 2019.  At that time he was found with mixed grade adenocarcinoma involving 3 of the 6 sectors sampled.  Maximum Maben was (4+3) 7, group 3.  He went on to complete a robot-assisted laparoscopic prostatectomy on 2019.  Final pathology graded the tumor as a Maben (3+4) 7 with tertiary pattern 5.   The pathologic stage is shown above.  Other findings included right bladder neck involvement, perineural invasion and a positive right bladder neck surgical margin.  All of the sampled lymph nodes were free of malignancy.  A sample was sent for decipher score evaluation and returned a value of 0.88, therefore high risk of progression.   the initial postoperative PSA in May 2019 was detectable at 0.07.   a subsequent PSA was read as 0.04, but  the PSA obtained on 8/27/2019 was 0.15, showing clear PSA progression with a doubling time of less than 6 months.   Mr. Hu Hurst seen by Dr. Jessica Saunders discuss the role of adjuvant radiation therapy, to which he was agreeable. Randee Rendon was initiated on androgen deprivation therapy, receiving Firmagon in September 2019.  A repeat PSA obtained in October 2019 was undetectable at <0.02.     Patrice tolerated his radiation therapy well. Randee Rendon had some initial dysuria that was very mild, and also some increased frequency of bowel movements and increased urinary frequency.  He was able to continue with all of his normal activities, and did not have any unexpected side effects related to radiation therapy.     INTERVAL HISTORY: Patrice returns to [address] [place] [address] for a post treatment follow up after undergoing adjuvant radiation therapy. Ting Ac continues on ADT he is scheduled to see Urology this month for his next Lupron injection. Ting Ac states he still struggles with Urinary frequency especially at night he is up 2-4 times per night. He feels he does fairly well during the day in regards to frequency but does admit to occasional leaking (stress incontinence). He does wear a shield during the day. Erectile dysfunction remains an issues while on Lupron but does not wish to pursue treatment at this time. He denies sob, chest pain, fever, chills, hot flashes, musculoskeletal aches, hematuria, dysuria or weak stream. States his bowel are formed and denies pain with bowel movements.        AUA Symptom Score: 8 (Moderate)  Quality of Life: 4 (Mostly dissatisfied)  STEPHANY Inventory: 1 (Severe ED)     LAB RESULTS:   PSA:   10/28/2020: <0.02  4/30/2020: <0.02  1/31/2020: <0.02 ---- First PSA s/p adjuvant radiation  8/27/19: 0.15  7/2/19: 0.04  5/3/19: 0.07 ---- First PSA s/p prostatectomy (3/20/19)  9/6/18: 8.28  4/1/15: 5.68  1/24/14: 7.02  3/25/13: 5.51  6/2/2009: 6.2    CBC:   Lab Results   Component Value Date    WBC 7.7 05/04/2020    RBC 4.76 05/04/2020    HGB 15.8 05/04/2020    HCT 47.9 05/04/2020    .6 05/04/2020    MCH 33.2 05/04/2020    MCHC 33.0 05/04/2020     05/04/2020    MPV 8.4 05/04/2020     CMP:  Lab Results   Component Value Date     05/04/2020    K 5.2 05/04/2020     05/04/2020    CO2 27 05/04/2020    BUN 18 05/04/2020    CREATININE 0.8 05/04/2020    LABGLOM >90 05/04/2020    GLUCOSE 110 05/04/2020    PROT 7.4 05/04/2020    LABALBU 4.4 05/04/2020    CALCIUM 10.2 05/04/2020    BILITOT 0.6 05/04/2020    ALKPHOS 40 05/04/2020    AST 30 05/04/2020    ALT 26 05/04/2020       RADIOLOGY RESULTS:   No recent imaging    MEDICATIONS:   Current Outpatient Medications   Medication Sig Dispense Refill    apixaban (ELIQUIS) 5 MG TABS tablet Take 1 tablet by mouth 2 times daily 180 tablet 3    nitroGLYCERIN (NITROSTAT) 0.4 MG SL tablet PLACE 1 TABLET UNDER TONGUE EVERY 5 MINS, UP TO 3 DOSES AS NEEDED FOR CHEST PAIN 25 tablet 1    tamsulosin (FLOMAX) 0.4 MG capsule TAKE 1 CAPSULE BY MOUTH EVERY DAY 90 capsule 1    famotidine (PEPCID) 20 MG tablet TAKE 1 TABLET BY MOUTH TWICE A DAY (Patient taking differently: TAKE 1 TABLET BY MOUTH DAILY) 180 tablet 2    calcium-vitamin D (OSCAL-500) 500-200 MG-UNIT per tablet Take 1 tablet by mouth daily Indications: 200 mg      clopidogrel (PLAVIX) 75 MG tablet Take 1 tablet by mouth daily 90 tablet 3    metoprolol succinate (TOPROL XL) 50 MG extended release tablet TAKE 1/2 TAB BY MOUTH EVERY DAY 45 tablet 2    atorvastatin (LIPITOR) 20 MG tablet TAKE 1 TABLET BY MOUTH EVERY DAY 90 tablet 4    Cetirizine HCl (ZYRTEC ALLERGY PO) Take 1 tablet by mouth daily as needed       Coenzyme Q10 (COQ10 PO) Take 500 mg by mouth daily      ferrous sulfate 325 (65 FE) MG tablet Take 1 tablet by mouth daily (with breakfast). 30 tablet 11    Krill Oil 500 MG CAPS Take  by mouth daily. No current facility-administered medications for this encounter. ROS: As noted in the HPI and Interval history, otherwise negative. EXAMINATION:   GENERAL: Patrice is a pleasant well-developed adult male. Nuria Ladd is alert and oriented x3 in no acute distress. VITAL SIGNS: Weight 110 kg, temperature 36.1 °C, pulse 79, blood pressure 116/64, respirations 16, pulse ox 98% on room air  HEENT: Normocephalic, atraumatic.  PERRL.  EOMI.  Ears, nose and lips are within normal limits on external examination.  Oropharynx unremarkable. NECK: Without palpable cervical adenopathy. LYMPH: Without palpable supraclavicular or axillary adenopathy. LUNGS: Clear without adventitious sounds. HEART: Regular rate and rhythm.  Without murmur. ABDOMEN: Soft, nontender, nondistended.  Active bowel sounds x4. EXTREMITIES: Without clubbing or cyanosis.  No edema noted. NEUROLOGIC EXAMINATION: Cranial nerves grossly intact. MUSCULOSKELETAL: Without bony point tenderness.     ASSESSMENT: Patrice was diagnosed with prostate cancer in November 2018. He underwent treatment initially with surgery. Final pathology graded the tumor as a Bedias (3+4) 7 with tertiary pattern 5. Other findings included right bladder neck involvement, perineural invasion and a positive right bladder neck surgical margin.  All of the sampled lymph nodes were free of malignancy.  A sample was sent for decipher score evaluation and returned a value of 0.88, therefore high risk of progression. Initial post surgical PSA showed a decline to 0.07 but still detectable. Subsequent PSA showed further declined to 0.04. Unfortunately in August 2019 PSA lety to 0.15. He was initiated on Firmagon in September 2019 with a consult to radiation oncology. His PSA prior to starting radiation after having Firmagon injection was <0.02.  Since then he has completed radiation and

## 2020-11-10 NOTE — PROGRESS NOTES
Akosua Roth, APRN - CNP        [facility] PROFESSIONAL [address] [facility] [address]  Dept: [phone]  Dept Fax: [phone]: [address] [place] [address] Urology Office Note -     Patient:  Jackson Kelley  YOB: 1950    The patient is a 78 y.o. male who presents today for evaluation of the following problems:   Chief Complaint   Patient presents with    Prostate Cancer    6 Month Follow-Up     PSA prior, lupron and prolia        HISTORY OF PRESENT ILLNESS:     Prostate Cancer  Onset was  Years ago  Overall, the problem(s) are unchanged. Severity is described as mild. Associated Symptoms: No dysuria, no gross hematuria. Current Pain Severity: some back pain, seeing doctor for. Here in follow up  Hx of lymphocele in past    Secondary Diagnosis:    ED- not bothered    MACO- does not want surgical treatment for this, wears shield, leaks only when lifting. Summary of Previous Records:  Tom Ellis presents for follow-up of prostate cancer. On 3/20/19 he underwent Robot-Assisted Laparoscopic Radical Prostatectomy (RALRP) and bilateral pelvic lymph node dissection and bilateral nerve wrap per Dr. Yary Goode. Pathology showed Tip 3+4=7 prostate cancer with tertiary 5, perineural invasion and right bladder neck involvement, pT3a N0. Post op he was found to have an infected left lymphocele. A drain was placed on 6/18/19. This has since resolved. He reports that incontinence had improved until recently, when radiation treatments started. He is now wearing shield. he will start doing kegel exercises again. He reports that pelvic pain has completely improved. The LLE pain has also improved. He is feeling well. He was referred to Radiation Oncology due to adverse features on pathology. PSA now undetectable. Patient started on loading dose of Firmagon 9-11-19. Second dose 10-9-19.  Will need 2 years of if part of CABG)    CARDIOVASCULAR STRESS TEST  1 05 2011    Cannot exclude slight inferobasal lateral stress-induced ischemia in a relatively small-sized area. EF 68%. Mildly abnormal nuclear scan. Lexiscan test associated with nonspecific symptoms. EKG nondiagnostic with nonspecific ST-T wave changes.  CAROTID ENDARTERECTOMY  2 08 2011    Right carotid endarterectomy with patch angioplasty with convention patch angioplasty.  COLONOSCOPY      CORONARY ARTERY BYPASS GRAFT      DIAGNOSTIC CARDIAC CATH LAB PROCEDURE  3 24 2011    LV end-diastolic pressure was 12 mmHg with no change before and after contrast injection. There was no significant gradient across the aortic valve to signify aortic stenosis. LV function was within normal limits, EF 55%. 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 Nonobstructive diffuse disease in a small sized RCA. Nomral LV function.  HAND FUSION Right     HERNIA REPAIR      Inguinal hernia repair.  PROSTATECTOMY N/A 3/20/2019    PROSTATECTOMY LAPAROSCOPIC ROBOTIC performed by Jessie Fish MD at [address] ECHOCARDIOGRAM  12 21 2010    Size was normal. Systolic function was normal. EF was estimated in the range of 55-65%. There were no regional wall motion abnormalities. Wall thickness was normal. Doppler - LV diastolic function parameters were normal. Mild MR. The aortic valve was trileaflet. Leaflets exhibited mildly increased thickness, mild calcification, normal cuspal separation, and sclerosis.  Mild TR.    VASCULAR SURGERY      cabg harvests from chest     Family History   Problem Relation Age of Onset    Heart Disease Mother     Diabetes Mother     High Blood Pressure Mother     Heart Disease Father     High Blood Pressure Father     High Blood Pressure Sister      Outpatient Medications Marked as Taking for the 11/12/20 encounter (Office Visit) with DOREEN Haskins - CNP   Medication Sig Dispense Refill    loratadine (CLARITIN) 10 MG tablet Take 10 mg by mouth daily as needed      apixaban (ELIQUIS) 5 MG TABS tablet Take 1 tablet by mouth 2 times daily 180 tablet 3    nitroGLYCERIN (NITROSTAT) 0.4 MG SL tablet PLACE 1 TABLET UNDER TONGUE EVERY 5 MINS, UP TO 3 DOSES AS NEEDED FOR CHEST PAIN 25 tablet 1    famotidine (PEPCID) 20 MG tablet TAKE 1 TABLET BY MOUTH TWICE A DAY (Patient taking differently: TAKE 1 TABLET BY MOUTH DAILY) 180 tablet 2    calcium-vitamin D (OSCAL-500) 500-200 MG-UNIT per tablet Take 1 tablet by mouth daily Indications: 200 mg      clopidogrel (PLAVIX) 75 MG tablet Take 1 tablet by mouth daily 90 tablet 3    metoprolol succinate (TOPROL XL) 50 MG extended release tablet TAKE 1/2 TAB BY MOUTH EVERY DAY 45 tablet 2    atorvastatin (LIPITOR) 20 MG tablet TAKE 1 TABLET BY MOUTH EVERY DAY 90 tablet 4    Coenzyme Q10 (COQ10 PO) Take 500 mg by mouth daily      ferrous sulfate 325 (65 FE) MG tablet Take 1 tablet by mouth daily (with breakfast). 30 tablet 11    Krill Oil 500 MG CAPS Take  by mouth daily. Patient has no known allergies. Social History     Tobacco Use   Smoking Status Never Smoker   Smokeless Tobacco Former User    Types: Chew      (If patient a smoker, smoking cessation counseling offered)   Social History     Substance and Sexual Activity   Alcohol Use No    Alcohol/week: 0.0 standard drinks    Comment: rare       REVIEW OF SYSTEMS:  Constitutional: negative  Eyes: negative  Respiratory: negative  Cardiovascular: negative  Gastrointestinal: negative  Genitourinary: see HPI  Musculoskeletal: negative  Skin: negative   Neurological: negative  Hematological/Lymphatic: negative  Psychological: negative        Physical Exam:    This a 78 y.o. male  Vitals:    11/12/20 0824   Temp: 94 °F (34.4 °C)     Body mass index is 32.38 kg/m².   Constitutional: Patient in no acute distress;   Physical Exam  Constitutional:       Appearance: Normal appearance. HENT:      Head: Normocephalic. Mouth/Throat:      Mouth: Mucous membranes are moist.      Pharynx: Oropharynx is clear. Pulmonary:      Effort: Pulmonary effort is normal.   Abdominal:      General: Abdomen is flat. Skin:     General: Skin is warm and dry. Neurological:      Mental Status: He is alert. Assessment and Plan        1. Idiopathic osteoporosis    2. Prostate CA [facility])          Plan:      Improved incontinence, wearing shield, moderate bother does not want further surgery. Discussed sling with Dr. Ambika Castillo. Will hold off. Hx of lymphocele in past that was drained. psa undetectable  Here for lupron prolia  Follow up in 6 months for lupron and prolia, psa prior. Prescriptions Ordered:  No orders of the defined types were placed in this encounter.      Orders Placed:  Orders Placed This Encounter   Procedures    PSA Prostatic Specific Antigen     Standing Status:   Future     Standing Expiration Date:   11/12/2021    POCT Urinalysis No Micro (Auto)       DOREEN Mares - CNP

## 2020-11-12 ENCOUNTER — OFFICE VISIT (OUTPATIENT)
Dept: UROLOGY | Age: 70
End: 2020-11-12
Payer: MEDICARE

## 2020-11-12 VITALS — WEIGHT: 240.4 LBS | BODY MASS INDEX: 32.56 KG/M2 | HEIGHT: 72 IN | TEMPERATURE: 94 F

## 2020-11-12 PROCEDURE — G8417 CALC BMI ABV UP PARAM F/U: HCPCS | Performed by: NURSE PRACTITIONER

## 2020-11-12 PROCEDURE — 99214 OFFICE O/P EST MOD 30 MIN: CPT | Performed by: NURSE PRACTITIONER

## 2020-11-12 PROCEDURE — G8427 DOCREV CUR MEDS BY ELIG CLIN: HCPCS | Performed by: NURSE PRACTITIONER

## 2020-11-12 PROCEDURE — 96401 CHEMO ANTI-NEOPL SQ/IM: CPT | Performed by: NURSE PRACTITIONER

## 2020-11-12 PROCEDURE — 3017F COLORECTAL CA SCREEN DOC REV: CPT | Performed by: NURSE PRACTITIONER

## 2020-11-12 PROCEDURE — G8484 FLU IMMUNIZE NO ADMIN: HCPCS | Performed by: NURSE PRACTITIONER

## 2020-11-12 PROCEDURE — 96402 CHEMO HORMON ANTINEOPL SQ/IM: CPT | Performed by: NURSE PRACTITIONER

## 2020-11-12 PROCEDURE — 1036F TOBACCO NON-USER: CPT | Performed by: NURSE PRACTITIONER

## 2020-11-12 PROCEDURE — 1123F ACP DISCUSS/DSCN MKR DOCD: CPT | Performed by: NURSE PRACTITIONER

## 2020-11-12 PROCEDURE — 81003 URINALYSIS AUTO W/O SCOPE: CPT | Performed by: NURSE PRACTITIONER

## 2020-11-12 PROCEDURE — 4040F PNEUMOC VAC/ADMIN/RCVD: CPT | Performed by: NURSE PRACTITIONER

## 2020-11-12 RX ORDER — LORATADINE 10 MG/1
10 TABLET ORAL DAILY PRN
COMMUNITY
End: 2021-05-28 | Stop reason: ALTCHOICE

## 2020-11-12 NOTE — PROGRESS NOTES
Patient has given me verbal consent to perform Lupron Injection Yes      Following Dr. Jumana Layton CNP plan of care. LUPRON 45 MG GIVEN I.M right UOQ HIP  Lot Number: [id number]  Expiration Date: 03/20/2023  [address] #: [id number]    After Injection was given there were no reactions at injection site and patient was feeling well. Patient was notified that possible side effects from injections include: Redness, swelling and itching at the injection site. Possible side effects of androgen deprivation therapy, including hot flashes, flushing of the skin, increased weight, decreased sex drive, and difficulties with ED. Patient was instructed to call the office with any further questions or concerns. Date of last Calcium/Vit D level: 5/4/20  Is patient on Calcium/Vit D replacement? Yes  Date of last Bone Scan: 2/27/19  Testosterone Level of <3 done on 11/8/19  Psa level of <0.02 done on 10/28/20    Patient supplied their own medications No      Pt [address] therapy first initiated on 09/11/2019. Patient has given me verbal consent to perform Prolia Injection Yes      Following Dr. Jumana Layton CNP plan of care. PROLIA 60MG GIVEN right S.C. in patient's arm  Lot Number: [id number]  Expiration Date: 11/22  [address] #: [id number]    After Injection was given there were no reactions at injection site and patient was feeling well. Patient was notified that possible side effects from injections include: Redness, swelling and itching at the injection site. Possible side effects of androgen deprivation therapy, including hot flashes, flushing of the skin, increased weight, decreased sex drive, and difficulties with ED. Patient was instructed to inform their dental office that they are receiving Prolia and that major dental procedures should be avoided. Patient was advised to call our office with any further questions or concerns. Any active dental problems, infections, or pain?  No    Date of last dental appointment: 02/2020    Any planned dental procedures? No    Patient supplied their own medications No      Pt [address] therapy first initiated on 09/11/2019.

## 2020-12-21 RX ORDER — METOPROLOL SUCCINATE 50 MG/1
TABLET, EXTENDED RELEASE ORAL
Qty: 45 TABLET | Refills: 1 | Status: SHIPPED | OUTPATIENT
Start: 2020-12-21 | End: 2021-06-23

## 2020-12-30 RX ORDER — ATORVASTATIN CALCIUM 20 MG/1
TABLET, FILM COATED ORAL
Qty: 90 TABLET | Refills: 3 | Status: SHIPPED | OUTPATIENT
Start: 2020-12-30 | End: 2022-01-04

## 2020-12-30 RX ORDER — ATORVASTATIN CALCIUM 20 MG/1
TABLET, FILM COATED ORAL
Qty: 90 TABLET | Refills: 3 | OUTPATIENT
Start: 2020-12-30

## 2020-12-30 RX ORDER — HYDROCODONE BITARTRATE AND ACETAMINOPHEN 7.5; 325 MG/1; MG/1
1 TABLET ORAL EVERY 6 HOURS PRN
Qty: 28 TABLET | Refills: 0 | OUTPATIENT
Start: 2020-12-30 | End: 2021-01-06

## 2020-12-30 NOTE — TELEPHONE ENCOUNTER
Sofie Ivy called requesting a refill on the following medications:  Requested Prescriptions     Pending Prescriptions Disp Refills    atorvastatin (LIPITOR) 20 MG tablet 90 tablet 3     [facility]      Date of last visit: 10/25/20  Date of next visit (if applicable): Visit date not found

## 2020-12-30 NOTE — TELEPHONE ENCOUNTER
Salud Shall called requesting a refill on the following medications:  Requested Prescriptions     Pending Prescriptions Disp Refills    HYDROcodone-acetaminophen (NORCO) 7.5-325 MG per tablet 28 tablet 0     Sig: Take 1 tablet by mouth every 6 hours as needed for Pain for up to 7 days. Intended supply: 7 days. Take lowest dose possible to manage pain     Pharmacy verified: yes    Patient states they only use this prescription 4-6 per month. He is requesting a refill to help with the back pain. He is stretching, but occasionally needs this medication.        Date of last visit: 7/16/2020  Date of next visit (if applicable): Visit date not found

## 2020-12-31 ENCOUNTER — OFFICE VISIT (OUTPATIENT)
Dept: FAMILY MEDICINE CLINIC | Age: 70
End: 2020-12-31
Payer: MEDICARE

## 2020-12-31 VITALS
WEIGHT: 251 LBS | DIASTOLIC BLOOD PRESSURE: 74 MMHG | BODY MASS INDEX: 33.81 KG/M2 | TEMPERATURE: 97.7 F | RESPIRATION RATE: 20 BRPM | SYSTOLIC BLOOD PRESSURE: 110 MMHG | HEART RATE: 72 BPM

## 2020-12-31 DIAGNOSIS — M48.062 SPINAL STENOSIS OF LUMBAR REGION WITH NEUROGENIC CLAUDICATION: Primary | ICD-10-CM

## 2020-12-31 PROCEDURE — G8417 CALC BMI ABV UP PARAM F/U: HCPCS | Performed by: FAMILY MEDICINE

## 2020-12-31 PROCEDURE — 1036F TOBACCO NON-USER: CPT | Performed by: FAMILY MEDICINE

## 2020-12-31 PROCEDURE — 90732 PPSV23 VACC 2 YRS+ SUBQ/IM: CPT | Performed by: FAMILY MEDICINE

## 2020-12-31 PROCEDURE — 3017F COLORECTAL CA SCREEN DOC REV: CPT | Performed by: FAMILY MEDICINE

## 2020-12-31 PROCEDURE — 4040F PNEUMOC VAC/ADMIN/RCVD: CPT | Performed by: FAMILY MEDICINE

## 2020-12-31 PROCEDURE — 99213 OFFICE O/P EST LOW 20 MIN: CPT | Performed by: FAMILY MEDICINE

## 2020-12-31 PROCEDURE — G8427 DOCREV CUR MEDS BY ELIG CLIN: HCPCS | Performed by: FAMILY MEDICINE

## 2020-12-31 PROCEDURE — G0009 ADMIN PNEUMOCOCCAL VACCINE: HCPCS | Performed by: FAMILY MEDICINE

## 2020-12-31 PROCEDURE — 1123F ACP DISCUSS/DSCN MKR DOCD: CPT | Performed by: FAMILY MEDICINE

## 2020-12-31 PROCEDURE — G8482 FLU IMMUNIZE ORDER/ADMIN: HCPCS | Performed by: FAMILY MEDICINE

## 2020-12-31 RX ORDER — HYDROCODONE BITARTRATE AND ACETAMINOPHEN 7.5; 325 MG/1; MG/1
1 TABLET ORAL EVERY 6 HOURS PRN
Qty: 28 TABLET | Refills: 0 | Status: SHIPPED | OUTPATIENT
Start: 2020-12-31 | End: 2021-01-07

## 2020-12-31 RX ORDER — FAMOTIDINE 20 MG/1
20 TABLET, FILM COATED ORAL 2 TIMES DAILY
COMMUNITY
End: 2021-04-05

## 2020-12-31 NOTE — PROGRESS NOTES
Immunization(s) given during visit:    Immunizations Administered     Name Date Dose Route    Pneumococcal Polysaccharide ([id number]) 12/31/2020 0.5 mL Intramuscular    Site: Deltoid- Right    Lot: [id number]    NDC: 1334-5535-81          Most recent Vaccine Information Sheet  given to pt

## 2021-01-01 ASSESSMENT — ENCOUNTER SYMPTOMS
NAUSEA: 0
COUGH: 0
SINUS PRESSURE: 0
ABDOMINAL DISTENTION: 0
SORE THROAT: 0
ABDOMINAL PAIN: 0
DIARRHEA: 0
BACK PAIN: 1
CONSTIPATION: 0
RHINORRHEA: 0
EYE PAIN: 0
SHORTNESS OF BREATH: 0

## 2021-01-01 NOTE — PROGRESS NOTES
[address] [place] Pending sale to [facility] [address]  Dept: [phone]  Dept Fax: [phone]  Loc: [phone]  PROGRESS NOTE      VisitDate: 12/31/2020    Daniel Hammer is a 78 y.o. male who presents today for:     Chief Complaint   Patient presents with    Lower Back Pain     Spinal stenosis lumbar spine    Medication Refill    Immunizations     pneumovax         Subjective:  HPI  Comes in follow-up spinal stenosis. He has been through therapy pain management receiving injections has had some intermittent improvement. Due for refill on his pain medication which he takes infrequently. Last prescription was for 20 tablets in October    Review of Systems   Constitutional: Negative for appetite change and fever. HENT: Negative for congestion, ear pain, postnasal drip, rhinorrhea, sinus pressure and sore throat. Eyes: Negative for pain and visual disturbance. Respiratory: Negative for cough and shortness of breath. Cardiovascular: Negative for chest pain. Gastrointestinal: Negative for abdominal distention, abdominal pain, constipation, diarrhea and nausea. Genitourinary: Negative for dysuria, frequency and urgency. Musculoskeletal: Positive for arthralgias and back pain. Skin: Negative for rash. Neurological: Negative for dizziness.      Past Medical History:   Diagnosis Date    Arthritis     back    BPH (benign prostatic hyperplasia)     CAD (coronary artery disease)     CVA (cerebral vascular accident) ([facility] [address] 2010    Disease of blood and blood forming organ     anemia    FH: heart disease     GERD (gastroesophageal reflux disease)     Hyperlipidemia     Hypertension     Obesity     Prostate cancer ([facility] [address] 2019      Past Surgical History:   Procedure Laterality Date    CARDIAC VALVE REPLACEMENT  April 2011    [place], [place] (Pt unsure if part of CABG)    CARDIOVASCULAR STRESS TEST  1 05 2011 Cannot exclude slight inferobasal lateral stress-induced ischemia in a relatively small-sized area. EF 68%. Mildly abnormal nuclear scan. Lexiscan test associated with nonspecific symptoms. EKG nondiagnostic with nonspecific ST-T wave changes.  CAROTID ENDARTERECTOMY  2 08 2011    Right carotid endarterectomy with patch angioplasty with convention patch angioplasty.  COLONOSCOPY      CORONARY ARTERY BYPASS GRAFT      DIAGNOSTIC CARDIAC CATH LAB PROCEDURE  3 24 2011    LV end-diastolic pressure was 12 mmHg with no change before and after contrast injection. There was no significant gradient across the aortic valve to signify aortic stenosis. LV function was within normal limits, EF 55%. 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 Nonobstructive diffuse disease in a small sized RCA. Nomral LV function.  HAND FUSION Right     HERNIA REPAIR      Inguinal hernia repair.  PROSTATECTOMY N/A 3/20/2019    PROSTATECTOMY LAPAROSCOPIC ROBOTIC performed by Deo Chan MD at [address] ECHOCARDIOGRAM  12 21 2010    Size was normal. Systolic function was normal. EF was estimated in the range of 55-65%. There were no regional wall motion abnormalities. Wall thickness was normal. Doppler - LV diastolic function parameters were normal. Mild MR. The aortic valve was trileaflet. Leaflets exhibited mildly increased thickness, mild calcification, normal cuspal separation, and sclerosis. Mild TR.    VASCULAR SURGERY      cabg harvests from chest     Family History   Problem Relation Age of Onset    Heart Disease Mother     Diabetes Mother     High Blood Pressure Mother     Heart Disease Father     High Blood Pressure Father     High Blood Pressure Sister      Social History     Tobacco Use    Smoking status: Never Smoker    Smokeless tobacco: Former User     Types: Chew   Substance Use Topics    Alcohol use:  No Alcohol/week: 0.0 standard drinks     Comment: rare      Current Outpatient Medications   Medication Sig Dispense Refill    famotidine (PEPCID) 20 MG tablet Take 20 mg by mouth 2 times daily      HYDROcodone-acetaminophen (NORCO) 7.5-325 MG per tablet Take 1 tablet by mouth every 6 hours as needed for Pain for up to 7 days. Intended supply: 7 days. Take lowest dose possible to manage pain 28 tablet 0    atorvastatin (LIPITOR) 20 MG tablet TAKE 1 TABLET BY MOUTH EVERY DAY 90 tablet 3    metoprolol succinate (TOPROL XL) 50 MG extended release tablet TAKE 1/2 TAB BY MOUTH EVERY DAY 45 tablet 1    loratadine (CLARITIN) 10 MG tablet Take 10 mg by mouth daily as needed      apixaban (ELIQUIS) 5 MG TABS tablet Take 1 tablet by mouth 2 times daily 180 tablet 3    nitroGLYCERIN (NITROSTAT) 0.4 MG SL tablet PLACE 1 TABLET UNDER TONGUE EVERY 5 MINS, UP TO 3 DOSES AS NEEDED FOR CHEST PAIN 25 tablet 1    famotidine (PEPCID) 20 MG tablet TAKE 1 TABLET BY MOUTH TWICE A DAY (Patient taking differently: No sig reported) 180 tablet 2    calcium-vitamin D (OSCAL-500) 500-200 MG-UNIT per tablet Take 1 tablet by mouth daily Indications: 200 mg      clopidogrel (PLAVIX) 75 MG tablet Take 1 tablet by mouth daily 90 tablet 3    Coenzyme Q10 (COQ10 PO) Take 500 mg by mouth daily      ferrous sulfate 325 (65 FE) MG tablet Take 1 tablet by mouth daily (with breakfast). 30 tablet 11    Krill Oil 500 MG CAPS Take  by mouth daily. No current facility-administered medications for this visit.       No Known Allergies  Health Maintenance   Topic Date Due    Shingles Vaccine (1 of 2) 08/03/2002    Colon cancer screen colonoscopy  08/03/2002    A1C test (Diabetic or Prediabetic)  09/06/2019    Lipid screen  05/04/2021    Annual Wellness Visit (AWV)  06/23/2021    PSA counseling  10/28/2021    DTaP/Tdap/Td vaccine (2 - Td) 12/20/2029    Flu vaccine  Completed    Pneumococcal 65+ years Vaccine  Completed  Hepatitis C screen  Completed    Hepatitis A vaccine  Aged Out    Hepatitis B vaccine  Aged Out    Hib vaccine  Aged Out    Meningococcal (ACWY) vaccine  Aged Out         Objective:     Physical Exam  Constitutional:       General: He is not in acute distress. Appearance: He is well-developed. He is not diaphoretic. HENT:      Head: Normocephalic and atraumatic. Right Ear: External ear normal.      Left Ear: External ear normal.   Eyes:      Conjunctiva/sclera: Conjunctivae normal.   Neck:      Vascular: No JVD. Cardiovascular:      Rate and Rhythm: Normal rate and regular rhythm. Heart sounds: Normal heart sounds. Pulmonary:      Effort: Pulmonary effort is normal.      Breath sounds: Normal breath sounds. No wheezing or rales. Musculoskeletal:         General: Tenderness present. Skin:     General: Skin is warm and dry. Coloration: Skin is not pale. Neurological:      Mental Status: He is alert and oriented to person, place, and time. /74   Pulse 72   Temp 97.7 °F (36.5 °C) (Oral)   Resp 20   Wt 251 lb (113.9 kg)   BMI 33.81 kg/m²       Impression/Plan:  1. Spinal stenosis of lumbar region with neurogenic claudication      Requested Prescriptions     Signed Prescriptions Disp Refills    HYDROcodone-acetaminophen (NORCO) 7.5-325 MG per tablet 28 tablet 0     Sig: Take 1 tablet by mouth every 6 hours as needed for Pain for up to 7 days. Intended supply: 7 days. Take lowest dose possible to manage pain     Orders Placed This Encounter   Procedures    Pneumococcal polysaccharide vaccine 23-valent greater than or equal to 1yo subcutaneous/IM       Patient giveneducational materials - see patient instructions. Discussed use, benefit, and side effects of prescribed medications. All patient questions answered. Pt voiced understanding. Reviewed health maintenance. Patient agreedwith treatment plan. Follow up as directed. **This report has been created using voice recognition software. It may contain minor errorswhich are inherent in voice recognition technology. **       Electronically signed by Jin Farmer MD on 1/1/2021 at 11:15 AM

## 2021-04-05 RX ORDER — FAMOTIDINE 20 MG/1
TABLET, FILM COATED ORAL
Qty: 180 TABLET | Refills: 2 | Status: SHIPPED | OUTPATIENT
Start: 2021-04-05 | End: 2022-01-04

## 2021-04-08 ENCOUNTER — TELEPHONE (OUTPATIENT)
Dept: FAMILY MEDICINE CLINIC | Age: 71
End: 2021-04-08

## 2021-04-08 NOTE — TELEPHONE ENCOUNTER
Dylan Chappell called today with bothersome ear and a cough  Was offered an appointment for vv, refused appointment. Patient would like Rosa Palm MD to see him in office.         Patient was made aware of e-visits via Maintenance Assistant

## 2021-04-09 ENCOUNTER — OFFICE VISIT (OUTPATIENT)
Dept: FAMILY MEDICINE CLINIC | Age: 71
End: 2021-04-09
Payer: MEDICARE

## 2021-04-09 VITALS
OXYGEN SATURATION: 98 % | DIASTOLIC BLOOD PRESSURE: 76 MMHG | SYSTOLIC BLOOD PRESSURE: 134 MMHG | HEART RATE: 70 BPM | TEMPERATURE: 97.9 F | RESPIRATION RATE: 16 BRPM | WEIGHT: 259.3 LBS | BODY MASS INDEX: 34.92 KG/M2

## 2021-04-09 DIAGNOSIS — H72.92 OTITIS MEDIA, SEROUS, TM RUPTURE, LEFT: Primary | ICD-10-CM

## 2021-04-09 DIAGNOSIS — C61 PROSTATE CA (HCC): ICD-10-CM

## 2021-04-09 DIAGNOSIS — I48.19 PERSISTENT ATRIAL FIBRILLATION (HCC): ICD-10-CM

## 2021-04-09 DIAGNOSIS — H65.01 RIGHT ACUTE SEROUS OTITIS MEDIA, RECURRENCE NOT SPECIFIED: ICD-10-CM

## 2021-04-09 DIAGNOSIS — H65.92 OTITIS MEDIA, SEROUS, TM RUPTURE, LEFT: Primary | ICD-10-CM

## 2021-04-09 DIAGNOSIS — E66.01 MORBIDLY OBESE (HCC): ICD-10-CM

## 2021-04-09 DIAGNOSIS — I73.9 PVD (PERIPHERAL VASCULAR DISEASE) (HCC): ICD-10-CM

## 2021-04-09 DIAGNOSIS — I20.8 ANGINA OF EFFORT (HCC): ICD-10-CM

## 2021-04-09 PROCEDURE — 1123F ACP DISCUSS/DSCN MKR DOCD: CPT | Performed by: NURSE PRACTITIONER

## 2021-04-09 PROCEDURE — G8427 DOCREV CUR MEDS BY ELIG CLIN: HCPCS | Performed by: NURSE PRACTITIONER

## 2021-04-09 PROCEDURE — 1036F TOBACCO NON-USER: CPT | Performed by: NURSE PRACTITIONER

## 2021-04-09 PROCEDURE — 3017F COLORECTAL CA SCREEN DOC REV: CPT | Performed by: NURSE PRACTITIONER

## 2021-04-09 PROCEDURE — 99214 OFFICE O/P EST MOD 30 MIN: CPT | Performed by: NURSE PRACTITIONER

## 2021-04-09 PROCEDURE — G8417 CALC BMI ABV UP PARAM F/U: HCPCS | Performed by: NURSE PRACTITIONER

## 2021-04-09 PROCEDURE — 4040F PNEUMOC VAC/ADMIN/RCVD: CPT | Performed by: NURSE PRACTITIONER

## 2021-04-09 RX ORDER — PREDNISONE 10 MG/1
10 TABLET ORAL 2 TIMES DAILY
Qty: 10 TABLET | Refills: 0 | Status: SHIPPED | OUTPATIENT
Start: 2021-04-09 | End: 2021-04-14

## 2021-04-09 RX ORDER — CETIRIZINE HYDROCHLORIDE 10 MG/1
10 TABLET ORAL DAILY
Qty: 30 TABLET | Refills: 0 | Status: SHIPPED | OUTPATIENT
Start: 2021-04-09 | End: 2021-05-03

## 2021-04-09 RX ORDER — AZITHROMYCIN 250 MG/1
TABLET, FILM COATED ORAL
Qty: 6 TABLET | Refills: 0 | Status: SHIPPED | OUTPATIENT
Start: 2021-04-09 | End: 2021-04-19

## 2021-04-09 ASSESSMENT — PATIENT HEALTH QUESTIONNAIRE - PHQ9
SUM OF ALL RESPONSES TO PHQ QUESTIONS 1-9: 0
1. LITTLE INTEREST OR PLEASURE IN DOING THINGS: 0

## 2021-04-09 NOTE — PATIENT INSTRUCTIONS
harmful. · Keep your ears dry. ? Take baths until your doctor says you can take showers again. ? When you wash your hair, use cotton lightly coated with petroleum jelly as an earplug. Do not use plastic earplugs. ? Do not swim until your doctor says you can.  ? If you get water in your ears, turn your head to each side and pull the earlobe in different directions. This will help the water run out. If your ears are still wet, use a hair dryer set on the lowest heat. Hold the dryer several inches from your ear. · Do not put anything into your ear canal. For example, do not use a cotton swab to clean the inside of your ear. It can damage your ear. If you think you have something inside your ear, ask your doctor to check it. When should you call for help? Call your doctor now or seek immediate medical care if:    · You have signs of infection, such as:  ? Increased pain, swelling, warmth, or redness. ? Pus draining from the ear. ? A fever. Watch closely for changes in your health, and be sure to contact your doctor if:    · You have changes in hearing.     · You do not get better as expected. Where can you learn more? Go to [email or web address]://[email or web address]. org and sign in to your "[employer] account. Enter X194 in the [facility] box to learn more about \"Perforated Eardrum: Care Instructions. \"     If you do not have an account, please click on the \"Sign Up Now\" link. Current as of: December 2, 2020               Content Version: 12.8  © 2006-2021 Healthwise, [employer]. Care instructions adapted under license by [facility] [address] If you have questions about a medical condition or this instruction, always ask your healthcare professional. [place] [address] any warranty or liability for your use of this information.

## 2021-04-12 NOTE — PROGRESS NOTES
[address] [place] [address]  Dept: [phone]  Dept Fax: [phone]  Loc: [phone]  PROGRESS NOTE      VisitDate: 4/9/2021    Pushpa Caballero is a 78 y.o. male who presents today for:     Chief Complaint   Patient presents with   Lizette Mcpherson     yesterday felt fluid in his left ear, used a qtip and had bloody discharge, cough, denies fever         Subjective:  Patient presents for red appointment with complaint of bloody fluid drainage from the left ear since yesterday. Patient did report sharp ear pain yesterday but is now resolved. Denies any dizziness, tinnitus. Denies any fever, headache, chest pain, shortness of breath. Patient does complain of some decreased hearing. History of arthritis, BPH, coronary artery disease, CVA, anemia, GERD, hyperlipidemia, hypertension prostate cancer. Review of Systems   Constitutional: Negative for fever. HENT: Positive for ear discharge and ear pain. Musculoskeletal: Positive for arthralgias. All other systems reviewed and are negative. Past Medical History:   Diagnosis Date    Arthritis     back    BPH (benign prostatic hyperplasia)     CAD (coronary artery disease)     CVA (cerebral vascular accident) ([address] 2010    Disease of blood and blood forming organ     anemia    FH: heart disease     GERD (gastroesophageal reflux disease)     Hyperlipidemia     Hypertension     Obesity     Prostate cancer ([facility] [address] 2019      Past Surgical History:   Procedure Laterality Date    CARDIAC VALVE REPLACEMENT  April 2011    [address] [place], [place] (Pt unsure if part of CABG)    CARDIOVASCULAR STRESS TEST  1 05 2011    Cannot exclude slight inferobasal lateral stress-induced ischemia in a relatively small-sized area. EF 68%. Mildly abnormal nuclear scan. Lexiscan test associated with nonspecific symptoms. EKG nondiagnostic with nonspecific ST-T wave changes.      CAROTID tablet by mouth daily 30 tablet 0    neomycin-polymyxin-hydrocortisone (CORTISPORIN) 3.5-36938-8 otic solution Place 3 drops into the left ear 3 times daily for 5 days 1 each 0    azithromycin (ZITHROMAX Z-MITCH) 250 MG tablet 2 pills orally for 1 day, then 1 pill orally for 4 days 6 tablet 0    famotidine (PEPCID) 20 MG tablet TAKE 1 TABLET BY MOUTH TWICE A  tablet 2    atorvastatin (LIPITOR) 20 MG tablet TAKE 1 TABLET BY MOUTH EVERY DAY 90 tablet 3    metoprolol succinate (TOPROL XL) 50 MG extended release tablet TAKE 1/2 TAB BY MOUTH EVERY DAY 45 tablet 1    loratadine (CLARITIN) 10 MG tablet Take 10 mg by mouth daily as needed      apixaban (ELIQUIS) 5 MG TABS tablet Take 1 tablet by mouth 2 times daily 180 tablet 3    nitroGLYCERIN (NITROSTAT) 0.4 MG SL tablet PLACE 1 TABLET UNDER TONGUE EVERY 5 MINS, UP TO 3 DOSES AS NEEDED FOR CHEST PAIN 25 tablet 1    calcium-vitamin D (OSCAL-500) 500-200 MG-UNIT per tablet Take 1 tablet by mouth daily Indications: 200 mg      clopidogrel (PLAVIX) 75 MG tablet Take 1 tablet by mouth daily 90 tablet 3    Coenzyme Q10 (COQ10 PO) Take 500 mg by mouth daily      ferrous sulfate 325 (65 FE) MG tablet Take 1 tablet by mouth daily (with breakfast). 30 tablet 11    Krill Oil 500 MG CAPS Take  by mouth daily. No current facility-administered medications for this visit.       No Known Allergies  Health Maintenance   Topic Date Due    Shingles Vaccine (1 of 2) Never done    Colon cancer screen colonoscopy  Never done    A1C test (Diabetic or Prediabetic)  09/06/2019    Lipid screen  05/04/2021    Annual Wellness Visit (AWV)  06/23/2021    PSA counseling  10/28/2021    DTaP/Tdap/Td vaccine (2 - Td) 12/20/2029    Flu vaccine  Completed    Pneumococcal 65+ years Vaccine  Completed    COVID-19 Vaccine  Completed    Hepatitis C screen  Completed    Hepatitis A vaccine  Aged Out    Hepatitis B vaccine  Aged Out    Hib vaccine  Aged C/ Yvan Petrona 19 Meningococcal (ACWY) vaccine  Aged Out         Objective:     Physical Exam  Vitals signs and nursing note reviewed. Constitutional:       Appearance: Normal appearance. He is well-developed and normal weight. He is not diaphoretic. HENT:      Head: Normocephalic and atraumatic. Not macrocephalic and not microcephalic. Right Ear: Hearing, tympanic membrane, ear canal and external ear normal. No drainage or tenderness. No middle ear effusion. No hemotympanum. Tympanic membrane is not injected, scarred, perforated or bulging. Left Ear: Hearing, ear canal and external ear normal. Drainage present. No tenderness. No middle ear effusion. No hemotympanum. Tympanic membrane is perforated. Tympanic membrane is not injected, scarred or bulging. Ears:      Comments: Bloody drainage noted to left TM. No obvious perforation noted at this time. Small amount of drainage noted in canal.     Nose: Nose normal. No nasal deformity, septal deviation, mucosal edema or rhinorrhea. Right Sinus: No maxillary sinus tenderness or frontal sinus tenderness. Left Sinus: No maxillary sinus tenderness or frontal sinus tenderness. Mouth/Throat:      Mouth: No oral lesions. Dentition: Normal dentition. Does not have dentures. No dental caries or dental abscesses. Pharynx: Oropharynx is clear. No oropharyngeal exudate or posterior oropharyngeal erythema. Tonsils: No tonsillar abscesses. Eyes:      General: Lids are normal. No scleral icterus. Extraocular Movements:      Right eye: Normal extraocular motion. Left eye: Normal extraocular motion. Conjunctiva/sclera: Conjunctivae normal.      Right eye: Right conjunctiva is not injected. Left eye: Left conjunctiva is not injected. Pupils: Pupils are equal, round, and reactive to light. Neck:      Musculoskeletal: Normal range of motion and neck supple. Normal range of motion. No edema or erythema.       Thyroid: No thyroid mass or thyromegaly. Vascular: No carotid bruit or JVD. Trachea: Trachea normal.   Cardiovascular:      Rate and Rhythm: Normal rate and regular rhythm. Heart sounds: Normal heart sounds, S1 normal and S2 normal. No murmur. No friction rub. No gallop. Pulmonary:      Effort: Pulmonary effort is normal. No respiratory distress. Breath sounds: Normal breath sounds. No wheezing, rhonchi or rales. Chest:      Chest wall: No tenderness. Abdominal:      General: Bowel sounds are normal.      Palpations: Abdomen is soft. There is no hepatomegaly, splenomegaly or mass. Tenderness: There is no guarding or rebound. Hernia: No hernia is present. There is no hernia in the ventral area or left inguinal area. Musculoskeletal: Normal range of motion. General: No tenderness. Lymphadenopathy:      Head:      Right side of head: No submental, submandibular, tonsillar, preauricular, posterior auricular or occipital adenopathy. Left side of head: No submental, submandibular, tonsillar, preauricular, posterior auricular or occipital adenopathy. Cervical: No cervical adenopathy. Right cervical: No superficial, deep or posterior cervical adenopathy. Left cervical: No superficial, deep or posterior cervical adenopathy. Upper Body:      Right upper body: No supraclavicular or pectoral adenopathy. Left upper body: No supraclavicular or pectoral adenopathy. Skin:     General: Skin is warm and dry. Coloration: Skin is not pale. Findings: No bruising, ecchymosis, laceration, lesion or rash. Nails: There is no clubbing. Neurological:      General: No focal deficit present. Mental Status: He is alert and oriented to person, place, and time. Cranial Nerves: No cranial nerve deficit. Motor: No abnormal muscle tone.       Coordination: Coordination normal.      Deep Tendon Reflexes: Reflexes normal.   Psychiatric:         Mood and Affect: Mood

## 2021-04-29 ENCOUNTER — NURSE ONLY (OUTPATIENT)
Dept: LAB | Age: 71
End: 2021-04-29

## 2021-04-29 DIAGNOSIS — C61 PROSTATE CA (HCC): ICD-10-CM

## 2021-04-29 LAB — PROSTATE SPECIFIC ANTIGEN: < 0.02 NG/ML (ref 0–1)

## 2021-05-03 ENCOUNTER — HOSPITAL ENCOUNTER (OUTPATIENT)
Dept: RADIATION ONCOLOGY | Age: 71
Discharge: HOME | End: 2021-05-03
Attending: RADIOLOGY
Payer: MEDICARE

## 2021-05-03 ENCOUNTER — TELEPHONE (OUTPATIENT)
Dept: UROLOGY | Age: 71
End: 2021-05-03

## 2021-05-03 VITALS
HEART RATE: 65 BPM | WEIGHT: 263.8 LBS | RESPIRATION RATE: 16 BRPM | TEMPERATURE: 97.9 F | OXYGEN SATURATION: 95 % | BODY MASS INDEX: 35.53 KG/M2 | SYSTOLIC BLOOD PRESSURE: 139 MMHG | DIASTOLIC BLOOD PRESSURE: 86 MMHG

## 2021-05-03 DIAGNOSIS — C61 PROSTATE CANCER (HCC): Primary | ICD-10-CM

## 2021-05-03 PROCEDURE — 99214 OFFICE O/P EST MOD 30 MIN: CPT | Performed by: NURSE PRACTITIONER

## 2021-05-03 PROCEDURE — 99212 OFFICE O/P EST SF 10 MIN: CPT | Performed by: NURSE PRACTITIONER

## 2021-05-03 RX ORDER — CETIRIZINE HYDROCHLORIDE 10 MG/1
TABLET ORAL
Qty: 90 TABLET | Refills: 3 | Status: SHIPPED | OUTPATIENT
Start: 2021-05-03 | End: 2022-08-11

## 2021-05-03 NOTE — PROGRESS NOTES
[address] [facility], [address]  Phone: [phone]   Toll Free: [phone]   Fax: [phone]    RADIATION ONCOLOGY FOLLOW UP REPORT    PATIENT NAME:  Dilshad Tabor     : 1950  MEDICAL RECORD NO: [id number]    LOCATION: [facility] NO: [id number]      PROVIDER: DOREEN Duff CNP        DATE OF SERVICE: 5/3/2021    FOLLOW UP PHYSICIANS: Dr. Keith RIGGSCNP     DIAGNOSIS:  C61 -- Malignant neoplasm of the prostate;  Adenocarcinoma, Tip's 3+4=7, Grade Group 3; pT3a pN0 M0, Stage IIIB with detectable and increasing PSA after prostatectomy.  PSA at diagnosis 8.3; Pre-RT PSA 0.15      DATE OF DIAGNOSIS: 2018     END OF TREATMENT DATE: 2019     ECOG PERFORMANCE STATUS: 1     PAIN: Denies     CHAPERONE: Declined        HPI:  Patrice is a Dimitrios Nam  with a history of a modestly elevated PSA that remained stable for approximately 9 years.  In 2018 it had risen to 8.28 and he completed a sextant biopsy on 2018, showing a Wellington 6 adenocarcinoma involving 3 of the 12 biopsy samples. Dorenda Councilman initially managed with active surveillance, but MRI scan obtained 2019 was concerning for multiple finding suggesting a high probability of clinically significant cancer.  He underwent re-biopsy on 2019.  At that time he was found with mixed grade adenocarcinoma involving 3 of the 6 sectors sampled.  Maximum Wellington was (4+3) 7, group 3.  He went on to complete a robot-assisted laparoscopic prostatectomy on 2019.  Final pathology graded the tumor as a Wellington (3+4) 7 with tertiary pattern 5.   The pathologic stage is shown above.  Other findings included right bladder neck involvement, perineural invasion and a positive right bladder neck surgical margin.  All of the sampled lymph nodes were free of malignancy.  A sample was sent for decipher score evaluation and returned a value of 0.88, therefore high risk of progression.   the initial postoperative PSA in May 2019 was detectable at 0.07.   a subsequent PSA was read as 0.04, but  the PSA obtained on 8/27/2019 was 0.15, showing clear PSA progression with a doubling time of less than 6 months.   Mr. Garrett Andujar seen by Dr. Isidro Flores discuss the role of adjuvant radiation therapy, to which he was agreeable. Katherine Broussard was initiated on androgen deprivation therapy, receiving Firmagon in September 2019.  A repeat PSA obtained in October 2019 was undetectable at <0.02.     Patrice tolerated his radiation therapy well. Katherine Broussard had some initial dysuria that was very mild, and also some increased frequency of bowel movements and increased urinary frequency.  He was able to continue with all of his normal activities, and did not have any unexpected side effects related to radiation therapy.     INTERVAL HISTORY: Patrice returns to [address] [place] [address] for a post treatment follow up after undergoing adjuvant radiation therapy. Lizzy Robin continues on ADT he is scheduled to see Urology this month for his next Lupron injection. Lizzy Robin states he still struggles with urinary urgency, nocturia 2 times per night. He feels he does fairly well during the day in regards to frequency but does admit to occasional leaking (stress incontinence). He does wear a shield during the day. Erectile dysfunction remains an issues while on Lupron but does not wish to pursue treatment at this time.  He denies sob, chest pain, fever, chills, hot flashes, arthralgias, hematuria, dysuria or weak stream. States his bowel are formed and denies pain or blood with bowel movements.         AUA Symptom Score: 14 (Moderate)  Quality of Life: 3 (Mixed)  STEPHANY Inventory: 1 (Severe ED)     LAB RESULTS:   PSA:   4/29/21: <0.02  10/28/2020: <0.02  4/30/2020: <0.02  1/31/2020: <0.02 ---- First PSA s/p adjuvant radiation  8/27/19: 0.15  7/2/19: 0.04  5/3/19: 0.07 ---- First PSA s/p adenopathy. LUNGS: Clear without adventitious sounds. HEART: Regular rate and rhythm.  Without murmur. ABDOMEN: Soft, nontender, nondistended.  Active bowel sounds x4. EXTREMITIES: Without clubbing or cyanosis.  No edema noted. NEUROLOGIC EXAMINATION: Cranial nerves grossly intact. MUSCULOSKELETAL: Without bony point tenderness.     ASSESSMENT: Patrice was diagnosed with prostate cancer in November 2018. He underwent treatment initially with surgery. Final pathology graded the tumor as a Tip (3+4) 7 with tertiary pattern 5. Other findings included right bladder neck involvement, perineural invasion and a positive right bladder neck surgical margin.  All of the sampled lymph nodes were free of malignancy.  A sample was sent for decipher score evaluation and returned a value of 0.88, therefore high risk of progression.     Initial post surgical PSA showed a decline to 0.07 but still detectable. Subsequent PSA showed further declined to 0.04. Unfortunately in August 2019 PSA lety to 0.15. He was initiated on Firmagon in September 2019 with a consult to radiation oncology. His PSA prior to starting radiation after having Firmagon injection was <0.02. Since then he has completed radiation and continues on ADT with Lupron. Next injection scheduled for this month. He continues on Prolia for bone health. His most recent PSA remains <0.02 showing good control of the prostate cancer at this time. Erick Lloyd does not have concerning findings on todays physical exam.     PLAN:  1. PSA April <0.02  2. Next PSA scheduled for October-- order sent to him. 3. Continues on Prolia and Lupron -- next dose scheduled for next week. 4. ED: does not wish to pursue treatment at this time. 5. Continue follow up with other providers as scheduled. 6. Follow up here in 6 months. 9. Erick Lloyd is aware he can call for questions, concerns or changes in condition.       Electronically signed by DOREEN Vicente CNP on 5/3/21 at 4:00 PM EDT    ATTESTATION:  I have spent 45 minutes reviewing previous notes, test results and face to face with the patient discussing the diagnosis and importance of compliance with the treatment plan as well as documenting on the day of the visit.     CC: Dr. Jcarlos Rice; [place] LOGAN-CNP

## 2021-05-04 NOTE — TELEPHONE ENCOUNTER
Attempted to call the patient. After checking HIPPA, voicemail left stating the PSA was undetectable.

## 2021-05-10 ENCOUNTER — OFFICE VISIT (OUTPATIENT)
Dept: FAMILY MEDICINE CLINIC | Age: 71
End: 2021-05-10
Payer: MEDICARE

## 2021-05-10 VITALS
OXYGEN SATURATION: 96 % | TEMPERATURE: 98.5 F | WEIGHT: 255 LBS | HEART RATE: 93 BPM | DIASTOLIC BLOOD PRESSURE: 78 MMHG | BODY MASS INDEX: 34.35 KG/M2 | RESPIRATION RATE: 16 BRPM | SYSTOLIC BLOOD PRESSURE: 100 MMHG

## 2021-05-10 DIAGNOSIS — K52.9 ACUTE GASTROENTERITIS: Primary | ICD-10-CM

## 2021-05-10 DIAGNOSIS — R53.83 FATIGUE, UNSPECIFIED TYPE: ICD-10-CM

## 2021-05-10 DIAGNOSIS — I49.9 IRREGULAR HEART RATE: ICD-10-CM

## 2021-05-10 DIAGNOSIS — R31.0 GROSS HEMATURIA: ICD-10-CM

## 2021-05-10 LAB
BILIRUBIN, POC: NORMAL
BLOOD URINE, POC: NORMAL
CLARITY, POC: CLEAR
COLOR, POC: NORMAL
GLUCOSE URINE, POC: NORMAL
INFLUENZA VIRUS A RNA: NORMAL
INFLUENZA VIRUS B RNA: NORMAL
KETONES, POC: NORMAL
LEUKOCYTE EST, POC: NORMAL
NITRITE, POC: NORMAL
PH, POC: 5.5
PROTEIN, POC: 100
SPECIFIC GRAVITY, POC: >=1.03
UROBILINOGEN, POC: 0.2

## 2021-05-10 PROCEDURE — 1123F ACP DISCUSS/DSCN MKR DOCD: CPT | Performed by: NURSE PRACTITIONER

## 2021-05-10 PROCEDURE — 99214 OFFICE O/P EST MOD 30 MIN: CPT | Performed by: NURSE PRACTITIONER

## 2021-05-10 PROCEDURE — 87502 INFLUENZA DNA AMP PROBE: CPT | Performed by: NURSE PRACTITIONER

## 2021-05-10 PROCEDURE — 3017F COLORECTAL CA SCREEN DOC REV: CPT | Performed by: NURSE PRACTITIONER

## 2021-05-10 PROCEDURE — 81003 URINALYSIS AUTO W/O SCOPE: CPT | Performed by: NURSE PRACTITIONER

## 2021-05-10 PROCEDURE — G8417 CALC BMI ABV UP PARAM F/U: HCPCS | Performed by: NURSE PRACTITIONER

## 2021-05-10 PROCEDURE — G8427 DOCREV CUR MEDS BY ELIG CLIN: HCPCS | Performed by: NURSE PRACTITIONER

## 2021-05-10 PROCEDURE — 4040F PNEUMOC VAC/ADMIN/RCVD: CPT | Performed by: NURSE PRACTITIONER

## 2021-05-10 PROCEDURE — 93000 ELECTROCARDIOGRAM COMPLETE: CPT | Performed by: NURSE PRACTITIONER

## 2021-05-10 PROCEDURE — 1036F TOBACCO NON-USER: CPT | Performed by: NURSE PRACTITIONER

## 2021-05-10 ASSESSMENT — ENCOUNTER SYMPTOMS
WHEEZING: 0
VOMITING: 1
ALLERGIC/IMMUNOLOGIC NEGATIVE: 1
SHORTNESS OF BREATH: 0
ABDOMINAL PAIN: 0
COUGH: 1
BACK PAIN: 0
EYE DISCHARGE: 0
EYE REDNESS: 0
NAUSEA: 1
DIARRHEA: 1
CONSTIPATION: 0
TROUBLE SWALLOWING: 0
RHINORRHEA: 0
SORE THROAT: 1
EYE PAIN: 0

## 2021-05-10 NOTE — PROGRESS NOTES
[address] [address] [address]  [facility] [address]  Dept: [phone]  Dept Fax: [phone]  Loc: [phone]     Visit Date:  5/10/2021      Patient:  Janna Jackson  YOB: 1950    HPI:     Chief Complaint   Patient presents with    Hematuria     vomiting, diarrhea started 05/06 but cleared up since yesterday. Patient presents with complaint of vomiting and diarrhea for 4 days but cleared up yesterday. He is also had a sore throat, cough, headache, and extreme fatigue and sweating. Denies dizziness or vertigo, fever, chest pain or shortness of breath. Has had Covid and his Covid vaccine. States he was with a family member a day prior to the onset of the symptoms and the family member was sick with exactly the same symptoms. Hematuria  Irritative symptoms do not include frequency or urgency. Associated symptoms include chills, nausea and vomiting. Pertinent negatives include no abdominal pain, dysuria or fever.        Medications    Current Outpatient Medications:     cetirizine (ZYRTEC) 10 MG tablet, TAKE 1 TABLET BY MOUTH EVERY DAY, Disp: 90 tablet, Rfl: 3    famotidine (PEPCID) 20 MG tablet, TAKE 1 TABLET BY MOUTH TWICE A DAY, Disp: 180 tablet, Rfl: 2    atorvastatin (LIPITOR) 20 MG tablet, TAKE 1 TABLET BY MOUTH EVERY DAY, Disp: 90 tablet, Rfl: 3    metoprolol succinate (TOPROL XL) 50 MG extended release tablet, TAKE 1/2 TAB BY MOUTH EVERY DAY, Disp: 45 tablet, Rfl: 1    loratadine (CLARITIN) 10 MG tablet, Take 10 mg by mouth daily as needed, Disp: , Rfl:     apixaban (ELIQUIS) 5 MG TABS tablet, Take 1 tablet by mouth 2 times daily, Disp: 180 tablet, Rfl: 3    nitroGLYCERIN (NITROSTAT) 0.4 MG SL tablet, PLACE 1 TABLET UNDER TONGUE EVERY 5 MINS, UP TO 3 DOSES AS NEEDED FOR CHEST PAIN, Disp: 25 tablet, Rfl: 1    calcium-vitamin D (OSCAL-500) 500-200 MG-UNIT per tablet, Take 1 tablet by mouth daily Indications: 200 mg, Disp: , Rfl:     clopidogrel (PLAVIX) 75 MG tablet, Take 1 tablet by mouth daily, Disp: 90 tablet, Rfl: 3    Coenzyme Q10 (COQ10 PO), Take 500 mg by mouth daily, Disp: , Rfl:     ferrous sulfate 325 (65 FE) MG tablet, Take 1 tablet by mouth daily (with breakfast). , Disp: 30 tablet, Rfl: 11    Krill Oil 500 MG CAPS, Take  by mouth daily. , Disp: , Rfl:     The patient has No Known Allergies. Past Medical History  Aminata Rodgers  has a past medical history of Arthritis, BPH (benign prostatic hyperplasia), CAD (coronary artery disease), CVA (cerebral vascular accident) ([facility] Disease of blood and blood forming organ, FH: heart disease, GERD (gastroesophageal reflux disease), Hyperlipidemia, Hypertension, Obesity, and Prostate cancer ([facility] Subjective:      Review of Systems   Constitutional: Positive for activity change, appetite change, chills, diaphoresis and fatigue. Negative for fever. HENT: Positive for sore throat. Negative for congestion, ear pain, rhinorrhea and trouble swallowing. Eyes: Negative for pain, discharge and redness. Respiratory: Positive for cough. Negative for shortness of breath and wheezing. Cardiovascular: Negative. Gastrointestinal: Positive for diarrhea, nausea and vomiting. Negative for abdominal pain and constipation. Endocrine: Negative. Genitourinary: Positive for hematuria. Negative for dysuria, frequency and urgency. Musculoskeletal: Negative for arthralgias, back pain and myalgias. Skin: Negative for rash. Allergic/Immunologic: Negative. Neurological: Positive for headaches. Negative for dizziness, tremors and weakness. Hematological: Negative. Psychiatric/Behavioral: Negative for dysphoric mood and sleep disturbance. The patient is not nervous/anxious.         Objective:     /78   Pulse 93   Temp 98.5 °F (36.9 °C) (Oral)   Resp 16   Wt 255 lb (115.7 kg)   SpO2 96%   BMI 34.35 kg/m²     Physical Exam  Vitals signs reviewed. Constitutional:       General: He is not in acute distress. Appearance: Normal appearance. He is well-developed. He is ill-appearing. He is not toxic-appearing or diaphoretic. HENT:      Head: Normocephalic. No right periorbital erythema or left periorbital erythema. Jaw: No trismus. Right Ear: Hearing, tympanic membrane, ear canal and external ear normal.      Left Ear: Hearing, tympanic membrane, ear canal and external ear normal.      Nose: Nose normal. No mucosal edema or rhinorrhea. Mouth/Throat:      Mouth: Mucous membranes are moist.      Dentition: Normal dentition. Pharynx: Uvula midline. No posterior oropharyngeal erythema. Tonsils: No tonsillar exudate. 0 on the right. 0 on the left. Eyes:      General: Lids are normal.         Right eye: No discharge. Left eye: No discharge. Conjunctiva/sclera: Conjunctivae normal.      Right eye: Right conjunctiva is not injected. No chemosis. Left eye: No chemosis. Pupils: Pupils are equal, round, and reactive to light. Neck:      Musculoskeletal: Full passive range of motion without pain and normal range of motion. Vascular: No JVD. Trachea: Trachea normal.   Cardiovascular:      Rate and Rhythm: Normal rate and regular rhythm. Heart sounds: Normal heart sounds. No murmur. Pulmonary:      Effort: Pulmonary effort is normal. No respiratory distress. Breath sounds: Normal breath sounds. No stridor. No wheezing. Abdominal:      General: Bowel sounds are normal. There is no distension. Palpations: Abdomen is soft. Tenderness: There is no abdominal tenderness. Musculoskeletal: Normal range of motion. General: No tenderness or signs of injury. Lymphadenopathy:      Cervical: No cervical adenopathy. Skin:     General: Skin is cool and dry. Capillary Refill: Capillary refill takes less than 2 seconds. Coloration: Skin is pale.  Skin is not ashen, cyanotic, jaundiced, mottled or sallow. Findings: No rash. Neurological:      Mental Status: He is alert and oriented to person, place, and time. GCS: GCS eye subscore is 4. GCS verbal subscore is 5. GCS motor subscore is 6. Cranial Nerves: No cranial nerve deficit. Coordination: Coordination normal.      Gait: Gait normal.   Psychiatric:         Mood and Affect: Mood is not anxious or depressed. Speech: Speech normal.         Behavior: Behavior normal. Behavior is not withdrawn or hyperactive. Behavior is cooperative. Thought Content: Thought content normal.         Judgment: Judgment normal.         Assessment/Plan:      Vahid Odonnell was seen today for hematuria. Diagnoses and all orders for this visit:    Acute gastroenteritis    Irregular heart rate  -     EKG 12 Lead; Future  -     EKG 12 Lead    Fatigue, unspecified type  -     COVID-19; Future  -     POCT Influenza A/B DNA  -     COVID-19    Gross hematuria  -     Urinalysis; Future  -     Cancel: Urinalysis  -     POCT Urinalysis No Micro (Auto)    Other orders  -     COVID-19, Rapid    EKG shows atrial fib which is chronic for him. Rapid flu is negative. Urinalysis shows protein and ketones which is likely due from dehydration. I discussed with the patient has 3 options are to go home and monitor his symptoms, go to the ER for further evaluation, go to the lab and get some lab values that I would order and follow-up with. Patient likes to go home and rest, focus on fluid intake and he can always go to the emergency department if his significant fatigue is persistent or worsens. Return if symptoms worsen or fail to improve. Patient instructions given jessica.         Electronically signed by DOREEN Guzman CNP on 5/10/2021 at 11:59 AM

## 2021-05-11 NOTE — PROGRESS NOTES
Margaret Foote, APRN - CNP        [facility] PROFESSIONAL [address] [facility] [address] [address]  Dept: [phone]  Dept Fax: [phone]: [address] [address] Urology Office Note      Patient:  Smiley Rodriguez  YOB: 1950    The patient is a 78 y.o. male who presents today for evaluation of the following problems:   Chief Complaint   Patient presents with    Prostate Cancer     Lupron and prolia injection, psa prior. HISTORY OF PRESENT ILLNESS:     Prostate Cancer  Onset was  Years ago  Overall, the problem(s) are unchanged. Severity is described as mild. Associated Symptoms: No dysuria, vomiting and nausea over weekend, thought he saw 2 small clots in urine, boo colored. no gross hematuria. Current Pain Severity:0    Here in follow up  Hx of lymphocele in past    Secondary Diagnosis:    ED- not bothered    MACO- does not want surgical treatment for this. Leaks only when lifting. Changes pad 2 times a day. Stays dry at night. Summary of Previous Records:  Erick Lloyd presents for follow-up of prostate cancer. On 3/20/19 he underwent Robot-Assisted Laparoscopic Radical Prostatectomy (RALRP) and bilateral pelvic lymph node dissection and bilateral nerve wrap per Dr. Carissa Bejarano. Pathology showed [place] 3+4=7 prostate cancer with tertiary 5, perineural invasion and right bladder neck involvement, pT3a N0. Post op he was found to have an infected left lymphocele. A drain was placed on 6/18/19. This has since resolved. He reports that incontinence had improved until recently, when radiation treatments started. He is now wearing shield. he will start doing kegel exercises again. He reports that pelvic pain has completely improved. The LLE pain has also improved. He is feeling well. He was referred to Radiation Oncology due to adverse features on pathology. PSA now undetectable.  Patient started on loading dose of Firmagon 9-11-19. Second dose 10-9-19. Will need 2 years of ADT. Requested/reviewed records from Rosa Palm MD office and/or outside physician/EMR    (Patient's old records have been requested, reviewed and pertinent findings summarized in today's note.)    Procedures Today: N/A    Last several PSA's:  Lab Results   Component Value Date    PSA <0.02 04/29/2021    PSA <0.02 10/28/2020    PSA <0.02 04/30/2020       Last total testosterone:  Lab Results   Component Value Date    TESTOSTERONE < 3 (L) 11/08/2019       Urinalysis today:  Results for POC orders placed in visit on 05/12/21   POCT Urinalysis No Micro (Auto)   Result Value Ref Range    Glucose, Ur Negative NEGATIVE mg/dl    Bilirubin Urine Negative     Ketones, Urine Negative NEGATIVE    Specific Gravity, Urine >= 1.030 1.002 - 1.030    Blood, UA POC Negative NEGATIVE    pH, Urine 5.50 5.0 - 9.0    Protein, Urine Negative NEGATIVE mg/dl    Urobilinogen, Urine 0.20 0.0 - 1.0 eu/dl    Nitrite, Urine Negative NEGATIVE    Leukocyte Clumps, Urine Negative NEGATIVE    Color, Urine Yellow [id number]    Character, Urine Clear [id number]       Last BUN and creatinine:  Lab Results   Component Value Date    BUN 18 05/04/2020     Lab Results   Component Value Date    CREATININE 0.8 05/04/2020       Imaging Reviewed during this Office Visit:   DOREEN Martell CNP independently reviewed the images and verified the radiology reports from:    No results found.     PAST MEDICAL, FAMILY AND SOCIAL HISTORY:  Past Medical History:   Diagnosis Date    Arthritis     back    BPH (benign prostatic hyperplasia)     CAD (coronary artery disease)     CVA (cerebral vascular accident) ([address] 2010    Disease of blood and blood forming organ     anemia    FH: heart disease     GERD (gastroesophageal reflux disease)     Hyperlipidemia     Hypertension     Obesity     Prostate cancer ([address] 2019     Past Surgical History:   Procedure Laterality Date    CARDIAC VALVE REPLACEMENT  April 2011    [address] (Pt unsure if part of CABG)    CARDIOVASCULAR STRESS TEST  1 05 2011    Cannot exclude slight inferobasal lateral stress-induced ischemia in a relatively small-sized area. EF 68%. Mildly abnormal nuclear scan. Lexiscan test associated with nonspecific symptoms. EKG nondiagnostic with nonspecific ST-T wave changes.  CAROTID ENDARTERECTOMY  2 08 2011    Right carotid endarterectomy with patch angioplasty with convention patch angioplasty.  COLONOSCOPY      CORONARY ARTERY BYPASS GRAFT      DIAGNOSTIC CARDIAC CATH LAB PROCEDURE  3 24 2011    LV end-diastolic pressure was 12 mmHg with no change before and after contrast injection. There was no significant gradient across the aortic valve to signify aortic stenosis. LV function was within normal limits, EF 55%. 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 Nonobstructive diffuse disease in a small sized RCA. Nomral LV function.  HAND FUSION Right     HERNIA REPAIR      Inguinal hernia repair.  PROSTATECTOMY N/A 3/20/2019    PROSTATECTOMY LAPAROSCOPIC ROBOTIC performed by Shalini Huynh MD at [address] ECHOCARDIOGRAM  12 21 2010    Size was normal. Systolic function was normal. EF was estimated in the range of 55-65%. There were no regional wall motion abnormalities. Wall thickness was normal. Doppler - LV diastolic function parameters were normal. Mild MR. The aortic valve was trileaflet. Leaflets exhibited mildly increased thickness, mild calcification, normal cuspal separation, and sclerosis.  Mild TR.    VASCULAR SURGERY      cabg harvests from chest     Family History   Problem Relation Age of Onset    Heart Disease Mother     Diabetes Mother     High Blood Pressure Mother     Heart Disease Father     High Blood Pressure Father     High Blood Pressure Sister      Outpatient Medications Marked as Taking Patient in no acute distress;   Physical Exam  Constitutional:       Appearance: Normal appearance. HENT:      Head: Normocephalic. Mouth/Throat:      Mouth: Mucous membranes are moist.      Pharynx: Oropharynx is clear. Pulmonary:      Effort: Pulmonary effort is normal.   Abdominal:      General: Abdomen is flat. Skin:     General: Skin is warm and dry. Neurological:      Mental Status: He is alert. Assessment and Plan        1. Prostate CA [facility])          Plan:      Improved incontinence, wearing shield, moderate bother does not want further surgery. Discussed sling with Dr. Peter Graf. Will hold off. Hx of lymphocele in past that was drained. psa undetectable  Here for lupron prolia, this should complete 2 years of ADT, 5-2021. Follow up in 6 months with Dr. Peter Graf, PSA prior.       Prescriptions Ordered:  Orders Placed This Encounter   Medications    leuprolide (LUPRON) injection 45 mg    denosumab (PROLIA) SC injection 60 mg      Orders Placed:  Orders Placed This Encounter   Procedures    PSA Prostatic Specific Antigen     Standing Status:   Future     Standing Expiration Date:   5/12/2022    Calcium     Standing Status:   Future     Number of Occurrences:   1     Standing Expiration Date:   5/12/2022    Cytology, Non-Gyn     Order Specific Question:   PREVIOUS BIOPSY     Answer:   No     Order Specific Question:   PREOP DIAGNOSIS     Answer:   hematuria     Order Specific Question:   FROZEN SECTION - NO OR YES/SPECIMEN     Answer:   No    POCT Urinalysis No Micro (Auto)       Audrea Fraction, APRN - CNP

## 2021-05-12 ENCOUNTER — NURSE ONLY (OUTPATIENT)
Dept: LAB | Age: 71
End: 2021-05-12

## 2021-05-12 ENCOUNTER — TELEPHONE (OUTPATIENT)
Dept: FAMILY MEDICINE CLINIC | Age: 71
End: 2021-05-12

## 2021-05-12 ENCOUNTER — OFFICE VISIT (OUTPATIENT)
Dept: UROLOGY | Age: 71
End: 2021-05-12
Payer: MEDICARE

## 2021-05-12 VITALS
HEIGHT: 72 IN | WEIGHT: 258 LBS | DIASTOLIC BLOOD PRESSURE: 60 MMHG | SYSTOLIC BLOOD PRESSURE: 110 MMHG | BODY MASS INDEX: 34.95 KG/M2

## 2021-05-12 DIAGNOSIS — C61 PROSTATE CA (HCC): ICD-10-CM

## 2021-05-12 DIAGNOSIS — C61 PROSTATE CA (HCC): Primary | ICD-10-CM

## 2021-05-12 LAB
BILIRUBIN URINE: NEGATIVE
BLOOD URINE, POC: NEGATIVE
CALCIUM SERPL-MCNC: 10.6 MG/DL (ref 8.5–10.5)
CHARACTER, URINE: CLEAR
COLOR, URINE: YELLOW
GLUCOSE URINE: NEGATIVE MG/DL
KETONES, URINE: NEGATIVE
LEUKOCYTE CLUMPS, URINE: NEGATIVE
NITRITE, URINE: NEGATIVE
PH, URINE: 5.5 (ref 5–9)
PROTEIN, URINE: NEGATIVE MG/DL
SARS-COV-2: NOT DETECTED
SOURCE: NORMAL
SPECIFIC GRAVITY, URINE: >= 1.03 (ref 1–1.03)
UROBILINOGEN, URINE: 0.2 EU/DL (ref 0–1)

## 2021-05-12 PROCEDURE — G8427 DOCREV CUR MEDS BY ELIG CLIN: HCPCS | Performed by: NURSE PRACTITIONER

## 2021-05-12 PROCEDURE — 81003 URINALYSIS AUTO W/O SCOPE: CPT | Performed by: NURSE PRACTITIONER

## 2021-05-12 PROCEDURE — G8417 CALC BMI ABV UP PARAM F/U: HCPCS | Performed by: NURSE PRACTITIONER

## 2021-05-12 PROCEDURE — 1036F TOBACCO NON-USER: CPT | Performed by: NURSE PRACTITIONER

## 2021-05-12 PROCEDURE — 1123F ACP DISCUSS/DSCN MKR DOCD: CPT | Performed by: NURSE PRACTITIONER

## 2021-05-12 PROCEDURE — 96402 CHEMO HORMON ANTINEOPL SQ/IM: CPT | Performed by: NURSE PRACTITIONER

## 2021-05-12 PROCEDURE — 99214 OFFICE O/P EST MOD 30 MIN: CPT | Performed by: NURSE PRACTITIONER

## 2021-05-12 PROCEDURE — 4040F PNEUMOC VAC/ADMIN/RCVD: CPT | Performed by: NURSE PRACTITIONER

## 2021-05-12 PROCEDURE — 96372 THER/PROPH/DIAG INJ SC/IM: CPT | Performed by: NURSE PRACTITIONER

## 2021-05-12 PROCEDURE — 3017F COLORECTAL CA SCREEN DOC REV: CPT | Performed by: NURSE PRACTITIONER

## 2021-05-28 ENCOUNTER — TELEPHONE (OUTPATIENT)
Dept: CARDIOLOGY CLINIC | Age: 71
End: 2021-05-28

## 2021-05-28 NOTE — TELEPHONE ENCOUNTER
Pre op Risk Assessment    Procedure EGD and Colonoscopy   Physician Dr. Arvil Simmonds  Date of surgery/procedure TBD     Last OV 10/5/2020  Last Stress 4/28/2020  Last Echo 4/28/2020  Last Cath 5/4/2020  Last Stent 05/04/2020  Is patient on blood thinners Eliquis and Plavix  Hold Meds/how many days 3-5?       Fax: [phone]

## 2021-06-07 ENCOUNTER — OFFICE VISIT (OUTPATIENT)
Dept: CARDIOLOGY CLINIC | Age: 71
End: 2021-06-07
Payer: MEDICARE

## 2021-06-07 VITALS
BODY MASS INDEX: 35.76 KG/M2 | WEIGHT: 264 LBS | HEART RATE: 80 BPM | HEIGHT: 72 IN | SYSTOLIC BLOOD PRESSURE: 138 MMHG | DIASTOLIC BLOOD PRESSURE: 60 MMHG

## 2021-06-07 DIAGNOSIS — I25.810 CORONARY ARTERY DISEASE INVOLVING CORONARY BYPASS GRAFT OF NATIVE HEART WITHOUT ANGINA PECTORIS: Primary | ICD-10-CM

## 2021-06-07 DIAGNOSIS — I10 ESSENTIAL HYPERTENSION: ICD-10-CM

## 2021-06-07 DIAGNOSIS — E78.01 FAMILIAL HYPERCHOLESTEROLEMIA: ICD-10-CM

## 2021-06-07 PROCEDURE — 1123F ACP DISCUSS/DSCN MKR DOCD: CPT | Performed by: NUCLEAR MEDICINE

## 2021-06-07 PROCEDURE — G8417 CALC BMI ABV UP PARAM F/U: HCPCS | Performed by: NUCLEAR MEDICINE

## 2021-06-07 PROCEDURE — G8427 DOCREV CUR MEDS BY ELIG CLIN: HCPCS | Performed by: NUCLEAR MEDICINE

## 2021-06-07 PROCEDURE — 3017F COLORECTAL CA SCREEN DOC REV: CPT | Performed by: NUCLEAR MEDICINE

## 2021-06-07 PROCEDURE — 1036F TOBACCO NON-USER: CPT | Performed by: NUCLEAR MEDICINE

## 2021-06-07 PROCEDURE — 4040F PNEUMOC VAC/ADMIN/RCVD: CPT | Performed by: NUCLEAR MEDICINE

## 2021-06-07 PROCEDURE — 99213 OFFICE O/P EST LOW 20 MIN: CPT | Performed by: NUCLEAR MEDICINE

## 2021-06-07 RX ORDER — FERROUS SULFATE 325(65) MG
325 TABLET ORAL
COMMUNITY

## 2021-06-07 NOTE — PROGRESS NOTES
[address] [facility] [address] [facility] [facility] [address]  [address]  [facility] [address]  Dept: [phone]  Dept Fax: [phone]  Loc: [phone]    Visit Date: 6/7/2021    Debra Perez is a 78 y.o. male who presents todayfor:  Chief Complaint   Patient presents with    Check-Up    Coronary Artery Disease    Hypertension    Hyperlipidemia     Known CABg and stents  No chest pain  Some back issues  Limited by that  Seeing ortho   No changes clinically   BP is stable  No dizziness  No syncope  On statins for hyperlipidemia  Know a fib and stroke  No bleeding     HPI:  HPI  Past Medical History:   Diagnosis Date    Arthritis     back    BPH (benign prostatic hyperplasia)     CAD (coronary artery disease)     CVA (cerebral vascular accident) ([facility] 2010    Disease of blood and blood forming organ     anemia    FH: heart disease     GERD (gastroesophageal reflux disease)     Hyperlipidemia     Hypertension     Obesity     Prostate cancer ([facility] 2019      Past Surgical History:   Procedure Laterality Date    CARDIAC VALVE REPLACEMENT  April 2011    [address], [place] (Pt unsure if part of CABG)    CARDIOVASCULAR STRESS TEST  1 05 2011    Cannot exclude slight inferobasal lateral stress-induced ischemia in a relatively small-sized area. EF 68%. Mildly abnormal nuclear scan. Lexiscan test associated with nonspecific symptoms. EKG nondiagnostic with nonspecific ST-T wave changes.  CAROTID ENDARTERECTOMY  2 08 2011    Right carotid endarterectomy with patch angioplasty with convention patch angioplasty.  COLONOSCOPY      CORONARY ARTERY BYPASS GRAFT      DIAGNOSTIC CARDIAC CATH LAB PROCEDURE  3 24 2011    LV end-diastolic pressure was 12 mmHg with no change before and after contrast injection. There was no significant gradient across the aortic valve to signify aortic stenosis. LV function was within normal limits, EF 55%.  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 Nonobstructive diffuse disease in a small sized RCA. Nomral LV function.  HAND FUSION Right     HERNIA REPAIR      Inguinal hernia repair.  PROSTATECTOMY N/A 3/20/2019    PROSTATECTOMY LAPAROSCOPIC ROBOTIC performed by Fiordaliza Regan MD at [address] ECHOCARDIOGRAM  12 21 2010    Size was normal. Systolic function was normal. EF was estimated in the range of 55-65%. There were no regional wall motion abnormalities. Wall thickness was normal. Doppler - LV diastolic function parameters were normal. Mild MR. The aortic valve was trileaflet. Leaflets exhibited mildly increased thickness, mild calcification, normal cuspal separation, and sclerosis.  Mild TR.    VASCULAR SURGERY      cabg harvests from chest     Family History   Problem Relation Age of Onset    Heart Disease Mother     Diabetes Mother     High Blood Pressure Mother     Heart Disease Father     High Blood Pressure Father     High Blood Pressure Sister     Colon Cancer Neg Hx     Colon Polyps Neg Hx     Esophageal Cancer Neg Hx      Social History     Tobacco Use    Smoking status: Never Smoker    Smokeless tobacco: Former User     Types: Chew   Substance Use Topics    Alcohol use: No     Alcohol/week: 0.0 standard drinks     Comment: rare      Current Outpatient Medications   Medication Sig Dispense Refill    ferrous sulfate (IRON 325) 325 (65 Fe) MG tablet Take 325 mg by mouth daily (with breakfast)      cetirizine (ZYRTEC) 10 MG tablet TAKE 1 TABLET BY MOUTH EVERY DAY 90 tablet 3    famotidine (PEPCID) 20 MG tablet TAKE 1 TABLET BY MOUTH TWICE A  tablet 2    atorvastatin (LIPITOR) 20 MG tablet TAKE 1 TABLET BY MOUTH EVERY DAY 90 tablet 3    metoprolol succinate (TOPROL XL) 50 MG extended release tablet TAKE 1/2 TAB BY MOUTH EVERY DAY 45 tablet 1    apixaban (ELIQUIS) 5 MG TABS tablet Take 1 tablet by mouth 2 times daily 180 tablet 3    nitroGLYCERIN (NITROSTAT) 0.4 MG SL tablet PLACE 1 TABLET UNDER TONGUE EVERY 5 MINS, UP TO 3 DOSES AS NEEDED FOR CHEST PAIN 25 tablet 1    calcium-vitamin D (OSCAL-500) 500-200 MG-UNIT per tablet Take 1 tablet by mouth daily Indications: 200 mg      clopidogrel (PLAVIX) 75 MG tablet Take 1 tablet by mouth daily 90 tablet 3    Coenzyme Q10 (COQ10 PO) Take 500 mg by mouth daily      Krill Oil 500 MG CAPS Take  by mouth daily.  polyethylene glycol (GLYCOLAX) 17 GM/SCOOP powder Dispense 238 Gram Bottle. Use as Directed (Patient not taking: Reported on 6/7/2021) 238 g 0     No current facility-administered medications for this visit. No Known Allergies  Health Maintenance   Topic Date Due    Shingles Vaccine (1 of 2) Never done    Colon cancer screen colonoscopy  Never done    A1C test (Diabetic or Prediabetic)  09/06/2019    Lipid screen  05/04/2021    Annual Wellness Visit (AWV)  06/23/2021    PSA counseling  04/29/2022    DTaP/Tdap/Td vaccine (2 - Td or Tdap) 12/20/2029    Flu vaccine  Completed    Pneumococcal 65+ years Vaccine  Completed    COVID-19 Vaccine  Completed    Hepatitis C screen  Completed    Hepatitis A vaccine  Aged Out    Hepatitis B vaccine  Aged Out    Hib vaccine  Aged Out    Meningococcal (ACWY) vaccine  Aged Out       Subjective:  Review of Systems  General:   No fever, no chills, No fatigue or weight loss  Pulmonary:    No dyspnea, no wheezing  Cardiac:    Denies recent chest pain,   GI:     No nausea or vomiting, no abdominal pain  Neuro:    No dizziness or light headedness,   Musculoskeletal:  No recent active issues  Extremities:   No edema, no obvious claudication       Objective:  Physical Exam  /60   Pulse 80   Ht 6' (1.829 m)   Wt 264 lb (119.7 kg)   BMI 35.80 kg/m²   General:   Well developed, well nourished  Lungs:   Clear to auscultation  Heart:    Normal S1 S2, Slight murmur.  no rubs, no gallops  Abdomen:   Soft, non tender, no organomegalies, positive bowel sounds  Extremities:   No edema, no cyanosis, good peripheral pulses  Neurological:   Awake, alert, oriented. No obvious focal deficits  Musculoskelatal:  No obvious deformities    Assessment:      Diagnosis Orders   1. Coronary artery disease involving coronary bypass graft of native heart without angina pectoris     2. Essential hypertension     3. Familial hypercholesterolemia     as above  Cardiac fair for now    Plan:  No follow-ups on file. As above  Continue risk factor modification and medical management]. Thank you for allowing me to participate in the care of your patient. Please don't hesitate to contact me regarding any further issues related to the patient care    Orders Placed:  No orders of the defined types were placed in this encounter. Medications Prescribed:  No orders of the defined types were placed in this encounter. Discussed use, benefit, and side effects of prescribed medications. All patient questions answered. Pt voicedunderstanding. Instructed to continue current medications, diet and exercise. Continue risk factor modification and medical management. Patient agreed with treatment plan. Follow up as directed.     Electronically signedby Yue Hsu MD on 6/7/2021 at 7:55 AM

## 2021-06-23 RX ORDER — METOPROLOL SUCCINATE 50 MG/1
TABLET, EXTENDED RELEASE ORAL
Qty: 45 TABLET | Refills: 3 | Status: SHIPPED | OUTPATIENT
Start: 2021-06-23 | End: 2022-10-19 | Stop reason: SDUPTHER

## 2021-06-26 ENCOUNTER — HOSPITAL ENCOUNTER (EMERGENCY)
Age: 71
Discharge: HOME | End: 2021-06-26
Payer: MEDICARE

## 2021-06-26 VITALS
TEMPERATURE: 97 F | DIASTOLIC BLOOD PRESSURE: 74 MMHG | HEART RATE: 78 BPM | HEIGHT: 72 IN | SYSTOLIC BLOOD PRESSURE: 131 MMHG | RESPIRATION RATE: 16 BRPM | BODY MASS INDEX: 34.95 KG/M2 | OXYGEN SATURATION: 96 % | WEIGHT: 258 LBS

## 2021-06-26 DIAGNOSIS — L25.5 CONTACT DERMATITIS DUE TO PLANT: Primary | ICD-10-CM

## 2021-06-26 PROCEDURE — 99213 OFFICE O/P EST LOW 20 MIN: CPT

## 2021-06-26 PROCEDURE — 99212 OFFICE O/P EST SF 10 MIN: CPT | Performed by: NURSE PRACTITIONER

## 2021-06-26 PROCEDURE — 96372 THER/PROPH/DIAG INJ SC/IM: CPT

## 2021-06-26 PROCEDURE — 6360000002 HC RX W HCPCS: Performed by: NURSE PRACTITIONER

## 2021-06-26 RX ORDER — PREDNISONE 20 MG/1
TABLET ORAL
Qty: 42 TABLET | Refills: 0 | Status: SHIPPED | OUTPATIENT
Start: 2021-06-26 | End: 2021-08-02 | Stop reason: ALTCHOICE

## 2021-06-26 RX ORDER — METHYLPREDNISOLONE ACETATE 80 MG/ML
80 INJECTION, SUSPENSION INTRA-ARTICULAR; INTRALESIONAL; INTRAMUSCULAR; SOFT TISSUE ONCE
Status: COMPLETED | OUTPATIENT
Start: 2021-06-26 | End: 2021-06-26

## 2021-06-26 RX ADMIN — METHYLPREDNISOLONE ACETATE 80 MG: 80 INJECTION, SUSPENSION INTRA-ARTICULAR; INTRALESIONAL; INTRAMUSCULAR; SOFT TISSUE at 11:50

## 2021-06-26 ASSESSMENT — ENCOUNTER SYMPTOMS
EYE REDNESS: 0
SHORTNESS OF BREATH: 0
EYE ITCHING: 0
COUGH: 0

## 2021-06-26 NOTE — ED NOTES
Pt. Released in stable condition, ambulated per self to private car. Instructed pt to follow-up with family doctor as needed for recheck or go directly to the emergency department for any concerns/worsening conditions. Pt. Verbalized understanding of instructions. No questions at this time. RX in hand.       Elise Britton RN  06/26/21 0818

## 2021-06-26 NOTE — ED PROVIDER NOTES
[id number]       Chief Complaint   Patient presents with    Rash       Nurses Notes reviewed and I agree except as noted in the HPI. HISTORY OF PRESENT ILLNESS   Javi Durbin is a 78 y.o. male who presents for evaluation. The history is provided by the patient. Rash  Location:  Full body  Quality: itchiness    Context: plant contact    Ineffective treatments:  None tried  Associated symptoms: no fever, no headaches, no shortness of breath and not wheezing    He is requesting sterid shot as this help him last time. REVIEW OF SYSTEMS     Review of Systems   Constitutional: Negative for chills and fever. HENT: Negative for sneezing and trouble swallowing. Eyes: Negative for redness and itching. Respiratory: Negative for cough, shortness of breath and wheezing. Cardiovascular: Negative for chest pain. Skin: Positive for rash. Allergic/Immunologic: Negative for environmental allergies and food allergies. Neurological: Negative for headaches. PAST MEDICAL HISTORY         Diagnosis Date    Arthritis     back    BPH (benign prostatic hyperplasia)     CAD (coronary artery disease)     [facility]A [facility]cere[facility] vascular accident) [facility] 2010    Disease of blood and blood forming organ     anemia    FH: heart disease     GERD (gastroesophageal reflux disease)     Hyperlipidemia     Hypertension     Obesity     Prostate cancer ([facility] 2019       SURGICAL HISTORY     Patient  has a past surgical history that includes Coronary artery bypass graft; Carotid endarterectomy (2 08 2011); hernia repair; cardiovascular stress test (1 05 2011); transthoracic echocardiogram (12 21 2010); Diagnostic Cardiac Cath Lab Procedure (3 24 2011); Prostatectomy (N/A, 3/20/2019); Colonoscopy; vascular surgery; Cardiac valve replacement (April 2011); Hand Fusion (Right); and Percutaneous Transluminal Coronary Angio.     CURRENT MEDICATIONS %  Physical Exam  Vitals and nursing note reviewed. Constitutional:       General: He is not in acute distress. Appearance: Normal appearance. He is well-developed and well-groomed. HENT:      Head: Normocephalic and atraumatic. Right Ear: External ear normal.      Left Ear: External ear normal.      Mouth/Throat:      Lips: Pink. Mouth: Mucous membranes are moist.   Eyes:      Conjunctiva/sclera:      Right eye: Right conjunctiva is not injected. Left eye: Left conjunctiva is not injected. Pupils: Pupils are equal.   Cardiovascular:      Rate and Rhythm: Normal rate. Heart sounds: Normal heart sounds. Pulmonary:      Effort: Pulmonary effort is normal. No respiratory distress. Breath sounds: Normal breath sounds and air entry. Musculoskeletal:      Cervical back: Normal range of motion. Skin:     General: Skin is warm and dry. Findings: Rash present. Rash is vesicular (scattered on body from face down). Neurological:      Mental Status: He is alert and oriented to person, place, and time. Psychiatric:         Mood and Affect: Mood normal.         Speech: Speech normal.         Behavior: Behavior normal.         DIAGNOSTIC RESULTS   Labs:  Abnormal Labs Reviewed - No data to display     IMAGING:  No orders to display     URGENT CARE COURSE:     Vitals:    06/26/21 1139   BP: 131/74   Pulse: 78   Resp: 16   Temp: 97 °F (36.1 °C)   TempSrc: Infrared   SpO2: 96%   Weight: 258 lb (117 kg)   Height: 6' (1.829 m)       Medications   methylPREDNISolone acetate (DEPO-MEDROL) injection 80 mg (80 mg Intramuscular Given 6/26/21 1150)     PROCEDURES:  FINALIMPRESSION      1. Contact dermatitis due to plant        DISPOSITION/PLAN   DISPOSITION      Discharge   Physical assessment findings, diagnostic testing(s) if applicable, and vital signs reviewed with patient/patient representative. Questions answered.    If applicable, patient/patient representative will be contacted upon receipt of final culture and sensitivity or other testing results when available. Any additions or changes to medications or changes the plan of care will be made at that time. Medications as directed, including OTC medications for supportive care. Education provided on medications. Differential diagnosis(s) discussed with patient/patient representative. Home care/self care instructions reviewed with patient/patient representative. Patient is to follow-up with family care provider in 2-3 days if no improvement. Patient is to go to the emergency department if symptoms worsen. Patient/patient representative is aware of care plan, questions answered, verbalizes understanding and is in agreement. Teach back method used for patient/patient representative teaching(s) and printed instructions attached to after visit summary. Problem List Items Addressed This Visit     None      Visit Diagnoses     Contact dermatitis due to plant    -  Primary    Relevant Medications    methylPREDNISolone acetate (DEPO-MEDROL) injection 80 mg (Completed)    predniSONE (DELTASONE) 20 MG tablet          PATIENT REFERRED TO:  Abbi Montero MD  [address] [place] [id number]  [phone]    Schedule an appointment as soon as possible for a visit in 3 days  For further evaluation. , If symptoms change/worsen, go to the [address] [place] [address] Urgent Care  [address] [facility]  [phone]    as needed, If symptoms change/worsen, go to the [address] [facility], [address], APRN - CNP  06/28/21 0373

## 2021-06-28 ENCOUNTER — TELEPHONE (OUTPATIENT)
Dept: CARDIOLOGY CLINIC | Age: 71
End: 2021-06-28

## 2021-06-28 ASSESSMENT — ENCOUNTER SYMPTOMS
WHEEZING: 0
TROUBLE SWALLOWING: 0

## 2021-06-28 NOTE — TELEPHONE ENCOUNTER
Pre op Risk Assessment    Procedure Laminectomy/Decompression  Physician Dr. Miriam Alonzo  Date of surgery/procedure TBD    Last OV 6-7-21  Last Stress 4-28-20  Last Echo 4-28-20  Last Cath 5-4-20  Last Stent 5-4-20  Is patient on blood thinners Eliquis/Plavix  Hold Meds/how many days ?     Fax: [phone]

## 2021-07-09 ENCOUNTER — TELEPHONE (OUTPATIENT)
Dept: FAMILY MEDICINE CLINIC | Age: 71
End: 2021-07-09

## 2021-07-09 DIAGNOSIS — Z01.818 PRE-OP EXAMINATION: Primary | ICD-10-CM

## 2021-07-09 NOTE — TELEPHONE ENCOUNTER
Fax received from [address] [place] [address],[address] stating pt is scheduled for a laminectomy/decompression with Dr Tam Ferro 8/9/21. He needs pre-op testing ordered by us. CBC, Chem7, (PT, PTT if applicable), and CXR. Last ekg done 5/10/21. Cardiac clearance per Dr Esthela Damian. Orders pending if ok. Pt will need notified and scheduled.  Form in nurse tray

## 2021-07-30 ENCOUNTER — HOSPITAL ENCOUNTER (OUTPATIENT)
Dept: GENERAL RADIOLOGY | Age: 71
Discharge: HOME | End: 2021-07-30
Payer: MEDICARE

## 2021-07-30 ENCOUNTER — HOSPITAL ENCOUNTER (OUTPATIENT)
Age: 71
Discharge: HOME | End: 2021-07-30
Payer: MEDICARE

## 2021-07-30 DIAGNOSIS — Z01.818 PRE-OP EXAMINATION: ICD-10-CM

## 2021-07-30 LAB
ANION GAP SERPL CALCULATED.3IONS-SCNC: 11 MEQ/L (ref 8–16)
BASOPHILS # BLD: 0.9 %
BASOPHILS ABSOLUTE: 0.1 THOU/MM3 (ref 0–0.1)
BUN BLDV-MCNC: 10 MG/DL (ref 7–22)
CALCIUM SERPL-MCNC: 9.5 MG/DL (ref 8.5–10.5)
CHLORIDE BLD-SCNC: 101 MEQ/L (ref 98–111)
CO2: 27 MEQ/L (ref 23–33)
CREAT SERPL-MCNC: 0.8 MG/DL (ref 0.4–1.2)
EOSINOPHIL # BLD: 3 %
EOSINOPHILS ABSOLUTE: 0.3 THOU/MM3 (ref 0–0.4)
ERYTHROCYTE [DISTWIDTH] IN BLOOD BY AUTOMATED COUNT: 11.4 % (ref 11.5–14.5)
ERYTHROCYTE [DISTWIDTH] IN BLOOD BY AUTOMATED COUNT: 41.2 FL (ref 35–45)
GFR SERPL CREATININE-BSD FRML MDRD: > 90 ML/MIN/1.73M2
GLUCOSE BLD-MCNC: 115 MG/DL (ref 70–108)
HCT VFR BLD CALC: 44.7 % (ref 42–52)
HEMOGLOBIN: 14.8 GM/DL (ref 14–18)
IMMATURE GRANS (ABS): 0.17 THOU/MM3 (ref 0–0.07)
IMMATURE GRANULOCYTES: 1.8 %
LYMPHOCYTES # BLD: 10.6 %
LYMPHOCYTES ABSOLUTE: 1 THOU/MM3 (ref 1–4.8)
MCH RBC QN AUTO: 32.9 PG (ref 26–33)
MCHC RBC AUTO-ENTMCNC: 33.1 GM/DL (ref 32.2–35.5)
MCV RBC AUTO: 99.3 FL (ref 80–94)
MONOCYTES # BLD: 8.2 %
MONOCYTES ABSOLUTE: 0.8 THOU/MM3 (ref 0.4–1.3)
NUCLEATED RED BLOOD CELLS: 0 /100 WBC
PLATELET # BLD: 218 THOU/MM3 (ref 130–400)
PMV BLD AUTO: 8.7 FL (ref 9.4–12.4)
POTASSIUM SERPL-SCNC: 4.5 MEQ/L (ref 3.5–5.2)
RBC # BLD: 4.5 MILL/MM3 (ref 4.7–6.1)
SEG NEUTROPHILS: 75.5 %
SEGMENTED NEUTROPHILS ABSOLUTE COUNT: 7.3 THOU/MM3 (ref 1.8–7.7)
SODIUM BLD-SCNC: 139 MEQ/L (ref 135–145)
WBC # BLD: 9.7 THOU/MM3 (ref 4.8–10.8)

## 2021-07-30 PROCEDURE — 36415 COLL VENOUS BLD VENIPUNCTURE: CPT

## 2021-07-30 PROCEDURE — 71046 X-RAY EXAM CHEST 2 VIEWS: CPT

## 2021-07-30 PROCEDURE — 80048 BASIC METABOLIC PNL TOTAL CA: CPT

## 2021-07-30 PROCEDURE — 85025 COMPLETE CBC W/AUTO DIFF WBC: CPT

## 2021-08-02 ENCOUNTER — OFFICE VISIT (OUTPATIENT)
Dept: FAMILY MEDICINE CLINIC | Age: 71
End: 2021-08-02
Payer: MEDICARE

## 2021-08-02 VITALS
DIASTOLIC BLOOD PRESSURE: 66 MMHG | WEIGHT: 260 LBS | OXYGEN SATURATION: 99 % | HEART RATE: 65 BPM | HEIGHT: 72 IN | BODY MASS INDEX: 35.21 KG/M2 | TEMPERATURE: 98.2 F | SYSTOLIC BLOOD PRESSURE: 120 MMHG

## 2021-08-02 DIAGNOSIS — I25.810 CORONARY ARTERY DISEASE INVOLVING CORONARY BYPASS GRAFT OF NATIVE HEART WITHOUT ANGINA PECTORIS: ICD-10-CM

## 2021-08-02 DIAGNOSIS — I48.91 ATRIAL FIBRILLATION, UNSPECIFIED TYPE (HCC): ICD-10-CM

## 2021-08-02 DIAGNOSIS — M48.062 SPINAL STENOSIS OF LUMBAR REGION WITH NEUROGENIC CLAUDICATION: Primary | ICD-10-CM

## 2021-08-02 DIAGNOSIS — Z87.19 HISTORY OF ILEUS: ICD-10-CM

## 2021-08-02 DIAGNOSIS — I73.9 PVD (PERIPHERAL VASCULAR DISEASE) (HCC): ICD-10-CM

## 2021-08-02 DIAGNOSIS — Z85.46 HISTORY OF PROSTATE CANCER: ICD-10-CM

## 2021-08-02 PROCEDURE — 99214 OFFICE O/P EST MOD 30 MIN: CPT | Performed by: FAMILY MEDICINE

## 2021-08-02 PROCEDURE — 1036F TOBACCO NON-USER: CPT | Performed by: FAMILY MEDICINE

## 2021-08-02 PROCEDURE — 1123F ACP DISCUSS/DSCN MKR DOCD: CPT | Performed by: FAMILY MEDICINE

## 2021-08-02 PROCEDURE — G8417 CALC BMI ABV UP PARAM F/U: HCPCS | Performed by: FAMILY MEDICINE

## 2021-08-02 PROCEDURE — 3017F COLORECTAL CA SCREEN DOC REV: CPT | Performed by: FAMILY MEDICINE

## 2021-08-02 PROCEDURE — 4040F PNEUMOC VAC/ADMIN/RCVD: CPT | Performed by: FAMILY MEDICINE

## 2021-08-02 PROCEDURE — G8427 DOCREV CUR MEDS BY ELIG CLIN: HCPCS | Performed by: FAMILY MEDICINE

## 2021-08-02 SDOH — ECONOMIC STABILITY: FOOD INSECURITY: WITHIN THE PAST 12 MONTHS, YOU WORRIED THAT YOUR FOOD WOULD RUN OUT BEFORE YOU GOT MONEY TO BUY MORE.: NEVER TRUE

## 2021-08-02 SDOH — ECONOMIC STABILITY: FOOD INSECURITY: WITHIN THE PAST 12 MONTHS, THE FOOD YOU BOUGHT JUST DIDN'T LAST AND YOU DIDN'T HAVE MONEY TO GET MORE.: NEVER TRUE

## 2021-08-02 ASSESSMENT — SOCIAL DETERMINANTS OF HEALTH (SDOH): HOW HARD IS IT FOR YOU TO PAY FOR THE VERY BASICS LIKE FOOD, HOUSING, MEDICAL CARE, AND HEATING?: NOT HARD AT ALL

## 2021-08-03 ASSESSMENT — ENCOUNTER SYMPTOMS
COUGH: 0
SORE THROAT: 0
SHORTNESS OF BREATH: 0
WHEEZING: 0
BACK PAIN: 1
CONSTIPATION: 0
VOMITING: 0
NAUSEA: 0
RHINORRHEA: 0
DIARRHEA: 0
ABDOMINAL PAIN: 0

## 2021-08-03 NOTE — PROGRESS NOTES
[address] [place] [place] [facility] [address]  Dept: [phone]  Dept Fax: [phone]  Loc: [phone]  PROGRESS NOTE      VisitDate: 8/2/2021    Magdalena Pena is a 73 y.o. male who presents today for:     Chief Complaint   Patient presents with    Pre-op Exam     8/9 Sybert  Lumbar decomp         Subjective:  HPI  Follow-up spinal stenosis, needs preop evaluation. He has failed conservative management. No history of reactions to anesthesia. No history of bleeding or clotting disorders. Is anticoagulated for atrial fibrillation. He is already had clearance from his cardiologist.  He does have a history of postop ileus. Review of Systems   Constitutional: Negative for chills, fatigue and fever. HENT: Negative for congestion, rhinorrhea and sore throat. Respiratory: Negative for cough, shortness of breath and wheezing. Cardiovascular: Negative for chest pain, palpitations and leg swelling. Gastrointestinal: Negative for abdominal pain, constipation, diarrhea, nausea and vomiting. Genitourinary: Negative for dysuria, frequency and urgency. Musculoskeletal: Positive for back pain and gait problem. Negative for arthralgias and joint swelling. Skin: Negative for rash. Neurological: Negative for dizziness, light-headedness, numbness and headaches.      Past Medical History:   Diagnosis Date    Arthritis     back    BPH (benign prostatic hyperplasia)     CAD (coronary artery disease)     CVA (cerebral vascular accident) ([facility] [address] [facility] 2010    Disease of blood and blood forming organ     anemia    FH: heart disease     GERD (gastroesophageal reflux disease)     Hyperlipidemia     Hypertension     Obesity     Prostate cancer ([facility] [address] [facility] 2019      Past Surgical History:   Procedure Laterality Date    CARDIAC VALVE REPLACEMENT  April 2011    [place], [place] (Pt unsure if part of CABG)    CARDIOVASCULAR STRESS TEST  1 05 2011 Cannot exclude slight inferobasal lateral stress-induced ischemia in a relatively small-sized area. EF 68%. Mildly abnormal nuclear scan. Lexiscan test associated with nonspecific symptoms. EKG nondiagnostic with nonspecific ST-T wave changes.  CAROTID ENDARTERECTOMY  2 08 2011    Right carotid endarterectomy with patch angioplasty with convention patch angioplasty.  COLONOSCOPY      CORONARY ARTERY BYPASS GRAFT      DIAGNOSTIC CARDIAC CATH LAB PROCEDURE  3 24 2011    LV end-diastolic pressure was 12 mmHg with no change before and after contrast injection. There was no significant gradient across the aortic valve to signify aortic stenosis. LV function was within normal limits, EF 55%. 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 Nonobstructive diffuse disease in a small sized RCA. Nomral LV function.  EGD      HAND FUSION Right     HERNIA REPAIR      Inguinal hernia repair.  PROSTATECTOMY N/A 3/20/2019    PROSTATECTOMY LAPAROSCOPIC ROBOTIC performed by Asiya Josue MD at [address] ECHOCARDIOGRAM  12 21 2010    Size was normal. Systolic function was normal. EF was estimated in the range of 55-65%. There were no regional wall motion abnormalities. Wall thickness was normal. Doppler - LV diastolic function parameters were normal. Mild MR. The aortic valve was trileaflet. Leaflets exhibited mildly increased thickness, mild calcification, normal cuspal separation, and sclerosis.  Mild TR.    VASCULAR SURGERY      cabg harvests from chest     Family History   Problem Relation Age of Onset    Heart Disease Mother     Diabetes Mother     High Blood Pressure Mother     Heart Disease Father     High Blood Pressure Father     High Blood Pressure Sister     Colon Cancer Neg Hx     Colon Polyps Neg Hx     Esophageal Cancer Neg Hx      Social History     Tobacco Use    Smoking status: Never Smoker    Smokeless tobacco: Former User     Types: Chew   Substance Use Topics    Alcohol use: No     Alcohol/week: 0.0 standard drinks     Comment: rare      Current Outpatient Medications   Medication Sig Dispense Refill    metoprolol succinate (TOPROL XL) 50 MG extended release tablet TAKE 1/2 TABLET BY MOUTH EVERY DAY 45 tablet 3    ferrous sulfate (IRON 325) 325 (65 Fe) MG tablet Take 325 mg by mouth daily (with breakfast)      cetirizine (ZYRTEC) 10 MG tablet TAKE 1 TABLET BY MOUTH EVERY DAY 90 tablet 3    famotidine (PEPCID) 20 MG tablet TAKE 1 TABLET BY MOUTH TWICE A  tablet 2    atorvastatin (LIPITOR) 20 MG tablet TAKE 1 TABLET BY MOUTH EVERY DAY 90 tablet 3    apixaban (ELIQUIS) 5 MG TABS tablet Take 1 tablet by mouth 2 times daily 180 tablet 3    nitroGLYCERIN (NITROSTAT) 0.4 MG SL tablet PLACE 1 TABLET UNDER TONGUE EVERY 5 MINS, UP TO 3 DOSES AS NEEDED FOR CHEST PAIN 25 tablet 1    calcium-vitamin D (OSCAL-500) 500-200 MG-UNIT per tablet Take 1 tablet by mouth daily Indications: 200 mg      clopidogrel (PLAVIX) 75 MG tablet Take 1 tablet by mouth daily 90 tablet 3    Coenzyme Q10 (COQ10 PO) Take 500 mg by mouth daily      Krill Oil 500 MG CAPS Take  by mouth daily. No current facility-administered medications for this visit.      No Known Allergies  Health Maintenance   Topic Date Due    Shingles Vaccine (1 of 2) Never done    Annual Wellness Visit (AWV)  Never done    A1C test (Diabetic or Prediabetic)  09/06/2019    Lipid screen  05/04/2021    Flu vaccine (1) 09/01/2021    PSA counseling  04/29/2022    DTaP/Tdap/Td vaccine (2 - Td or Tdap) 12/20/2029    Colon cancer screen colonoscopy  06/11/2031    Pneumococcal 65+ years Vaccine  Completed    COVID-19 Vaccine  Completed    Hepatitis C screen  Completed    Hepatitis A vaccine  Aged Out    Hepatitis B vaccine  Aged Out    Hib vaccine  Aged Out    Meningococcal (ACWY) vaccine  Aged Out         Objective:     Physical Exam  Constitutional:       General: He is not in acute distress. Appearance: He is well-developed. He is not diaphoretic. HENT:      Head: Normocephalic and atraumatic. Right Ear: External ear normal.      Left Ear: External ear normal.   Eyes:      Conjunctiva/sclera: Conjunctivae normal.   Neck:      Vascular: No JVD. Cardiovascular:      Rate and Rhythm: Normal rate and regular rhythm. Heart sounds: Normal heart sounds. Pulmonary:      Effort: Pulmonary effort is normal.      Breath sounds: Normal breath sounds. No wheezing or rales. Musculoskeletal:         General: No tenderness. Skin:     General: Skin is warm and dry. Coloration: Skin is not pale. Neurological:      Mental Status: He is alert and oriented to person, place, and time. /66   Pulse 65   Temp 98.2 °F (36.8 °C) (Oral)   Ht 6' (1.829 m)   Wt 260 lb (117.9 kg)   SpO2 99%   BMI 35.26 kg/m²   Lab Results   Component Value Date    WBC 9.7 07/30/2021    HGB 14.8 07/30/2021    HCT 44.7 07/30/2021    MCV 99.3 (H) 07/30/2021     07/30/2021     Lab Results   Component Value Date     07/30/2021    K 4.5 07/30/2021    K 5.2 05/04/2020     07/30/2021    CO2 27 07/30/2021    BUN 10 07/30/2021    CREATININE 0.8 07/30/2021    GLUCOSE 115 07/30/2021    CALCIUM 9.5 07/30/2021      Lab Results   Component Value Date    INR 1.09 05/04/2020    INR 1.05 03/20/2019         Impression/Plan:  1. Spinal stenosis of lumbar region with neurogenic claudication    2. Atrial fibrillation, unspecified type ([address]    3. PVD (peripheral vascular disease) ([address]    4. Coronary artery disease involving coronary bypass graft of native heart without angina pectoris    5. History of ileus    6. History of prostate cancer      Requested Prescriptions      No prescriptions requested or ordered in this encounter     No orders of the defined types were placed in this encounter. No contraindication to planned surgery. Patient is cleared for the OR    Patient giveneducational materials - see patient instructions. Discussed use, benefit, and side effects of prescribed medications. All patient questions answered. Pt voiced understanding. Reviewed health maintenance. Patient agreedwith treatment plan. Follow up as directed. **This report has been created using voice recognition software. It may contain minor errorswhich are inherent in voice recognition technology. **       Electronically signed by Whitney Ty MD on 8/3/2021 at 7:58 AM

## 2021-08-18 ENCOUNTER — TELEPHONE (OUTPATIENT)
Dept: CARDIOLOGY CLINIC | Age: 71
End: 2021-08-18

## 2021-08-18 NOTE — TELEPHONE ENCOUNTER
Hima for pt to call office   [facility] paperwork in bakis box     Does he want to pick this up or does he need it faxed? Fax number?

## 2021-11-02 ENCOUNTER — TELEPHONE (OUTPATIENT)
Dept: UROLOGY | Age: 71
End: 2021-11-02

## 2021-11-02 ENCOUNTER — HOSPITAL ENCOUNTER (OUTPATIENT)
Age: 71
Discharge: HOME | End: 2021-11-02
Payer: MEDICARE

## 2021-11-02 DIAGNOSIS — C61 PROSTATE CA (HCC): ICD-10-CM

## 2021-11-02 LAB — PROSTATE SPECIFIC ANTIGEN: < 0.02 NG/ML (ref 0–1)

## 2021-11-02 PROCEDURE — 84153 ASSAY OF PSA TOTAL: CPT

## 2021-11-02 PROCEDURE — 36415 COLL VENOUS BLD VENIPUNCTURE: CPT

## 2021-11-02 NOTE — TELEPHONE ENCOUNTER
----- Message from DOREEN Farrell CNP sent at 11/2/2021  2:24 PM EDT -----  Undetectable PSA  Please call him.  Thank you

## 2021-11-03 ENCOUNTER — HOSPITAL ENCOUNTER (OUTPATIENT)
Dept: RADIATION ONCOLOGY | Age: 71
Discharge: HOME | End: 2021-11-03
Attending: RADIOLOGY
Payer: MEDICARE

## 2021-11-03 VITALS
BODY MASS INDEX: 35.89 KG/M2 | WEIGHT: 264.6 LBS | RESPIRATION RATE: 18 BRPM | TEMPERATURE: 96.4 F | DIASTOLIC BLOOD PRESSURE: 83 MMHG | HEART RATE: 72 BPM | OXYGEN SATURATION: 95 % | SYSTOLIC BLOOD PRESSURE: 140 MMHG

## 2021-11-03 DIAGNOSIS — C61 PROSTATE CANCER (HCC): Primary | ICD-10-CM

## 2021-11-03 PROCEDURE — 99212 OFFICE O/P EST SF 10 MIN: CPT | Performed by: NURSE PRACTITIONER

## 2021-11-03 PROCEDURE — 99215 OFFICE O/P EST HI 40 MIN: CPT | Performed by: NURSE PRACTITIONER

## 2021-11-03 NOTE — PROGRESS NOTES
[address] [facility], [address]  Phone: [phone]   Toll Free: [phone]   Fax: [phone]    RADIATION ONCOLOGY FOLLOW UP REPORT    PATIENT NAME:  Phoebe Roberts     : 1950  MEDICAL RECORD NO: [id number]    LOCATION: [facility] NO: [id number]      PROVIDER: DOREEN Brown CNP        DATE OF SERVICE: 11/3/2021    FOLLOW UP PHYSICIANS: Dr. Miriam LOGAN-CNP     DIAGNOSIS:  C61 -- Malignant neoplasm of the prostate;  Adenocarcinoma, Tip's 3+4=7, Grade Group 3; pT3a pN0 M0, Stage IIIB with detectable and increasing PSA after prostatectomy.  PSA at diagnosis 8.3; Pre-RT PSA 0.15      DATE OF DIAGNOSIS: 2018     END OF TREATMENT DATE: 2019     ECOG PERFORMANCE STATUS: 0-1     PAIN: Denies     CHAPERONE: Declined        HPI:  Patrice is a Dimitrios Nam  with a history of a modestly elevated PSA that remained stable for approximately 9 years.  In 2018 it had risen to 8.28 and he completed a sextant biopsy on 2018, showing a [place] 6 adenocarcinoma involving 3 of the 12 biopsy samples. Janet Thomason initially managed with active surveillance, but MRI scan obtained 2019 was concerning for multiple finding suggesting a high probability of clinically significant cancer.  He underwent re-biopsy on 2019.  At that time he was found with mixed grade adenocarcinoma involving 3 of the 6 sectors sampled.  Maximum [place] was (4+3) 7, group 3.  He went on to complete a robot-assisted laparoscopic prostatectomy on 2019.  Final pathology graded the tumor as a [place] (3+4) 7 with tertiary pattern 5.   The pathologic stage is shown above.  Other findings included right bladder neck involvement, perineural invasion and a positive right bladder neck surgical margin.  All of the sampled lymph nodes were free of malignancy.  A sample was sent for STYLIGHT score evaluation and returned a value of 0.88, therefore high risk of progression.   the initial postoperative PSA in May 2019 was detectable at 0.07.   a subsequent PSA was read as 0.04, but  the PSA obtained on 8/27/2019 was 0.15, showing clear PSA progression with a doubling time of less than 6 months.   Mr. Marco John seen by Dr. Sathish Villa discuss the role of adjuvant radiation therapy, to which he was agreeable. Jamie Raya was initiated on androgen deprivation therapy, receiving Firmagon in September 2019.  A repeat PSA obtained in October 2019 was undetectable at <0.02.     Patrice tolerated his radiation therapy well. Jamie Raya had some initial dysuria that was very mild, and also some increased frequency of bowel movements and increased urinary frequency.  He was able to continue with all of his normal activities, and did not have any unexpected side effects related to radiation therapy.     INTERVAL HISTORY: Patrice returns to [address] [place] [address] for a post treatment follow up after undergoing adjuvant radiation therapy. Hillary Fox states he recently approximately 2 months ago underwent back surgery. She states he felt good for a few days after but then developed the same pain back. He has seen his physicians at [facility] and was referred to PT which he continues on. He states he continues to experience urinary frequency and leaking, does admit to occasional urgency. Denies pain or blood with urination or bowel movements. He denies sob, chest pain, fever, chills, hot flashes, arthralgias, abdominal pain, loss of appetite or unintentional weight loss.      AUA Symptom Score: 13 (Moderate)  Quality of Life: 4 (mostly dissatisfied)  STEPHANY Inventory: 1 (Severe ED)    LAB RESULTS:   PSA:   11/2/21: <0.02  4/29/21: <0.02  10/28/2020: <0.02  4/30/2020: <0.02  1/31/2020: <0.02 ---- First PSA s/p adjuvant radiation  10/23/19: <0.02  10/8/19: <0.02  8/27/19: 0.15  7/2/19: 0.04  5/3/19: 0.07 ---- First PSA s/p prostatectomy (3/20/19)  9/6/18: 8.28  4/1/15: 5.68  1/24/14: 7.02  3/25/13: 5.51  6/2/2009: 6.2    CBC:   Lab Results   Component Value Date    WBC 9.7 07/30/2021    RBC 4.50 07/30/2021    HGB 14.8 07/30/2021    HCT 44.7 07/30/2021    MCV 99.3 07/30/2021    MCH 32.9 07/30/2021    MCHC 33.1 07/30/2021     07/30/2021    MPV 8.7 07/30/2021       RADIOLOGY RESULTS:   7/30/21 CXR:   Impression   1. Normal heart size. Minimal fibrotic stranding lingula. Evidence for old, healed granulomatous disease. 2. No acute findings. No infiltrates or effusions are seen. Mild pleural thickening blunting left lateral costophrenic angle. MEDICATIONS:   Current Outpatient Medications   Medication Sig Dispense Refill    metoprolol succinate (TOPROL XL) 50 MG extended release tablet TAKE 1/2 TABLET BY MOUTH EVERY DAY 45 tablet 3    ferrous sulfate (IRON 325) 325 (65 Fe) MG tablet Take 325 mg by mouth daily (with breakfast)      cetirizine (ZYRTEC) 10 MG tablet TAKE 1 TABLET BY MOUTH EVERY DAY 90 tablet 3    famotidine (PEPCID) 20 MG tablet TAKE 1 TABLET BY MOUTH TWICE A  tablet 2    atorvastatin (LIPITOR) 20 MG tablet TAKE 1 TABLET BY MOUTH EVERY DAY 90 tablet 3    apixaban (ELIQUIS) 5 MG TABS tablet Take 1 tablet by mouth 2 times daily 180 tablet 3    nitroGLYCERIN (NITROSTAT) 0.4 MG SL tablet PLACE 1 TABLET UNDER TONGUE EVERY 5 MINS, UP TO 3 DOSES AS NEEDED FOR CHEST PAIN 25 tablet 1    calcium-vitamin D (OSCAL-500) 500-200 MG-UNIT per tablet Take 1 tablet by mouth daily Indications: 200 mg      clopidogrel (PLAVIX) 75 MG tablet Take 1 tablet by mouth daily 90 tablet 3    Coenzyme Q10 (COQ10 PO) Take 500 mg by mouth daily      Krill Oil 500 MG CAPS Take  by mouth daily. No current facility-administered medications for this encounter. ROS: As noted in the HPI and Interval history, otherwise negative.        EXAMINATION:   GENERAL: Patrice is a pleasant well-developed adult male. Roseanna Kirby is alert and oriented x3 in no acute distress. VITAL SIGNS: Weight 120 kg, temperature 35.8 °C, pulse 72, blood pressure 140/83, respirations 18, pulse ox 95% on room air  HEENT: Normocephalic, atraumatic.  PERRL.  EOMI.  Ears, nose and lips are within normal limits on external examination.  Oropharynx unremarkable. NECK: Without palpable cervical adenopathy. LYMPH: Without palpable supraclavicular or axillary adenopathy. LUNGS: Clear without adventitious sounds. HEART: Regular rate and rhythm.  Without murmur. ABDOMEN: Soft, nontender, nondistended.  Active bowel sounds x4. EXTREMITIES: Without clubbing or cyanosis.  No edema noted. NEUROLOGIC EXAMINATION: Cranial nerves grossly intact. MUSCULOSKELETAL: Without bony point tenderness.     ASSESSMENT: Patrice was diagnosed with prostate cancer in November 2018. He underwent treatment initially with surgery. Final pathology graded the tumor as a Tip (3+4) 7 with tertiary pattern 5. Other findings included right bladder neck involvement, perineural invasion and a positive right bladder neck surgical margin.  All of the sampled lymph nodes were free of malignancy.  A sample was sent for decipher score evaluation and returned a value of 0.88, therefore high risk of progression.     Initial post surgical PSA showed a decline to 0.07 but still detectable. Subsequent PSA showed further declined to 0.04. Unfortunately in August 2019 PSA lety to 0.15. He was initiated on Firmagon in September 2019 with a consult to radiation oncology. Since starting on ADT and undergoing radiation therapy Patrice PSA has remained <0.02. Per Urology note he received his final Lupron injection in May 2021. Most recent PSA has remained <0.02 on 11/2/21. Will plan to repeat PSA in 6 months with a Testosterone level as well. Discussed PSA results with Lovey Blizzard today he voice no questions or concerns. S/p back surgery in [address] September 2021. Continues on PT s/p surgery.  Back pain continues to be a factor. Following with [facility] for this. PLAN:  1. PSA in 6 months  2. Testosterone level in 6 months. 3. Final Lupron dose given in May 2021 per Urology note. 4. Continues on Prolia  5. S/p back surgery 9/2021. Still with pain following with PT currently. 6. Continue follow-up with other providers as scheduled. 7. Follow-up here in 6 months  8. Zaida Miller is aware he can call for questions, concerns or changes in condition    Electronically signed by DOREEN Brown CNP on 11/3/21 at 12:07 PM EDT    ATTESTATION:  I have spent 45 minutes reviewing previous notes, test results and face to face with the patient discussing the diagnosis and importance of compliance with the treatment plan as well as documenting on the day of the visit.     CC: Dr. Keyshawn Naranjo; Mack HASSAN

## 2021-11-23 ENCOUNTER — OFFICE VISIT (OUTPATIENT)
Dept: UROLOGY | Age: 71
End: 2021-11-23
Payer: MEDICARE

## 2021-11-23 VITALS
WEIGHT: 266 LBS | HEART RATE: 65 BPM | SYSTOLIC BLOOD PRESSURE: 113 MMHG | HEIGHT: 72 IN | BODY MASS INDEX: 36.03 KG/M2 | DIASTOLIC BLOOD PRESSURE: 68 MMHG

## 2021-11-23 DIAGNOSIS — C61 PROSTATE CA (HCC): Primary | ICD-10-CM

## 2021-11-23 DIAGNOSIS — N52.9 ERECTILE DYSFUNCTION, UNSPECIFIED ERECTILE DYSFUNCTION TYPE: ICD-10-CM

## 2021-11-23 DIAGNOSIS — N39.3 SUI (STRESS URINARY INCONTINENCE), MALE: ICD-10-CM

## 2021-11-23 PROCEDURE — 1036F TOBACCO NON-USER: CPT | Performed by: UROLOGY

## 2021-11-23 PROCEDURE — 99214 OFFICE O/P EST MOD 30 MIN: CPT | Performed by: UROLOGY

## 2021-11-23 PROCEDURE — G8417 CALC BMI ABV UP PARAM F/U: HCPCS | Performed by: UROLOGY

## 2021-11-23 PROCEDURE — 1123F ACP DISCUSS/DSCN MKR DOCD: CPT | Performed by: UROLOGY

## 2021-11-23 PROCEDURE — 4040F PNEUMOC VAC/ADMIN/RCVD: CPT | Performed by: UROLOGY

## 2021-11-23 PROCEDURE — G8484 FLU IMMUNIZE NO ADMIN: HCPCS | Performed by: UROLOGY

## 2021-11-23 PROCEDURE — G8427 DOCREV CUR MEDS BY ELIG CLIN: HCPCS | Performed by: UROLOGY

## 2021-11-23 PROCEDURE — 3017F COLORECTAL CA SCREEN DOC REV: CPT | Performed by: UROLOGY

## 2021-11-23 NOTE — PROGRESS NOTES
MD [facility] [address] [facility] [address]  Dept: [phone]  Dept Fax: [phone]: [address] [address] Urology Office Note      Patient:  Isadora Walker  YOB: 1950    The patient is a 73 y.o. male who presents today for evaluation of the following problems:   Chief Complaint   Patient presents with    Follow-up     prostate ca psa done 11/2        HISTORY OF PRESENT ILLNESS:     Prostate cancer  S/p RALP and xrt  Postop course complicated by lymphocele    ED- not bothered    MACO- stable. Discussed incontinence. Does not want sling. 2 pads daily. Summary of Previous Records:  Summary of Previous Records:  Tamela Stanton presents for follow-up of prostate cancer. On 3/20/19 he underwent Robot-Assisted Laparoscopic Radical Prostatectomy (RALRP) and bilateral pelvic lymph node dissection and bilateral nerve wrap per Dr. Jory Pickett. Pathology showed [place] 3+4=7 prostate cancer with tertiary 5, perineural invasion and right bladder neck involvement, pT3a N0. Post op he was found to have an infected left lymphocele. A drain was placed on 6/18/19. This has since resolved. He reports that incontinence had improved until recently, when radiation treatments started. He is now wearing shield. he will start doing kegel exercises again. He reports that pelvic pain has completely improved. The LLE pain has also improved. He is feeling well. He was referred to Radiation Oncology due to adverse features on pathology. PSA now undetectable. Patient started on loading dose of Firmagon 9-11-19. Second dose 10-9-19. Will need 2 years of ADT.       Requested/reviewed records from Joshua Zabala MD office and/or outside [de-identified]    (Patient's old records have been requested, reviewed and pertinent findings summarized in today's note.)    Procedures Today: N/A    Last several PSA's:  Lab Results   Component Value Date    PSA <0.02 11/02/2021    PSA <0.02 04/29/2021    PSA <0.02 10/28/2020       Last total testosterone:  Lab Results   Component Value Date    TESTOSTERONE < 3 (L) 11/08/2019       Urinalysis today:  No results found for this visit on 11/23/21. Last BUN and creatinine:  Lab Results   Component Value Date    BUN 10 07/30/2021     Lab Results   Component Value Date    CREATININE 0.8 07/30/2021       Imaging Reviewed during this Office Visit:   Citlalli Madrid MD independently reviewed the images and verified the radiology reports from:    No results found. PAST MEDICAL, FAMILY AND SOCIAL HISTORY:  Past Medical History:   Diagnosis Date    Arthritis     back    BPH (benign prostatic hyperplasia)     CAD (coronary artery disease)     CVA (cerebral vascular accident) ([address] 2010    Disease of blood and blood forming organ     anemia    FH: heart disease     GERD (gastroesophageal reflux disease)     Hyperlipidemia     Hypertension     Obesity     Prostate cancer ([address] 2019     Past Surgical History:   Procedure Laterality Date    BACK SURGERY  08/2021   [employer] CARDIAC VALVE REPLACEMENT  April 2011    [address] (Pt unsure if part of CABG)    CARDIOVASCULAR STRESS TEST  1 05 2011    Cannot exclude slight inferobasal lateral stress-induced ischemia in a relatively small-sized area. EF 68%. Mildly abnormal nuclear scan. Lexiscan test associated with nonspecific symptoms. EKG nondiagnostic with nonspecific ST-T wave changes.  CAROTID ENDARTERECTOMY  2 08 2011    Right carotid endarterectomy with patch angioplasty with convention patch angioplasty.  COLONOSCOPY      CORONARY ARTERY BYPASS GRAFT      DIAGNOSTIC CARDIAC CATH LAB PROCEDURE  3 24 2011    LV end-diastolic pressure was 12 mmHg with no change before and after contrast injection. There was no significant gradient across the aortic valve to signify aortic stenosis. LV function was within normal limits, EF 55%. 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 Nonobstructive diffuse disease in a small sized RCA. Nomral LV function.  EGD      HAND FUSION Right     HERNIA REPAIR      Inguinal hernia repair.  PROSTATECTOMY N/A 3/20/2019    PROSTATECTOMY LAPAROSCOPIC ROBOTIC performed by Sunita Beaulieu MD at [address] ECHOCARDIOGRAM  12 21 2010    Size was normal. Systolic function was normal. EF was estimated in the range of 55-65%. There were no regional wall motion abnormalities. Wall thickness was normal. Doppler - LV diastolic function parameters were normal. Mild MR. The aortic valve was trileaflet. Leaflets exhibited mildly increased thickness, mild calcification, normal cuspal separation, and sclerosis.  Mild TR.    VASCULAR SURGERY      cabg harvests from chest     Family History   Problem Relation Age of Onset    Heart Disease Mother     Diabetes Mother     High Blood Pressure Mother     Heart Disease Father     High Blood Pressure Father     High Blood Pressure Sister     Colon Cancer Neg Hx     Colon Polyps Neg Hx     Esophageal Cancer Neg Hx      Outpatient Medications Marked as Taking for the 11/23/21 encounter (Office Visit) with Jourdan Ledesma MD   Medication Sig Dispense Refill    metoprolol succinate (TOPROL XL) 50 MG extended release tablet TAKE 1/2 TABLET BY MOUTH EVERY DAY 45 tablet 3    ferrous sulfate (IRON 325) 325 (65 Fe) MG tablet Take 325 mg by mouth daily (with breakfast)      cetirizine (ZYRTEC) 10 MG tablet TAKE 1 TABLET BY MOUTH EVERY DAY 90 tablet 3    famotidine (PEPCID) 20 MG tablet TAKE 1 TABLET BY MOUTH TWICE A  tablet 2    atorvastatin (LIPITOR) 20 MG tablet TAKE 1 TABLET BY MOUTH EVERY DAY 90 tablet 3    apixaban (ELIQUIS) 5 MG TABS tablet Take 1 tablet by mouth 2 times daily 180 tablet 3    nitroGLYCERIN (NITROSTAT) 0.4 MG SL tablet PLACE 1 TABLET UNDER TONGUE EVERY 5 MINS, UP TO 3 DOSES AS NEEDED FOR CHEST PAIN 25 tablet 1    calcium-vitamin D (OSCAL-500) 500-200 MG-UNIT per tablet Take 1 tablet by mouth daily Indications: 200 mg      clopidogrel (PLAVIX) 75 MG tablet Take 1 tablet by mouth daily 90 tablet 3    Coenzyme Q10 (COQ10 PO) Take 500 mg by mouth daily      Krill Oil 500 MG CAPS Take  by mouth daily. Patient has no known allergies. Social History     Tobacco Use   Smoking Status Never Smoker   Smokeless Tobacco Former User    Types: Chew      (If patient a smoker, smoking cessation counseling offered)   Social History     Substance and Sexual Activity   Alcohol Use No    Alcohol/week: 0.0 standard drinks    Comment: rare       REVIEW OF SYSTEMS:  Constitutional: negative  Eyes: negative  Respiratory: negative  Cardiovascular: negative  Gastrointestinal: negative  Genitourinary: see HPI  Musculoskeletal: negative  Skin: negative   Neurological: negative  Hematological/Lymphatic: negative  Psychological: negative        Physical Exam:    This a 73 y.o. male  Vitals:    11/23/21 1033   BP: 113/68   Pulse: 65     Body mass index is 36.08 kg/m². Constitutional: Patient in no acute distress;         Assessment and Plan        1. Prostate CA ([address]    2. MACO (stress urinary incontinence), male    3. Erectile dysfunction, unspecified erectile dysfunction type               Plan:      Prostate cancer- psa in six months. No need for ADT  MACO- stable. No plans for sling or surgical intervention  ED- not bothered      Six months with psa      Prescriptions Ordered:  No orders of the defined types were placed in this encounter.      Orders Placed:  Orders Placed This Encounter   Procedures    PSA Prostatic Specific Antigen     Standing Status:   Future     Standing Expiration Date:   11/23/2022            Shila Christensen MD

## 2021-12-21 RX ORDER — APIXABAN 5 MG/1
TABLET, FILM COATED ORAL
Qty: 180 TABLET | Refills: 1 | Status: SHIPPED | OUTPATIENT
Start: 2021-12-21 | End: 2022-09-08

## 2022-01-04 RX ORDER — FAMOTIDINE 20 MG/1
TABLET, FILM COATED ORAL
Qty: 180 TABLET | Refills: 2 | Status: SHIPPED | OUTPATIENT
Start: 2022-01-04

## 2022-01-04 RX ORDER — ATORVASTATIN CALCIUM 20 MG/1
TABLET, FILM COATED ORAL
Qty: 90 TABLET | Refills: 1 | Status: SHIPPED | OUTPATIENT
Start: 2022-01-04 | End: 2022-08-15 | Stop reason: SDUPTHER

## 2022-02-24 ENCOUNTER — HOSPITAL ENCOUNTER (OUTPATIENT)
Age: 72
Discharge: HOME | End: 2022-02-24
Payer: MEDICARE

## 2022-02-24 ENCOUNTER — OFFICE VISIT (OUTPATIENT)
Dept: FAMILY MEDICINE CLINIC | Age: 72
End: 2022-02-24
Payer: MEDICARE

## 2022-02-24 ENCOUNTER — HOSPITAL ENCOUNTER (OUTPATIENT)
Dept: GENERAL RADIOLOGY | Age: 72
Discharge: HOME | End: 2022-02-24
Payer: MEDICARE

## 2022-02-24 ENCOUNTER — NURSE ONLY (OUTPATIENT)
Dept: LAB | Age: 72
End: 2022-02-24

## 2022-02-24 VITALS
RESPIRATION RATE: 18 BRPM | SYSTOLIC BLOOD PRESSURE: 120 MMHG | TEMPERATURE: 97.5 F | WEIGHT: 267.5 LBS | OXYGEN SATURATION: 97 % | BODY MASS INDEX: 36.28 KG/M2 | HEART RATE: 85 BPM | DIASTOLIC BLOOD PRESSURE: 70 MMHG

## 2022-02-24 DIAGNOSIS — M54.50 ACUTE RIGHT-SIDED LOW BACK PAIN WITHOUT SCIATICA: ICD-10-CM

## 2022-02-24 DIAGNOSIS — E66.01 SEVERE OBESITY (BMI 35.0-39.9) WITH COMORBIDITY (HCC): ICD-10-CM

## 2022-02-24 DIAGNOSIS — Z98.890 HISTORY OF LUMBAR LAMINECTOMY FOR SPINAL CORD DECOMPRESSION: ICD-10-CM

## 2022-02-24 DIAGNOSIS — I48.91 ATRIAL FIBRILLATION, UNSPECIFIED TYPE (HCC): ICD-10-CM

## 2022-02-24 DIAGNOSIS — Z85.46 HISTORY OF PROSTATE CANCER: ICD-10-CM

## 2022-02-24 DIAGNOSIS — R68.89 HYPEREMIA: ICD-10-CM

## 2022-02-24 DIAGNOSIS — I73.9 PVD (PERIPHERAL VASCULAR DISEASE) (HCC): ICD-10-CM

## 2022-02-24 DIAGNOSIS — M54.50 ACUTE RIGHT-SIDED LOW BACK PAIN WITHOUT SCIATICA: Primary | ICD-10-CM

## 2022-02-24 LAB
ALBUMIN SERPL-MCNC: 4.3 G/DL (ref 3.5–5.1)
ALP BLD-CCNC: 66 U/L (ref 38–126)
ALT SERPL-CCNC: 31 U/L (ref 11–66)
ANION GAP SERPL CALCULATED.3IONS-SCNC: 10 MEQ/L (ref 8–16)
AST SERPL-CCNC: 28 U/L (ref 5–40)
BASOPHILS # BLD: 1.1 %
BASOPHILS ABSOLUTE: 0.1 THOU/MM3 (ref 0–0.1)
BILIRUB SERPL-MCNC: 0.5 MG/DL (ref 0.3–1.2)
BUN BLDV-MCNC: 12 MG/DL (ref 7–22)
CALCIUM SERPL-MCNC: 9.9 MG/DL (ref 8.5–10.5)
CHLORIDE BLD-SCNC: 103 MEQ/L (ref 98–111)
CO2: 28 MEQ/L (ref 23–33)
CREAT SERPL-MCNC: 0.7 MG/DL (ref 0.4–1.2)
EOSINOPHIL # BLD: 2.7 %
EOSINOPHILS ABSOLUTE: 0.2 THOU/MM3 (ref 0–0.4)
ERYTHROCYTE [DISTWIDTH] IN BLOOD BY AUTOMATED COUNT: 11.6 % (ref 11.5–14.5)
ERYTHROCYTE [DISTWIDTH] IN BLOOD BY AUTOMATED COUNT: 41.9 FL (ref 35–45)
GFR SERPL CREATININE-BSD FRML MDRD: > 90 ML/MIN/1.73M2
GLUCOSE BLD-MCNC: 131 MG/DL (ref 70–108)
HCT VFR BLD CALC: 45.5 % (ref 42–52)
HEMOGLOBIN: 14.9 GM/DL (ref 14–18)
IMMATURE GRANS (ABS): 0.09 THOU/MM3 (ref 0–0.07)
IMMATURE GRANULOCYTES: 1.1 %
LYMPHOCYTES # BLD: 15.7 %
LYMPHOCYTES ABSOLUTE: 1.3 THOU/MM3 (ref 1–4.8)
MCH RBC QN AUTO: 32.7 PG (ref 26–33)
MCHC RBC AUTO-ENTMCNC: 32.7 GM/DL (ref 32.2–35.5)
MCV RBC AUTO: 99.8 FL (ref 80–94)
MONOCYTES # BLD: 8.9 %
MONOCYTES ABSOLUTE: 0.7 THOU/MM3 (ref 0.4–1.3)
NUCLEATED RED BLOOD CELLS: 0 /100 WBC
PLATELET # BLD: 233 THOU/MM3 (ref 130–400)
PMV BLD AUTO: 9.1 FL (ref 9.4–12.4)
POTASSIUM SERPL-SCNC: 4.7 MEQ/L (ref 3.5–5.2)
RBC # BLD: 4.56 MILL/MM3 (ref 4.7–6.1)
SEG NEUTROPHILS: 70.5 %
SEGMENTED NEUTROPHILS ABSOLUTE COUNT: 5.8 THOU/MM3 (ref 1.8–7.7)
SODIUM BLD-SCNC: 141 MEQ/L (ref 135–145)
T4 FREE: 0.98 NG/DL (ref 0.93–1.76)
TOTAL PROTEIN: 7.1 G/DL (ref 6.1–8)
TSH SERPL DL<=0.05 MIU/L-ACNC: 2.05 UIU/ML (ref 0.4–4.2)
WBC # BLD: 8.2 THOU/MM3 (ref 4.8–10.8)

## 2022-02-24 PROCEDURE — G8417 CALC BMI ABV UP PARAM F/U: HCPCS | Performed by: FAMILY MEDICINE

## 2022-02-24 PROCEDURE — 3017F COLORECTAL CA SCREEN DOC REV: CPT | Performed by: FAMILY MEDICINE

## 2022-02-24 PROCEDURE — 72100 X-RAY EXAM L-S SPINE 2/3 VWS: CPT

## 2022-02-24 PROCEDURE — 96372 THER/PROPH/DIAG INJ SC/IM: CPT | Performed by: FAMILY MEDICINE

## 2022-02-24 PROCEDURE — G8427 DOCREV CUR MEDS BY ELIG CLIN: HCPCS | Performed by: FAMILY MEDICINE

## 2022-02-24 PROCEDURE — 4040F PNEUMOC VAC/ADMIN/RCVD: CPT | Performed by: FAMILY MEDICINE

## 2022-02-24 PROCEDURE — 99214 OFFICE O/P EST MOD 30 MIN: CPT | Performed by: FAMILY MEDICINE

## 2022-02-24 PROCEDURE — G8484 FLU IMMUNIZE NO ADMIN: HCPCS | Performed by: FAMILY MEDICINE

## 2022-02-24 PROCEDURE — 1036F TOBACCO NON-USER: CPT | Performed by: FAMILY MEDICINE

## 2022-02-24 PROCEDURE — 1123F ACP DISCUSS/DSCN MKR DOCD: CPT | Performed by: FAMILY MEDICINE

## 2022-02-24 RX ORDER — METHYLPREDNISOLONE ACETATE 80 MG/ML
160 INJECTION, SUSPENSION INTRA-ARTICULAR; INTRALESIONAL; INTRAMUSCULAR; SOFT TISSUE ONCE
Status: COMPLETED | OUTPATIENT
Start: 2022-02-24 | End: 2022-02-24

## 2022-02-24 RX ADMIN — METHYLPREDNISOLONE ACETATE 160 MG: 80 INJECTION, SUSPENSION INTRA-ARTICULAR; INTRALESIONAL; INTRAMUSCULAR; SOFT TISSUE at 16:11

## 2022-02-24 NOTE — PROGRESS NOTES
Administrations This Visit     methylPREDNISolone acetate (DEPO-MEDROL) injection 160 mg     Admin Date  02/24/2022  16:11 Action  Given Dose  160 mg Route  IntraMUSCular Site  Dorsogluteal Right Administered By  Bonilla Rhodes RN    Ordering Provider: Janeen Martínez MD    UlSarah [address] 47: [id number]    Lot#: [id number]    : [address] [place] [address] Rocio    Patient Supplied?: No

## 2022-02-27 ASSESSMENT — ENCOUNTER SYMPTOMS
ABDOMINAL PAIN: 0
COUGH: 0
SHORTNESS OF BREATH: 0
NAUSEA: 0
DIARRHEA: 0
RHINORRHEA: 0
SINUS PRESSURE: 0
EYE PAIN: 0
ABDOMINAL DISTENTION: 0
BACK PAIN: 1
CONSTIPATION: 0
SORE THROAT: 0

## 2022-02-27 NOTE — PROGRESS NOTES
[address] [place] [place] [address]  Dept: [phone]  Dept Fax: [phone]  Loc: [phone]  PROGRESS NOTE      VisitDate: 2/24/2022    Ines Ram is a 73 y.o. male who presents today for:     Chief Complaint   Patient presents with    Hip Pain     right side hip pain, worse first thing in the morning, loosens up after being up and around    Foot Problem     daughter noticed that feet turn bilateral purple, color gets better when he puts them up         Subjective:  HPI  Patient comes in with right-sided hip pain. He has a history of lumbar laminectomy within the past year. He began to experience pain in his lower back down into his hip and has gotten much worse recently. Is a history of prostate cancer. History of A. fib stable rate controlled. History of PVD and he has noted some increasing redness in his feet. Is worse when his feet are in dependent positions. Obesity unchanged. Review of Systems   Constitutional: Negative for appetite change and fever. HENT: Negative for congestion, ear pain, postnasal drip, rhinorrhea, sinus pressure and sore throat. Eyes: Negative for pain and visual disturbance. Respiratory: Negative for cough and shortness of breath. Cardiovascular: Negative for chest pain. Gastrointestinal: Negative for abdominal distention, abdominal pain, constipation, diarrhea and nausea. Genitourinary: Negative for dysuria, frequency and urgency. Musculoskeletal: Positive for arthralgias and back pain. Skin: Negative for rash. Neurological: Negative for dizziness.      Past Medical History:   Diagnosis Date    Arthritis     back    BPH (benign prostatic hyperplasia)     CAD (coronary artery disease)     [facility]re[facility]) [address] 2010    Disease of blood and blood forming organ     anemia    FH: heart disease     GERD (gastroesophageal reflux disease)     Hyperlipidemia     Hypertension     Obesity     Prostate cancer [facility]) 2019      Past Surgical History:   Procedure Laterality Date    BACK SURGERY  08/2021   [address] [place] [address] CARDIAC VALVE REPLACEMENT  April 2011    [facility]I Baig (Pt unsure if part of CABG)    CARDIOVASCULAR STRESS TEST  1 05 2011    Cannot exclude slight inferobasal lateral stress-induced ischemia in a relatively small-sized area. EF 68%. Mildly abnormal nuclear scan. Lexiscan test associated with nonspecific symptoms. EKG nondiagnostic with nonspecific ST-T wave changes.  CAROTID ENDARTERECTOMY  2 08 2011    Right carotid endarterectomy with patch angioplasty with convention patch angioplasty.  COLONOSCOPY      CORONARY ARTERY BYPASS GRAFT      DIAGNOSTIC CARDIAC CATH LAB PROCEDURE  3 24 2011    LV end-diastolic pressure was 12 mmHg with no change before and after contrast injection. There was no significant gradient across the aortic valve to signify aortic stenosis. LV function was within normal limits, EF 55%. 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 Nonobstructive diffuse disease in a small sized RCA. Nomral LV function.  EGD      HAND FUSION Right     HERNIA REPAIR      Inguinal hernia repair.  PROSTATECTOMY N/A 3/20/2019    PROSTATECTOMY LAPAROSCOPIC ROBOTIC performed by Tanya Carmona MD at [address] ECHOCARDIOGRAM  12 21 2010    Size was normal. Systolic function was normal. EF was estimated in the range of 55-65%. There were no regional wall motion abnormalities. Wall thickness was normal. Doppler - LV diastolic function parameters were normal. Mild MR. The aortic valve was trileaflet. Leaflets exhibited mildly increased thickness, mild calcification, normal cuspal separation, and sclerosis.  Mild TR.    VASCULAR SURGERY      cabg harvests from chest     Family History   Problem Relation Age of Onset    Heart Disease Mother     Diabetes Mother     High Blood Pressure Mother     Heart Disease Father     High Blood Pressure Father     High Blood Pressure Sister     Colon Cancer Neg Hx     Colon Polyps Neg Hx     Esophageal Cancer Neg Hx      Social History     Tobacco Use    Smoking status: Never Smoker    Smokeless tobacco: Former User     Types: Chew   Substance Use Topics    Alcohol use: No     Alcohol/week: 0.0 standard drinks     Comment: rare      Current Outpatient Medications   Medication Sig Dispense Refill    atorvastatin (LIPITOR) 20 MG tablet TAKE 1 TABLET BY MOUTH EVERY DAY 90 tablet 1    famotidine (PEPCID) 20 MG tablet TAKE 1 TABLET BY MOUTH TWICE A  tablet 2    ELIQUIS 5 MG TABS tablet TAKE 1 TABLET BY MOUTH TWICE A  tablet 1    metoprolol succinate (TOPROL XL) 50 MG extended release tablet TAKE 1/2 TABLET BY MOUTH EVERY DAY 45 tablet 3    ferrous sulfate (IRON 325) 325 (65 Fe) MG tablet Take 325 mg by mouth daily (with breakfast)      cetirizine (ZYRTEC) 10 MG tablet TAKE 1 TABLET BY MOUTH EVERY DAY 90 tablet 3    nitroGLYCERIN (NITROSTAT) 0.4 MG SL tablet PLACE 1 TABLET UNDER TONGUE EVERY 5 MINS, UP TO 3 DOSES AS NEEDED FOR CHEST PAIN 25 tablet 1    calcium-vitamin D (OSCAL-500) 500-200 MG-UNIT per tablet Take 1 tablet by mouth daily Indications: 200 mg      clopidogrel (PLAVIX) 75 MG tablet Take 1 tablet by mouth daily 90 tablet 3    Coenzyme Q10 (COQ10 PO) Take 500 mg by mouth daily      Krill Oil 500 MG CAPS Take  by mouth daily. No current facility-administered medications for this visit.      No Known Allergies  Health Maintenance   Topic Date Due    Shingles Vaccine (1 of 2) Never done    A1C test (Diabetic or Prediabetic)  09/06/2019    Lipid screen  05/04/2021    COVID-19 Vaccine (2 - Pf[employer] risk 4-dose series) 11/23/2021    Depression Screen  04/09/2022    PSA counseling  11/02/2022    DTaP/Tdap/Td vaccine (2 - Td or Tdap) 12/20/2029    Colorectal Cancer Screen  06/11/2031    Flu vaccine  Completed    Pneumococcal 65+ years Vaccine  Completed    Hepatitis C screen  Completed    Hepatitis A vaccine  Aged Out    Hepatitis B vaccine  Aged Out    Hib vaccine  Aged Out    Meningococcal (ACWY) vaccine  Aged Out         Objective:     Physical Exam  Constitutional:       General: He is not in acute distress. Appearance: He is well-developed. He is not diaphoretic. HENT:      Head: Normocephalic and atraumatic. Right Ear: External ear normal.      Left Ear: External ear normal.   Eyes:      Conjunctiva/sclera: Conjunctivae normal.   Neck:      Vascular: No JVD. Cardiovascular:      Rate and Rhythm: Normal rate and regular rhythm. Heart sounds: Normal heart sounds. Pulmonary:      Effort: Pulmonary effort is normal.      Breath sounds: Normal breath sounds. No wheezing or rales. Musculoskeletal:         General: Tenderness present. Comments: Tenderness decreased range of motion of lumbar spine, hip range of motion is normal   Skin:     General: Skin is warm and dry. Coloration: Skin is not pale. Comments: Hyperemia in his feet due to his vascular disease   Neurological:      Mental Status: He is alert and oriented to person, place, and time. /70 (Site: Left Upper Arm)   Pulse 85   Temp 97.5 °F (36.4 °C) (Oral)   Resp 18   Wt 267 lb 8 oz (121.3 kg)   SpO2 97%   BMI 36.28 kg/m²       Impression/Plan:  1. Acute right-sided low back pain without sciatica    2. History of lumbar laminectomy for spinal cord decompression    3. PVD (peripheral vascular disease) ([address]    4. Atrial fibrillation, unspecified type ([address]    5. History of prostate cancer    6. Hyperemia    7. Severe obesity (BMI 35.0-39. 9) with comorbidity ([address]      Requested Prescriptions      No prescriptions requested or ordered in this encounter     Orders Placed This Encounter   Procedures    XR LUMBAR SPINE (2-3 VIEWS)     Standing Status:   Future     Number of Occurrences:

## 2022-03-02 ENCOUNTER — TELEPHONE (OUTPATIENT)
Dept: FAMILY MEDICINE CLINIC | Age: 72
End: 2022-03-02

## 2022-03-02 NOTE — TELEPHONE ENCOUNTER
Labs ok, xray shows degen changes as expected.   If sx are not improved, would rec fu with his back doctor

## 2022-03-03 ENCOUNTER — TELEPHONE (OUTPATIENT)
Dept: UROLOGY | Age: 72
End: 2022-03-03

## 2022-03-03 NOTE — TELEPHONE ENCOUNTER
Patient is returning call to the office about needing to r/s his appt on 05/24/2022 to 05/31. No answer at .   Please call patient # [phone]

## 2022-03-21 ENCOUNTER — HOSPITAL ENCOUNTER (OUTPATIENT)
Dept: MRI IMAGING | Age: 72
Discharge: HOME | End: 2022-03-21
Payer: MEDICARE

## 2022-03-21 DIAGNOSIS — M47.26 OTHER SPONDYLOSIS WITH RADICULOPATHY, LUMBAR REGION: ICD-10-CM

## 2022-03-21 PROCEDURE — 72148 MRI LUMBAR SPINE W/O DYE: CPT

## 2022-04-19 ENCOUNTER — TELEPHONE (OUTPATIENT)
Dept: CARDIOLOGY CLINIC | Age: 72
End: 2022-04-19

## 2022-04-19 NOTE — TELEPHONE ENCOUNTER
Pre op Risk Assessment    Procedure Caudal Epidural    Physician Dr Michael Pérez  Date of surgery/procedure TBD    Last OV 6/7/21  Last Stress 4/28/20  Last Echo 4/28/20  Last Cath 5/4/20  Last Stent 5/4/20  Is patient on blood thinners Eliquis & Plavix  Hold Meds/how many days 2

## 2022-05-03 ENCOUNTER — NURSE ONLY (OUTPATIENT)
Dept: LAB | Age: 72
End: 2022-05-03

## 2022-05-03 DIAGNOSIS — C61 PROSTATE CANCER (HCC): ICD-10-CM

## 2022-05-03 DIAGNOSIS — C61 PROSTATE CA (HCC): ICD-10-CM

## 2022-05-03 LAB — PROSTATE SPECIFIC ANTIGEN: < 0.02 NG/ML (ref 0–1)

## 2022-05-04 ENCOUNTER — HOSPITAL ENCOUNTER (OUTPATIENT)
Dept: RADIATION ONCOLOGY | Age: 72
Discharge: HOME | End: 2022-05-04
Attending: RADIOLOGY
Payer: MEDICARE

## 2022-05-04 VITALS
OXYGEN SATURATION: 96 % | SYSTOLIC BLOOD PRESSURE: 133 MMHG | BODY MASS INDEX: 35.99 KG/M2 | HEART RATE: 84 BPM | RESPIRATION RATE: 16 BRPM | TEMPERATURE: 95 F | WEIGHT: 265.4 LBS | DIASTOLIC BLOOD PRESSURE: 85 MMHG

## 2022-05-04 PROCEDURE — 99214 OFFICE O/P EST MOD 30 MIN: CPT | Performed by: RADIOLOGY

## 2022-05-04 PROCEDURE — 99212 OFFICE O/P EST SF 10 MIN: CPT | Performed by: RADIOLOGY

## 2022-05-04 RX ORDER — CYCLOBENZAPRINE HCL 10 MG
1 TABLET ORAL EVERY 8 HOURS PRN
COMMUNITY
Start: 2022-03-22

## 2022-05-04 RX ORDER — TRAMADOL HYDROCHLORIDE 50 MG/1
1 TABLET ORAL 3 TIMES DAILY
COMMUNITY
Start: 2022-03-23 | End: 2022-11-04 | Stop reason: ALTCHOICE

## 2022-05-04 ASSESSMENT — ENCOUNTER SYMPTOMS
SHORTNESS OF BREATH: 0
RECTAL PAIN: 0
COUGH: 0
DIARRHEA: 0
ABDOMINAL PAIN: 0
ANAL BLEEDING: 0
NAUSEA: 0
ABDOMINAL DISTENTION: 0
VOMITING: 0
BLOOD IN STOOL: 0
CONSTIPATION: 0

## 2022-05-04 NOTE — PROGRESS NOTES
[address] [facility] [address], [address]        [facility], [address] [place] [address]        Fazal Bean: [phone]        F: [phone]       [email or web address]            FOLLOW UP NOTE    Date of Service: 2022  Patient ID: Whitney Dominguez   : 1950  MRN: [id number]   Acct Number: [de-identified]       DATE OF SERVICE: 2022   LOCATION: [facility]  PROVIDER: Dr. Gayathri Briseno: Dr. Grover Hernandez (PCP), Dr. Henrique Godfrey:  Cancer Staging  Prostate cancer [facility])  Staging form: Prostate, AJCC 8th Edition  - Clinical stage from 2019: Stage IIC (cT2b, cN0, cM0, PSA: 8.3, Grade Group: 3) - Signed by Luis Campo MD on 2019  - Pathologic stage from 3/20/2019: Stage IIIB (pT3a, pN0, cM0, PSA: 8.3, Grade Group: 3) - Signed by Luis Campo MD on 10/1/2019  Staging comments: Post-Op PSA:    5-3-2019    0.07   1st                             2019  0.15   Most recent    [address]     END OF TREATMENT DATE: 2019    The patient will return to clinic in 6 months or sooner if clinically indicated. They have our clinic number to call with any questions or concerns if needed. Thank you for allowing us to be a part of their care.     HPI: Debbie Valero is a Dimitrios Nam  with a history of a modestly elevated PSA that remained stable for approximately 9 years.  In 2018 it had risen to 8.28 and he completed a sextant biopsy on 2018, showing a Tip 6 adenocarcinoma involving 3 of the 12 biopsy samples. Luis Angel Chitimacha initially managed with active surveillance, but MRI scan obtained 2019 was concerning for multiple finding suggesting a high probability of clinically significant cancer.  He underwent re-biopsy on 2019.  At that time he was found with mixed grade adenocarcinoma involving 3 of the 6 sectors sampled.  Maximum Tip was (4+3) 7, group 3.  He went on to complete a robot-assisted laparoscopic prostatectomy on March 20, 2019.  Final pathology graded the tumor as a [place] (3+4) 7 with tertiary pattern 5.   The pathologic stage is shown above.  Other findings included right bladder neck involvement, perineural invasion and a positive right bladder neck surgical margin.  All of the sampled lymph nodes were free of malignancy.  A sample was sent for decipher score evaluation and returned a value of 0.88, therefore high risk of progression.   the initial postoperative PSA in May 2019 was detectable at 0.07.   a subsequent PSA was read as 0.04, but  the PSA obtained on 8/27/2019 was 0.15, showing clear PSA progression with a doubling time of less than 6 months.   Mr. Cota Almaz seen by Dr. Ambriz Puls discuss the role of adjuvant radiation therapy, to which he was agreeable. Cece Porter was initiated on androgen deprivation therapy, receiving Firmagon in September 2019.  A repeat PSA obtained in October 2019 was undetectable at <0.02.     Patrice tolerated his radiation therapy well. Ccee Porter had some initial dysuria that was very mild, and also some increased frequency of bowel movements and increased urinary frequency.  He was able to continue with all of his normal activities, and did not have any unexpected side effects related to radiation therapy. INTERVAL HISTORY:  Salud Whittington is a 73 y.o. male is presenting today for regularly scheduled follow up regarding the above mentioned oncologic history. Review of Systems   Constitutional: Positive for fatigue. Negative for activity change, appetite change and unexpected weight change. Respiratory: Negative for cough and shortness of breath. Cardiovascular: Negative for chest pain. Gastrointestinal: Negative for abdominal distention, abdominal pain, anal bleeding, blood in stool, constipation, diarrhea, nausea, rectal pain and vomiting. Genitourinary: Positive for urgency.  Negative for dysuria, frequency and hematuria. Incontenance   Neurological: Negative for dizziness, weakness, numbness and headaches. A complete review of systems was performed and found to be negative except as presented above. CHAPERONE: n/a    PAIN: 0/10 when sitting, 5/10 with walking    LABORATORY STUDIES:  Onc labs:   Lab Results   Component Value Date    PSA <0.02 2022       MEDICATIONS:   Current Outpatient Medications   Medication Sig Dispense Refill    cyclobenzaprine (FLEXERIL) 10 MG tablet Take 1 tablet by mouth every 8 hours as needed      traMADol (ULTRAM) 50 MG tablet Take 1 tablet by mouth 3 times daily. As needed      atorvastatin (LIPITOR) 20 MG tablet TAKE 1 TABLET BY MOUTH EVERY DAY 90 tablet 1    famotidine (PEPCID) 20 MG tablet TAKE 1 TABLET BY MOUTH TWICE A  tablet 2    ELIQUIS 5 MG TABS tablet TAKE 1 TABLET BY MOUTH TWICE A  tablet 1    metoprolol succinate (TOPROL XL) 50 MG extended release tablet TAKE 1/2 TABLET BY MOUTH EVERY DAY 45 tablet 3    ferrous sulfate (IRON 325) 325 (65 Fe) MG tablet Take 325 mg by mouth daily (with breakfast)      cetirizine (ZYRTEC) 10 MG tablet TAKE 1 TABLET BY MOUTH EVERY DAY 90 tablet 3    nitroGLYCERIN (NITROSTAT) 0.4 MG SL tablet PLACE 1 TABLET UNDER TONGUE EVERY 5 MINS, UP TO 3 DOSES AS NEEDED FOR CHEST PAIN 25 tablet 1    calcium-vitamin D (OSCAL-500) 500-200 MG-UNIT per tablet Take 1 tablet by mouth daily Indications: 200 mg      clopidogrel (PLAVIX) 75 MG tablet Take 1 tablet by mouth daily 90 tablet 3    Coenzyme Q10 (COQ10 PO) Take 500 mg by mouth daily      Krill Oil 500 MG CAPS Take  by mouth daily. No current facility-administered medications for this encounter.        PHYSICAL EXAMINATION:  VITAL SIGNS: /85   Pulse 84   Temp 95 °F (35 °C) (Infrared)   Resp 16   Wt 265 lb 6.4 oz (120.4 kg)   SpO2 96%   BMI 35.99 kg/m²     ECO - Symptomatic but completely ambulatory (Restricted in physically strenuous activity but ambulatory and able to carry out work of a light or sedentary nature. For example, light housework, office work)    Physical Exam  Constitutional:       General: He is not in acute distress. Appearance: He is obese. He is not ill-appearing. Neurological:      Mental Status: He is alert. Psychiatric:         Mood and Affect: Mood normal.         Behavior: Behavior normal.     RECTAL: The patient's PSA is < 0.02, rectal examination is not indicated today. Electronically signed by Jael Pickering on 5/4/22 at 1:20 PM EDT     ATTESTATION: 30 minutes were spent with the patient at today's visit reviewing pertinent information related to their oncologic diagnosis, including any recent labs, imaging, follow ups and plan of care going forward.     CC:Dr. Isidro Cota (PCP), Dr. Tawanda Chaudhari  ACC:[facility] Cancer Registry

## 2022-05-06 LAB — TESTOSTERONE TOTAL: < 3 NG/DL (ref 300–720)

## 2022-06-08 ENCOUNTER — HOSPITAL ENCOUNTER (OUTPATIENT)
Dept: NON INVASIVE DIAGNOSTICS | Age: 72
Discharge: HOME | End: 2022-06-08
Payer: MEDICARE

## 2022-06-08 DIAGNOSIS — I51.9 HEART DISEASE, UNSPECIFIED: ICD-10-CM

## 2022-06-08 LAB
LV EF: 55 %
LVEF MODALITY: NORMAL

## 2022-06-08 PROCEDURE — 93306 TTE W/DOPPLER COMPLETE: CPT

## 2022-06-13 ENCOUNTER — OFFICE VISIT (OUTPATIENT)
Dept: CARDIOLOGY CLINIC | Age: 72
End: 2022-06-13
Payer: MEDICARE

## 2022-06-13 VITALS
DIASTOLIC BLOOD PRESSURE: 80 MMHG | SYSTOLIC BLOOD PRESSURE: 132 MMHG | HEART RATE: 71 BPM | HEIGHT: 72 IN | BODY MASS INDEX: 35.35 KG/M2 | WEIGHT: 261 LBS

## 2022-06-13 DIAGNOSIS — I10 PRIMARY HYPERTENSION: ICD-10-CM

## 2022-06-13 DIAGNOSIS — I48.0 PAROXYSMAL ATRIAL FIBRILLATION (HCC): ICD-10-CM

## 2022-06-13 DIAGNOSIS — I25.810 CORONARY ARTERY DISEASE INVOLVING CORONARY BYPASS GRAFT OF NATIVE HEART WITHOUT ANGINA PECTORIS: Primary | ICD-10-CM

## 2022-06-13 DIAGNOSIS — E78.01 FAMILIAL HYPERCHOLESTEROLEMIA: ICD-10-CM

## 2022-06-13 PROCEDURE — 93000 ELECTROCARDIOGRAM COMPLETE: CPT | Performed by: NUCLEAR MEDICINE

## 2022-06-13 PROCEDURE — G8427 DOCREV CUR MEDS BY ELIG CLIN: HCPCS | Performed by: NUCLEAR MEDICINE

## 2022-06-13 PROCEDURE — G8417 CALC BMI ABV UP PARAM F/U: HCPCS | Performed by: NUCLEAR MEDICINE

## 2022-06-13 PROCEDURE — 3017F COLORECTAL CA SCREEN DOC REV: CPT | Performed by: NUCLEAR MEDICINE

## 2022-06-13 PROCEDURE — 1123F ACP DISCUSS/DSCN MKR DOCD: CPT | Performed by: NUCLEAR MEDICINE

## 2022-06-13 PROCEDURE — 99214 OFFICE O/P EST MOD 30 MIN: CPT | Performed by: NUCLEAR MEDICINE

## 2022-06-13 PROCEDURE — 1036F TOBACCO NON-USER: CPT | Performed by: NUCLEAR MEDICINE

## 2022-06-13 NOTE — PROGRESS NOTES
[facility] [address] [place] [address] [address]  [address]  [facility] [address]  Dept: [phone]  Dept Fax: [phone]  Loc: [phone]    Visit Date: 6/13/2022    Whitney Dominguez is a 73 y.o. male who presents todayfor:  Chief Complaint   Patient presents with    1 Year Follow Up    Cardiac Clearance    Coronary Artery Disease    Hypertension    Hyperlipidemia     Dealing with more back issues  Back pain   Getting injections  Known CABG and stents  Main limitation is more the back   No obvious chest pain   Some baseline dyspnea  Deconditioning and limitation   No BP issues  No dizziness  No syncope  Known A fib on eliquis   No bleeding   On statins for hyperlipidemia      HPI:  HPI  Past Medical History:   Diagnosis Date    Arthritis     back    BPH (benign prostatic hyperplasia)     CAD (coronary artery disease)     CVA (cerebral vascular accident) ([address] 2010    Disease of blood and blood forming organ     anemia    FH: heart disease     GERD (gastroesophageal reflux disease)     Hyperlipidemia     Hypertension     Obesity     Prostate cancer ([facility] [address] 2019      Past Surgical History:   Procedure Laterality Date    BACK SURGERY  08/2021   Nini Cheney CARDIAC VALVE REPLACEMENT  April 2011    [place], [place] (Pt unsure if part of CABG)    CARDIOVASCULAR STRESS TEST  1 05 2011    Cannot exclude slight inferobasal lateral stress-induced ischemia in a relatively small-sized area. EF 68%. Mildly abnormal nuclear scan. Lexiscan test associated with nonspecific symptoms. EKG nondiagnostic with nonspecific ST-T wave changes.  CAROTID ENDARTERECTOMY  2 08 2011    Right carotid endarterectomy with patch angioplasty with convention patch angioplasty.  COLONOSCOPY      CORONARY ARTERY BYPASS GRAFT      DIAGNOSTIC CARDIAC CATH LAB PROCEDURE  3 24 2011    LV end-diastolic pressure was 12 mmHg with no change before and after contrast injection.  There was no significant gradient across the aortic valve to signify aortic stenosis. LV function was within normal limits, EF 55%. 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 Nonobstructive diffuse disease in a small sized RCA. Nomral LV function.  EGD      HAND FUSION Right     HERNIA REPAIR      Inguinal hernia repair.  PROSTATECTOMY N/A 3/20/2019    PROSTATECTOMY LAPAROSCOPIC ROBOTIC performed by Bhakti Ulloa MD at [address] ECHOCARDIOGRAM  12 21 2010    Size was normal. Systolic function was normal. EF was estimated in the range of 55-65%. There were no regional wall motion abnormalities. Wall thickness was normal. Doppler - LV diastolic function parameters were normal. Mild MR. The aortic valve was trileaflet. Leaflets exhibited mildly increased thickness, mild calcification, normal cuspal separation, and sclerosis. Mild TR.    VASCULAR SURGERY      cabg harvests from chest     Family History   Problem Relation Age of Onset    Heart Disease Mother     Diabetes Mother     High Blood Pressure Mother     Heart Disease Father     High Blood Pressure Father     High Blood Pressure Sister     Colon Cancer Neg Hx     Colon Polyps Neg Hx     Esophageal Cancer Neg Hx      Social History     Tobacco Use    Smoking status: Never Smoker    Smokeless tobacco: Former User     Types: Chew   Substance Use Topics    Alcohol use: No     Alcohol/week: 0.0 standard drinks     Comment: rare      Current Outpatient Medications   Medication Sig Dispense Refill    cyclobenzaprine (FLEXERIL) 10 MG tablet Take 1 tablet by mouth every 8 hours as needed      traMADol (ULTRAM) 50 MG tablet Take 1 tablet by mouth 3 times daily.  As needed      atorvastatin (LIPITOR) 20 MG tablet TAKE 1 TABLET BY MOUTH EVERY DAY 90 tablet 1    famotidine (PEPCID) 20 MG tablet TAKE 1 TABLET BY MOUTH TWICE A  tablet 2    ELIQUIS 5 MG TABS tablet TAKE 1 TABLET BY MOUTH TWICE A  tablet 1    metoprolol succinate (TOPROL XL) 50 MG extended release tablet TAKE 1/2 TABLET BY MOUTH EVERY DAY 45 tablet 3    ferrous sulfate (IRON 325) 325 (65 Fe) MG tablet Take 325 mg by mouth daily (with breakfast)      cetirizine (ZYRTEC) 10 MG tablet TAKE 1 TABLET BY MOUTH EVERY DAY 90 tablet 3    nitroGLYCERIN (NITROSTAT) 0.4 MG SL tablet PLACE 1 TABLET UNDER TONGUE EVERY 5 MINS, UP TO 3 DOSES AS NEEDED FOR CHEST PAIN 25 tablet 1    calcium-vitamin D (OSCAL-500) 500-200 MG-UNIT per tablet Take 1 tablet by mouth daily Indications: 200 mg      clopidogrel (PLAVIX) 75 MG tablet Take 1 tablet by mouth daily 90 tablet 3    Coenzyme Q10 (COQ10 PO) Take 500 mg by mouth daily      Krill Oil 500 MG CAPS Take  by mouth daily. No current facility-administered medications for this visit.      No Known Allergies  Health Maintenance   Topic Date Due    Shingles vaccine (1 of 2) Never done    A1C test (Diabetic or Prediabetic)  09/06/2019    Lipids  05/04/2021    Annual Wellness Visit (AWV)  06/23/2021    Pneumococcal 65+ years Vaccine (2 - PCV) 12/31/2021    COVID-19 Vaccine (4 - Booster) 02/02/2022    Depression Screen  04/09/2022    Prostate Specific Antigen (PSA) Screening or Monitoring  05/03/2023    DTaP/Tdap/Td vaccine (2 - Td or Tdap) 12/20/2029    Colorectal Cancer Screen  06/11/2031    Flu vaccine  Completed    Hepatitis C screen  Completed    Hepatitis A vaccine  Aged Out    Hepatitis B vaccine  Aged Out    Hib vaccine  Aged Out    Meningococcal (ACWY) vaccine  Aged Out       Subjective:  Review of Systems  General:   No fever, no chills, No fatigue or weight loss  Pulmonary:    No dyspnea, no wheezing  Cardiac:    Denies recent chest pain,   GI:     No nausea or vomiting, no abdominal pain  Neuro:     No dizziness or light headedness,   Musculoskeletal: Back issues   Extremities:   No edema, no obvious claudication       Objective:  Physical Exam  BP 132/80   Pulse 71   Ht 6' (1.829 m)   Wt 261 lb (118.4 kg)   BMI 35.40 kg/m²   General:   Well developed, well nourished  Lungs:    Clear to auscultation  Heart:    Normal S1 S2, Slight murmur. no rubs, no gallops  Abdomen:   Soft, non tender, no organomegalies, positive bowel sounds  Extremities:   No edema, no cyanosis, good peripheral pulses  Neurological:   Awake, alert, oriented. No obvious focal deficits  Musculoskelatal:  No obvious deformities    Assessment:      Diagnosis Orders   1. Coronary artery disease involving coronary bypass graft of native heart without angina pectoris  EKG 12 Lead   2. Primary hypertension     3. Familial hypercholesterolemia     4. Paroxysmal atrial fibrillation (HCC)     as above  Multiple chronic medical issues  Seems under control   ECG in office was done today. I reviewed the ECG. No acute findings      Plan:  No follow-ups on file. Clear for injection  Mod risk for MI and stroke   eliquis hold for 3 days   Continue risk factor modification and medical management  Thank you for allowing me to participate in the care of your patient. Please don't hesitate to contact me regarding any further issues related to the patient care    Orders Placed:  Orders Placed This Encounter   Procedures    EKG 12 Lead     Order Specific Question:   Reason for Exam?     Answer: Other       Medications Prescribed:  No orders of the defined types were placed in this encounter. Discussed use, benefit, and side effects of prescribed medications. All patient questions answered. Pt voicedunderstanding. Instructed to continue current medications, diet and exercise. Continue risk factor modification and medical management. Patient agreed with treatment plan. Follow up as directed.     Electronically signedby Sloan Apley, MD on 6/13/2022 at 7:50 AM

## 2022-08-08 ENCOUNTER — HOSPITAL ENCOUNTER (EMERGENCY)
Age: 72
Discharge: HOME | End: 2022-08-08
Attending: STUDENT IN AN ORGANIZED HEALTH CARE EDUCATION/TRAINING PROGRAM
Payer: MEDICARE

## 2022-08-08 ENCOUNTER — APPOINTMENT (OUTPATIENT)
Dept: CT IMAGING | Age: 72
End: 2022-08-08
Payer: MEDICARE

## 2022-08-08 ENCOUNTER — APPOINTMENT (OUTPATIENT)
Dept: MRI IMAGING | Age: 72
End: 2022-08-08
Payer: MEDICARE

## 2022-08-08 ENCOUNTER — APPOINTMENT (OUTPATIENT)
Dept: GENERAL RADIOLOGY | Age: 72
End: 2022-08-08
Payer: MEDICARE

## 2022-08-08 VITALS
SYSTOLIC BLOOD PRESSURE: 162 MMHG | RESPIRATION RATE: 16 BRPM | HEIGHT: 72 IN | OXYGEN SATURATION: 98 % | DIASTOLIC BLOOD PRESSURE: 94 MMHG | BODY MASS INDEX: 35.21 KG/M2 | HEART RATE: 58 BPM | WEIGHT: 260 LBS | TEMPERATURE: 97.5 F

## 2022-08-08 DIAGNOSIS — G45.9 TIA (TRANSIENT ISCHEMIC ATTACK): Primary | ICD-10-CM

## 2022-08-08 LAB
ALBUMIN SERPL-MCNC: 4.3 G/DL (ref 3.5–5.1)
ALP BLD-CCNC: 68 U/L (ref 38–126)
ALT SERPL-CCNC: 44 U/L (ref 11–66)
ANION GAP SERPL CALCULATED.3IONS-SCNC: 15 MEQ/L (ref 8–16)
AST SERPL-CCNC: 36 U/L (ref 5–40)
BASOPHILS # BLD: 1.1 %
BASOPHILS ABSOLUTE: 0.1 THOU/MM3 (ref 0–0.1)
BILIRUB SERPL-MCNC: 0.7 MG/DL (ref 0.3–1.2)
BILIRUBIN DIRECT: < 0.2 MG/DL (ref 0–0.3)
BILIRUBIN URINE: NEGATIVE
BLOOD, URINE: NEGATIVE
BUN BLDV-MCNC: 19 MG/DL (ref 7–22)
CALCIUM SERPL-MCNC: 9.9 MG/DL (ref 8.5–10.5)
CHARACTER, URINE: CLEAR
CHLORIDE BLD-SCNC: 99 MEQ/L (ref 98–111)
CO2: 23 MEQ/L (ref 23–33)
COLOR: YELLOW
CREAT SERPL-MCNC: 0.7 MG/DL (ref 0.4–1.2)
EOSINOPHIL # BLD: 0.7 %
EOSINOPHILS ABSOLUTE: 0.1 THOU/MM3 (ref 0–0.4)
ERYTHROCYTE [DISTWIDTH] IN BLOOD BY AUTOMATED COUNT: 11.7 % (ref 11.5–14.5)
ERYTHROCYTE [DISTWIDTH] IN BLOOD BY AUTOMATED COUNT: 42 FL (ref 35–45)
GFR SERPL CREATININE-BSD FRML MDRD: > 90 ML/MIN/1.73M2
GLUCOSE BLD-MCNC: 173 MG/DL (ref 70–108)
GLUCOSE BLD-MCNC: 199 MG/DL (ref 70–108)
GLUCOSE URINE: NEGATIVE MG/DL
HCT VFR BLD CALC: 46.8 % (ref 42–52)
HEMOGLOBIN: 16.1 GM/DL (ref 14–18)
IMMATURE GRANS (ABS): 0.49 THOU/MM3 (ref 0–0.07)
IMMATURE GRANULOCYTES: 4 %
KETONES, URINE: NEGATIVE
LEUKOCYTE ESTERASE, URINE: NEGATIVE
LYMPHOCYTES # BLD: 10.5 %
LYMPHOCYTES ABSOLUTE: 1.3 THOU/MM3 (ref 1–4.8)
MCH RBC QN AUTO: 34.2 PG (ref 26–33)
MCHC RBC AUTO-ENTMCNC: 34.4 GM/DL (ref 32.2–35.5)
MCV RBC AUTO: 99.4 FL (ref 80–94)
MONOCYTES # BLD: 8.2 %
MONOCYTES ABSOLUTE: 1 THOU/MM3 (ref 0.4–1.3)
NITRITE, URINE: NEGATIVE
NUCLEATED RED BLOOD CELLS: 0 /100 WBC
OSMOLALITY CALCULATION: 280.2 MOSMOL/KG (ref 275–300)
PH UA: 6 (ref 5–9)
PLATELET # BLD: 263 THOU/MM3 (ref 130–400)
PMV BLD AUTO: 9 FL (ref 9.4–12.4)
POTASSIUM REFLEX MAGNESIUM: 3.7 MEQ/L (ref 3.5–5.2)
PRO-BNP: 834 PG/ML (ref 0–900)
PROTEIN UA: NEGATIVE
RBC # BLD: 4.71 MILL/MM3 (ref 4.7–6.1)
SEG NEUTROPHILS: 75.5 %
SEGMENTED NEUTROPHILS ABSOLUTE COUNT: 9.3 THOU/MM3 (ref 1.8–7.7)
SODIUM BLD-SCNC: 137 MEQ/L (ref 135–145)
SPECIFIC GRAVITY, URINE: > 1.03 (ref 1–1.03)
TOTAL PROTEIN: 7.4 G/DL (ref 6.1–8)
TROPONIN T: < 0.01 NG/ML
UROBILINOGEN, URINE: 0.2 EU/DL (ref 0–1)
WBC # BLD: 12.3 THOU/MM3 (ref 4.8–10.8)

## 2022-08-08 PROCEDURE — 93005 ELECTROCARDIOGRAM TRACING: CPT | Performed by: STUDENT IN AN ORGANIZED HEALTH CARE EDUCATION/TRAINING PROGRAM

## 2022-08-08 PROCEDURE — 70551 MRI BRAIN STEM W/O DYE: CPT

## 2022-08-08 PROCEDURE — 99285 EMERGENCY DEPT VISIT HI MDM: CPT

## 2022-08-08 PROCEDURE — 84484 ASSAY OF TROPONIN QUANT: CPT

## 2022-08-08 PROCEDURE — 85025 COMPLETE CBC W/AUTO DIFF WBC: CPT

## 2022-08-08 PROCEDURE — 6360000004 HC RX CONTRAST MEDICATION: Performed by: STUDENT IN AN ORGANIZED HEALTH CARE EDUCATION/TRAINING PROGRAM

## 2022-08-08 PROCEDURE — 70498 CT ANGIOGRAPHY NECK: CPT

## 2022-08-08 PROCEDURE — 80076 HEPATIC FUNCTION PANEL: CPT

## 2022-08-08 PROCEDURE — 71045 X-RAY EXAM CHEST 1 VIEW: CPT

## 2022-08-08 PROCEDURE — 99231 SBSQ HOSP IP/OBS SF/LOW 25: CPT

## 2022-08-08 PROCEDURE — 70450 CT HEAD/BRAIN W/O DYE: CPT

## 2022-08-08 PROCEDURE — 2580000003 HC RX 258: Performed by: STUDENT IN AN ORGANIZED HEALTH CARE EDUCATION/TRAINING PROGRAM

## 2022-08-08 PROCEDURE — 80048 BASIC METABOLIC PNL TOTAL CA: CPT

## 2022-08-08 PROCEDURE — 81003 URINALYSIS AUTO W/O SCOPE: CPT

## 2022-08-08 PROCEDURE — 70496 CT ANGIOGRAPHY HEAD: CPT

## 2022-08-08 PROCEDURE — 83880 ASSAY OF NATRIURETIC PEPTIDE: CPT

## 2022-08-08 PROCEDURE — 82948 REAGENT STRIP/BLOOD GLUCOSE: CPT

## 2022-08-08 RX ORDER — 0.9 % SODIUM CHLORIDE 0.9 %
1000 INTRAVENOUS SOLUTION INTRAVENOUS ONCE
Status: COMPLETED | OUTPATIENT
Start: 2022-08-08 | End: 2022-08-08

## 2022-08-08 RX ORDER — CLOPIDOGREL BISULFATE 75 MG/1
75 TABLET ORAL DAILY
Qty: 30 TABLET | Refills: 0 | Status: SHIPPED | OUTPATIENT
Start: 2022-08-08

## 2022-08-08 RX ADMIN — SODIUM CHLORIDE 1000 ML: 9 INJECTION, SOLUTION INTRAVENOUS at 15:25

## 2022-08-08 RX ADMIN — IOPAMIDOL 80 ML: 755 INJECTION, SOLUTION INTRAVENOUS at 15:07

## 2022-08-08 ASSESSMENT — ENCOUNTER SYMPTOMS
RECTAL PAIN: 0
ANAL BLEEDING: 0
SINUS PAIN: 0
SINUS PRESSURE: 0
FACIAL SWELLING: 0
NAUSEA: 0
BACK PAIN: 1
EYE REDNESS: 0
VOMITING: 0
APNEA: 0
EYE DISCHARGE: 0
CHOKING: 0
CHEST TIGHTNESS: 0
COUGH: 0
BLOOD IN STOOL: 0
SHORTNESS OF BREATH: 0
SORE THROAT: 0
ABDOMINAL PAIN: 0
RHINORRHEA: 0
PHOTOPHOBIA: 0
EYE PAIN: 0

## 2022-08-08 ASSESSMENT — PAIN - FUNCTIONAL ASSESSMENT: PAIN_FUNCTIONAL_ASSESSMENT: NONE - DENIES PAIN

## 2022-08-08 NOTE — ED NOTES
Pt to ER with complaints of numbness, tingling, and weakness on the right side. [place] right arm started to feel tingly and weak. This started around 1400. Pt states upon arrival he is feeling better. He reports he does have stroke history.  NIH completed upon arrival.      Rizwana Pittman RN  08/08/22 [id number]

## 2022-08-08 NOTE — CONSULTS
Neurology Consult Note    Date:8/8/2022       Room:[facility]  Patient Name:Patrice Hastings     YOB: 1950     Age:72 y.o. Requesting Physician: No att. providers found     Reason for Consult:  Evaluate for TIA, R sided numbness, tingling, weakness      Chief Complaint:   Chief Complaint   Patient presents with    Numbness    Tingling    Extremity Weakness       Subjective     Court Montgomery is a 81 y.o. male with a history of CVA affecting the left side (2010), CAD, Afib - on Eliquis, HLD, HTN, prostate cancer who presents to [facility] ED complaining of sudden onset R sided face, arm, and leg numbness and tingling, with mild associated weakness of RUE. Symptoms began around 1400 today and resolved approximately 20 minutes later upon arrival to the ED. He described tingling most pronounced in RUE, with mild tingling of Right side of bottom lip and jaw, as well as into RLE. Reports symptoms of previous stroke were similar - sudden onset of left arm numbness and tingling that resolved within 30 minutes, with imaging 3-4 days later revealing the stroke. Also reports hx of TIAs in the past in between CVA and today. He is currently on Eliquis for hx of afib, as well as statin therapy for HLD. However, he denies currently taking Plavix 75 mg, as stated in the chart and is unsure when/if he ever began/discontinued this medication. He denies currently following with Neurology. He denies reoccurrence of symptoms since arriving to the ED. Denies confusion, vision changes, difficulty speaking, additional numbness or tingling, weakness, headache, chest pain, SOB, or facial droop. Denies LOC, drove himself to the ED and is able to recall the entirety of the event    Review of Systems   Review of Systems   Constitutional:  Negative for chills and fever. HENT:  Negative for rhinorrhea and sore throat. Eyes:  Negative for photophobia and visual disturbance.    Respiratory:  Negative for cough and shortness of breath. Cardiovascular:  Negative for chest pain and palpitations. Gastrointestinal:  Negative for abdominal pain, nausea and vomiting. Genitourinary:  Negative for dysuria. Musculoskeletal:  Positive for back pain. Negative for arthralgias, myalgias, neck pain and neck stiffness. Skin:  Negative for rash. Neurological:  Positive for weakness and numbness. Negative for dizziness, tremors, seizures, facial asymmetry, speech difficulty, light-headedness and headaches. Psychiatric/Behavioral:  Negative for confusion and decreased concentration. The patient is not nervous/anxious. Medications   Scheduled Meds:   Continuous Infusions:   PRN Meds:   Medications Prior to Admission:   No current facility-administered medications on file prior to encounter. Current Outpatient Medications on File Prior to Encounter   Medication Sig Dispense Refill    cyclobenzaprine (FLEXERIL) 10 MG tablet Take 1 tablet by mouth every 8 hours as needed      traMADol (ULTRAM) 50 MG tablet Take 1 tablet by mouth 3 times daily.  As needed      atorvastatin (LIPITOR) 20 MG tablet TAKE 1 TABLET BY MOUTH EVERY DAY 90 tablet 1    famotidine (PEPCID) 20 MG tablet TAKE 1 TABLET BY MOUTH TWICE A  tablet 2    ELIQUIS 5 MG TABS tablet TAKE 1 TABLET BY MOUTH TWICE A  tablet 1    metoprolol succinate (TOPROL XL) 50 MG extended release tablet TAKE 1/2 TABLET BY MOUTH EVERY DAY 45 tablet 3    ferrous sulfate (IRON 325) 325 (65 Fe) MG tablet Take 325 mg by mouth daily (with breakfast)      cetirizine (ZYRTEC) 10 MG tablet TAKE 1 TABLET BY MOUTH EVERY DAY 90 tablet 3    nitroGLYCERIN (NITROSTAT) 0.4 MG SL tablet PLACE 1 TABLET UNDER TONGUE EVERY 5 MINS, UP TO 3 DOSES AS NEEDED FOR CHEST PAIN 25 tablet 1    calcium-vitamin D (OSCAL-500) 500-200 MG-UNIT per tablet Take 1 tablet by mouth daily Indications: 200 mg      clopidogrel (PLAVIX) 75 MG tablet Take 1 tablet by mouth daily 90 tablet 3    Coenzyme Q10 (COQ10 PO) Take 500 mg by mouth daily      Krill Oil 500 MG CAPS Take  by mouth daily. Past History    Past Medical History:   has a past medical history of Arthritis, BPH (benign prostatic hyperplasia), CAD (coronary artery disease), CVA (cerebral vascular accident) ([facility] [address] Disease of blood and blood forming organ, FH: heart disease, GERD (gastroesophageal reflux disease), Hyperlipidemia, Hypertension, Obesity, and Prostate cancer ([facility] [address] Social History:   reports that he has never smoked. He quit smokeless tobacco use about 22 years ago. His smokeless tobacco use included chew. He reports that he does not drink alcohol and does not use drugs. Family History:   Family History   Problem Relation Age of Onset    Heart Disease Mother     Diabetes Mother     High Blood Pressure Mother     Heart Disease Father     High Blood Pressure Father     High Blood Pressure Sister     Colon Cancer Neg Hx     Colon Polyps Neg Hx     Esophageal Cancer Neg Hx        Physical Examination      Vitals:  BP (!) 162/94   Pulse 58   Temp 97.5 °F (36.4 °C) (Oral)   Resp 16   Ht 6' (1.829 m)   Wt 260 lb (117.9 kg)   SpO2 98%   BMI 35.26 kg/m²   Temp (24hrs), Av.5 °F (36.4 °C), Min:97.5 °F (36.4 °C), Max:97.5 °F (36.4 °C)      I/O (24Hr): No intake or output data in the 24 hours ending 22      Physical Exam  Vitals (hypertensive) reviewed. Constitutional:       General: He is not in acute distress. Appearance: Normal appearance. He is not ill-appearing. HENT:      Head: Normocephalic and atraumatic. Right Ear: External ear normal.      Left Ear: External ear normal.      Nose: Nose normal.      Mouth/Throat:      Mouth: Mucous membranes are moist.      Pharynx: No oropharyngeal exudate or posterior oropharyngeal erythema. Eyes:      Extraocular Movements: Extraocular movements intact and EOM normal.      Pupils: Pupils are equal, round, and reactive to light.    Cardiovascular:      Rate and Rhythm: Normal rate and regular rhythm. Heart sounds: Normal heart sounds. No murmur heard. Pulmonary:      Effort: Pulmonary effort is normal. No respiratory distress. Breath sounds: Normal breath sounds. No wheezing. Abdominal:      General: Bowel sounds are normal.      Palpations: Abdomen is soft. Tenderness: There is no abdominal tenderness. Musculoskeletal:         General: Normal range of motion. Cervical back: Normal range of motion. No tenderness. Right lower leg: No edema. Left lower leg: No edema. Skin:     General: Skin is warm. Findings: No rash. Neurological:      Mental Status: He is alert and oriented to person, place, and time. Coordination: Finger-Nose-Finger Test and Heel to [employer] normal.   Psychiatric:         Mood and Affect: Mood normal.         Speech: Speech normal.         Behavior: Behavior normal.     Neurologic Exam     Mental Status   Oriented to person, place, and time. Follows 2 step commands. Attention: normal. Concentration: normal.   Speech: speech is normal   Level of consciousness: alert  Normal comprehension. Cranial Nerves     CN II   Visual fields full to confrontation. Right visual field deficit: none  Left visual field deficit: none     CN III, IV, VI   Pupils are equal, round, and reactive to light. Extraocular motions are normal.   Right pupil: Shape: regular. Reactivity: brisk. Left pupil: Shape: regular. Reactivity: brisk. CN V   Facial sensation intact. Right facial sensation deficit: none  Left facial sensation deficit: none    CN VII   Facial expression full, symmetric.    Right facial weakness: none  Left facial weakness: none    CN VIII   CN VIII normal.   Hearing: intact    CN IX, X   CN IX normal.   Palate: symmetric    CN XI   CN XI normal.   Right sternocleidomastoid strength: normal  Left sternocleidomastoid strength: normal  Right trapezius strength: normal  Left trapezius strength: normal    CN XII   CN XII normal.   Tongue: not atrophic  Fasciculations: absent  Tongue deviation: none    Motor Exam   Muscle bulk: normal  Overall muscle tone: normal  Right arm pronator drift: absent  Left arm pronator drift: absent  Muscle strength 5/5 bilateral upper extremities, 4+/5 bilateral lower extremities. Sensory Exam   Light touch normal.     Gait, Coordination, and Reflexes     Coordination   Finger to nose coordination: normal  Heel to shin coordination: normal    Tremor   Resting tremor: absent  Intention tremor: absent  Action tremor: absent     Labs/Imaging/Diagnostics   Labs:  CBC:  Recent Labs     08/08/22  1525   WBC 12.3*   RBC 4.71   HGB 16.1   HCT 46.8   MCV 99.4*        CHEMISTRIES:  Recent Labs     08/08/22  1525      K 3.7   CL 99   CO2 23   BUN 19   CREATININE 0.7   GLUCOSE 173*     COAGULATION STUDIES:No results for input(s): PROTIME, INR, APTT in the last 72 hours. LIVER PROFILE:  Recent Labs     08/08/22  1525   AST 36   ALT 44   BILIDIR <0.2   BILITOT 0.7   ALKPHOS 68     CHOLESTEROL AND A1C:No results for input(s): LDLCALC, HDL, CHOL, TRIG, LABA1C in the last 720 hours. Imaging Last 24 Hours:  CTA HEAD W WO CONTRAST    Result Date: 8/8/2022  CTA of the brain. Technique: Axial CTA images of the brain. Sagittal and coronal reconstructions provided. 3D postprocessing and/or MIPS were included. Comparison: None Findings: Moderate atherosclerotic vessel disease within the cavernous segment bilateral internal carotid arteries. Otherwise normal anterior circulation. This includes the intracranial portion of the internal carotid arteries, anterior cerebral arteries and middle cerebral arteries. No evidence for aneurysm or occlusion. The right vertebral artery is not identified and could be occluded. This includes the vertebral arteries, basilar artery and posterior cerebral arteries. No evidence for aneurysm. Impression: The right vertebral artery is not identified and could be occluded. This could be chronic. This document has been electronically signed by: Varghese Clark MD on 08/08/2022 05:50 PM All CTs at this facility use dose modulation techniques and iterative reconstructions, and/or weight-based dosing when appropriate to reduce radiation to a low as reasonably achievable. 3D Post-processing was performed on this study. CT Head WO Contrast    Result Date: 8/8/2022  Noncontrast CT of the head Technique: Noncontrast CT of the head from the vertex through the skull base. Comparison:  CT - CT HEAD WO CONTR - 06/30/2014 10:23 PM EDT Findings: Old ischemic right frontal lobe and right parietal lobe. Moderate nonspecific periventricular and deep white matter disease. This most often can be ascribed to chronic small vessel ischemic change. No intracranial mass, hemorrhage or hydrocephalus. No definite acute ischemic event by CT. No skull fractures. Normal aeration of the nasal sinuses. Impression: Old ischemic event left frontal lobe and right parietal lobe. Moderate nonspecific periventricular and deep white matter disease. This document has been electronically signed by: Varghese Clark MD on 08/08/2022 05:46 PM All CTs at this facility use dose modulation techniques and iterative reconstructions, and/or weight-based dosing when appropriate to reduce radiation to a low as reasonably achievable. CTA NECK W WO CONTRAST    Result Date: 8/8/2022  PROCEDURE: CTA HEAD W WO CONTRAST, CTA NECK W WO CONTRAST CLINICAL INFORMATION: tia, R arm weakness. COMPARISON: Head CT 8/8/2022. TECHNIQUE: 1 mm axial images were obtained through the head and neck after the fast bolus administration of contrast. A noncontrast localizer was obtained. 3-D reconstructions were performed on a dedicated 3-D workstation. These include multiplanar MPR images and multiplanar MIP images. Centerline reconstructions were obtained of the carotid systems. Isovue intravenous contrast was given.  All CT scans at this facility use dose modulation, iterative reconstruction, and/or weight-based dosing when appropriate to reduce radiation dose to as low as reasonably achievable. FINDINGS: CTA NECK: Aortic arch and branches: There is some atherosclerosis of the aortic arch. There is no stenosis of the origin innominate artery, left common carotid artery or either subclavian artery. Right common carotid artery/ICA: The right common carotid artery is normal. There is some minimal atherosclerosis of the right carotid bulb. There is no stenosis. There is no stenosis of the right internal carotid artery. Left common carotid artery/ICA: The left common carotid artery is within acceptable limits. There is complex atherosclerosis of the left carotid bulb. There are areas of soft plaque and calcified plaque. Using NASCET criteria and the distal left internal  carotid artery as the reference, there is a 63% stenosis at the origin left internal carotid artery. There is no distal stenosis. Vertebral arteries: The right vertebral artery is a diminutive vessel. This ends at the C1 level. On the left, the vertebral artery is dominant. There is atherosclerosis of the origin. There is atherosclerosis at its proximal segment. There is a moderate  stenosis proximally. There is some atherosclerosis along its course. There is a mild stenosis of the left vertebral artery as it enters the dural ring. CTA HEAD: Internal carotid arteries: There is calcified atherosclerosis of the cavernous segments of the internal carotid arteries bilaterally. There is no associated significant stenosis. The ophthalmic artery origins are normal. Middle cerebral arteries: Normal. The proximal branches are also normal. Anterior cerebral arteries: Normal. The proximal branches are normal. There is a normal small anterior communicating artery. Vertebral arteries: The right vertebral artery ends at C1. The left vertebral artery is dominant.  Mild stenosis at that enters the dural ring. Basilar artery: Normal. Superior cerebellar arteries: Normal. Posterior cerebral arteries: Normal. The proximal branches are also normal. There is a normal right posterior communicating artery. No aneurysms, stenoses or occlusions are noted. The superior sagittal sinus, vein of Abdirashid, internal cerebral veins, straight sinus, transverse sinuses and sigmoid sinuses are patent. Axial source data: There are no suspicious findings the lung apices. There is no upper mediastinal adenopathy. There is no cervical adenopathy. There are no gross abnormalities in the brain. 1. No significant stenosis of the right carotid system. 2. Complex atherosclerosis of the left carotid bulb with a 63% stenosis at the origin of the left internal carotid artery. 3. Moderate stenosis in the proximal left vertebral artery. This is the dominant vertebral artery. 4. No intracranial stenoses or occlusions. **This report has been created using voice recognition software. It may contain minor errors which are inherent in voice recognition technology. ** Final report electronically signed by Dr. Jordana Zabala on 8/8/2022 4:02 PM    XR CHEST 1 VIEW    Result Date: 8/8/2022  PROCEDURE: XR CHEST 1 VIEW CLINICAL INFORMATION: stroke COMPARISON: Chest radiograph 7/10/2021 TECHNIQUE: Single frontal view of the chest performed. FINDINGS: Calcified granuloma is seen in the left lower lobe. No focal pulmonary consolidation. Cardiac silhouette is not enlarged. No pleural effusion. No pneumothorax. No acute bony abnormality. Degenerative changes of the thoracic spine. 1. No acute bony abnormality. **This report has been created using voice recognition software. It may contain minor errors which are inherent in voice recognition technology. ** Final report electronically signed by Dr Mariama Paul on 8/8/2022 3:43 PM    MRI BRAIN WO CONTRAST    Result Date: 8/8/2022  Noncontrast MRI of the brain.  Technique: Sagittal T1, coronal T2, axial T1, T2, FLAIR and diffusion-weighted imaging. Comparison:  CT,SR - CT HEAD WO CONTRAST - 08/08/2022 03:07 PM EDT  MR - MRI BRAIN B STEM WO CONTR - 06/18/2014 03:23 PM EDT Findings: Normal signal on diffusion-weighted imaging. No evidence for acute ischemic event. Old ischemic event right frontal lobe measuring 3.2 x 4.2 cm. Old ischemic event right parietal lobe measuring 1.3 x 1.5 cm. Moderate nonspecific periventricular and deep white matter disease. This most often can be ascribed to chronic small vessel ischemic change. There are no intracranial masses. No evidence for hemorrhage. The ventricles are normal size, no hydrocephalus. Contents of the posterior fossa including brainstem and cerebellum are unremarkable. Normal vascular flow voids. Normal aeration of paranasal sinuses. Impression: Old ischemic event right frontal lobe and right parietal lobe. Moderate nonspecific periventricular and deep white matter disease. This most often can be ascribed to chronic small vessel ischemic change. This document has been electronically signed by: Daryl Wolf MD on 08/08/2022 05:45 PM     Assessment and Plan:        Transient Ischemic Attack, resolved  CT Head: negative for acute infarct. chronic infarct of R frontal lobe (although read as left) and right parietal lobe. Moderate nonspecific periventricular and deep white matter disease  CTA H/N: No hemodynamically significant stenoses or occlusions, full read above. CTA Head re-read as possible chronic occlusion of R vertebral artery, as it is not well visualized, not noted on initial read, full read above. MRI Brain WO: negative for acute findings. chronic infarct of R frontal and R parietal lobe, moderate nonspecific periventricular and deep white matter disease, full read above. Restart Plavix 75 mg daily  Continue Eliquis 5 mg BID  Continue aggressive risk factor management. Continue home medications. BP control <130/90  LDL goal 45-70.   Follow up with  [facility] in 1 month. Inpatient neurologic work-up complete at this time. Neurology will sign off. Please call with questions or concerns. Thank you for this consult. This patient's case was discussed with Dr. Jenny Geronimo who is in agreement with assessment and plan.     Electronically signed by Frank Jung PA-C on 8/8/22 at 8:03 PM EDT

## 2022-08-08 NOTE — ED PROVIDER NOTES
neck pain and neck stiffness. Skin:  Negative for pallor, rash and wound. Neurological:  Positive for weakness and numbness. Negative for tremors, seizures, syncope, facial asymmetry and headaches. Hematological:  Negative for adenopathy. Psychiatric/Behavioral:  Negative for agitation, behavioral problems, hallucinations, self-injury and suicidal ideas. PAST MEDICAL AND SURGICAL HISTORY     Past Medical History:   Diagnosis Date    Arthritis     back    BPH (benign prostatic hyperplasia)     CAD (coronary artery disease)     CVA (cerebral vascular accident) ([facility] [address] 2010    Disease of blood and blood forming organ     anemia    FH: heart disease     GERD (gastroesophageal reflux disease)     Hyperlipidemia     Hypertension     Obesity     Prostate cancer ([facility] [address] 2019     Past Surgical History:   Procedure Laterality Date    BACK SURGERY  08/2021    CARDIAC VALVE REPLACEMENT  April 2011    [place], [place] (Pt unsure if part of CABG)    CARDIOVASCULAR STRESS TEST  1 05 2011    Cannot exclude slight inferobasal lateral stress-induced ischemia in a relatively small-sized area. EF 68%. Mildly abnormal nuclear scan. Lexiscan test associated with nonspecific symptoms. EKG nondiagnostic with nonspecific ST-T wave changes. CAROTID ENDARTERECTOMY  2 08 2011    Right carotid endarterectomy with patch angioplasty with convention patch angioplasty. COLONOSCOPY      CORONARY ARTERY BYPASS GRAFT      DIAGNOSTIC CARDIAC CATH LAB PROCEDURE  3 24 2011    LV end-diastolic pressure was 12 mmHg with no change before and after contrast injection. There was no significant gradient across the aortic valve to signify aortic stenosis. LV function was within normal limits, EF 55%. 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 Nonobstructive diffuse disease in a small sized RCA. Nomral LV function. EGD      HAND FUSION Right     HERNIA REPAIR      Inguinal hernia repair. PROSTATECTOMY N/A 3/20/2019    PROSTATECTOMY LAPAROSCOPIC ROBOTIC performed by Melba Johnson MD at [address] [place] [address] ECHOCARDIOGRAM  12 21 2010    Size was normal. Systolic function was normal. EF was estimated in the range of 55-65%. There were no regional wall motion abnormalities. Wall thickness was normal. Doppler - LV diastolic function parameters were normal. Mild MR. The aortic valve was trileaflet. Leaflets exhibited mildly increased thickness, mild calcification, normal cuspal separation, and sclerosis. Mild TR. VASCULAR SURGERY      cabg harvests from chest         MEDICATIONS   No current facility-administered medications for this encounter. Current Outpatient Medications:     cyclobenzaprine (FLEXERIL) 10 MG tablet, Take 1 tablet by mouth every 8 hours as needed, Disp: , Rfl:     traMADol (ULTRAM) 50 MG tablet, Take 1 tablet by mouth 3 times daily.  As needed, Disp: , Rfl:     atorvastatin (LIPITOR) 20 MG tablet, TAKE 1 TABLET BY MOUTH EVERY DAY, Disp: 90 tablet, Rfl: 1    famotidine (PEPCID) 20 MG tablet, TAKE 1 TABLET BY MOUTH TWICE A DAY, Disp: 180 tablet, Rfl: 2    ELIQUIS 5 MG TABS tablet, TAKE 1 TABLET BY MOUTH TWICE A DAY, Disp: 180 tablet, Rfl: 1    metoprolol succinate (TOPROL XL) 50 MG extended release tablet, TAKE 1/2 TABLET BY MOUTH EVERY DAY, Disp: 45 tablet, Rfl: 3    ferrous sulfate (IRON 325) 325 (65 Fe) MG tablet, Take 325 mg by mouth daily (with breakfast), Disp: , Rfl:     cetirizine (ZYRTEC) 10 MG tablet, TAKE 1 TABLET BY MOUTH EVERY DAY, Disp: 90 tablet, Rfl: 3    nitroGLYCERIN (NITROSTAT) 0.4 MG SL tablet, PLACE 1 TABLET UNDER TONGUE EVERY 5 MINS, UP TO 3 DOSES AS NEEDED FOR CHEST PAIN, Disp: 25 tablet, Rfl: 1    calcium-vitamin D (OSCAL-500) 500-200 MG-UNIT per tablet, Take 1 tablet by mouth daily Indications: 200 mg, Disp: , Rfl:     clopidogrel (PLAVIX) 75 MG tablet, Take 1 tablet by mouth daily, Disp: 90 tablet, Rfl: 3    Coenzyme Q10 (COQ10 PO), Take 500 mg by mouth daily, Disp: , Rfl:     Krill Oil 500 MG CAPS, Take  by mouth daily. , Disp: , Rfl:       SOCIAL HISTORY     Social History     Social History Narrative    Not on file     Social History     Tobacco Use    Smoking status: Never    Smokeless tobacco: Former     Types: Chew     Quit date: 1/1/2000   Vaping Use    Vaping Use: Never used   Substance Use Topics    Alcohol use: No     Alcohol/week: 0.0 standard drinks     Comment: rare    Drug use: No         ALLERGIES   No Known Allergies      FAMILY HISTORY     Family History   Problem Relation Age of Onset    Heart Disease Mother     Diabetes Mother     High Blood Pressure Mother     Heart Disease Father     High Blood Pressure Father     High Blood Pressure Sister     Colon Cancer Neg Hx     Colon Polyps Neg Hx     Esophageal Cancer Neg Hx          PREVIOUS RECORDS   Previous records reviewed: I reviewed the patient's past medical records including relevant labs, imaging and procedures. Patient last seen in the emergency room on 6/26/2021 due to contact dermatitis  PHYSICAL EXAM     ED Triage Vitals [08/08/22 1443]   BP Temp Temp Source Heart Rate Resp SpO2 Height Weight   (!) 156/94 97.5 °F (36.4 °C) Oral 69 18 97 % 6' (1.829 m) 260 lb (117.9 kg)     Initial vital signs and nursing assessment reviewed and abnormal from HTN . Body mass index is 35.26 kg/m². Pulsoximetry is normal per my interpretation. Additional Vital Signs:  Vitals:    08/08/22 1836   BP: (!) 162/94   Pulse: 58   Resp: 16   Temp:    SpO2: 98%       Physical Exam  Constitutional:       Appearance: Normal appearance. He is obese. He is not diaphoretic. HENT:      Head: Normocephalic and atraumatic. Right Ear: External ear normal.      Left Ear: External ear normal.      Nose: Nose normal. No congestion or rhinorrhea. Mouth/Throat:      Mouth: Mucous membranes are moist.      Pharynx: Oropharynx is clear. Eyes:      General: No scleral icterus.         Right eye: No discharge. Left eye: No discharge. Extraocular Movements: Extraocular movements intact. Conjunctiva/sclera: Conjunctivae normal.      Pupils: Pupils are equal, round, and reactive to light. Cardiovascular:      Rate and Rhythm: Normal rate and regular rhythm. Pulses: Normal pulses. Heart sounds: Normal heart sounds. No murmur heard. Pulmonary:      Effort: Pulmonary effort is normal. No respiratory distress. Breath sounds: Normal breath sounds. No stridor. No wheezing. Chest:      Chest wall: No tenderness. Abdominal:      General: Abdomen is flat. Bowel sounds are normal. There is no distension. Palpations: Abdomen is soft. There is no mass. Tenderness: There is no abdominal tenderness. There is no guarding or rebound. Musculoskeletal:         General: No swelling, tenderness, deformity or signs of injury. Normal range of motion. Cervical back: Normal range of motion and neck supple. Skin:     General: Skin is warm and dry. Capillary Refill: Capillary refill takes less than 2 seconds. Coloration: Skin is not jaundiced. Findings: No bruising or erythema. Neurological:      General: No focal deficit present. Mental Status: He is alert and oriented to person, place, and time. Motor: No weakness. Psychiatric:         Mood and Affect: Mood normal.         Behavior: Behavior normal.         Thought Content:  Thought content normal.     ED RESULTS   Laboratory results:  Labs Reviewed   CBC WITH AUTO DIFFERENTIAL - Abnormal; Notable for the following components:       Result Value    WBC 12.3 (*)     MCV 99.4 (*)     MCH 34.2 (*)     MPV 9.0 (*)     Segs Absolute 9.3 (*)     Immature Grans (Abs) 0.49 (*)     All other components within normal limits   BASIC METABOLIC PANEL W/ REFLEX TO MG FOR LOW K - Abnormal; Notable for the following components:    Glucose 173 (*)     All other components within normal limits   POCT GLUCOSE - Abnormal; Notable for the following components:    POC Glucose 199 (*)     All other components within normal limits   HEPATIC FUNCTION PANEL   TROPONIN   BRAIN NATRIURETIC PEPTIDE   ANION GAP   OSMOLALITY   GLOMERULAR FILTRATION RATE, ESTIMATED   URINALYSIS WITH REFLEX TO CULTURE       Radiologic studies results:  MRI BRAIN WO CONTRAST   Final Result   Impression:   Old ischemic event right frontal lobe and right parietal lobe. Moderate nonspecific periventricular and deep white matter disease. This    most often can be ascribed to chronic small vessel ischemic change. This document has been electronically signed by: Eric García MD on    08/08/2022 05:45 PM      CT Head WO Contrast   Final Result   Impression:      Old ischemic event left frontal lobe and right parietal lobe. Moderate nonspecific periventricular and deep white matter disease. This document has been electronically signed by: Eric García MD on    08/08/2022 05:46 PM      All CTs at this facility use dose modulation techniques and iterative    reconstructions, and/or weight-based dosing   when appropriate to reduce radiation to a low as reasonably achievable. CTA HEAD W WO CONTRAST   Final Result   Impression:   The right vertebral artery is not identified and could be occluded. This    could be chronic. This document has been electronically signed by: Eric García MD on    08/08/2022 05:50 PM      All CTs at this facility use dose modulation techniques and iterative    reconstructions, and/or weight-based dosing   when appropriate to reduce radiation to a low as reasonably achievable. 3D Post-processing was performed on this study. CTA NECK W WO CONTRAST   Final Result       1. No significant stenosis of the right carotid system. 2. Complex atherosclerosis of the left carotid bulb with a 63% stenosis at the origin of the left internal carotid artery.    3. Moderate stenosis in the proximal left vertebral artery. This is the dominant vertebral artery. 4. No intracranial stenoses or occlusions. **This report has been created using voice recognition software. It may contain minor errors which are inherent in voice recognition technology. **      Final report electronically signed by Dr. Sonal Powers on 8/8/2022 4:02 PM      XR CHEST 1 VIEW   Final Result   1. No acute bony abnormality. **This report has been created using voice recognition software. It may contain minor errors which are inherent in voice recognition technology. **      Final report electronically signed by Dr Chico Merchant on 8/8/2022 3:43 PM          ED Medications administered this visit:   Medications   0.9 % sodium chloride bolus (0 mLs IntraVENous Stopped 8/8/22 1639)   iopamidol (ISOVUE-370) 76 % injection 80 mL (80 mLs IntraVENous Given 8/8/22 1507)         ED COURSE     ED Course as of 08/08/22 1906   Mon Aug 08, 2022   1511 Neurology PA made aware of patient  [EL]   8348 CT Head WO Contrast  Stable appearing old infarcts in the right frontal lobe. No new abnormalities [EL]   1609 WBC(!): 12.3 [EL]   1622 CTA: IMPRESSION:  1. No significant stenosis of the right carotid system. 2. Complex atherosclerosis of the left carotid bulb with a 63% stenosis at the origin of the left internal carotid artery. 3. Moderate stenosis in the proximal left vertebral artery. This is the dominant vertebral artery. 4. No intracranial stenoses or occlusions. [EL]   2556 MRI BRAIN WO CONTRAST  Old ischemic event right frontal lobe and right parietal lobe. Moderate nonspecific periventricular and deep white matter disease. This  most often can be ascribed to chronic small vessel ischemic change. [EL]      ED Course User Index  [EL] Mini Chi MD       MEDICAL DECISION MAKING   Initial Assessment:   68-year-old male chief complaint numbness, tingling, weakness on the right side.   Lasting approximately 15 minutes earlier today. Patient with past medical history significant of previous CVA     Given the patient's above chief complaint and findings on history and physical examination, I thought it was appropriate to consider the following emergency medical conditions:  CVA, stroke, TIA, hemorrhagic stroke, intracranial hemorrhage    Although some of these diagnoses are unlikely they were considered in my medical decision making. 72-year-old male chief complaint 15 minutes of numbness, tingling, weakness to the right upper extremity. In the ED, NIH 0. CTA with stenosis, MRI negative. Patient is currently on Eliquis and Lipitor. States that he is not taking his Plavix however has previously had Plavix on board. Neurology was consulted. At this time patient to start outpatient Plavix 75 mg. Will follow-up with Dr. Cate Dinero in 1 month. Okay for discharge. Strict return precautions and follow up instructions were discussed with the patient prior to discharge, with which the patient agrees. MEDICATION CHANGES     New Prescriptions    No medications on file         FINAL DISPOSITION     Final diagnoses:   TIA (transient ischemic attack)     Condition: condition: stable  Dispo: Discharge to home      This transcription was electronically signed. Parts of this transcriptions may have been dictated by use of voice recognition software and electronically transcribed, and parts may have been transcribed with the assistance of an ED scribe. The transcription may contain errors not detected in proofreading. Please refer to my supervising physician's documentation if my documentation differs.     Electronically Signed: Kenna Dance, MD, 08/08/22, 7:06 PM         Prince Peterson MD  Resident  08/08/22 8479

## 2022-08-08 NOTE — DISCHARGE INSTRUCTIONS
You are seen today in the emergency department for numbness, tingling, weakness to your right extremity. You were seen to have a TIA. Please add Plavix back to your regimen. I have sent you with a prescription for Plavix just in case you do not have any left. You are to follow-up with neurology in 1 month. Return to the emergency department develop any new concerning symptoms such as another episode of numbness, weakness, tingling.   Any signs of stroke

## 2022-08-09 LAB
EKG Q-T INTERVAL: 396 MS
EKG QRS DURATION: 96 MS
EKG QTC CALCULATION (BAZETT): 408 MS
EKG R AXIS: -23 DEGREES
EKG T AXIS: 25 DEGREES
EKG VENTRICULAR RATE: 64 BPM

## 2022-08-09 PROCEDURE — 93010 ELECTROCARDIOGRAM REPORT: CPT | Performed by: INTERNAL MEDICINE

## 2022-08-11 ENCOUNTER — OFFICE VISIT (OUTPATIENT)
Dept: FAMILY MEDICINE CLINIC | Age: 72
End: 2022-08-11
Payer: MEDICARE

## 2022-08-11 ENCOUNTER — TELEPHONE (OUTPATIENT)
Dept: NEUROLOGY | Age: 72
End: 2022-08-11

## 2022-08-11 ENCOUNTER — HOSPITAL ENCOUNTER (EMERGENCY)
Age: 72
Discharge: HOME | End: 2022-08-11
Attending: STUDENT IN AN ORGANIZED HEALTH CARE EDUCATION/TRAINING PROGRAM
Payer: MEDICARE

## 2022-08-11 VITALS
DIASTOLIC BLOOD PRESSURE: 68 MMHG | SYSTOLIC BLOOD PRESSURE: 124 MMHG | HEART RATE: 81 BPM | HEIGHT: 72 IN | WEIGHT: 262 LBS | BODY MASS INDEX: 35.49 KG/M2 | OXYGEN SATURATION: 95 % | RESPIRATION RATE: 16 BRPM

## 2022-08-11 VITALS
BODY MASS INDEX: 35.21 KG/M2 | WEIGHT: 260 LBS | OXYGEN SATURATION: 98 % | TEMPERATURE: 98.1 F | HEART RATE: 72 BPM | DIASTOLIC BLOOD PRESSURE: 84 MMHG | HEIGHT: 72 IN | SYSTOLIC BLOOD PRESSURE: 146 MMHG | RESPIRATION RATE: 17 BRPM

## 2022-08-11 DIAGNOSIS — Z00.00 ROUTINE GENERAL MEDICAL EXAMINATION AT A HEALTH CARE FACILITY: Primary | ICD-10-CM

## 2022-08-11 DIAGNOSIS — G45.9 TIA (TRANSIENT ISCHEMIC ATTACK): ICD-10-CM

## 2022-08-11 DIAGNOSIS — R31.0 GROSS HEMATURIA: ICD-10-CM

## 2022-08-11 DIAGNOSIS — R73.9 HYPERGLYCEMIA: ICD-10-CM

## 2022-08-11 DIAGNOSIS — H53.2 DIPLOPIA: Primary | ICD-10-CM

## 2022-08-11 DIAGNOSIS — Z00.00 MEDICARE ANNUAL WELLNESS VISIT, SUBSEQUENT: ICD-10-CM

## 2022-08-11 DIAGNOSIS — R31.9 HEMATURIA, UNSPECIFIED TYPE: ICD-10-CM

## 2022-08-11 LAB
BACTERIA: ABNORMAL /HPF
BILIRUBIN URINE: ABNORMAL
BLOOD, URINE: ABNORMAL
CASTS 2: ABNORMAL /LPF
CASTS UA: ABNORMAL /LPF
CHARACTER, URINE: CLEAR
COLOR: ABNORMAL
CRYSTALS, UA: ABNORMAL
EPITHELIAL CELLS, UA: ABNORMAL /HPF
GLUCOSE URINE: NEGATIVE MG/DL
ICTOTEST: NEGATIVE
KETONES, URINE: ABNORMAL
LEUKOCYTE ESTERASE, URINE: ABNORMAL
MISCELLANEOUS 2: ABNORMAL
NITRITE, URINE: NEGATIVE
PH UA: 5.5 (ref 5–9)
PROTEIN UA: 100
RBC URINE: ABNORMAL /HPF
RENAL EPITHELIAL, UA: ABNORMAL
SPECIFIC GRAVITY, URINE: 1.02 (ref 1–1.03)
UROBILINOGEN, URINE: 1 EU/DL (ref 0–1)
WBC UA: ABNORMAL /HPF
YEAST: ABNORMAL

## 2022-08-11 PROCEDURE — 90471 IMMUNIZATION ADMIN: CPT | Performed by: FAMILY MEDICINE

## 2022-08-11 PROCEDURE — 1123F ACP DISCUSS/DSCN MKR DOCD: CPT | Performed by: FAMILY MEDICINE

## 2022-08-11 PROCEDURE — 90677 PCV20 VACCINE IM: CPT | Performed by: FAMILY MEDICINE

## 2022-08-11 PROCEDURE — 99283 EMERGENCY DEPT VISIT LOW MDM: CPT

## 2022-08-11 PROCEDURE — G0439 PPPS, SUBSEQ VISIT: HCPCS | Performed by: FAMILY MEDICINE

## 2022-08-11 PROCEDURE — 3017F COLORECTAL CA SCREEN DOC REV: CPT | Performed by: FAMILY MEDICINE

## 2022-08-11 PROCEDURE — 81001 URINALYSIS AUTO W/SCOPE: CPT

## 2022-08-11 SDOH — ECONOMIC STABILITY: FOOD INSECURITY: WITHIN THE PAST 12 MONTHS, THE FOOD YOU BOUGHT JUST DIDN'T LAST AND YOU DIDN'T HAVE MONEY TO GET MORE.: NEVER TRUE

## 2022-08-11 SDOH — ECONOMIC STABILITY: FOOD INSECURITY: WITHIN THE PAST 12 MONTHS, YOU WORRIED THAT YOUR FOOD WOULD RUN OUT BEFORE YOU GOT MONEY TO BUY MORE.: NEVER TRUE

## 2022-08-11 ASSESSMENT — SOCIAL DETERMINANTS OF HEALTH (SDOH): HOW HARD IS IT FOR YOU TO PAY FOR THE VERY BASICS LIKE FOOD, HOUSING, MEDICAL CARE, AND HEATING?: NOT HARD AT ALL

## 2022-08-11 ASSESSMENT — ENCOUNTER SYMPTOMS
SHORTNESS OF BREATH: 0
NAUSEA: 0
TROUBLE SWALLOWING: 0
ABDOMINAL PAIN: 0
VOMITING: 0
PHOTOPHOBIA: 0
SORE THROAT: 0
DIARRHEA: 0
COUGH: 0
BACK PAIN: 0

## 2022-08-11 ASSESSMENT — PATIENT HEALTH QUESTIONNAIRE - PHQ9
SUM OF ALL RESPONSES TO PHQ9 QUESTIONS 1 & 2: 2
1. LITTLE INTEREST OR PLEASURE IN DOING THINGS: 1
SUM OF ALL RESPONSES TO PHQ QUESTIONS 1-9: 2
2. FEELING DOWN, DEPRESSED OR HOPELESS: 1

## 2022-08-11 ASSESSMENT — PAIN - FUNCTIONAL ASSESSMENT: PAIN_FUNCTIONAL_ASSESSMENT: NONE - DENIES PAIN

## 2022-08-11 ASSESSMENT — LIFESTYLE VARIABLES
HOW OFTEN DO YOU HAVE A DRINK CONTAINING ALCOHOL: MONTHLY OR LESS
HOW MANY STANDARD DRINKS CONTAINING ALCOHOL DO YOU HAVE ON A TYPICAL DAY: 1 OR 2

## 2022-08-11 NOTE — ED PROVIDER NOTES
[address] [address]          Pt Name: Gemma Red  MRN: [id number]  [place] 1950  Date of evaluation: 8/11/2022  Treating Resident Physician: Francisco Helm MD  Supervising Physician: Virl Osgood, MD      CHIEF COMPLAINT       Chief Complaint   Patient presents with    Other     Medical evaluation      Diplopia    Hematuria     History obtained from the patient. HISTORY OF PRESENT ILLNESS    HPI  Gemma Red is a 81 y.o. male with PMHx of TIA, CAD, atrial fibrillation on Eliquis who presents to the emergency department for evaluation of vision changes. Was seen in our emergency department 3 days ago where he presented with right-sided numbness, weakness and tingling. Neurology at that time evaluate the patient after CT head, CTA head and neck and MRI revealed no acute findings. Patient was loaded with Plavix. Patient went to his neurology follow-up appointment today and at registration he was asked if anything is changed since discharge. Patient states that he had an episode of diplopia, unsure which eye, that lasted approximately 90 seconds while walking yesterday. This resolved on its own he is asymptomatic now. Additionally, patient reports that he had bright red-tinged blood when urinating yesterday. He is provided a urine sample today which shows a small clot. He is denying any abdominal pain, flank pain, fevers, chills. Has his annual physical scheduled day with his PCP  The patient has no other acute complaints at this time. REVIEW OF SYSTEMS   Review of Systems   Constitutional:  Negative for chills and fever. HENT:  Negative for sore throat and trouble swallowing. Eyes:  Positive for visual disturbance. Negative for photophobia. Respiratory:  Negative for cough and shortness of breath. Cardiovascular:  Negative for chest pain, palpitations and leg swelling.    Gastrointestinal:  Negative for abdominal pain, diarrhea, nausea and vomiting. Genitourinary:  Positive for hematuria. Negative for difficulty urinating, flank pain, frequency and urgency. Musculoskeletal:  Negative for arthralgias, back pain, myalgias and neck pain. Skin:  Negative for rash. Neurological:  Negative for seizures, syncope, weakness and headaches. PAST MEDICAL AND SURGICAL HISTORY     Past Medical History:   Diagnosis Date    Arthritis     back    BPH (benign prostatic hyperplasia)     CAD (coronary artery disease)     CVA (cerebral vascular accident) ([facility] [address] 2010    Disease of blood and blood forming organ     anemia    FH: heart disease     GERD (gastroesophageal reflux disease)     Hyperlipidemia     Hypertension     Obesity     Prostate cancer ([facility] [address] 2019     Past Surgical History:   Procedure Laterality Date    BACK SURGERY  08/2021    CARDIAC VALVE REPLACEMENT  April 2011    [place], [place] (Pt unsure if part of CABG)    CARDIOVASCULAR STRESS TEST  1 05 2011    Cannot exclude slight inferobasal lateral stress-induced ischemia in a relatively small-sized area. EF 68%. Mildly abnormal nuclear scan. Lexiscan test associated with nonspecific symptoms. EKG nondiagnostic with nonspecific ST-T wave changes. CAROTID ENDARTERECTOMY  2 08 2011    Right carotid endarterectomy with patch angioplasty with convention patch angioplasty. COLONOSCOPY      CORONARY ARTERY BYPASS GRAFT      DIAGNOSTIC CARDIAC CATH LAB PROCEDURE  3 24 2011    LV end-diastolic pressure was 12 mmHg with no change before and after contrast injection. There was no significant gradient across the aortic valve to signify aortic stenosis. LV function was within normal limits, EF 55%. 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 Nonobstructive diffuse disease in a small sized RCA. Nomral LV function. EGD      HAND FUSION Right     HERNIA REPAIR      Inguinal hernia repair.      PROSTATECTOMY N/A General: No scleral icterus. Extraocular Movements: Extraocular movements intact. Conjunctiva/sclera: Conjunctivae normal.      Pupils: Pupils are equal, round, and reactive to light. Cardiovascular:      Rate and Rhythm: Normal rate and regular rhythm. Pulses: Normal pulses. Heart sounds: Normal heart sounds. No murmur heard. No friction rub. No gallop. Pulmonary:      Effort: Pulmonary effort is normal.      Breath sounds: Normal breath sounds. No wheezing or rales. Abdominal:      Palpations: Abdomen is soft. Tenderness: There is no abdominal tenderness. There is no guarding or rebound. Musculoskeletal:         General: Normal range of motion. Cervical back: Normal range of motion and neck supple. Right lower leg: No edema. Left lower leg: No edema. Skin:     General: Skin is warm and dry. Capillary Refill: Capillary refill takes less than 2 seconds. Neurological:      General: No focal deficit present. Mental Status: He is alert and oriented to person, place, and time. Cranial Nerves: No cranial nerve deficit. Sensory: No sensory deficit. Motor: No weakness. Coordination: Coordination normal.           ED RESULTS   Laboratory results:  Labs Reviewed   URINALYSIS WITH REFLEX TO CULTURE       Radiologic studies results:  No orders to display       ED Medications administered this visit: Medications - No data to display      ED COURSE     ED Course as of 08/11/22 1145   Thu Aug 11, 2022   1024 Discussed the case with neurology. They do not recommend any interventions or medication changes at the time. Recommend that he follows out with outpatient neurology again [TM]   1145 Blood, Urine(!): LARGE [TM]      ED Course User Index  [TM] Shelton Malone MD           MEDICAL DECISION MAKING   Initial Assessment:   53-year-old male on optimal therapy for TIA. Presenting with 90 seconds of diplopia yesterday.   No headache, chest pain or shortness of breath concerning for CAD. Patient has a follow-up with his PCP today. Case is already been discussed with neurology who are recommending no further intervention. The patient had 1 episode of painless hematuria will likely need outpatient urology follow-up but I will leave this to his PCP as he is got an appointment today. No flank pain, benign abdominal exam.      Given the patient's above chief complaint and findings on history and physical examination, I thought it was appropriate to consider the following emergency medical conditions:  TIA, BPPV, peripheral vertigo, acute cystitis, pyelonephritis, urolithiasis, malignancy, hypocoagulability secondary to medications  Although some of these diagnoses are unlikely they were considered in my medical decision making. Discussed the case with neurology who is recommending no further work-up or medication changes regarding the patient's 90 second episode appropriate yesterday. Patient is currently asymptomatic. Patient can follow-up with outpatient neurology. UA revealed a large amount of blood. He is currently not having any abdominal pain so low suspicion for urolithiasis. Patient has a follow-up appoint with his PCP this afternoon who can follow this up. Strict return precautions and follow up instructions were discussed with the patient prior to discharge, with which the patient agrees. MEDICATION CHANGES     New Prescriptions    No medications on file         FINAL DISPOSITION     Final diagnoses:   Diplopia   Hematuria, unspecified type     Condition: condition: stable  Dispo: Discharge to home      This transcription was electronically signed. Parts of this transcriptions may have been dictated by use of voice recognition software and electronically transcribed, and parts may have been transcribed with the assistance of an ED scribe. The transcription may contain errors not detected in proofreading.   Please refer to my supervising physician's documentation if my documentation differs.     Electronically Signed: Paulie Bejarano MD, 08/11/22, 10:47 AM         Alicia Patel MD  Resident  08/11/22 2850

## 2022-08-11 NOTE — ED TRIAGE NOTES
Pt to ED via private vehicle w/rprts of an episode of diplopia yesterday around 2pm that lasted less than a minute. \"Some tingling to R arm\" that quickly resolved. Followed up w/neurologist this morning from a similar incident on the 8/8/2022 and instructed to f/u in ER. Pt rprts noticing blood in urine yesterday and today. Denies pain. A&O x4. Breathing easy and unlabored on RA.

## 2022-08-11 NOTE — PROGRESS NOTES
Immunization(s) given during visit:    Immunizations Administered       Name Date Dose Route    Pneumococcal conjugate PCV20, PF (Prevnar 20) 8/11/2022 0.5 mL Intramuscular    Site: Deltoid- Left    Lot: [id number]    NDC: 1389-0143-77            Most recent Vaccine Information Sheet  given to pt

## 2022-08-11 NOTE — TELEPHONE ENCOUNTER
Patient arrived to his appointment this morning. During the chart review, patient notified me that he had an episode of diplopia that lasted 2 minutes and caused him to stumble that occurred yesterday. He is also on Plavix and Eliquis. Neetu Gallegos was notified of episode and recommended patient go to ER. Patient refused wheelchair transport and requested to walk to ER. Patient was escorted to ER with no problems.

## 2022-08-12 NOTE — PROGRESS NOTES
Medicare Annual Wellness Visit    Alberto Hinson is here for Medicare AWV [facility] 08/08/2022 and 08/11/2022 had numbness and tingling in right arm and right side of mouth. Starting this morning started noticing blood in urine- has a UA in chart from ER.)    Assessment & Plan   Routine general medical examination at a health care facility  -     Lipid Panel; Future  Gross hematuria  -     Urinalysis with Reflex to Culture; Future  TIA (transient ischemic attack)  Hyperglycemia  -     Hemoglobin A1C; Future    Recommendations for Preventive Services Due: see orders and patient instructions/AVS.  Recommended screening schedule for the next 5-10 years is provided to the patient in written form: see Patient Instructions/AVS.     No follow-ups on file. Subjective   The following acute and/or chronic problems were also addressed today:   Diagnosis Orders   1. Routine general medical examination at a health care facility  Lipid Panel      2. Gross hematuria  Urinalysis with Reflex to Culture      3. TIA (transient ischemic attack)        4. Hyperglycemia  Hemoglobin A1C            Patient's complete Health Risk Assessment and screening values have been reviewed and are found in Flowsheets. The following problems were reviewed today and where indicated follow up appointments were made and/or referrals ordered.     Positive Risk Factor Screenings with Interventions:    Fall Risk:  Do you feel unsteady or are you worried about falling? : (!) yes  2 or more falls in past year?: (!) yes  Fall with injury in past year?: no   Fall Risk Interventions:    Home safety tips provided            General Health and ACP:  General  In general, how would you say your health is?: Fair  In the past 7 days, have you experienced any of the following: New or Increased Pain, New or Increased Fatigue, Loneliness, Social Isolation, Stress or Anger?: (!) Yes  Select all that apply: (!) New or Increased Fatigue, Social Isolation  Do you get the social and emotional support that you need?: Yes  Do you have a Living Will?: (!) No    Advance Directives       Power of [employer] Will ACP-Advance Directive ACP-Power of     Not on File Not on File Not on File Not on File        General Health Risk Interventions:  Pain issues: back, to have spinal stimulator removed and MRI repeated    Health Habits/Nutrition:  Physical Activity: Insufficiently Active    Days of Exercise per Week: 3 days    Minutes of Exercise per Session: 20 min     Have you lost any weight without trying in the past 3 months?: No  Body mass index: (!) 35.53  Have you seen the dentist within the past year?: Appointment is scheduled  Health Habits/Nutrition Interventions:  Inadequate physical activity:  back pain related    Hearing/Vision:  Do you or your family notice any trouble with your hearing that hasn't been managed with hearing aids?: No  Do you have difficulty driving, watching TV, or doing any of your daily activities because of your eyesight?: No  Have you had an eye exam within the past year?: (!) No  No results found. Hearing/Vision Interventions:  na    Safety:  Do you have working smoke detectors?: Yes  Do you have any tripping hazards - loose or unsecured carpets or rugs?: (!) Yes  Do you have any tripping hazards - clutter in doorways, halls, or stairs?: No  Do you have either shower bars, grab bars, non-slip mats or non-slip surfaces in your shower or bathtub?: (!) No  Do all of your stairways have a railing or banister?: Yes  Do you always fasten your seatbelt when you are in a car?: Yes  Safety Interventions:  Home safety tips provided           Objective   Vitals:    08/11/22 1347   BP: 124/68   Pulse: 81   Resp: 16   SpO2: 95%   Weight: 262 lb (118.8 kg)   Height: 6' (1.829 m)      Body mass index is 35.53 kg/m².       General Appearance: alert and oriented to person, place and time, well developed and well- nourished, in no acute distress  Skin: warm and dry, no rash or erythema  Head: normocephalic and atraumatic  Eyes: pupils equal, round, and reactive to light, extraocular eye movements intact, conjunctivae normal  ENT: tympanic membrane, external ear and ear canal normal bilaterally, nose without deformity, nasal mucosa and turbinates normal without polyps  Neck: supple and non-tender without mass, no thyromegaly or thyroid nodules, no cervical lymphadenopathy  Pulmonary/Chest: clear to auscultation bilaterally- no wheezes, rales or rhonchi, normal air movement, no respiratory distress  Cardiovascular: normal rate, regular rhythm, normal S1 and S2, no murmurs, rubs, clicks, or gallops, distal pulses intact, no carotid bruits  Abdomen: soft, non-tender, non-distended, normal bowel sounds, no masses or organomegaly  Extremities: no cyanosis, clubbing or edema  Musculoskeletal: normal range of motion, no joint swelling, deformity or tenderness  Neurologic: reflexes normal and symmetric, no cranial nerve deficit, gait, coordination and speech normal       No Known Allergies  Prior to Visit Medications    Medication Sig Taking? Authorizing Provider   clopidogrel (PLAVIX) 75 MG tablet Take 1 tablet by mouth in the morning. Yes Venessa Mancera MD   traMADol (ULTRAM) 50 MG tablet Take 1 tablet by mouth 3 times daily.  As needed Yes Historical Provider, MD   atorvastatin (LIPITOR) 20 MG tablet TAKE 1 TABLET BY MOUTH EVERY DAY Yes Ulises Cornejo MD   famotidine (PEPCID) 20 MG tablet TAKE 1 TABLET BY MOUTH TWICE A DAY  Patient taking differently: Indications: PRN Yes Bill Britton MD   ELIQUIS 5 MG TABS tablet TAKE 1 TABLET BY MOUTH TWICE A DAY Yes Kimberly Thompson MD   metoprolol succinate (TOPROL XL) 50 MG extended release tablet TAKE 1/2 TABLET BY MOUTH EVERY DAY Yes Ulises Cornejo MD   ferrous sulfate (IRON 325) 325 (65 Fe) MG tablet Take 325 mg by mouth daily (with breakfast) Yes Historical Provider, MD   calcium-vitamin D (OSCAL-500) 500-200 MG-UNIT per tablet Take 1 tablet by mouth daily Indications: 200 mg Yes Historical Provider, MD   Coenzyme Q10 (COQ10 PO) Take 500 mg by mouth daily Yes Historical Provider, MD Nolan Maurisio Oil 500 MG CAPS Take  by mouth daily.  Yes Historical Provider, MD   cyclobenzaprine (FLEXERIL) 10 MG tablet Take 1 tablet by mouth every 8 hours as needed  Patient not taking: Reported on 8/11/2022  Historical Provider, MD   nitroGLYCERIN (NITROSTAT) 0.4 MG SL tablet PLACE 1 TABLET UNDER TONGUE EVERY 5 MINS, UP TO 3 DOSES AS NEEDED FOR CHEST PAIN  Patient not taking: Reported on 8/11/2022  [address], MD       [facility] (Including outside providers/suppliers regularly involved in providing care):   Patient Care Team:  Evie Mccord MD as PCP - General (Family Medicine)  Evie Mccord MD as PCP - [facility] Empaneled Provider  Linda Berry MD as Surgeon (Cardiothoracic Surgery)  Tyler Hernandez RN as Nurse Navigator (Oncology)  [address], MD as Cardiologist (Cardiology)     Reviewed and updated this visit:  Tobacco  Allergies  Meds  Problems  Med Hx  Surg Hx  Soc Hx  Fam Hx

## 2022-08-12 NOTE — PATIENT INSTRUCTIONS
Personalized Preventive Plan for Gulshan Dwyer - 8/11/2022  Medicare offers a range of preventive health benefits. Some of the tests and screenings are paid in full while other may be subject to a deductible, co-insurance, and/or copay. Some of these benefits include a comprehensive review of your medical history including lifestyle, illnesses that may run in your family, and various assessments and screenings as appropriate. After reviewing your medical record and screening and assessments performed today your provider may have ordered immunizations, labs, imaging, and/or referrals for you. A list of these orders (if applicable) as well as your Preventive Care list are included within your After Visit Summary for your review. Other Preventive Recommendations:    A preventive eye exam performed by an eye specialist is recommended every 1-2 years to screen for glaucoma; cataracts, macular degeneration, and other eye disorders. A preventive dental visit is recommended every 6 months. Try to get at least 150 minutes of exercise per week or 10,000 steps per day on a pedometer . Order or download the FREE \"Exercise & Physical Activity: Your Everyday Guide\" from [employer]. Call 4-711.365.8722 or search [employer] online. You need 6836-1934 mg of calcium and 3577-9032 IU of vitamin D per day. It is possible to meet your calcium requirement with diet alone, but a vitamin D supplement is usually necessary to meet this goal.  When exposed to the sun, use a sunscreen that protects against both UVA and UVB radiation with an SPF of 30 or greater. Reapply every 2 to 3 hours or after sweating, drying off with a towel, or swimming. Always wear a seat belt when traveling in a car. Always wear a helmet when riding a bicycle or motorcycle.

## 2022-08-15 RX ORDER — ATORVASTATIN CALCIUM 20 MG/1
TABLET, FILM COATED ORAL
Qty: 90 TABLET | Refills: 1 | Status: SHIPPED | OUTPATIENT
Start: 2022-08-15

## 2022-08-16 ENCOUNTER — INITIAL CONSULT (OUTPATIENT)
Dept: NEUROLOGY | Age: 72
End: 2022-08-16
Payer: MEDICARE

## 2022-08-16 VITALS
SYSTOLIC BLOOD PRESSURE: 120 MMHG | DIASTOLIC BLOOD PRESSURE: 80 MMHG | BODY MASS INDEX: 35.35 KG/M2 | HEART RATE: 78 BPM | WEIGHT: 261 LBS | HEIGHT: 72 IN

## 2022-08-16 DIAGNOSIS — G45.9 TIA (TRANSIENT ISCHEMIC ATTACK): Primary | ICD-10-CM

## 2022-08-16 DIAGNOSIS — R20.0 RIGHT ARM NUMBNESS: ICD-10-CM

## 2022-08-16 PROCEDURE — 1036F TOBACCO NON-USER: CPT | Performed by: PSYCHIATRY & NEUROLOGY

## 2022-08-16 PROCEDURE — 1123F ACP DISCUSS/DSCN MKR DOCD: CPT | Performed by: PSYCHIATRY & NEUROLOGY

## 2022-08-16 PROCEDURE — 99204 OFFICE O/P NEW MOD 45 MIN: CPT | Performed by: PSYCHIATRY & NEUROLOGY

## 2022-08-16 PROCEDURE — G8427 DOCREV CUR MEDS BY ELIG CLIN: HCPCS | Performed by: PSYCHIATRY & NEUROLOGY

## 2022-08-16 PROCEDURE — G8417 CALC BMI ABV UP PARAM F/U: HCPCS | Performed by: PSYCHIATRY & NEUROLOGY

## 2022-08-16 PROCEDURE — 3017F COLORECTAL CA SCREEN DOC REV: CPT | Performed by: PSYCHIATRY & NEUROLOGY

## 2022-08-16 NOTE — PROGRESS NOTES
Chief Complaint   Patient presents with    Consultation     TIA       Referring Physician: Dr. Hamida Huizar is a 81 y.o. male who presents today for evaluation of Stroke/TIA. He apparently experienced episode of right arm numbness tingling and weakness. He reported the episode lasted approximately 15 minutes. He reported no change in his speech, no leg weakness. He also experienced transient right lower lip numbness. There was no disturbance with his vision, no left-sided symptoms. He does report neck pain. He went to the ER on 8/8/2022 underwent MRI brain that showed chronic right frontal infarct. He has afib and on eliquis. He was treated and released. The patient experienced a episode of diplopia, and dizziness a few days later that was short-lived, and resolved. He was found to have blood in the urine, and this is being investigated with the PMD.  He is supposed to have further work-up, and investigation in the next couple weeks for this. The patient denies any headache. He is not active. He has history of MI in 2010, s/p Open heart bypass surgery. He denies reports the balance is ok. He is not diabetic, he had history of stroke in 2010. He reports weight gain. He reports right CEA done in 2011. History obtained from patient and review of the emergency room records.        Past Medical History:   Diagnosis Date    Arthritis     back    BPH (benign prostatic hyperplasia)     CAD (coronary artery disease)     CVA (cerebral vascular accident) ([facility] [address] 2010    Disease of blood and blood forming organ     anemia    FH: heart disease     GERD (gastroesophageal reflux disease)     Hyperlipidemia     Hypertension     Obesity     Prostate cancer ([facility] [address] 2019       Patient Active Problem List   Diagnosis    Benign prostatic hyperplasia    Hyperlipidemia    CVA (cerebral vascular accident) ([facility] [address]    Hypertension    Morbidly obese (HCC)    FH: heart disease    Bilateral carotid artery stenosis Atrial fibrillation ([facility] [address]    Coronary artery disease involving coronary bypass graft of native heart without angina pectoris    PVD (peripheral vascular disease) ([facility])    Prostate cancer ([facility])    Ileus, postoperative ([facility])    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 30 tablet 0    cyclobenzaprine (FLEXERIL) 10 MG tablet Take 1 tablet by mouth every 8 hours as needed      traMADol (ULTRAM) 50 MG tablet Take 1 tablet by mouth 3 times daily. As needed      famotidine (PEPCID) 20 MG tablet TAKE 1 TABLET BY MOUTH TWICE A DAY (Patient taking differently: Indications: PRN) 180 tablet 2    ELIQUIS 5 MG TABS tablet TAKE 1 TABLET BY MOUTH TWICE A  tablet 1    metoprolol succinate (TOPROL XL) 50 MG extended release tablet TAKE 1/2 TABLET BY MOUTH EVERY DAY 45 tablet 3    ferrous sulfate (IRON 325) 325 (65 Fe) MG tablet Take 325 mg by mouth daily (with breakfast)      nitroGLYCERIN (NITROSTAT) 0.4 MG SL tablet PLACE 1 TABLET UNDER TONGUE EVERY 5 MINS, UP TO 3 DOSES AS NEEDED FOR CHEST PAIN 25 tablet 1    calcium-vitamin D (OSCAL-500) 500-200 MG-UNIT per tablet Take 1 tablet by mouth daily Indications: 200 mg      Coenzyme Q10 (COQ10 PO) Take 500 mg by mouth daily      Krill Oil 500 MG CAPS Take  by mouth daily. No current facility-administered medications for this visit. Social History     Socioeconomic History    Marital status:       Spouse name: None    Number of children: 3    Years of nerve II: Normal   Cranial nerve III: Pupils: equal, round, reactive to light  Cranial nerves III, IV, VI: Extraocular Movements: intact   Cranial nerve V: Facial sensation: intact   Cranial nerve VII:Facial strength: intact   Cranial nerve VIII: Hearing: intact   Cranial nerve IX: Palate Elevation intact bilaterally  Cranial nerve XI: Shoulder shrug intact bilaterally  Cranial nerve XII: Tongue midline   neck supple without rigidity, there is no limitation of range of motion of the neck. DTR's are decreased distal and symmetric  Babinski sign negative  Motor exam is 5/5 in the upper and lower extremities. Normal muscle tone . There is no muscle atrophy. Sensory is intact for light touch, cortical sensation. Coordination: finger to nose intact  Gait and station normal  Abnormal movement none, vibration normal, proprioception normal  Skin - warm, dry to touch, normal coloration, no rashes, no suspicious skin lesions  Superficial temporal artery pulses are normal.   There is no limitation of range of motion of the neck. There is no resting tremor, no pin rolling, no bradykinesia, no Hypohonia, normal blink rate. Musculoskeletal: Has no hand arthritis, no limitation of ROM in any of the four extremities. There is no leg edema. The Heart was regular in rate and rhythm. No heart murmur  Chest Clear, with  good effort. Abdomen soft, intact bowel sounds. CTA HEAD W WO CONTRAST    Narrative  CTA of the brain. Technique: Axial CTA images of the brain. Sagittal and coronal  reconstructions provided. 3D postprocessing and/or MIPS were included. Comparison:  None    Findings: Moderate atherosclerotic vessel disease within the cavernous segment  bilateral internal carotid arteries. Otherwise normal anterior  circulation. This includes the intracranial portion of the internal  carotid arteries, anterior cerebral arteries and middle cerebral arteries. No evidence for aneurysm or occlusion.     The right vertebral artery is not identified and could be occluded. This  includes the vertebral arteries, basilar artery and posterior cerebral  arteries. No evidence for aneurysm. Impression  Impression:  The right vertebral artery is not identified and could be occluded. This  could be chronic. This document has been electronically signed by: Brody Vyas MD on  08/08/2022 05:50 PM    All CTs at this facility use dose modulation techniques and iterative  reconstructions, and/or weight-based dosing  when appropriate to reduce radiation to a low as reasonably achievable. 3D Post-processing was performed on this study. CTA NECK W WO CONTRAST    Narrative  PROCEDURE: CTA HEAD W WO CONTRAST, CTA NECK W WO CONTRAST    CLINICAL INFORMATION: tia, R arm weakness. COMPARISON: Head CT 8/8/2022. TECHNIQUE: 1 mm axial images were obtained through the head and neck after the fast bolus administration of contrast. A noncontrast localizer was obtained. 3-D reconstructions were performed on a dedicated 3-D workstation. These include multiplanar MPR  images and multiplanar MIP images. Centerline reconstructions were obtained of the carotid systems. Isovue intravenous contrast was given. All CT scans at this facility use dose modulation, iterative reconstruction, and/or weight-based dosing when appropriate to reduce radiation dose to as low as reasonably achievable. FINDINGS:      CTA NECK:    Aortic arch and branches: There is some atherosclerosis of the aortic arch. There is no stenosis of the origin innominate artery, left common carotid artery or either subclavian artery. Right common carotid artery/ICA: The right common carotid artery is normal. There is some minimal atherosclerosis of the right carotid bulb. There is no stenosis. There is no stenosis of the right internal carotid artery. Left common carotid artery/ICA: The left common carotid artery is within acceptable limits.  There is complex atherosclerosis of the left carotid bulb. There are areas of soft plaque and calcified plaque. Using NASCET criteria and the distal left internal  carotid artery as the reference, there is a 63% stenosis at the origin left internal carotid artery. There is no distal stenosis. Vertebral arteries: The right vertebral artery is a diminutive vessel. This ends at the C1 level. On the left, the vertebral artery is dominant. There is atherosclerosis of the origin. There is atherosclerosis at its proximal segment. There is a moderate  stenosis proximally. There is some atherosclerosis along its course. There is a mild stenosis of the left vertebral artery as it enters the dural ring. CTA HEAD:  Internal carotid arteries: There is calcified atherosclerosis of the cavernous segments of the internal carotid arteries bilaterally. There is no associated significant stenosis. The ophthalmic artery origins are normal.  Middle cerebral arteries: Normal. The proximal branches are also normal.  Anterior cerebral arteries: Normal. The proximal branches are normal. There is a normal small anterior communicating artery. Vertebral arteries: The right vertebral artery ends at C1. The left vertebral artery is dominant. Mild stenosis at that enters the dural ring. Basilar artery: Normal.  Superior cerebellar arteries: Normal.  Posterior cerebral arteries: Normal. The proximal branches are also normal.  There is a normal right posterior communicating artery. No aneurysms, stenoses or occlusions are noted. The superior sagittal sinus, vein of Abdirashid, internal cerebral veins, straight sinus, transverse sinuses and sigmoid sinuses are patent. Axial source data: There are no suspicious findings the lung apices. There is no upper mediastinal adenopathy. There is no cervical adenopathy. There are no gross abnormalities in the brain. Impression  1. No significant stenosis of the right carotid system.   2. Complex atherosclerosis of the left carotid bulb with a 63% stenosis at the origin of the left internal carotid artery. 3. Moderate stenosis in the proximal left vertebral artery. This is the dominant vertebral artery. 4. No intracranial stenoses or occlusions. **This report has been created using voice recognition software. It may contain minor errors which are inherent in voice recognition technology. **    Final report electronically signed by Dr. Natasha Arthur on 8/8/2022 4:02 PM    Results for orders placed during the hospital encounter of 08/08/22    MRI BRAIN WO CONTRAST    Narrative  Noncontrast MRI of the brain. Technique: Sagittal T1, coronal T2, axial T1, T2, FLAIR and  diffusion-weighted imaging. Comparison:  CT,SR - CT HEAD WO CONTRAST - 08/08/2022 03:07 PM EDT  MR - MRI BRAIN B STEM WO CONTR - 06/18/2014 03:23 PM EDT    Findings: Normal signal on diffusion-weighted imaging. No evidence for  acute ischemic event. Old ischemic event right frontal lobe measuring 3.2 x 4.2 cm. Old ischemic  event right parietal lobe measuring 1.3 x 1.5 cm. Moderate nonspecific  periventricular and deep white matter disease. This most often can be  ascribed to chronic small vessel ischemic change. There are no  intracranial masses. No evidence for hemorrhage. The ventricles are normal  size, no hydrocephalus. Contents of the posterior fossa including brainstem and cerebellum are  unremarkable. Normal vascular flow voids. Normal aeration of paranasal sinuses. Impression  Impression:  Old ischemic event right frontal lobe and right parietal lobe. Moderate nonspecific periventricular and deep white matter disease. This  most often can be ascribed to chronic small vessel ischemic change.     This document has been electronically signed by: Yvonne Ibrahim MD on  08/08/2022 05:45 PM      CT Head WO Contrast    Narrative  Noncontrast CT of the head    Technique: Noncontrast CT of the head from the vertex through the skull  base. Comparison:  CT - CT HEAD WO CONTR - 06/30/2014 10:23 PM EDT    Findings: Old ischemic right frontal lobe and right parietal lobe. Moderate nonspecific periventricular and deep white matter disease. This  most often can be ascribed to chronic small vessel ischemic change. No  intracranial mass, hemorrhage or hydrocephalus. No definite acute ischemic  event by CT. No skull fractures. Normal aeration of the nasal sinuses. Impression  Impression:    Old ischemic event left frontal lobe and right parietal lobe. Moderate nonspecific periventricular and deep white matter disease. This document has been electronically signed by: Trudy Palafox MD on  08/08/2022 05:46 PM    All CTs at this facility use dose modulation techniques and iterative  reconstructions, and/or weight-based dosing  when appropriate to reduce radiation to a low as reasonably achievable. We reviewed the patient records from referring provider and available information in the EHR       ASSESSMENT:      Diagnosis Orders   1. TIA (transient ischemic attack)        2. Right arm numbness           This is a 74-year-old male with history of coronary artery disease status post CABG, history of atrial fibrillation, on  He does report right side neck pain. He complains of the right arm being numb on and off with movement, he reports weak . He was evaluated in the emergency department for concerns of stroke. He underwent MRI brain 8/8/2022 that was reviewed. There is chronic right frontal infarct. He had on one episode of diplopia lasting less that one minute, resolved on its own. The patient was counseled about the symptoms, results of testing obtained including MRI brain, in addition to his history of atrial fibrillation possibly contributing to his presentation as he may have experienced a transient episode of hypoperfusion. He follows with cardiology Dr. Shukri Epps. He is on Eliquis, and Lipitor.   The patient complains of right side neck discomfort, numbness with the right arm. He may benefit from EMG right upper extremity, in addition we will arrange for him to undergo a tilt table study to assess for orthostatic hypotension, this will be performed with his cardiologist. After detailed discussion with patient we agreed on the following plan. Plan    Tilt table test with Dr Roverto Woo. EMG right arm. Continue with current medications. Call with any new symptoms or concerns. Follow up in 1 months.      Total time 62 min    Bee Goodman MD

## 2022-08-16 NOTE — PATIENT INSTRUCTIONS
Tilt table test with Dr Jamil Alejandra. EMG right arm. Continue with current medications. Call with any new symptoms or concerns. Follow up in 1 months.

## 2022-08-25 ENCOUNTER — HOSPITAL ENCOUNTER (OUTPATIENT)
Age: 72
Discharge: HOME | End: 2022-08-25
Payer: MEDICARE

## 2022-08-25 DIAGNOSIS — R73.9 HYPERGLYCEMIA: ICD-10-CM

## 2022-08-25 DIAGNOSIS — Z00.00 ROUTINE GENERAL MEDICAL EXAMINATION AT A HEALTH CARE FACILITY: ICD-10-CM

## 2022-08-25 DIAGNOSIS — R31.0 GROSS HEMATURIA: ICD-10-CM

## 2022-08-25 LAB
AVERAGE GLUCOSE: 144 MG/DL (ref 70–126)
BACTERIA: ABNORMAL /HPF
BILIRUBIN URINE: NEGATIVE
BLOOD, URINE: NEGATIVE
CASTS 2: ABNORMAL /LPF
CASTS UA: ABNORMAL /LPF
CHARACTER, URINE: CLEAR
CHOLESTEROL, TOTAL: 153 MG/DL (ref 100–199)
COLOR: YELLOW
CRYSTALS, UA: ABNORMAL
EPITHELIAL CELLS, UA: ABNORMAL /HPF
GLUCOSE URINE: NEGATIVE MG/DL
HBA1C MFR BLD: 6.8 % (ref 4.4–6.4)
HDLC SERPL-MCNC: 50 MG/DL
KETONES, URINE: NEGATIVE
LDL CHOLESTEROL CALCULATED: 82 MG/DL
LEUKOCYTE ESTERASE, URINE: NEGATIVE
MISCELLANEOUS 2: ABNORMAL
NITRITE, URINE: NEGATIVE
PH UA: 5.5 (ref 5–9)
PROTEIN UA: ABNORMAL
RBC URINE: ABNORMAL /HPF
RENAL EPITHELIAL, UA: ABNORMAL
SPECIFIC GRAVITY, URINE: 1.02 (ref 1–1.03)
TRIGL SERPL-MCNC: 107 MG/DL (ref 0–199)
UROBILINOGEN, URINE: 0.2 EU/DL (ref 0–1)
WBC UA: ABNORMAL /HPF
YEAST: ABNORMAL

## 2022-08-25 PROCEDURE — 83036 HEMOGLOBIN GLYCOSYLATED A1C: CPT

## 2022-08-25 PROCEDURE — 36415 COLL VENOUS BLD VENIPUNCTURE: CPT

## 2022-08-25 PROCEDURE — 80061 LIPID PANEL: CPT

## 2022-08-25 PROCEDURE — 81001 URINALYSIS AUTO W/SCOPE: CPT

## 2022-09-06 ENCOUNTER — PROCEDURE VISIT (OUTPATIENT)
Dept: NEUROLOGY | Age: 72
End: 2022-09-06
Payer: MEDICARE

## 2022-09-06 DIAGNOSIS — R20.0 RIGHT ARM NUMBNESS: Primary | ICD-10-CM

## 2022-09-06 DIAGNOSIS — M54.12 CERVICAL RADICULOPATHY: ICD-10-CM

## 2022-09-06 DIAGNOSIS — G56.01 RIGHT CARPAL TUNNEL SYNDROME: ICD-10-CM

## 2022-09-06 PROCEDURE — 95886 MUSC TEST DONE W/N TEST COMP: CPT | Performed by: PSYCHIATRY & NEUROLOGY

## 2022-09-06 PROCEDURE — 95909 NRV CNDJ TST 5-6 STUDIES: CPT | Performed by: PSYCHIATRY & NEUROLOGY

## 2022-09-07 ENCOUNTER — HOSPITAL ENCOUNTER (OUTPATIENT)
Dept: NON INVASIVE DIAGNOSTICS | Age: 72
Discharge: HOME | End: 2022-09-07
Payer: MEDICARE

## 2022-09-07 PROCEDURE — 93660 TILT TABLE EVALUATION: CPT | Performed by: INTERNAL MEDICINE

## 2022-09-08 RX ORDER — APIXABAN 5 MG/1
TABLET, FILM COATED ORAL
Qty: 180 TABLET | Refills: 2 | Status: SHIPPED | OUTPATIENT
Start: 2022-09-08

## 2022-09-08 NOTE — PROCEDURES
[address] [place] [address]                                TILT TABLE TEST    PATIENT NAME: Dino TAO                :        1950  MED REC NO:   [id number]                           ROOM:  ACCOUNT NO:   [de-identified]                           ADMIT DATE: 2022  PROVIDER:     Merlene Sims MD    DATE OF STUDY:  2022    PROCEDURE PERFORMED:  Tilt table study. INDICATION FOR STUDY:  Presyncopal episodes. DESCRIPTION OF PROCEDURE:  After written informed consent was obtained,  the patient was brought to the electrophysiology laboratory in a  fasting, nonsedated state. His resting blood pressure was 139/95 with  heart rate of 71 beats per minute. Under continuous blood pressure, EKG, and heart rate monitoring, his bed  was tilted to about 70 degrees. Immediately after tilt, his blood  pressure was 149/98 with the heart rate of 86 beats per minute. In the next 10 minutes, his blood pressure and heart rate remained  stable. At 10 minutes of tilt table study, his blood pressure was  148/100 with heart rate of 78 beats per minute. At 20 minutes of tilt  table study, his blood pressure was 153/92 with the heart rate of 79  beats per minute, and at 30 minutes of tilt table study, his blood  pressure was 137/91 with the heart rate of 88 beats per minute. During  the entire 30 minutes of tilt table study, the patient did not have any  symptoms. SUMMARY:  This is a negative table study for vasodepressor and  cardioinhibitory symptoms. RECOMMENDATIONS:  1. Consider uptitrating antihypertensive medications for better blood  pressure response. 2.  Clinical correlation is necessary.         Mary Bucio MD    D: 2022 8:59:53       T: 2022 9:23:53     MARY [email or web address]  Job#: [id number]     Doc#: [id number]    CC:

## 2022-09-13 PROCEDURE — 93660 TILT TABLE EVALUATION: CPT | Performed by: INTERNAL MEDICINE

## 2022-09-16 ENCOUNTER — OFFICE VISIT (OUTPATIENT)
Dept: NEUROLOGY | Age: 72
End: 2022-09-16
Payer: MEDICARE

## 2022-09-16 ENCOUNTER — HOSPITAL ENCOUNTER (OUTPATIENT)
Age: 72
Discharge: HOME | End: 2022-09-16
Payer: MEDICARE

## 2022-09-16 ENCOUNTER — TELEPHONE (OUTPATIENT)
Dept: NEUROLOGY | Age: 72
End: 2022-09-16

## 2022-09-16 ENCOUNTER — HOSPITAL ENCOUNTER (OUTPATIENT)
Dept: GENERAL RADIOLOGY | Age: 72
Discharge: HOME | End: 2022-09-16
Payer: MEDICARE

## 2022-09-16 VITALS
DIASTOLIC BLOOD PRESSURE: 80 MMHG | HEART RATE: 71 BPM | OXYGEN SATURATION: 98 % | BODY MASS INDEX: 35.89 KG/M2 | HEIGHT: 72 IN | WEIGHT: 265 LBS | SYSTOLIC BLOOD PRESSURE: 128 MMHG

## 2022-09-16 DIAGNOSIS — G45.9 TIA (TRANSIENT ISCHEMIC ATTACK): Primary | ICD-10-CM

## 2022-09-16 DIAGNOSIS — G45.9 TIA (TRANSIENT ISCHEMIC ATTACK): ICD-10-CM

## 2022-09-16 DIAGNOSIS — R20.0 RIGHT ARM NUMBNESS: ICD-10-CM

## 2022-09-16 PROCEDURE — 1123F ACP DISCUSS/DSCN MKR DOCD: CPT | Performed by: NURSE PRACTITIONER

## 2022-09-16 PROCEDURE — 1036F TOBACCO NON-USER: CPT | Performed by: NURSE PRACTITIONER

## 2022-09-16 PROCEDURE — G8427 DOCREV CUR MEDS BY ELIG CLIN: HCPCS | Performed by: NURSE PRACTITIONER

## 2022-09-16 PROCEDURE — 99213 OFFICE O/P EST LOW 20 MIN: CPT | Performed by: NURSE PRACTITIONER

## 2022-09-16 PROCEDURE — G8417 CALC BMI ABV UP PARAM F/U: HCPCS | Performed by: NURSE PRACTITIONER

## 2022-09-16 PROCEDURE — 72050 X-RAY EXAM NECK SPINE 4/5VWS: CPT

## 2022-09-16 PROCEDURE — 3017F COLORECTAL CA SCREEN DOC REV: CPT | Performed by: NURSE PRACTITIONER

## 2022-09-16 NOTE — PROGRESS NOTES
NEUROLOGY OUT PATIENT FOLLOW UP NOTE:  9/16/20228:58 AM    William Hogan is here for follow up for TIA, episode of right arm numbness. ROS:  Respiratory : no cough, no shortness of breath  Cardiac: no chest pain. No palpitations. Renal : no flank pain, no hematuria    Skin: no rash      No Known Allergies    Current Outpatient Medications:     ELIQUIS 5 MG TABS tablet, TAKE 1 TABLET BY MOUTH TWICE DAILY, Disp: 180 tablet, Rfl: 2    atorvastatin (LIPITOR) 20 MG tablet, 1 tab daily, Disp: 90 tablet, Rfl: 1    clopidogrel (PLAVIX) 75 MG tablet, Take 1 tablet by mouth in the morning., Disp: 30 tablet, Rfl: 0    cyclobenzaprine (FLEXERIL) 10 MG tablet, Take 1 tablet by mouth every 8 hours as needed, Disp: , Rfl:     famotidine (PEPCID) 20 MG tablet, TAKE 1 TABLET BY MOUTH TWICE A DAY (Patient taking differently: Indications: PRN), Disp: 180 tablet, Rfl: 2    metoprolol succinate (TOPROL XL) 50 MG extended release tablet, TAKE 1/2 TABLET BY MOUTH EVERY DAY, Disp: 45 tablet, Rfl: 3    ferrous sulfate (IRON 325) 325 (65 Fe) MG tablet, Take 325 mg by mouth daily (with breakfast), Disp: , Rfl:     nitroGLYCERIN (NITROSTAT) 0.4 MG SL tablet, PLACE 1 TABLET UNDER TONGUE EVERY 5 MINS, UP TO 3 DOSES AS NEEDED FOR CHEST PAIN, Disp: 25 tablet, Rfl: 1    calcium-vitamin D (OSCAL-500) 500-200 MG-UNIT per tablet, Take 1 tablet by mouth daily Indications: 200 mg, Disp: , Rfl:     Coenzyme Q10 (COQ10 PO), Take 500 mg by mouth daily, Disp: , Rfl:     Krill Oil 500 MG CAPS, Take  by mouth daily. , Disp: , Rfl:     traMADol (ULTRAM) 50 MG tablet, Take 1 tablet by mouth 3 times daily. As needed (Patient not taking: Reported on 9/16/2022), Disp: , Rfl:     I reviewed the past medical history, allergies, medications, social history and family history.        PE:   Vitals:    09/16/22 0853   BP: 128/80   Site: Left Upper Arm   Position: Sitting   Cuff Size: Large Adult   Pulse: 71   SpO2: 98%   Weight: 265 lb (120.2 kg)   Height: 6' (1.829 m)     General Appearance:  awake, alert, oriented, in no acute distress  Gen: NAD, Language is Intact. Skin: no rash, lesion, dry to touch. warm  Head: no rash, no icterus  Neck: There is no carotid bruits. The Neck is supple. There is no neck lymphadenopathy. Neuro: CN 2-12 grossly intact with no focal deficits. Power 5/5 Throughout symmetric, Reflexes are  symmetric. Long tracts are intact. Cerebellar exam is Intact. Sensory exam is intact to light touch. Gait is intact. Musculoskeletal:  Has no hand arthritis, no limitation of ROM in any of the four extremities. Lower extremities no edema          DATA:      Results for orders placed or performed during the hospital encounter of 08/25/22   Hemoglobin A1C   Result Value Ref Range    Hemoglobin A1C 6.8 (H) 4.4 - 6.4 %    AVERAGE GLUCOSE 144 (H) 70 - 126 mg/dL   Lipid Panel   Result Value Ref Range    Cholesterol, Total 153 100 - 199 mg/dL    Triglycerides 107 0 - 199 mg/dL    HDL 50 mg/dL    LDL Calculated 82 mg/dL   Urine with Reflexed Micro   Result Value Ref Range    Glucose, Ur NEGATIVE NEGATIVE mg/dl    Bilirubin Urine NEGATIVE NEGATIVE    Ketones, Urine NEGATIVE NEGATIVE    Specific Gravity, Urine 1.023 1.002 - 1.030    Blood, Urine NEGATIVE NEGATIVE    pH, UA 5.5 5.0 - 9.0    Protein, UA TRACE (A) NEGATIVE    Urobilinogen, Urine 0.2 0.0 - 1.0 eu/dl    Nitrite, Urine NEGATIVE NEGATIVE    Leukocyte Esterase, Urine NEGATIVE NEGATIVE    Color, UA YELLOW STRAW-YELLOW    Character, Urine CLEAR CLEAR-SL CLOUD    RBC, UA 0-2 0-2/hpf /hpf    WBC, UA 0-2 0-4/hpf /hpf    Epithelial Cells, UA 0-2 3-5/hpf /hpf    Bacteria, UA NONE SEEN FEW/NONE SEEN /hpf    Casts UA 4-8 HYALINE NONE SEEN /lpf    Crystals, UA NONE SEEN NONE SEEN    Renal Epithelial, UA NONE SEEN NONE SEEN    Yeast, UA NONE SEEN NONE SEEN    CASTS 2 NONE SEEN NONE SEEN /lpf    MISCELLANEOUS 2 NONE SEEN           No results found for this or any previous visit.     No results found for this or any previous visit. Results for orders placed during the hospital encounter of 08/08/22    CTA HEAD W WO CONTRAST    Narrative  CTA of the brain. Technique: Axial CTA images of the brain. Sagittal and coronal  reconstructions provided. 3D postprocessing and/or MIPS were included. Comparison:  None    Findings: Moderate atherosclerotic vessel disease within the cavernous segment  bilateral internal carotid arteries. Otherwise normal anterior  circulation. This includes the intracranial portion of the internal  carotid arteries, anterior cerebral arteries and middle cerebral arteries. No evidence for aneurysm or occlusion. The right vertebral artery is not identified and could be occluded. This  includes the vertebral arteries, basilar artery and posterior cerebral  arteries. No evidence for aneurysm. Impression  Impression:  The right vertebral artery is not identified and could be occluded. This  could be chronic. This document has been electronically signed by: Lukasz Godfrey MD on  08/08/2022 05:50 PM    All CTs at this facility use dose modulation techniques and iterative  reconstructions, and/or weight-based dosing  when appropriate to reduce radiation to a low as reasonably achievable. 3D Post-processing was performed on this study. Results for orders placed during the hospital encounter of 08/08/22    CTA NECK W WO CONTRAST    Narrative  PROCEDURE: CTA HEAD W WO CONTRAST, CTA NECK W WO CONTRAST    CLINICAL INFORMATION: tia, R arm weakness. COMPARISON: Head CT 8/8/2022. TECHNIQUE: 1 mm axial images were obtained through the head and neck after the fast bolus administration of contrast. A noncontrast localizer was obtained. 3-D reconstructions were performed on a dedicated 3-D workstation. These include multiplanar MPR  images and multiplanar MIP images. Centerline reconstructions were obtained of the carotid systems. Isovue intravenous contrast was given.         All CT scans at this facility use dose modulation, iterative reconstruction, and/or weight-based dosing when appropriate to reduce radiation dose to as low as reasonably achievable. FINDINGS:      CTA NECK:    Aortic arch and branches: There is some atherosclerosis of the aortic arch. There is no stenosis of the origin innominate artery, left common carotid artery or either subclavian artery. Right common carotid artery/ICA: The right common carotid artery is normal. There is some minimal atherosclerosis of the right carotid bulb. There is no stenosis. There is no stenosis of the right internal carotid artery. Left common carotid artery/ICA: The left common carotid artery is within acceptable limits. There is complex atherosclerosis of the left carotid bulb. There are areas of soft plaque and calcified plaque. Using NASCET criteria and the distal left internal  carotid artery as the reference, there is a 63% stenosis at the origin left internal carotid artery. There is no distal stenosis. Vertebral arteries: The right vertebral artery is a diminutive vessel. This ends at the C1 level. On the left, the vertebral artery is dominant. There is atherosclerosis of the origin. There is atherosclerosis at its proximal segment. There is a moderate  stenosis proximally. There is some atherosclerosis along its course. There is a mild stenosis of the left vertebral artery as it enters the dural ring. CTA HEAD:      Internal carotid arteries: There is calcified atherosclerosis of the cavernous segments of the internal carotid arteries bilaterally. There is no associated significant stenosis. The ophthalmic artery origins are normal.    Middle cerebral arteries: Normal. The proximal branches are also normal.    Anterior cerebral arteries: Normal. The proximal branches are normal. There is a normal small anterior communicating artery. Vertebral arteries: The right vertebral artery ends at C1.  The left vertebral artery is dominant. Mild stenosis at that enters the dural ring. Basilar artery: Normal.    Superior cerebellar arteries: Normal.    Posterior cerebral arteries: Normal. The proximal branches are also normal.    There is a normal right posterior communicating artery. No aneurysms, stenoses or occlusions are noted. The superior sagittal sinus, vein of Abdirashid, internal cerebral veins, straight sinus, transverse sinuses and sigmoid sinuses are patent. Axial source data: There are no suspicious findings the lung apices. There is no upper mediastinal adenopathy. There is no cervical adenopathy. There are no gross abnormalities in the brain. Impression  1. No significant stenosis of the right carotid system. 2. Complex atherosclerosis of the left carotid bulb with a 63% stenosis at the origin of the left internal carotid artery. 3. Moderate stenosis in the proximal left vertebral artery. This is the dominant vertebral artery. 4. No intracranial stenoses or occlusions. **This report has been created using voice recognition software. It may contain minor errors which are inherent in voice recognition technology. **    Final report electronically signed by Dr. Dayanara Norman on 8/8/2022 4:02 PM    Results for orders placed during the hospital encounter of 08/08/22    MRI BRAIN WO CONTRAST    Narrative  Noncontrast MRI of the brain. Technique: Sagittal T1, coronal T2, axial T1, T2, FLAIR and  diffusion-weighted imaging. Comparison:  CT,SR - CT HEAD WO CONTRAST - 08/08/2022 03:07 PM EDT  MR - MRI BRAIN B STEM WO CONTR - 06/18/2014 03:23 PM EDT    Findings: Normal signal on diffusion-weighted imaging. No evidence for  acute ischemic event. Old ischemic event right frontal lobe measuring 3.2 x 4.2 cm. Old ischemic  event right parietal lobe measuring 1.3 x 1.5 cm. Moderate nonspecific  periventricular and deep white matter disease. This most often can be  ascribed to chronic small vessel ischemic change. There are no  intracranial masses. No evidence for hemorrhage. The ventricles are normal  size, no hydrocephalus. Contents of the posterior fossa including brainstem and cerebellum are  unremarkable. Normal vascular flow voids. Normal aeration of paranasal sinuses. Impression  Impression:  Old ischemic event right frontal lobe and right parietal lobe. Moderate nonspecific periventricular and deep white matter disease. This  most often can be ascribed to chronic small vessel ischemic change. This document has been electronically signed by: Jero Aguero MD on  08/08/2022 05:45 PM    No results found for this or any previous visit. No results found for this or any previous visit. Results for orders placed during the hospital encounter of 08/08/22    CT Head WO Contrast    Narrative  Noncontrast CT of the head    Technique: Noncontrast CT of the head from the vertex through the skull  base. Comparison:  CT - CT HEAD WO CONTR - 06/30/2014 10:23 PM EDT    Findings: Old ischemic right frontal lobe and right parietal lobe. Moderate nonspecific periventricular and deep white matter disease. This  most often can be ascribed to chronic small vessel ischemic change. No  intracranial mass, hemorrhage or hydrocephalus. No definite acute ischemic  event by CT. No skull fractures. Normal aeration of the nasal sinuses. Impression  Impression:    Old ischemic event left frontal lobe and right parietal lobe. Moderate nonspecific periventricular and deep white matter disease. This document has been electronically signed by: Jero Aguero MD on  08/08/2022 05:46 PM    All CTs at this facility use dose modulation techniques and iterative  reconstructions, and/or weight-based dosing  when appropriate to reduce radiation to a low as reasonably achievable. Tilt table test done 9//722:    [address] [facility] [facility]  [address], [address] TILT TABLE TEST     PATIENT NAME: Anna TAO                :        1950  MED REC NO:   [id number]                           ROOM:  ACCOUNT NO:   [de-identified]                           ADMIT DATE: 2022  PROVIDER:     Jeremy Wolfe MD     DATE OF STUDY:  2022     PROCEDURE PERFORMED:  Tilt table study. INDICATION FOR STUDY:  Presyncopal episodes. DESCRIPTION OF PROCEDURE:  After written informed consent was obtained,  the patient was brought to the electrophysiology laboratory in a  fasting, nonsedated state. His resting blood pressure was 139/95 with  heart rate of 71 beats per minute. Under continuous blood pressure, EKG, and heart rate monitoring, his bed  was tilted to about 70 degrees. Immediately after tilt, his blood  pressure was 149/98 with the heart rate of 86 beats per minute. In the next 10 minutes, his blood pressure and heart rate remained  stable. At 10 minutes of tilt table study, his blood pressure was  148/100 with heart rate of 78 beats per minute. At 20 minutes of tilt  table study, his blood pressure was 153/92 with the heart rate of 79  beats per minute, and at 30 minutes of tilt table study, his blood  pressure was 137/91 with the heart rate of 88 beats per minute. During  the entire 30 minutes of tilt table study, the patient did not have any  symptoms. SUMMARY:  This is a negative table study for vasodepressor and  cardioinhibitory symptoms. RECOMMENDATIONS:  1. Consider uptitrating antihypertensive medications for better blood  pressure response. 2.  Clinical correlation is necessary. Roney Wall MD       Assessment:     Diagnosis Orders   1. TIA (transient ischemic attack)        2. Right arm numbness             He is doing well. He denies any new symptoms. No episodes of numbness or weakness. I shared with him his tilt table test showed no vasodepressor or cardioinhibitory symptoms.  He did have EMG right arm that

## 2022-09-16 NOTE — TELEPHONE ENCOUNTER
----- Message from DOREEN Richmond - CNP sent at 9/16/2022 11:02 AM EDT -----  Please let patient know his cervical spine x-ray showed degenerative  changes, no anterolisthesis or issues with alignment of spine.    Tony Taylor, CNP

## 2022-09-16 NOTE — PATIENT INSTRUCTIONS
Cervical spine x-ray   Referral to hand specialist reL mild to moderate carpal tunnel  Continue with current medications  Follow up in 2 months  or sooner if needed. Call if any questions or concerns.

## 2022-10-19 RX ORDER — METOPROLOL SUCCINATE 50 MG/1
TABLET, EXTENDED RELEASE ORAL
Qty: 45 TABLET | Refills: 2 | Status: SHIPPED | OUTPATIENT
Start: 2022-10-19

## 2022-10-19 NOTE — TELEPHONE ENCOUNTER
Christiano Villarreal called requesting a refill on the following medications:  Requested Prescriptions     Pending Prescriptions Disp Refills    metoprolol succinate (TOPROL XL) 50 MG extended release tablet 45 tablet 3     Sig: TAKE 1/2 TABLET BY MOUTH [address]  . pv      Date of last visit:   Date of next visit (if applicable): 7-

## 2022-10-27 ENCOUNTER — CARE COORDINATION (OUTPATIENT)
Dept: CARE COORDINATION | Age: 72
End: 2022-10-27

## 2022-10-27 RX ORDER — GABAPENTIN 600 MG/1
600 TABLET ORAL 3 TIMES DAILY
COMMUNITY
Start: 2022-10-27

## 2022-10-27 NOTE — CARE COORDINATION
Ambulatory Care Coordination Note  10/27/2022    ACC: Clark Rendon, RN        [facility] referral for care management. Lives alone and independently. Drives himself. Daughter lives nearby. Uses rollator for long distances. Reports a chest cold last week but feeling better. Denies any SOB, fever, difficulty breathing. Offered PCP appt and declined. Pain mgmt - est at [facility]. April Dill, gabapentin. Not taking tramadol    TIA - est with neuro. EMG, tilt table, XR cervical spine, ortho ref. Right arm numbness. CAD, HLD, HTN - est with cardiology. Taking lipitor, plavix, eliquis, metoprolol, krill oil    Prostate Ca - est with radiation oncology and urology    Plan -   11/7 rad onc  11/18 neuro  Michele Pencil for right CT syndrome  Pain mgmt  Report new or worsening symptoms ASAP  Early symptom recognition and reporting to prevent ED and admissions  Use same or next day appts with PCP office for non-emergency problems    Offered patient enrollment in the Remote Patient Monitoring (RPM) program for in-home monitoring: NA. Ambulatory Care Coordination Assessment    Care Coordination Protocol  Referral from Primary Care Provider: No  Week 1 - Initial Assessment     Do you have all of your prescriptions and are they filled?: Yes  Barriers to medication adherence: None  Are you able to afford your medications?: Yes  How often do you have trouble taking your medications the way you have been told to take them?: I always take them as prescribed. Do you have Home O2 Therapy?: No      Ability to seek help/take action for Emergent Urgent situations i.e. fire, crime, inclement weather or health crisis. : Independent  Ability to ambulate to restroom: Independent  Ability handle personal hygeine needs (bathing/dressing/grooming): Independent  Ability to manage Medications: Independent  Ability to prepare Food Preparation: Independent  Ability to maintain home (clean home, laundry):  Independent  Ability to drive and/or has transportation: Independent  Ability to do shopping: Independent  Ability to manage finances: Independent  Is patient able to live independently?: Yes     Current Housing: Private Residence        Per the Fall Risk Screening, did the patient have 2 or more falls or 1 fall with injury in the past year?: Yes  How often do you think you are about to fall and you do NOT fall? For example, you grab something to stabilize yourself or hold onto a wall/furniture?: Never  Use of a Mobility Aid: Yes  Difficulty walking/impaired gait: Yes  Issues with feet or shoes like numbness, edema, shoes not fitting: No  Changes in vision, poor vision or poor lighting in environment: No  Dizziness: No  Other Fall Risk: No     Frequent urination at night?: Yes  Do you use rails/bars?: Yes  Do you have a non-slip tub mat?: Yes     Are you experiencing loss of meaning?: No  Are you experiencing loss of hope and peace?: No     Thinking about your patient's physical health needs, are there any symptoms or problems (risk indicators) you are unsure about that require further investigation?: Mild vague physical symptoms or problems; but do not impact on daily life or are not of concern to patient   Are the patients physical health problems impacting on their mental well-being?: Mild impact on mental well-being e.g. \"\"feeling fed-up\"\", \"\"reduced enjoyment\"\"   Are there any problems with your patients lifestyle behaviors (alcohol, drugs, diet, exercise) that are impacting on physical or mental well-being?: No identified areas of concern   Do you have any other concerns about your patients mental well-being?  How would you rate their severity and impact on the patient?: Mild problems - don't interfere with function   How would you rate their home environment in terms of safety and stability (including domestic violence, insecure housing, neighbor harassment)?: Consistently safe, supportive, stable, no identified problems   How do daily activities impact on the patient's well-being? (include current or anticipated unemployment, work, caregiving, access to transportation or other): Some general dissatisfaction but no concern   How would you rate their social network (family, work, friends)?: Restricted participation with some degree of social isolation   How would you rate their financial resources (including ability to afford all required medical care)?: Financially secure, resources adequate, no identified problems   How wells does the patient now understand their health and well-being (symptoms, signs or risk factors) and what they need to do to manage their health?: Reasonable to good understanding and already engages in managing health or is willing to undertake better management   How well do you think your patient can engage in healthcare discussions? (Barriers include language, deafness, aphasia, alcohol or drug problems, learning difficulties, concentration): Clear and open communication, no identified barriers   Do other services need to be involved to help this patient?: Other care/services in place and adequate   Are current services involved with this patient well-coordinated? (Include coordination with other services you are now recommendation): Required care/services in place and adequately coordinated   Suggested Interventions and Community Resources   Home Health Services: Declined   Meals on Wheels: Declined   Other Services or Interventions: est with cardiology, neuro, urology, rad onc, OIO   Medi Set or Pill Pack: Declined   Transportation Services: Declined         Schedule an appointment with the patient's PCP, Set up/Review an Education Plan, Set up/Review Goals              Prior to Admission medications    Medication Sig Start Date End Date Taking? Authorizing Provider   gabapentin (NEURONTIN) 600 MG tablet Take 600 mg by mouth 3 times daily.  10/27/22  Yes Historical Provider, MD   metoprolol succinate (TOPROL XL) 50 MG extended release tablet TAKE 1/2 TABLET BY MOUTH EVERY DAY 10/19/22  Yes Pippa Curran MD   ELIQUIS 5 MG TABS tablet TAKE 1 TABLET BY MOUTH TWICE DAILY 9/8/22  Yes DOREEN Ellis - CNP   atorvastatin (LIPITOR) 20 MG tablet 1 tab daily 8/15/22  Yes Kemar Coleman MD   clopidogrel (PLAVIX) 75 MG tablet Take 1 tablet by mouth in the morning. 8/8/22  Yes Samina Benton MD   cyclobenzaprine (FLEXERIL) 10 MG tablet Take 1 tablet by mouth every 8 hours as needed 3/22/22  Yes Historical Provider, MD   famotidine (PEPCID) 20 MG tablet TAKE 1 TABLET BY MOUTH TWICE A DAY  Patient taking differently: Indications: PRN 1/4/22  Yes Jamie Rankin MD   ferrous sulfate (IRON 325) 325 (65 Fe) MG tablet Take 325 mg by mouth daily (with breakfast)   Yes Historical Provider, MD   calcium-vitamin D (OSCAL-500) 500-200 MG-UNIT per tablet Take 1 tablet by mouth daily Indications: 200 mg   Yes Historical Provider, MD   Coenzyme Q10 (COQ10 PO) Take 500 mg by mouth daily   Yes Historical Provider, MD Margaretta Severs Oil 500 MG CAPS Take  by mouth daily. Yes Historical Provider, MD   traMADol (ULTRAM) 50 MG tablet Take 1 tablet by mouth 3 times daily.  As needed  Patient not taking: No sig reported 3/23/22   Historical Provider, MD   nitroGLYCERIN (NITROSTAT) 0.4 MG SL tablet PLACE 1 TABLET UNDER TONGUE EVERY 5 MINS, UP TO 3 DOSES AS NEEDED FOR CHEST PAIN 8/1/20   Pippa Curran MD       Future Appointments   Date Time Provider Meggan Ramirez   11/7/2022 11:30 AM MD Avani Ayala   11/18/2022 10:15 AM MD JEFF Villalta [facility]   6/12/2023  7:45 AM MD JEFF Moyer [facility] [address]   8/15/2023  1:30 PM MD MADELAINE Sheppard HARVEY [address]

## 2022-10-28 ENCOUNTER — OFFICE VISIT (OUTPATIENT)
Dept: FAMILY MEDICINE CLINIC | Age: 72
End: 2022-10-28
Payer: MEDICARE

## 2022-10-28 VITALS
OXYGEN SATURATION: 99 % | WEIGHT: 261.4 LBS | TEMPERATURE: 98.5 F | HEIGHT: 72 IN | BODY MASS INDEX: 35.41 KG/M2 | DIASTOLIC BLOOD PRESSURE: 68 MMHG | RESPIRATION RATE: 16 BRPM | HEART RATE: 67 BPM | SYSTOLIC BLOOD PRESSURE: 116 MMHG

## 2022-10-28 DIAGNOSIS — J01.90 ACUTE RHINOSINUSITIS: Primary | ICD-10-CM

## 2022-10-28 DIAGNOSIS — I20.8 ANGINA OF EFFORT (HCC): ICD-10-CM

## 2022-10-28 LAB
Lab: NORMAL
QC PASS/FAIL: NORMAL
SARS-COV-2 RDRP RESP QL NAA+PROBE: NEGATIVE

## 2022-10-28 PROCEDURE — G8417 CALC BMI ABV UP PARAM F/U: HCPCS | Performed by: FAMILY MEDICINE

## 2022-10-28 PROCEDURE — G8484 FLU IMMUNIZE NO ADMIN: HCPCS | Performed by: FAMILY MEDICINE

## 2022-10-28 PROCEDURE — 87635 SARS-COV-2 COVID-19 AMP PRB: CPT | Performed by: FAMILY MEDICINE

## 2022-10-28 PROCEDURE — 3078F DIAST BP <80 MM HG: CPT | Performed by: FAMILY MEDICINE

## 2022-10-28 PROCEDURE — 99213 OFFICE O/P EST LOW 20 MIN: CPT | Performed by: FAMILY MEDICINE

## 2022-10-28 PROCEDURE — G8427 DOCREV CUR MEDS BY ELIG CLIN: HCPCS | Performed by: FAMILY MEDICINE

## 2022-10-28 PROCEDURE — 3074F SYST BP LT 130 MM HG: CPT | Performed by: FAMILY MEDICINE

## 2022-10-28 PROCEDURE — 1123F ACP DISCUSS/DSCN MKR DOCD: CPT | Performed by: FAMILY MEDICINE

## 2022-10-28 PROCEDURE — 1036F TOBACCO NON-USER: CPT | Performed by: FAMILY MEDICINE

## 2022-10-28 PROCEDURE — 3017F COLORECTAL CA SCREEN DOC REV: CPT | Performed by: FAMILY MEDICINE

## 2022-10-28 RX ORDER — AMOXICILLIN AND CLAVULANATE POTASSIUM 875; 125 MG/1; MG/1
1 TABLET, FILM COATED ORAL 2 TIMES DAILY
Qty: 20 TABLET | Refills: 0 | Status: SHIPPED | OUTPATIENT
Start: 2022-10-28 | End: 2022-11-07

## 2022-10-28 NOTE — PROGRESS NOTES
SUBJECTIVE:  Pj Brito is a 81 y.o. y/o male that presents with Cough, Pharyngitis, and Headache  . HPI:      Symptoms have been present for 4 day(s). Symptoms are worse since they initially started. Fever? No  Runny nose or congestion? Yes, with post na  Cough? Yes  Sore throat? No  Shortness of breath/Wheezing? No  Other associated symptoms?  fatigue      Known COVID exposures or RFs? No  Smoker? No  Preexisting Respiratory conditions?  none      Past Medical History:   Diagnosis Date    Arthritis     back    BPH (benign prostatic hyperplasia)     CAD (coronary artery disease)     CVA (cerebral vascular accident) ([facility] 2010    Disease of blood and blood forming organ     anemia    FH: heart disease     GERD (gastroesophageal reflux disease)     Hyperlipidemia     Hypertension     Obesity     [facility]         Tobacco Use      Smoking status: Never      Smokeless tobacco: Former            OBJECTIVE:  /68   Pulse 67   Temp 98.5 °F (36.9 °C) (Oral)   Resp 16   Ht 6' (1.829 m)   Wt 261 lb 6.4 oz (118.6 kg)   SpO2 99%   BMI 35.45 kg/m²   General appearance: oriented to person, place, and time. ENT exam reveals - ENT exam normal, no neck nodes or sinus tenderness, pharynx erythematous without exudate, and nasal mucosa congested. CVS exam: normal rate, regular rhythm, normal S1, S2, no murmurs, rubs, clicks or gallops. Chest:clear to auscultation, no wheezes, rales or rhonchi, symmetric air entry. Abdominal exam: soft, nontender, nondistended, no masses or organomegaly. Extremities:  No clubbing, cyanosis or edema  Skin exam - normal coloration and turgor, no rashes, no suspicious skin lesions noted. Psych -  Affect appropriate. Thought process is normal without evidence of depression or psychosis. Good insight and appropriae interaction. Cognition and memory appear to be intact.         ASSESSMENT & PLAN  Rachael Gaston was seen today for cough, pharyngitis and headache. Diagnoses and all orders for this visit:    Acute rhinosinusitis  -     amoxicillin-clavulanate (AUGMENTIN) 875-125 MG per tablet; Take 1 tablet by mouth 2 times daily for 10 days  -     POCT COVID-19 Rapid, NAAT    Angina of effort [facility])      Return if symptoms worsen or fail to improve.     -Start above treatments  -Patient advised to contact our office immediately if symptoms worsen or persist  -Patient counseled on conservative care including fluids, rest and OTC meds  -Other chronic issues are stable, continue current medications  -Advised to call if any issues      I have reviewed this patient's history, habits, and medication list and have updated the chart where appropriate.

## 2022-11-03 ENCOUNTER — CARE COORDINATION (OUTPATIENT)
Dept: CARE COORDINATION | Age: 72
End: 2022-11-03

## 2022-11-03 NOTE — CARE COORDINATION
Ambulatory Care Coordination Note  11/3/2022    ACC: Aman Brizuela, RN        Feeling better. Finished abx. Denies SOB or congestion. Has a sore on tongue for about a week. Pain worse when eating or swallowing. Scheduled appt per patient request.     Plan -   11/7 rad onc  11/18 neuro  Danna Carballo for right CT syndrome  Pain mgmt  Report new or worsening symptoms ASAP  Early symptom recognition and reporting to prevent ED and admissions  Use same or next day appts with PCP office for non-emergency problems    Offered patient enrollment in the Remote Patient Monitoring (RPM) program for in-home monitoring: NA. Lab Results       None            Care Coordination Interventions    Referral from Primary Care Provider: No  Suggested Interventions and Community Resources  Home Health Services: Declined  Meals on Wheels: Declined  Medi Set or Pill Pack: Declined  Transportation Support: Declined  Other Services or Interventions: est with cardiology, neuro, urology, rad onc, OIO          Goals Addressed                   This Visit's Progress     Conditions and Symptoms   On track     I will schedule office visits, as directed by my provider. I will keep my appointment or reschedule if I have to cancel. I will notify my provider of any barriers to my plan of care. I will follow my Zone Management tool to seek urgent or emergent care. I will notify my provider of any symptoms that indicate a worsening of my condition. Barriers: lack of support and overwhelmed by complexity of regimen  Plan for overcoming my barriers: care coordination  Confidence: 9/10  Anticipated Goal Completion Date: 1/27/2023                Prior to Admission medications    Medication Sig Start Date End Date Taking?  Authorizing Provider   amoxicillin-clavulanate (AUGMENTIN) 875-125 MG per tablet Take 1 tablet by mouth 2 times daily for 10 days 10/28/22 11/7/22  Ricardo Fontaine, DO   gabapentin (NEURONTIN) 600 MG tablet Take 600 mg by mouth 3 times daily. 10/27/22   Historical Provider, MD   metoprolol succinate (TOPROL XL) 50 MG extended release tablet TAKE 1/2 TABLET BY MOUTH EVERY DAY 10/19/22   Jelena Jain MD   ELIQUIS 5 MG TABS tablet TAKE 1 TABLET BY MOUTH TWICE DAILY 9/8/22   Gely Sommers APRN - CNP   atorvastatin (LIPITOR) 20 MG tablet 1 tab daily 8/15/22   Dwight Walters MD   clopidogrel (PLAVIX) 75 MG tablet Take 1 tablet by mouth in the morning. 8/8/22   Michell Rich MD   cyclobenzaprine (FLEXERIL) 10 MG tablet Take 1 tablet by mouth every 8 hours as needed 3/22/22   Historical Provider, MD   traMADol (ULTRAM) 50 MG tablet Take 1 tablet by mouth 3 times daily. As needed 3/23/22   Historical Provider, MD   famotidine (PEPCID) 20 MG tablet TAKE 1 TABLET BY MOUTH TWICE A DAY  Patient taking differently: Indications: PRN 1/4/22   Ame Mcpherson MD   ferrous sulfate (IRON 325) 325 (65 Fe) MG tablet Take 325 mg by mouth daily (with breakfast)    Historical Provider, MD   nitroGLYCERIN (NITROSTAT) 0.4 MG SL tablet PLACE 1 TABLET UNDER TONGUE EVERY 5 MINS, UP TO 3 DOSES AS NEEDED FOR CHEST PAIN 8/1/20   Trena Bledsoe MD   calcium-vitamin D (Nelly Felty) 500-200 MG-UNIT per tablet Take 1 tablet by mouth daily Indications: 200 mg    Historical Provider, MD   Coenzyme Q10 (COQ10 PO) Take 500 mg by mouth daily    Historical Provider, MD Dubois Mu Oil 500 MG CAPS Take  by mouth daily.     Historical Provider, MD       Future Appointments   Date Time Provider Meggan Ramirez   11/4/2022  9:20 AM Antonietta Mchugh, APRN - CNP [facility] [address]   11/7/2022 11:30 AM MD Anna Valverde [facility]   11/18/2022 10:15 AM MD Maame Laguna Neurology -   6/12/2023  7:45 AM MD JEFF [facility] [address]   8/15/2023  1:30 PM MD MADELAINE Ortiz [address]

## 2022-11-04 ENCOUNTER — OFFICE VISIT (OUTPATIENT)
Dept: FAMILY MEDICINE CLINIC | Age: 72
End: 2022-11-04
Payer: MEDICARE

## 2022-11-04 VITALS
DIASTOLIC BLOOD PRESSURE: 80 MMHG | BODY MASS INDEX: 36.08 KG/M2 | WEIGHT: 266 LBS | RESPIRATION RATE: 16 BRPM | HEART RATE: 72 BPM | OXYGEN SATURATION: 92 % | SYSTOLIC BLOOD PRESSURE: 118 MMHG

## 2022-11-04 DIAGNOSIS — K12.0 CANKER SORE: Primary | ICD-10-CM

## 2022-11-04 PROCEDURE — 3074F SYST BP LT 130 MM HG: CPT | Performed by: NURSE PRACTITIONER

## 2022-11-04 PROCEDURE — 1123F ACP DISCUSS/DSCN MKR DOCD: CPT | Performed by: NURSE PRACTITIONER

## 2022-11-04 PROCEDURE — G8417 CALC BMI ABV UP PARAM F/U: HCPCS | Performed by: NURSE PRACTITIONER

## 2022-11-04 PROCEDURE — 3078F DIAST BP <80 MM HG: CPT | Performed by: NURSE PRACTITIONER

## 2022-11-04 PROCEDURE — 3017F COLORECTAL CA SCREEN DOC REV: CPT | Performed by: NURSE PRACTITIONER

## 2022-11-04 PROCEDURE — G8484 FLU IMMUNIZE NO ADMIN: HCPCS | Performed by: NURSE PRACTITIONER

## 2022-11-04 PROCEDURE — 99214 OFFICE O/P EST MOD 30 MIN: CPT | Performed by: NURSE PRACTITIONER

## 2022-11-04 PROCEDURE — G8427 DOCREV CUR MEDS BY ELIG CLIN: HCPCS | Performed by: NURSE PRACTITIONER

## 2022-11-04 PROCEDURE — 1036F TOBACCO NON-USER: CPT | Performed by: NURSE PRACTITIONER

## 2022-11-04 RX ORDER — LIDOCAINE HYDROCHLORIDE 20 MG/ML
5 SOLUTION OROPHARYNGEAL PRN
Qty: 100 ML | Refills: 0 | Status: SHIPPED | OUTPATIENT
Start: 2022-11-04 | End: 2022-11-09

## 2022-11-04 ASSESSMENT — ENCOUNTER SYMPTOMS
EYE DISCHARGE: 0
CONSTIPATION: 0
ABDOMINAL PAIN: 0
SHORTNESS OF BREATH: 0
WHEEZING: 0
NAUSEA: 0
RHINORRHEA: 0
VOMITING: 0
EYE REDNESS: 0
COUGH: 0
ALLERGIC/IMMUNOLOGIC NEGATIVE: 1
SORE THROAT: 0
TROUBLE SWALLOWING: 0
BACK PAIN: 0
DIARRHEA: 0
EYE PAIN: 0

## 2022-11-04 NOTE — PROGRESS NOTES
[address] [address] [address]  [facility] [place] [address]  Dept: [phone]  Dept Fax: [phone]  Loc: [phone]     Visit Date:  11/4/2022      Patient:  Levar Del Rio  YOB: 1950    HPI:     Chief Complaint   Patient presents with    Sore     On the left side of his tongue for 1 week. Patient treated for rhinosinusitis on October 28 with amoxicillin. Since then he noticed the development of a canker sore like sore on the left lateral tongue area that is painful, makes swallowing painful. Denies fever, nausea vomiting diarrhea, otalgia or generally of sore throat at this point. No history of this, no smoking history but he did chew tobacco at 1 time years ago. Medications    Current Outpatient Medications:     lidocaine viscous hcl (XYLOCAINE) 2 % SOLN solution, Take 5 mLs by mouth as needed for Irritation, Disp: 100 mL, Rfl: 0    amoxicillin-clavulanate (AUGMENTIN) 875-125 MG per tablet, Take 1 tablet by mouth 2 times daily for 10 days, Disp: 20 tablet, Rfl: 0    gabapentin (NEURONTIN) 600 MG tablet, Take 600 mg by mouth 3 times daily. , Disp: , Rfl:     metoprolol succinate (TOPROL XL) 50 MG extended release tablet, TAKE 1/2 TABLET BY MOUTH EVERY DAY, Disp: 45 tablet, Rfl: 2    ELIQUIS 5 MG TABS tablet, TAKE 1 TABLET BY MOUTH TWICE DAILY, Disp: 180 tablet, Rfl: 2    atorvastatin (LIPITOR) 20 MG tablet, 1 tab daily, Disp: 90 tablet, Rfl: 1    clopidogrel (PLAVIX) 75 MG tablet, Take 1 tablet by mouth in the morning., Disp: 30 tablet, Rfl: 0    cyclobenzaprine (FLEXERIL) 10 MG tablet, Take 1 tablet by mouth every 8 hours as needed, Disp: , Rfl:     famotidine (PEPCID) 20 MG tablet, TAKE 1 TABLET BY MOUTH TWICE A DAY (Patient taking differently: Indications: PRN), Disp: 180 tablet, Rfl: 2    ferrous sulfate (IRON 325) 325 (65 Fe) MG tablet, Take 325 mg by mouth daily (with breakfast), Disp: , Rfl:     calcium-vitamin D (OSCAL-500) 500-200 MG-UNIT per tablet, Take 1 tablet by mouth daily Indications: 200 mg, Disp: , Rfl:     Coenzyme Q10 (COQ10 PO), Take 500 mg by mouth daily, Disp: , Rfl:     Krill Oil 500 MG CAPS, Take  by mouth daily. , Disp: , Rfl:     nitroGLYCERIN (NITROSTAT) 0.4 MG SL tablet, PLACE 1 TABLET UNDER TONGUE EVERY 5 MINS, UP TO 3 DOSES AS NEEDED FOR CHEST PAIN (Patient not taking: Reported on 11/4/2022), Disp: 25 tablet, Rfl: 1    The patient has No Known Allergies. Past Medical History  Raciel Jauregui  has a past medical history of Arthritis, BPH (benign prostatic hyperplasia), CAD (coronary artery disease), CVA (cerebral vascular accident) ([facility] [address] Disease of blood and blood forming organ, FH: heart disease, GERD (gastroesophageal reflux disease), Hyperlipidemia, Hypertension, Obesity, and Prostate cancer ([facility] Subjective:      Review of Systems   Constitutional:  Negative for activity change, fatigue and fever. HENT:  Negative for congestion, ear pain, rhinorrhea, sore throat and trouble swallowing. Tongue lesion   Eyes:  Negative for pain, discharge and redness. Respiratory:  Negative for cough, shortness of breath and wheezing. Cardiovascular: Negative. Gastrointestinal:  Negative for abdominal pain, constipation, diarrhea, nausea and vomiting. Endocrine: Negative. Genitourinary:  Negative for dysuria, frequency and urgency. Musculoskeletal:  Negative for arthralgias, back pain and myalgias. Skin:  Negative for rash. Allergic/Immunologic: Negative. Neurological:  Negative for dizziness, tremors, weakness and headaches. Hematological: Negative. Psychiatric/Behavioral:  Negative for dysphoric mood and sleep disturbance. The patient is not nervous/anxious. Objective:     /80   Pulse 72   Resp 16   Wt 266 lb (120.7 kg)   SpO2 92%   BMI 36.08 kg/m²     Physical Exam  Vitals reviewed. Constitutional:       General: He is not in acute distress.      Appearance: Normal appearance. He is well-developed. He is not toxic-appearing or diaphoretic. HENT:      Head: Normocephalic. No right periorbital erythema or left periorbital erythema. Jaw: No trismus. Right Ear: Hearing and external ear normal.      Left Ear: Hearing and external ear normal.      Nose: Nose normal. No mucosal edema or rhinorrhea. Mouth/Throat:      Mouth: Mucous membranes are moist.      Dentition: Normal dentition. Tongue: Lesions present. Pharynx: Uvula midline. No posterior oropharyngeal erythema. Tonsils: No tonsillar exudate. 0 on the right. 0 on the left. Eyes:      General: Lids are normal.         Right eye: No discharge. Left eye: No discharge. Conjunctiva/sclera: Conjunctivae normal.      Right eye: Right conjunctiva is not injected. No chemosis. Left eye: No chemosis. Pupils: Pupils are equal, round, and reactive to light. Neck:      Vascular: No JVD. Trachea: Trachea normal.   Cardiovascular:      Rate and Rhythm: Normal rate and regular rhythm. Heart sounds: Normal heart sounds. No murmur heard. Pulmonary:      Effort: Pulmonary effort is normal. No respiratory distress. Breath sounds: Normal breath sounds. No stridor. No wheezing. Musculoskeletal:         General: No tenderness or signs of injury. Normal range of motion. Cervical back: Full passive range of motion without pain and normal range of motion. Lymphadenopathy:      Cervical: No cervical adenopathy. Skin:     General: Skin is warm and dry. Capillary Refill: Capillary refill takes less than 2 seconds. Findings: No rash. Neurological:      Mental Status: He is alert and oriented to person, place, and time. GCS: GCS eye subscore is 4. GCS verbal subscore is 5. GCS motor subscore is 6. Cranial Nerves: No cranial nerve deficit.       Coordination: Coordination normal.      Gait: Gait normal.   Psychiatric:         Mood and Affect: Mood is not anxious or depressed. Speech: Speech normal.         Behavior: Behavior normal. Behavior is not withdrawn or hyperactive. Behavior is cooperative. Thought Content: Thought content normal.         Judgment: Judgment normal.       Assessment/Plan:      Hermann Pizano was seen today for sore. Diagnoses and all orders for this visit:    Canker sore  -     lidocaine viscous hcl (XYLOCAINE) 2 % SOLN solution; Take 5 mLs by mouth as needed for Irritation  -     External Referral To ENT    Exam shows a patient has no cervical or submental adenopathy, no other abnormality on oral exam on the palate, under the tongue or in his cheeks. There is what appears to be a canker sore on the left lateral portion midway back on his tongue. Patient is given viscous Xylocaine for pain relief and also placed an external referral to ENT if this is persistent into next week. Wait time for [facility]) ENT is currently weeks if not months. Return if symptoms worsen or fail to improve. Patient instructions given amid.         Electronically signed by DOREEN lUloa CNP on 11/4/2022 at 9:54 AM

## 2022-11-07 ENCOUNTER — HOSPITAL ENCOUNTER (OUTPATIENT)
Dept: RADIATION ONCOLOGY | Age: 72
Discharge: HOME | End: 2022-11-07
Attending: RADIOLOGY
Payer: MEDICARE

## 2022-11-07 VITALS
HEART RATE: 76 BPM | DIASTOLIC BLOOD PRESSURE: 74 MMHG | BODY MASS INDEX: 36.62 KG/M2 | RESPIRATION RATE: 18 BRPM | TEMPERATURE: 97.6 F | OXYGEN SATURATION: 97 % | WEIGHT: 270 LBS | SYSTOLIC BLOOD PRESSURE: 144 MMHG

## 2022-11-07 DIAGNOSIS — C61 PROSTATE CANCER (HCC): Primary | ICD-10-CM

## 2022-11-07 PROCEDURE — 99213 OFFICE O/P EST LOW 20 MIN: CPT | Performed by: RADIOLOGY

## 2022-11-07 PROCEDURE — 99212 OFFICE O/P EST SF 10 MIN: CPT | Performed by: NURSE PRACTITIONER

## 2022-11-07 ASSESSMENT — PAIN DESCRIPTION - LOCATION: LOCATION: BACK

## 2022-11-07 ASSESSMENT — PAIN DESCRIPTION - PAIN TYPE: TYPE: CHRONIC PAIN

## 2022-11-07 ASSESSMENT — ENCOUNTER SYMPTOMS
CONSTIPATION: 0
RECTAL PAIN: 0
NAUSEA: 0
ABDOMINAL DISTENTION: 0
ANAL BLEEDING: 0
BLOOD IN STOOL: 0
COUGH: 1
ABDOMINAL PAIN: 0
SHORTNESS OF BREATH: 0
VOMITING: 0
DIARRHEA: 0

## 2022-11-07 NOTE — PROGRESS NOTES
[address] [facility], [address]  Phone: [phone]   Toll Free: [phone]   Fax: [phone]    RADIATION ONCOLOGY FOLLOW UP REPORT    PATIENT NAME:  Bay Rankin              : 1950  MEDICAL RECORD NO: [id number]    LOCATION: Radiation Oncology  [facility] NO: [id number]      PROVIDER: Abdiel Andersen. Chris Salgado CNP    DATE OF SERVICE: 2022      FOLLOW UP PHYSICIANS: Maritza Coto; Owen Deleon      DIAGNOSIS:  C61 -- Malignant neoplasm of the prostate; Adenocarcinoma, [place]'s 3+4=7, Grade Group 3; pT3a pN0 M0, Stage IIIB with detectable and increasing PSA after prostatectomy. PSA at diagnosis 8.3; Pre-RT PSA 0.15   Date of diagnosis: 2018      ASSESSMENT:  Recuperated well from radiation therapy with no appreciable lingering side effects. PSA remains undetectable <0.02. No unexpected complications related to radiation therapy. PLAN:  Follow-up with radiation oncology in 12 months. Reminded Irma Pinto that he can contact this office earlier than the appointment with any questions or concerns. Obtain PSA in 6 months and again in 12 months prior to follow-up appointment. Continue care with all other physicians/providers as scheduled. Continue surveillance and basic/preventive/supportive health care in accordance with clinical practice guidelines. RADIATION THERAPY TREATMENT HISTORY:   Treatment Course Number: 1     Treatment Site (s) Modality Dose (cGy) From To Fractions/  Elapsed Days   Prostate Fossa + Lower Pelvic Nodes 6MV photons 5040 cGy 10/23/2019 2019 28/ 40   Prostate Fossa Boost 6MV photons 1800 cGy 2019 2019 10/ 13         CHAPERONE: Declined      HISTORY OF PRESENT ILLNESS:   Irma Pinto presented as a 81year old [facility]  with a history of a modestly elevated PSA that remained stable for approximately 9 years.   In 2018 it had risen to 8.28 and he completed a sextant biopsy on 11/19/2018, showing a Lorraine 6 adenocarcinoma involving 3 of the 12 biopsy samples. He was initially managed with active surveillance, but MRI scan obtained January 2019 was concerning for multiple finding suggesting a high probability of clinically significant cancer. He underwent re-biopsy on January 30, 2019. At that time he was found with mixed grade adenocarcinoma involving 3 of the 6 sectors sampled. Maximum Lorraine was (4+3) 7, group 3. He went on to complete a robot-assisted laparoscopic prostatectomy on March 20, 2019. Final pathology graded the tumor as a [place]raine (3+4) 7 with tertiary pattern 5. The pathologic stage is shown above. Other findings included right bladder neck involvement, perineural invasion and a positive right bladder neck surgical margin. All of the sampled lymph nodes were free of malignancy. A sample was sent for decipher score evaluation and returned a value of 0.88, therefore high risk of progression. the initial postoperative PSA in May 2019 was detectable at 0.07.   a subsequent PSA was read as 0.04, but  the PSA obtained on 8/27/2019 was 0.15, showing clear PSA progression with a doubling time of less than 6 months. Mr. Elvira Mondragon was seen by Dr. Karissa Barrett to discuss the role of adjuvant radiation therapy, to which he was agreeable. He was initiated on androgen deprivation therapy, receiving Dewitte Launiupoko in September 2019. A repeat PSA obtained in October 2019 was undetectable at <0.02. INTERVAL HISTORY:   Felicia Tolentino returns to the [facility] today, 11/07/2022, for follow-up. Patrice tolerated his radiation therapy well. He had some initial dysuria that was very mild, and also some increased frequency of bowel movements and increased urinary frequency. He was able to continue with all of his normal activities, and did not have any unexpected side effects related to radiation therapy.   His most recent PSA on 11/04/2022 remains virtually undetectable (see below). Hermann Pizano continues to follow-up with urology as well. Hermann Pizano states he had a recent episode of almost passing out last month. He has since been to the ER for this and follows with his cardiologist as well. AUA score today is 14 (Moderate) with a mixed quality of life score. STEPHANY score of 1 (Severe ED). Past Medical History:   Diagnosis Date    Arthritis     back    BPH (benign prostatic hyperplasia)     CAD (coronary artery disease)     CVA (cerebral vascular accident) ([facility] [address] 2010    Disease of blood and blood forming organ     anemia    FH: heart disease     GERD (gastroesophageal reflux disease)     Hyperlipidemia     Hypertension     Obesity     Prostate cancer ([facility] [address] 2019       Past Surgical History:   Procedure Laterality Date    BACK SURGERY  08/2021    CARDIAC VALVE REPLACEMENT  April 2011    Oseas Mukherjee (Pt unsure if part of CABG)    CARDIOVASCULAR STRESS TEST  1 05 2011    Cannot exclude slight inferobasal lateral stress-induced ischemia in a relatively small-sized area. EF 68%. Mildly abnormal nuclear scan. Lexiscan test associated with nonspecific symptoms. EKG nondiagnostic with nonspecific ST-T wave changes. CAROTID ENDARTERECTOMY  2 08 2011    Right carotid endarterectomy with patch angioplasty with convention patch angioplasty. COLONOSCOPY      CORONARY ARTERY BYPASS GRAFT      DIAGNOSTIC CARDIAC CATH LAB PROCEDURE  3 24 2011    LV end-diastolic pressure was 12 mmHg with no change before and after contrast injection. There was no significant gradient across the aortic valve to signify aortic stenosis. LV function was within normal limits, EF 55%. 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 Nonobstructive diffuse disease in a small sized RCA. Nomral LV function. EGD      HAND FUSION Right     HERNIA REPAIR      Inguinal hernia repair.      PROSTATECTOMY N/A 3/20/2019 PROSTATECTOMY LAPAROSCOPIC ROBOTIC performed by Vasquez Washington MD at [address] [place] [address] ECHOCARDIOGRAM  12 21 2010    Size was normal. Systolic function was normal. EF was estimated in the range of 55-65%. There were no regional wall motion abnormalities. Wall thickness was normal. Doppler - LV diastolic function parameters were normal. Mild MR. The aortic valve was trileaflet. Leaflets exhibited mildly increased thickness, mild calcification, normal cuspal separation, and sclerosis. Mild TR. VASCULAR SURGERY      cabg harvests from chest       MEDICATIONS:   Current Outpatient Medications   Medication Sig Dispense Refill    lidocaine viscous hcl (XYLOCAINE) 2 % SOLN solution Take 5 mLs by mouth as needed for Irritation 100 mL 0    amoxicillin-clavulanate (AUGMENTIN) 875-125 MG per tablet Take 1 tablet by mouth 2 times daily for 10 days 20 tablet 0    gabapentin (NEURONTIN) 600 MG tablet Take 600 mg by mouth 3 times daily. metoprolol succinate (TOPROL XL) 50 MG extended release tablet TAKE 1/2 TABLET BY MOUTH EVERY DAY 45 tablet 2    ELIQUIS 5 MG TABS tablet TAKE 1 TABLET BY MOUTH TWICE DAILY 180 tablet 2    atorvastatin (LIPITOR) 20 MG tablet 1 tab daily 90 tablet 1    clopidogrel (PLAVIX) 75 MG tablet Take 1 tablet by mouth in the morning.  30 tablet 0    cyclobenzaprine (FLEXERIL) 10 MG tablet Take 1 tablet by mouth every 8 hours as needed      famotidine (PEPCID) 20 MG tablet TAKE 1 TABLET BY MOUTH TWICE A DAY (Patient taking differently: Indications: PRN) 180 tablet 2    ferrous sulfate (IRON 325) 325 (65 Fe) MG tablet Take 325 mg by mouth daily (with breakfast)      nitroGLYCERIN (NITROSTAT) 0.4 MG SL tablet PLACE 1 TABLET UNDER TONGUE EVERY 5 MINS, UP TO 3 DOSES AS NEEDED FOR CHEST PAIN (Patient not taking: Reported on 11/4/2022) 25 tablet 1    calcium-vitamin D (OSCAL-500) 500-200 MG-UNIT per tablet Take 1 tablet by mouth daily Indications: 200 mg      Coenzyme Q10 (COQ10 PO) Take 500 mg by mouth daily      Krill Oil 500 MG CAPS Take  by mouth daily. No current facility-administered medications for this encounter. TESTS:  RADIOLOGIC STUDIES:   None    LABORATORY STUDIES:    CBC:   Lab Results   Component Value Date/Time    WBC 12.3 08/08/2022 03:25 PM    RBC 4.71 08/08/2022 03:25 PM    HGB 16.1 08/08/2022 03:25 PM    HCT 46.8 08/08/2022 03:25 PM    MCV 99.4 08/08/2022 03:25 PM    MCH 34.2 08/08/2022 03:25 PM    MCHC 34.4 08/08/2022 03:25 PM    RDW 12.1 04/01/2015 11:33 AM     08/08/2022 03:25 PM    MPV 9.0 08/08/2022 89:36 PM     MetabolicPanel:  Lab Results   Component Value Date/Time     08/08/2022 03:25 PM    K 3.7 08/08/2022 03:25 PM    CL 99 08/08/2022 03:25 PM    CO2 23 08/08/2022 03:25 PM    BUN 19 08/08/2022 03:25 PM    CREATININE 0.7 08/08/2022 03:25 PM    LABGLOM >90 08/08/2022 03:25 PM    GLUCOSE 173 08/08/2022 03:25 PM    PROT 7.4 08/08/2022 03:25 PM    LABALBU 4.3 08/08/2022 03:25 PM    LABALBU 4.5 11/25/2011 11:10 AM    CALCIUM 9.9 08/08/2022 03:25 PM    BILITOT 0.7 08/08/2022 03:25 PM    ALKPHOS 68 08/08/2022 03:25 PM    AST 36 08/08/2022 03:25 PM    ALT 44 08/08/2022 03:25 PM     Onc labs:    Latest Reference Range & Units 11/2/21 12:03 5/3/22 11:43 11/4/22 13:56   Prostatic Specific Ag 0.00 - 1.00 ng/mL <0.02 <0.02 <0.02         Review of Systems   Constitutional:  Negative for activity change, appetite change, fatigue and unexpected weight change. Respiratory:  Positive for cough. Negative for shortness of breath. Cardiovascular:  Negative for chest pain. Gastrointestinal:  Negative for abdominal distention, abdominal pain, anal bleeding, blood in stool, constipation, diarrhea, nausea, rectal pain and vomiting. Genitourinary:  Positive for frequency. Negative for dysuria, hematuria and urgency. Stress incontinence    Neurological:  Negative for dizziness, weakness, numbness and headaches.      A complete review of systems was performed and found to be negative except as presented above. EXAMINATION:   GENERAL: Well-developed adult male, alert and oriented ×3 in no obvious distress. Clear mentation with appropriate affect. VITAL SIGNS:  BP (!) 144/74   Pulse 76   Temp 97.6 °F (36.4 °C) (Infrared)   Resp 18   Wt 270 lb (122.5 kg)   SpO2 97%   BMI 36.62 kg/m²   PAIN: Back pain, chronic ECO  HEENT: Atraumatic, normocephalic. PERRL/EOMI; ears, nose and lips unremarkable on external examination. NECK: No JVD. No palpable cervical lymphadenopathy. THORAX: No palpable supraclavicular or axillary lymphadenopathy. LUNGS: Clear to auscultation. HEART: Non-tachycardic, irregularly irregular rhythm. ABDOMEN: Soft, nondistended, nontender with unremarkable bowel sounds. EXTREMITIES: No clubbing or cyanosis. NEUROLOGIC EXAMINATION: Cranial nerves grossly intact. No obvious focal neurologic deficits. SKIN: Unremarkable. Electronically signed by Edvin Salgado CNP on 22   Radiation Oncology      Patient seen and examined independently by me. The above note has been modified by me to reflect current findings and plans. Labs, cultures, and radiographs where available were reviewed. I discussed patient concerns and instructions were given. Please see our orders for the updated patient care plan. Renae Dunn, PhD, MD  Radiation Oncology      CC: Maritza Damian  [facility]: Tumor Registry: [id number]    This document was created using a voice-recognition program.  Computer generated transcription errors may be present.

## 2022-11-09 ENCOUNTER — CARE COORDINATION (OUTPATIENT)
Dept: CARE COORDINATION | Age: 72
End: 2022-11-09

## 2022-11-09 NOTE — CARE COORDINATION
Attempted to reach patient for continued Care Coordination follow up and education. Patient was unavailable at the time of my call, and a generic voicemail message was left asking patient to return my call at [phone].

## 2022-11-11 ENCOUNTER — CARE COORDINATION (OUTPATIENT)
Dept: CARE COORDINATION | Age: 72
End: 2022-11-11

## 2022-11-11 NOTE — CARE COORDINATION
Ambulatory Care Coordination Note  11/11/2022    ACC: Brooks Roberson       I spoke with the patient for continued Care Coordination follow up and education. Patient states he currently has a fever and productive cough. States sputum is grey/ greenish in color. Breathing is at baseline. Patient is trying OTC medicine. Patient states he did vomit a few times yesterday. Denies abdominal pain or diarrhea. Encouraged fluids, ice packs and OTC medication. Advised patient if symptoms get worse to go to  over the weekend if needed. Advised him to call PCP office Monday to schedule appointment if needed. Canker sore has improved. I advised patient to contact PCP office if needed. No further needs at this time. Lab Results       None            Care Coordination Interventions    Referral from Primary Care Provider: No  Suggested Interventions and Community Resources  Home Health Services: Declined  Meals on Wheels: Declined  Medi Set or Pill Pack: Declined  Transportation Support: Declined  Other Services or Interventions: est with cardiology, neuro, urology, rad onc, OIO          Goals Addressed    None         Prior to Admission medications    Medication Sig Start Date End Date Taking? Authorizing Provider   gabapentin (NEURONTIN) 600 MG tablet Take 600 mg by mouth 3 times daily. 10/27/22   Historical Provider, MD   metoprolol succinate (TOPROL XL) 50 MG extended release tablet TAKE 1/2 TABLET BY MOUTH EVERY DAY 10/19/22   Zoheir Adaline MD Santos   ELIQUIS 5 MG TABS tablet TAKE 1 TABLET BY MOUTH TWICE DAILY 9/8/22   Lieutenant Colleen APRN - CNP   atorvastatin (LIPITOR) 20 MG tablet 1 tab daily 8/15/22   Rubina Che MD   clopidogrel (PLAVIX) 75 MG tablet Take 1 tablet by mouth in the morning.  8/8/22   Dimas Rodriguez MD   cyclobenzaprine (FLEXERIL) 10 MG tablet Take 1 tablet by mouth every 8 hours as needed 3/22/22   Historical Provider, MD   famotidine (PEPCID) 20 MG tablet TAKE 1 TABLET BY MOUTH TWICE A DAY  Patient taking differently: Indications: PRN 1/4/22   Xavier Juarez MD   ferrous sulfate (IRON 325) 325 (65 Fe) MG tablet Take 325 mg by mouth daily (with breakfast)    Historical Provider, MD   nitroGLYCERIN (NITROSTAT) 0.4 MG SL tablet PLACE 1 TABLET UNDER TONGUE EVERY 5 MINS, UP TO 3 DOSES AS NEEDED FOR CHEST PAIN  Patient not taking: Reported on 11/4/2022 8/1/20   Yolanda Vasquez MD   calcium-vitamin D (OSCAL-500) 500-200 MG-UNIT per tablet Take 1 tablet by mouth daily Indications: 200 mg    Historical Provider, MD   Coenzyme Q10 (COQ10 PO) Take 500 mg by mouth daily    Historical Provider, MD [place] Oil 500 MG CAPS Take  by mouth daily.     Historical Provider, MD       Future Appointments   Date Time Provider Meggan Ramirez   11/18/2022 10:15 AM MD Bandar Givens [facility] -   6/12/2023  7:45 AM MD JEFF [facility] [address]   8/15/2023  1:30 PM MD MADELAINE Martin HARVEY [address]

## 2022-11-18 ENCOUNTER — CARE COORDINATION (OUTPATIENT)
Dept: CARE COORDINATION | Age: 72
End: 2022-11-18

## 2022-11-18 ENCOUNTER — OFFICE VISIT (OUTPATIENT)
Dept: NEUROLOGY | Age: 72
End: 2022-11-18
Payer: MEDICARE

## 2022-11-18 VITALS
DIASTOLIC BLOOD PRESSURE: 80 MMHG | OXYGEN SATURATION: 97 % | HEART RATE: 67 BPM | WEIGHT: 265 LBS | HEIGHT: 72 IN | SYSTOLIC BLOOD PRESSURE: 116 MMHG | BODY MASS INDEX: 35.89 KG/M2

## 2022-11-18 DIAGNOSIS — G45.9 TIA (TRANSIENT ISCHEMIC ATTACK): Primary | ICD-10-CM

## 2022-11-18 PROCEDURE — 3078F DIAST BP <80 MM HG: CPT | Performed by: PSYCHIATRY & NEUROLOGY

## 2022-11-18 PROCEDURE — G8484 FLU IMMUNIZE NO ADMIN: HCPCS | Performed by: PSYCHIATRY & NEUROLOGY

## 2022-11-18 PROCEDURE — 3017F COLORECTAL CA SCREEN DOC REV: CPT | Performed by: PSYCHIATRY & NEUROLOGY

## 2022-11-18 PROCEDURE — G8427 DOCREV CUR MEDS BY ELIG CLIN: HCPCS | Performed by: PSYCHIATRY & NEUROLOGY

## 2022-11-18 PROCEDURE — 1036F TOBACCO NON-USER: CPT | Performed by: PSYCHIATRY & NEUROLOGY

## 2022-11-18 PROCEDURE — 99213 OFFICE O/P EST LOW 20 MIN: CPT | Performed by: PSYCHIATRY & NEUROLOGY

## 2022-11-18 PROCEDURE — 1123F ACP DISCUSS/DSCN MKR DOCD: CPT | Performed by: PSYCHIATRY & NEUROLOGY

## 2022-11-18 PROCEDURE — G8417 CALC BMI ABV UP PARAM F/U: HCPCS | Performed by: PSYCHIATRY & NEUROLOGY

## 2022-11-18 PROCEDURE — 3074F SYST BP LT 130 MM HG: CPT | Performed by: PSYCHIATRY & NEUROLOGY

## 2022-11-18 NOTE — PROGRESS NOTES
NEUROLOGY OUT PATIENT FOLLOW UP NOTE:  11/18/202210:20 AM    Henry Stephenson is here for follow up for TIA, episode of right arm numbness. No Known Allergies    Current Outpatient Medications:     gabapentin (NEURONTIN) 600 MG tablet, Take 600 mg by mouth 3 times daily. , Disp: , Rfl:     metoprolol succinate (TOPROL XL) 50 MG extended release tablet, TAKE 1/2 TABLET BY MOUTH EVERY DAY, Disp: 45 tablet, Rfl: 2    ELIQUIS 5 MG TABS tablet, TAKE 1 TABLET BY MOUTH TWICE DAILY, Disp: 180 tablet, Rfl: 2    atorvastatin (LIPITOR) 20 MG tablet, 1 tab daily, Disp: 90 tablet, Rfl: 1    famotidine (PEPCID) 20 MG tablet, TAKE 1 TABLET BY MOUTH TWICE A DAY (Patient taking differently: Indications: PRN), Disp: 180 tablet, Rfl: 2    ferrous sulfate (IRON 325) 325 (65 Fe) MG tablet, Take 325 mg by mouth daily (with breakfast), Disp: , Rfl:     nitroGLYCERIN (NITROSTAT) 0.4 MG SL tablet, PLACE 1 TABLET UNDER TONGUE EVERY 5 MINS, UP TO 3 DOSES AS NEEDED FOR CHEST PAIN, Disp: 25 tablet, Rfl: 1    Coenzyme Q10 (COQ10 PO), Take 500 mg by mouth daily, Disp: , Rfl:     Krill Oil 500 MG CAPS, Take  by mouth daily. , Disp: , Rfl:     clopidogrel (PLAVIX) 75 MG tablet, Take 1 tablet by mouth in the morning., Disp: 30 tablet, Rfl: 0    cyclobenzaprine (FLEXERIL) 10 MG tablet, Take 1 tablet by mouth every 8 hours as needed (Patient not taking: Reported on 11/18/2022), Disp: , Rfl:     calcium-vitamin D (OSCAL-500) 500-200 MG-UNIT per tablet, Take 1 tablet by mouth daily Indications: 200 mg, Disp: , Rfl:     I reviewed the past medical history, allergies, medications, social history and family history. PE:   Vitals:    11/18/22 1015   BP: 116/80   Site: Left Upper Arm   Position: Sitting   Cuff Size: Large Adult   Pulse: 67   SpO2: 97%   Weight: 265 lb (120.2 kg)   Height: 6' (1.829 m)     General Appearance:  awake, alert, oriented, in no acute distress  Gen: NAD, Language is Intact. Skin: no rash, lesion, dry to touch. warm  Head: no rash, no icterus  Neck: There is no carotid bruits. The Neck is supple. Neuro: CN 2-12 grossly intact with no focal deficits. Power 5/5 Throughout symmetric, Reflexes are  symmetric. Long tracts are intact. Cerebellar exam is Intact. Sensory exam is intact to light touch. Gait is intact. Musculoskeletal:  Has no hand arthritis, no limitation of ROM in any of the four extremities. Lower extremities no edema          DATA:      Results for orders placed or performed in visit on 11/04/22   PSA, Prostatic Specific Antigen   Result Value Ref Range    PSA <0.02 0.00 - 1.00 ng/mL          CTA HEAD W WO CONTRAST    Narrative  CTA of the brain. Technique: Axial CTA images of the brain. Sagittal and coronal  reconstructions provided. 3D postprocessing and/or MIPS were included. Comparison:  None    Findings: Moderate atherosclerotic vessel disease within the cavernous segment  bilateral internal carotid arteries. Otherwise normal anterior  circulation. This includes the intracranial portion of the internal  carotid arteries, anterior cerebral arteries and middle cerebral arteries. No evidence for aneurysm or occlusion. The right vertebral artery is not identified and could be occluded. This  includes the vertebral arteries, basilar artery and posterior cerebral  arteries. No evidence for aneurysm. Impression  Impression:  The right vertebral artery is not identified and could be occluded. This  could be chronic. This document has been electronically signed by: Dev Brand MD on  08/08/2022 05:50 PM    All CTs at this facility use dose modulation techniques and iterative  reconstructions, and/or weight-based dosing  when appropriate to reduce radiation to a low as reasonably achievable. 3D Post-processing was performed on this study.     Results for orders placed during the hospital encounter of 08/08/22    CTA NECK W WO CONTRAST    Narrative  PROCEDURE: CTA HEAD W [facility], CTA NECK W WO CONTRAST    CLINICAL INFORMATION: tia, R arm weakness. COMPARISON: Head CT 8/8/2022. TECHNIQUE: 1 mm axial images were obtained through the head and neck after the fast bolus administration of contrast. A noncontrast localizer was obtained. 3-D reconstructions were performed on a dedicated 3-D workstation. These include multiplanar MPR  images and multiplanar MIP images. Centerline reconstructions were obtained of the carotid systems. Isovue intravenous contrast was given. All CT scans at this facility use dose modulation, iterative reconstruction, and/or weight-based dosing when appropriate to reduce radiation dose to as low as reasonably achievable. FINDINGS:      CTA NECK:    Aortic arch and branches: There is some atherosclerosis of the aortic arch. There is no stenosis of the origin innominate artery, left common carotid artery or either subclavian artery. Right common carotid artery/ICA: The right common carotid artery is normal. There is some minimal atherosclerosis of the right carotid bulb. There is no stenosis. There is no stenosis of the right internal carotid artery. Left common carotid artery/ICA: The left common carotid artery is within acceptable limits. There is complex atherosclerosis of the left carotid bulb. There are areas of soft plaque and calcified plaque. Using NASCET criteria and the distal left internal  carotid artery as the reference, there is a 63% stenosis at the origin left internal carotid artery. There is no distal stenosis. Vertebral arteries: The right vertebral artery is a diminutive vessel. This ends at the C1 level. On the left, the vertebral artery is dominant. There is atherosclerosis of the origin. There is atherosclerosis at its proximal segment. There is a moderate  stenosis proximally. There is some atherosclerosis along its course. There is a mild stenosis of the left vertebral artery as it enters the dural ring.         CTA HEAD:      Internal carotid arteries: There is calcified atherosclerosis of the cavernous segments of the internal carotid arteries bilaterally. There is no associated significant stenosis. The ophthalmic artery origins are normal.    Middle cerebral arteries: Normal. The proximal branches are also normal.    Anterior cerebral arteries: Normal. The proximal branches are normal. There is a normal small anterior communicating artery. Vertebral arteries: The right vertebral artery ends at C1. The left vertebral artery is dominant. Mild stenosis at that enters the dural ring. Basilar artery: Normal.    Superior cerebellar arteries: Normal.    Posterior cerebral arteries: Normal. The proximal branches are also normal.    There is a normal right posterior communicating artery. No aneurysms, stenoses or occlusions are noted. The superior sagittal sinus, vein of Abdirashid, internal cerebral veins, straight sinus, transverse sinuses and sigmoid sinuses are patent. Axial source data: There are no suspicious findings the lung apices. There is no upper mediastinal adenopathy. There is no cervical adenopathy. There are no gross abnormalities in the brain. Impression  1. No significant stenosis of the right carotid system. 2. Complex atherosclerosis of the left carotid bulb with a 63% stenosis at the origin of the left internal carotid artery. 3. Moderate stenosis in the proximal left vertebral artery. This is the dominant vertebral artery. 4. No intracranial stenoses or occlusions. **This report has been created using voice recognition software. It may contain minor errors which are inherent in voice recognition technology. **    Final report electronically signed by Dr. Leatha Arrieta on 8/8/2022 4:02 PM    Results for orders placed during the hospital encounter of 08/08/22    MRI BRAIN WO CONTRAST    Narrative  Noncontrast MRI of the brain.     Technique: Sagittal T1, coronal T2, axial T1, T2, FLAIR and  diffusion-weighted imaging. Comparison:  CT,SR - CT HEAD WO CONTRAST - 08/08/2022 03:07 PM EDT  MR - MRI BRAIN B STEM WO CONTR - 06/18/2014 03:23 PM EDT    Findings: Normal signal on diffusion-weighted imaging. No evidence for  acute ischemic event. Old ischemic event right frontal lobe measuring 3.2 x 4.2 cm. Old ischemic  event right parietal lobe measuring 1.3 x 1.5 cm. Moderate nonspecific  periventricular and deep white matter disease. This most often can be  ascribed to chronic small vessel ischemic change. There are no  intracranial masses. No evidence for hemorrhage. The ventricles are normal  size, no hydrocephalus. Contents of the posterior fossa including brainstem and cerebellum are  unremarkable. Normal vascular flow voids. Normal aeration of paranasal sinuses. Impression  Impression:  Old ischemic event right frontal lobe and right parietal lobe. Moderate nonspecific periventricular and deep white matter disease. This  most often can be ascribed to chronic small vessel ischemic change. This document has been electronically signed by: Leonardo Song MD on  08/08/2022 05:45 PM    No results found for this or any previous visit. No results found for this or any previous visit. Results for orders placed during the hospital encounter of 08/08/22    CT Head WO Contrast    Narrative  Noncontrast CT of the head    Technique: Noncontrast CT of the head from the vertex through the skull  base. Comparison:  CT - CT HEAD WO CONTR - 06/30/2014 10:23 PM EDT    Findings: Old ischemic right frontal lobe and right parietal lobe. Moderate nonspecific periventricular and deep white matter disease. This  most often can be ascribed to chronic small vessel ischemic change. No  intracranial mass, hemorrhage or hydrocephalus. No definite acute ischemic  event by CT. No skull fractures. Normal aeration of the nasal sinuses.     Impression  Impression:    Old ischemic event left frontal lobe and right parietal lobe. Moderate nonspecific periventricular and deep white matter disease. This document has been electronically signed by: Lynsey Simmons MD on  2022 05:46 PM    All CTs at this facility use dose modulation techniques and iterative  reconstructions, and/or weight-based dosing  when appropriate to reduce radiation to a low as reasonably achievable. Tilt table test done :    [address] [place] [address]                                 TILT TABLE TEST     PATIENT NAME: Daysi TAO                :        1950  MED REC NO:   [id number]                           ROOM:  ACCOUNT NO:   [de-identified]                           ADMIT DATE: 2022  PROVIDER:     Lilliana Emmanuel MD     DATE OF STUDY:  2022     PROCEDURE PERFORMED:  Tilt table study. INDICATION FOR STUDY:  Presyncopal episodes. DESCRIPTION OF PROCEDURE:  After written informed consent was obtained,  the patient was brought to the electrophysiology laboratory in a  fasting, nonsedated state. His resting blood pressure was 139/95 with  heart rate of 71 beats per minute. Under continuous blood pressure, EKG, and heart rate monitoring, his bed  was tilted to about 70 degrees. Immediately after tilt, his blood  pressure was 149/98 with the heart rate of 86 beats per minute. In the next 10 minutes, his blood pressure and heart rate remained  stable. At 10 minutes of tilt table study, his blood pressure was  148/100 with heart rate of 78 beats per minute. At 20 minutes of tilt  table study, his blood pressure was 153/92 with the heart rate of 79  beats per minute, and at 30 minutes of tilt table study, his blood  pressure was 137/91 with the heart rate of 88 beats per minute. During  the entire 30 minutes of tilt table study, the patient did not have any  symptoms.      SUMMARY:  This is a negative table study for vasodepressor and  cardioinhibitory symptoms. RECOMMENDATIONS:  1. Consider uptitrating antihypertensive medications for better blood  pressure response. 2.  Clinical correlation is necessary. Rajan Mayorga MD       Assessment:     Diagnosis Orders   1. TIA (transient ischemic attack)               Follow up for TIA. He had testing performed. He denies new symptoms. He underwent his tilt table test showed no vasodepressor or cardioinhibitory symptoms. He did have EMG right arm that showed right C5-6 cervical radiculopathy, mild chronic and  right mild to moderate carpal tunnel. Then Cervical spine X ray was performed and it showed multilevel facet arthropathy at C3-4. He was referred to hand specialist re right CTS. He does not recall getting a call. He had a non focal exam. He has non focal exam. He denies hand numbness. After detailed discussion with patient we agreed on the following plan. Plan:  Continue with current medications  Follow up as needed. Call if any questions or concerns.     Total time 21 min    Davion Valdes MD

## 2022-11-18 NOTE — CARE COORDINATION
Ambulatory Care Coordination Note  11/18/2022    ACC: Bambi Dakin, RN        Neuro today. Follow up as needed. PCP office for cankar sore. Report is not painful anymore. Not healed yet. ENT referral was placed by TM. Encouraged to see ENT is unhealed sore for more than a few weeks. Plan -   ENT referral  [facility] [place] for right CT syndrome  Pain mgmt  Report new or worsening symptoms ASAP  Early symptom recognition and reporting to prevent ED and admissions  Use same or next day appts with PCP office for non-emergency problems    Offered patient enrollment in the Remote Patient Monitoring (RPM) program for in-home monitoring: NA. Lab Results       None            Care Coordination Interventions    Referral from Primary Care Provider: No  Suggested Interventions and Community Resources  Home Health Services: Declined  Meals on Wheels: Declined  Medi Set or Pill Pack: Declined  Transportation Support: Declined  Other Services or Interventions: est with cardiology, neuro, urology, rad onc, OIO          Goals Addressed                   This Visit's Progress     Conditions and Symptoms   On track     I will schedule office visits, as directed by my provider. I will keep my appointment or reschedule if I have to cancel. I will notify my provider of any barriers to my plan of care. I will follow my Zone Management tool to seek urgent or emergent care. I will notify my provider of any symptoms that indicate a worsening of my condition. Barriers: lack of support and overwhelmed by complexity of regimen  Plan for overcoming my barriers: care coordination  Confidence: 9/10  Anticipated Goal Completion Date: 1/27/2023                Prior to Admission medications    Medication Sig Start Date End Date Taking? Authorizing Provider   gabapentin (NEURONTIN) 600 MG tablet Take 600 mg by mouth 3 times daily.  10/27/22   Historical Provider, MD   metoprolol succinate (TOPROL XL) 50 MG extended release tablet TAKE 1/2 TABLET BY MOUTH EVERY DAY 10/19/22   Zoheir Youlanda Eisenmenger, MD   ELIQUIS 5 MG TABS tablet TAKE 1 TABLET BY MOUTH TWICE DAILY 9/8/22   Zahra Archuleta APRN - CNP   atorvastatin (LIPITOR) 20 MG tablet 1 tab daily 8/15/22   Colten Lopez MD   clopidogrel (PLAVIX) 75 MG tablet Take 1 tablet by mouth in the morning. 8/8/22   Maira Mehta MD   cyclobenzaprine (FLEXERIL) 10 MG tablet Take 1 tablet by mouth every 8 hours as needed  Patient not taking: Reported on 11/18/2022 3/22/22   Historical Provider, MD   famotidine (PEPCID) 20 MG tablet TAKE 1 TABLET BY MOUTH TWICE A DAY  Patient taking differently: Indications: PRN 1/4/22   Tyler Lewis MD   ferrous sulfate (IRON 325) 325 (65 Fe) MG tablet Take 325 mg by mouth daily (with breakfast)    Historical Provider, MD   nitroGLYCERIN (NITROSTAT) 0.4 MG SL tablet PLACE 1 TABLET UNDER TONGUE EVERY 5 MINS, UP TO 3 DOSES AS NEEDED FOR CHEST PAIN 8/1/20   Andrew Cuba MD   calcium-vitamin D ([address]) 500-200 MG-UNIT per tablet Take 1 tablet by mouth daily Indications: 200 mg    Historical Provider, MD   Coenzyme Q10 (COQ10 PO) Take 500 mg by mouth daily    Historical Provider, MD Tanja Oatesander Oil 500 MG CAPS Take  by mouth daily.     Historical Provider, MD       Future Appointments   Date Time Provider Meggan Ramirez   6/12/2023  7:45 AM MD JEFF [facility]   8/15/2023  1:30 PM MD MADELAINE Dumont [facility]  ANGELIQUE Pisano   11/6/2023 11:45 AM Lilliana Washington, [address] [place] [address] [place]

## 2022-11-23 ENCOUNTER — CARE COORDINATION (OUTPATIENT)
Dept: CARE COORDINATION | Age: 72
End: 2022-11-23

## 2022-11-23 NOTE — CARE COORDINATION
Ambulatory Care Coordination Note  11/23/2022    ACC: Mars Simmons RN        Micahela sore is healed. Denies ENT. Encouraged to use same or next day appts at PCP office if he needs to see a provider. Good medication adherence    Plan -   ENT referral  [facility] for right CT syndrome  Pain mgmt  Report new or worsening symptoms ASAP  Early symptom recognition and reporting to prevent ED and admissions  Use same or next day appts with PCP office for non-emergency problems    Offered patient enrollment in the Remote Patient Monitoring (RPM) program for in-home monitoring: NA. Lab Results       None            Care Coordination Interventions    Referral from Primary Care Provider: No  Suggested Interventions and Community Resources  Home Health Services: Declined  Meals on Wheels: Declined  Medi Set or Pill Pack: Declined  Transportation Support: Declined  Other Services or Interventions: est with cardiology, neuro, urology, rad onc, OIO          Goals Addressed                   This Visit's Progress     Conditions and Symptoms   On track     I will schedule office visits, as directed by my provider. I will keep my appointment or reschedule if I have to cancel. I will notify my provider of any barriers to my plan of care. I will follow my Zone Management tool to seek urgent or emergent care. I will notify my provider of any symptoms that indicate a worsening of my condition. Barriers: lack of support and overwhelmed by complexity of regimen  Plan for overcoming my barriers: care coordination  Confidence: 9/10  Anticipated Goal Completion Date: 1/27/2023                Prior to Admission medications    Medication Sig Start Date End Date Taking? Authorizing Provider   gabapentin (NEURONTIN) 600 MG tablet Take 600 mg by mouth 3 times daily.  10/27/22   Historical Provider, MD   metoprolol succinate (TOPROL XL) 50 MG extended release tablet TAKE 1/2 TABLET BY MOUTH EVERY DAY 10/19/22   Jelena MASTERSON Malena Fraire MD   ELIQUIS 5 MG TABS tablet TAKE 1 TABLET BY MOUTH TWICE DAILY 9/8/22   DOREEN Perez - CNP   atorvastatin (LIPITOR) 20 MG tablet 1 tab daily 8/15/22   Neetu Rao MD   clopidogrel (PLAVIX) 75 MG tablet Take 1 tablet by mouth in the morning. 8/8/22   Bree Jennings MD   cyclobenzaprine (FLEXERIL) 10 MG tablet Take 1 tablet by mouth every 8 hours as needed  Patient not taking: Reported on 11/18/2022 3/22/22   Historical Provider, MD   famotidine (PEPCID) 20 MG tablet TAKE 1 TABLET BY MOUTH TWICE A DAY  Patient taking differently: Indications: PRN 1/4/22   Devika Atkinson MD   ferrous sulfate (IRON 325) 325 (65 Fe) MG tablet Take 325 mg by mouth daily (with breakfast)    Historical Provider, MD   nitroGLYCERIN (NITROSTAT) 0.4 MG SL tablet PLACE 1 TABLET UNDER TONGUE EVERY 5 MINS, UP TO 3 DOSES AS NEEDED FOR CHEST PAIN 8/1/20   Pancho Latham MD   calcium-vitamin D ([address]) 500-200 MG-UNIT per tablet Take 1 tablet by mouth daily Indications: 200 mg    Historical Provider, MD   Coenzyme Q10 (COQ10 PO) Take 500 mg by mouth daily    Historical Provider, MD   Karl Shelling Oil 500 MG CAPS Take  by mouth daily.     Historical Provider, MD       Future Appointments   Date Time Provider Meggan Ramirez   6/12/2023  7:45 AM [facility] [email or web address]   8/15/2023  1:30 PM MD [facility]AD[facility]AINE Ty [facility] [place]   11/6/2023 11:45 AM [facility], [address] [place] [address]

## 2022-12-07 ENCOUNTER — CARE COORDINATION (OUTPATIENT)
Dept: CARE COORDINATION | Age: 72
End: 2022-12-07

## 2022-12-07 NOTE — CARE COORDINATION
Ambulatory Care Coordination Note  12/7/2022    ACC: Fernandez Orf    I spoke with the patient for continued Care Coordination follow up and education. Patient states he is doing well. Patient has not heard from ENT office. Denies current issues or concerns. I advised patient to contact PCP office if needed. No further needs at this time. Plan -   [facility]  Pain mg[facility]  Report new or worsening symptoms ASAP  Early symptom recognition and reporting to prevent ED and admissions  Use same or next day appts with PCP office for non-emergency problems      Lab Results       None            Care Coordination Interventions    Referral from Primary Care Provider: No  Suggested Interventions and [address]: Declined  Meals on Wheels: Declined  Medi Set or Pill Pack: Declined  Transportation Support: Declined  Other Services or Interventions: est with cardiology, neuro, urology, rad onc, OIO          Goals Addressed    None         Prior to Admission medications    Medication Sig Start Date End Date Taking? Authorizing Provider   gabapentin (NEURONTIN) 600 MG tablet Take 600 mg by mouth 3 times daily. 10/27/22   Historical Provider, MD   metoprolol succinate (TOPROL XL) 50 MG extended release tablet TAKE 1/2 TABLET BY MOUTH EVERY DAY 10/19/22   Zoheir Youlanda Eisenmenger, MD   ELIQUIS 5 MG TABS tablet TAKE 1 TABLET BY MOUTH TWICE DAILY 9/8/22   DOREEN Fox - CNP   atorvastatin (LIPITOR) 20 MG tablet 1 tab daily 8/15/22   Colten Lopez MD   clopidogrel (PLAVIX) 75 MG tablet Take 1 tablet by mouth in the morning.  8/8/22   Maira Mehta MD   cyclobenzaprine (FLEXERIL) 10 MG tablet Take 1 tablet by mouth every 8 hours as needed  Patient not taking: Reported on 11/18/2022 3/22/22   Historical Provider, MD   famotidine (PEPCID) 20 MG tablet TAKE 1 TABLET BY MOUTH TWICE A DAY  Patient taking differently: Indications: PRN 1/4/22   Tyler Lewis MD ferrous sulfate (IRON 325) 325 (65 Fe) MG tablet Take 325 mg by mouth daily (with breakfast)    Historical Provider, MD   nitroGLYCERIN (NITROSTAT) 0.4 MG SL tablet PLACE 1 TABLET UNDER TONGUE EVERY 5 MINS, UP TO 3 DOSES AS NEEDED FOR CHEST PAIN 8/1/20   Mayra Peguero MD   calcium-vitamin D (OSCAL-500) 500-200 MG-UNIT per tablet Take 1 tablet by mouth daily Indications: 200 mg    Historical Provider, MD   Coenzyme Q10 (COQ10 PO) Take 500 mg by mouth daily    Historical Provider, MD Vazquez Deutscher Oil 500 MG CAPS Take  by mouth daily.     Historical Provider, MD       Future Appointments   Date Time Provider Meggan Ramirez   6/12/2023  7:45 AM Mayra Peguero MD [facility] [address] [place] [address]   8/15/2023  1:30 PM Lauryn Tobin MD [facility] [address]   11/6/2023 11:45 AM Marisa Acuna, [address] [place] [address]

## 2022-12-21 ENCOUNTER — CARE COORDINATION (OUTPATIENT)
Dept: CARE COORDINATION | Age: 72
End: 2022-12-21

## 2022-12-21 NOTE — CARE COORDINATION
Remote Patient Monitoring Enrollment Note      Date/Time:  12/21/2022 12:33 PM    Offered patient enrollment in the [employer] Remote Patient Monitoring (RPM) program for in home monitoring for HTN. Patient accepted RPM services. Patient will be monitoring the following daily:  activity level  blood pressure heart rate  medications  survey response    AC reviewed the information below with patient:    Emergency Contact (name and contact number): Peggy aglvan [phone]    [x] A member from the care coordination team will reach out to notify the patient once the RPM kit is ordered. [x] Once the kit is delivered, the [facility] team will contact the patient after UPS deliver to assist with set up. [x] Determined BP cuff size: large (13.8\"-19.68\")      [x] Determined weight scale: regular (<330lbs)                                                 [x] Hours of ACM monitoring - Monday-Friday [id number]                         All questions about RPM program answered at this time. Ambulatory Care Coordination Note  12/21/2022    ACC: Africa Guillory RN        Plan -   RPM HTN  Silviano Leary for right CT syndrome  Pain mgmt  Report new or worsening symptoms ASAP  Early symptom recognition and reporting to prevent ED and admissions  Use same or next day appts with PCP office for non-emergency problems    Offered patient enrollment in the Remote Patient Monitoring (RPM) program for in-home monitoring: Yes, patient enrolled.     Lab Results       None            Care Coordination Interventions    Referral from Primary Care Provider: No  Suggested Interventions and Community Resources  Home Health Services: Declined  Meals on Wheels: Declined  Medi Set or Pill Pack: Declined  Transportation Support: Declined  Other Services or Interventions: est with cardiology, neuro, urology, rad onc, OIO          Goals Addressed                   This Visit's Progress     Conditions and Symptoms   On track     I will schedule office visits, as directed by my provider. I will keep my appointment or reschedule if I have to cancel. I will notify my provider of any barriers to my plan of care. I will follow my Zone Management tool to seek urgent or emergent care. I will notify my provider of any symptoms that indicate a worsening of my condition. Barriers: lack of support and overwhelmed by complexity of regimen  Plan for overcoming my barriers: care coordination  Confidence: 9/10  Anticipated Goal Completion Date: 1/27/2023                Prior to Admission medications    Medication Sig Start Date End Date Taking? Authorizing Provider   gabapentin (NEURONTIN) 600 MG tablet Take 600 mg by mouth 3 times daily. 10/27/22   Historical Provider, MD   metoprolol succinate (TOPROL XL) 50 MG extended release tablet TAKE 1/2 TABLET BY MOUTH EVERY DAY 10/19/22   Jelena Mina MD   ELIQUIS 5 MG TABS tablet TAKE 1 TABLET BY MOUTH TWICE DAILY 9/8/22   Stephanie Garcia APRN - CNP   atorvastatin (LIPITOR) 20 MG tablet 1 tab daily 8/15/22   Cinthya Holguin MD   clopidogrel (PLAVIX) 75 MG tablet Take 1 tablet by mouth in the morning.  8/8/22   Priscila Cummings MD   cyclobenzaprine (FLEXERIL) 10 MG tablet Take 1 tablet by mouth every 8 hours as needed  Patient not taking: Reported on 11/18/2022 3/22/22   Historical Provider, MD   famotidine (PEPCID) 20 MG tablet TAKE 1 TABLET BY MOUTH TWICE A DAY  Patient taking differently: Indications: PRN 1/4/22   Roland Clifford MD   ferrous sulfate (IRON 325) 325 (65 Fe) MG tablet Take 325 mg by mouth daily (with breakfast)    Historical Provider, MD   nitroGLYCERIN (NITROSTAT) 0.4 MG SL tablet PLACE 1 TABLET UNDER TONGUE EVERY 5 MINS, UP TO 3 DOSES AS NEEDED FOR CHEST PAIN 8/1/20   Tamera Flores MD   calcium-vitamin D (OSCAL-500) 500-200 MG-UNIT per tablet Take 1 tablet by mouth daily Indications: 200 mg    Historical Provider, MD   Coenzyme Q10 (COQ10 PO) Take 500 mg by mouth daily Historical Provider, MD Janusz Ponce Oil 500 MG CAPS Take  by mouth daily.     Historical Provider, MD       Future Appointments   Date Time Provider Meggan Bulli   6/12/2023  7:45 AM MD JEFF Lucero [facility] E[place]IE [place] [address] [address]   8/15/2023  1:30 PM MD MADELAINE Brooke [facility]Y [facility] [place]IE [place] [email or web address]   11/6/2023 11:45 AM Tali Sorensen, [address] [place] [address]

## 2022-12-21 NOTE — CARE COORDINATION
Remote Patient Kit Ordering Note      Date/Time:  12/21/2022 1:37 PM      [x] CCSS confirmed patient shipping address  [x] Patient will receive package over the next 2-4 business days. Someone 21 years or older must be present to sign for UPS delivery. [x] Patient to contact virtual installation-specific phone number listed in the patient instructions. [x] If the patient does not contact HRS within 24 hours, an [employer] [facility] will call the patient directly: If the patient does not answer, HRS will follow up with the clinical team notifying them about the unsuccessful attempt to contact the patient. HRS will make three call attempts to the patient. [x] LPN will contact patient once equipment is active to welcome them to the program.                                                         [x] Hours of RPM monitoring - Monday-Friday [id number]                     All questions answered at this time. LPN made aware the RPM kit has been ordered. EMTP notified patient of RPM equipment order.

## 2023-01-04 ENCOUNTER — CARE COORDINATION (OUTPATIENT)
Dept: CARE COORDINATION | Age: 73
End: 2023-01-04

## 2023-01-04 NOTE — CARE COORDINATION
Ambulatory Care Coordination Note  1/4/2023    ACC: Nora Knutson, RN    Sister recently passed away. He didn't set up RPM equipment yet. Advised to call HRS number to get help with set up. Plan -   RPM HTN  Delaney Junior for right CT syndrome  Pain mgmt  Report new or worsening symptoms ASAP  Early symptom recognition and reporting to prevent ED and admissions  Use same or next day appts with PCP office for non-emergency problems    Offered patient enrollment in the Remote Patient Monitoring (RPM) program for in-home monitoring: Yes, patient enrolled. Lab Results       None            Care Coordination Interventions    Referral from Primary Care Provider: No  Suggested Interventions and Community Resources  Home Health Services: Declined  Meals on Wheels: Declined  Medi Set or Pill Pack: Declined  Transportation Support: Declined  Other Services or Interventions: est with cardiology, neuro, urology, rad onc, OIO          Goals Addressed                   This Visit's Progress     Conditions and Symptoms   On track     I will schedule office visits, as directed by my provider. I will keep my appointment or reschedule if I have to cancel. I will notify my provider of any barriers to my plan of care. I will follow my Zone Management tool to seek urgent or emergent care. I will notify my provider of any symptoms that indicate a worsening of my condition. Barriers: lack of support and overwhelmed by complexity of regimen  Plan for overcoming my barriers: care coordination  Confidence: 9/10  Anticipated Goal Completion Date: 1/27/2023                Prior to Admission medications    Medication Sig Start Date End Date Taking? Authorizing Provider   gabapentin (NEURONTIN) 600 MG tablet Take 600 mg by mouth 3 times daily.  10/27/22   Historical Provider, MD   metoprolol succinate (TOPROL XL) 50 MG extended release tablet TAKE 1/2 TABLET BY MOUTH EVERY DAY 10/19/22   Jelena Cunningham MD   ELIQUIS 5 MG TABS tablet TAKE 1 TABLET BY MOUTH TWICE DAILY 9/8/22   Abbey Bal, APRN - CNP   atorvastatin (LIPITOR) 20 MG tablet 1 tab daily 8/15/22   Jackson Miller MD   clopidogrel (PLAVIX) 75 MG tablet Take 1 tablet by mouth in the morning. 8/8/22   Chris Fuentes MD   cyclobenzaprine (FLEXERIL) 10 MG tablet Take 1 tablet by mouth every 8 hours as needed  Patient not taking: Reported on 11/18/2022 3/22/22   Historical Provider, MD   famotidine (PEPCID) 20 MG tablet TAKE 1 TABLET BY MOUTH TWICE A DAY  Patient taking differently: Indications: PRN 1/4/22   Bo Del Real MD   ferrous sulfate (IRON 325) 325 (65 Fe) MG tablet Take 325 mg by mouth daily (with breakfast)    Historical Provider, MD   nitroGLYCERIN (NITROSTAT) 0.4 MG SL tablet PLACE 1 TABLET UNDER TONGUE EVERY 5 MINS, UP TO 3 DOSES AS NEEDED FOR CHEST PAIN 8/1/20   Renato Oden MD   calcium-vitamin D ([address]) 500-200 MG-UNIT per tablet Take 1 tablet by mouth daily Indications: 200 mg    Historical Provider, MD   Coenzyme Q10 (COQ10 PO) Take 500 mg by mouth daily    Historical Provider, MD   Navya Litten Oil 500 MG CAPS Take  by mouth daily.     Historical Provider, MD       Future Appointments   Date Time Provider Meggan Ramirez   6/12/2023  7:45 AM Renato Oden MD [facility] [address]   8/15/2023  1:30 PM MD KILEY AldanaX HARVEY [address]   11/6/2023 11:45 AM Queen Chandra, [address] [place] [address]

## 2023-01-09 ENCOUNTER — CARE COORDINATION (OUTPATIENT)
Dept: CASE MANAGEMENT | Age: 73
End: 2023-01-09

## 2023-01-09 NOTE — CARE COORDINATION
Remote Patient Monitoring Note      Date/Time:  1/9/2023 9:55 AM    LPN reviewed patients reported daily Remote Patient Monitoring metrics. All reported metrics are within alert parameters. Plan/Follow Up:  Will continue to review, monitor and address alerts with follow up based on severity of symptoms and risk factors    Current Patient Metrics ---- Blood Pressure: 142/87, 69bpm Pulseox: 98%, 69bpm Survey: - Weight: 244.0lbs Note Created at: 01/09/2023 09:56 AM ET ---- Time-Spent: 2 minutes 0 seconds

## 2023-01-10 ENCOUNTER — CARE COORDINATION (OUTPATIENT)
Dept: CASE MANAGEMENT | Age: 73
End: 2023-01-10

## 2023-01-10 NOTE — CARE COORDINATION
Remote Patient Monitoring Note      Date/Time:  1/10/2023 3:16 PM    LPN reviewed patients reported daily Remote Patient Monitoring metrics. All reported metrics are within alert parameters. Plan/Follow Up:  Will continue to review, monitor and address alerts with follow up based on severity of symptoms and risk factors  Current Patient Metrics ---- Blood Pressure: 144/85, 90bpm Pulseox: 93%, 83bpm Survey: C Weight: 255.5lbs Note Created at: 01/10/2023 03:17 PM ET ---- Time-Spent: 2 minutes 0 seconds

## 2023-01-11 ENCOUNTER — CARE COORDINATION (OUTPATIENT)
Dept: CASE MANAGEMENT | Age: 73
End: 2023-01-11

## 2023-01-11 NOTE — CARE COORDINATION
Remote Patient Monitoring Note      Date/Time:  2023 8:59 AM    LPN reviewed patients reported daily Remote Patient Monitoring metrics. All reported metrics are within alert parameters. Plan/Follow Up: Will continue to review, monitor and address alerts with follow up based on severity of symptoms and risk factors. Remote Patient Monitoring Welcome Note  Date/Time:  2023 9:00 AM     Verified patients name and  as identifiers. Completed and confirmed the following:   Emergency Contact: Emergency Contact: Sherice galvan [phone]  [x] Patient received all RPM equipment (tablet, scale, blood pressure device and cuff, and pulse oximeter)  Cuff Size: Small  []  Regular   []  Large  [x]   Weight Scale: Regular   [x]  Bariatric  []               [x] Instructed patient keep box for use when returning equipment                                                          [x] Reviewed Patient Welcome Letter with patient                         [x] Reviewed expectations for patient and care team  [x] Reviewed RPM consent form         [x] Instructed patient to keep scale on flat surface                                                         [x] Instructed patient to keep tablet plugged in at all times                         [x] Instructed how to contact IT support (number listed on welcome letter)  [x] Provided Remote Patient Monitoring care  information               All questions answered at this time.     Current Patient Metrics ---- Blood Pressure: 141/89, 93bpm Pulseox: 97%, 101bpm Survey: - Weight: 254.0lbs Note Created at: 2023 09:03 AM ET ---- Time-Spent: 4 minutes 0 seconds

## 2023-01-12 ENCOUNTER — CARE COORDINATION (OUTPATIENT)
Dept: CARE COORDINATION | Age: 73
End: 2023-01-12

## 2023-01-12 NOTE — CARE COORDINATION
Remote Patient Monitoring Note      Date/Time:  1/12/2023 2:09 PM    EMTP reviewed patients reported daily Remote Patient Monitoring metrics. All reported metrics are within alert parameters. Plan/Follow Up:  Will continue to review, monitor and address alerts with follow up based on severity of symptoms and risk factors-- Current Patient Metrics ---- Blood Pressure: 128/80, 84bpm Pulseox: 97%, 78bpm Survey: C Weight: 254.5lbs Note Created at: 01/12/2023 02:09 PM ET ---- Time-Spent: 2 minutes 0 seconds

## 2023-01-13 ENCOUNTER — CARE COORDINATION (OUTPATIENT)
Dept: CARE COORDINATION | Age: 73
End: 2023-01-13

## 2023-01-13 NOTE — CARE COORDINATION
Remote Patient Monitoring Note      Date/Time:  1/13/2023 12:09 PM    EMTP reviewed patients reported daily Remote Patient Monitoring metrics. All reported metrics are within alert parameters. Plan/Follow Up:  Will continue to review, monitor and address alerts with follow up based on severity of symptoms and risk factors--- Current Patient Metrics ---- Blood Pressure: 168/82, 71bpm Pulseox: 98%, 75bpm Survey: C Weight: 256.0lbs Note Created at: 01/13/2023 12:09 PM ET ---- Time-Spent: 2 minutes 0 seconds

## 2023-01-16 ENCOUNTER — CARE COORDINATION (OUTPATIENT)
Dept: CARE COORDINATION | Age: 73
End: 2023-01-16

## 2023-01-16 NOTE — CARE COORDINATION
Remote Patient Monitoring Note      Date/Time:  1/16/2023 12:40 PM    CCSS reviewed patients reported daily Remote Patient Monitoring metrics. All reported metrics are within alert parameters. Plan/Follow Up:  Will continue to review, monitor and address alerts with follow up based on severity of symptoms and risk factors  urrent Patient Metrics ---- Blood Pressure: 156/92, 78bpm Pulseox: 97%, 75bpm Survey: C Weight: 253.5lbs Note Created at: 01/16/2023 12:40 PM ET ---- Time-Spent: 2 minutes 0 seconds

## 2023-01-17 ENCOUNTER — CARE COORDINATION (OUTPATIENT)
Dept: CARE COORDINATION | Age: 73
End: 2023-01-17

## 2023-01-18 ENCOUNTER — CARE COORDINATION (OUTPATIENT)
Dept: CARE COORDINATION | Age: 73
End: 2023-01-18

## 2023-01-18 NOTE — CARE COORDINATION
Remote Patient Monitoring Note      Date/Time:  1/18/2023 12:45 PM    EMTP reviewed patients reported daily Remote Patient Monitoring metrics. All reported metrics are within alert parameters. Plan/Follow Up:  Will continue to review, monitor and address alerts with follow up based on severity of symptoms and risk factors--- Current Patient Metrics ---- Blood Pressure: 151/79, 71bpm Pulseox: 95%, 66bpm Survey: C Weight: 253.5lbs Note Created at: 01/18/2023 12:45 PM ET ---- Time-Spent: 2 minutes 0 seconds

## 2023-01-19 ENCOUNTER — CARE COORDINATION (OUTPATIENT)
Dept: CARE COORDINATION | Age: 73
End: 2023-01-19

## 2023-01-19 NOTE — CARE COORDINATION
Remote Patient Monitoring Note      Date/Time:  1/19/2023 1:51 PM    EMTP reviewed patients reported daily Remote Patient Monitoring metrics. All reported metrics are within alert parameters. Plan/Follow Up:  Will continue to review, monitor and address alerts with follow up based on severity of symptoms and risk factors--- Current Patient Metrics ---- Blood Pressure: 134/88, 67bpm Pulseox: 96%, 74bpm Survey: C Weight: 253.5lbs Note Created at: 01/19/2023 01:51 PM ET ---- Time-Spent: 2 minutes 0 seconds

## 2023-01-20 ENCOUNTER — CARE COORDINATION (OUTPATIENT)
Dept: CARE COORDINATION | Age: 73
End: 2023-01-20

## 2023-01-20 NOTE — CARE COORDINATION
Remote Patient Monitoring Note      Date/Time:  1/20/2023 3:33 PM    EMTP reviewed patients reported daily Remote Patient Monitoring metrics. All reported metrics are within alert parameters. Plan/Follow Up:  Will continue to review, monitor and address alerts with follow up based on severity of symptoms and risk factors-- Current Patient Metrics ---- Blood Pressure: 164/69, 67bpm Pulseox: 99%, 70bpm Survey: C Weight: 254.0lbs Note Created at: 01/20/2023 03:33 PM ET ---- Time-Spent: 2 minutes 0 seconds

## 2023-01-23 ENCOUNTER — CARE COORDINATION (OUTPATIENT)
Dept: CARE COORDINATION | Age: 73
End: 2023-01-23

## 2023-01-23 NOTE — CARE COORDINATION
Remote Patient Monitoring Note      Date/Time:  1/23/2023 1:29 PM    CCSS reviewed patients reported daily Remote Patient Monitoring metrics. All reported metrics are within alert parameters. Plan/Follow Up:  Will continue to review, monitor and address alerts with follow up based on severity of symptoms and risk factors  Current Patient Metrics ---- Blood Pressure: 170/92, 66bpm Pulseox: 99%, 74bpm Survey: C Weight: 255.0lbs Note Created at: 01/23/2023 01:27 PM ET ---- Time-Spent: 2 minutes 0 seconds

## 2023-01-24 ENCOUNTER — CARE COORDINATION (OUTPATIENT)
Dept: CARE COORDINATION | Age: 73
End: 2023-01-24

## 2023-01-24 NOTE — CARE COORDINATION
Remote Patient Monitoring Note      Date/Time:  1/24/2023 9:09 AM    EMTP reviewed patients reported daily Remote Patient Monitoring metrics. All reported metrics are within alert parameters. Plan/Follow Up:  Will continue to review, monitor and address alerts with follow up based on severity of symptoms and risk factors-- Current Patient Metrics ---- Blood Pressure: 141/81, 66bpm Pulseox: 98%, 72bpm Survey: - Weight: 253.5lbs Note Created at: 01/24/2023 09:09 AM ET ---- Time-Spent: 2 minutes 0 seconds

## 2023-01-25 ENCOUNTER — CARE COORDINATION (OUTPATIENT)
Dept: CARE COORDINATION | Age: 73
End: 2023-01-25

## 2023-01-25 SDOH — ECONOMIC STABILITY: TRANSPORTATION INSECURITY
IN THE PAST 12 MONTHS, HAS LACK OF TRANSPORTATION KEPT YOU FROM MEETINGS, WORK, OR FROM GETTING THINGS NEEDED FOR DAILY LIVING?: NO

## 2023-01-25 SDOH — ECONOMIC STABILITY: TRANSPORTATION INSECURITY
IN THE PAST 12 MONTHS, HAS THE LACK OF TRANSPORTATION KEPT YOU FROM MEDICAL APPOINTMENTS OR FROM GETTING MEDICATIONS?: NO

## 2023-01-25 ASSESSMENT — SOCIAL DETERMINANTS OF HEALTH (SDOH)
DO YOU BELONG TO ANY CLUBS OR ORGANIZATIONS SUCH AS CHURCH GROUPS UNIONS, FRATERNAL OR ATHLETIC GROUPS, OR SCHOOL GROUPS?: NO
HOW OFTEN DO YOU GET TOGETHER WITH FRIENDS OR RELATIVES?: MORE THAN THREE TIMES A WEEK
IN A TYPICAL WEEK, HOW MANY TIMES DO YOU TALK ON THE PHONE WITH FAMILY, FRIENDS, OR NEIGHBORS?: MORE THAN THREE TIMES A WEEK
HOW OFTEN DO YOU ATTENT MEETINGS OF THE CLUB OR ORGANIZATION YOU BELONG TO?: NEVER
HOW OFTEN DO YOU ATTEND CHURCH OR RELIGIOUS SERVICES?: 1 TO 4 TIMES PER YEAR

## 2023-01-25 NOTE — CARE COORDINATION
Ambulatory Care Coordination Note  1/25/2023    ACC: Elora Curling, RN    Active with RPM. Feeling well. No complaints at this time. Will work towards graduation once graduated from [employer]. BP well controlled. Good medication adherence. Plan -   RPM HTN  Pain mgmt  Report new or worsening symptoms ASAP  Early symptom recognition and reporting to prevent ED and admissions  Use same or next day appts with PCP office for non-emergency problems    Offered patient enrollment in the Remote Patient Monitoring (RPM) program for in-home monitoring: Yes, patient enrolled. Lab Results       None            Care Coordination Interventions    Referral from Primary Care Provider: No  Suggested Interventions and Community Resources  Home Health Services: Declined  Meals on Wheels: Declined  Medi Set or Pill Pack: Declined  Transportation Support: Declined  Other Services or Interventions: est with cardiology, neuro, urology, rad onc, OIO          Goals Addressed                   This Visit's Progress     Conditions and Symptoms   On track     I will schedule office visits, as directed by my provider. I will keep my appointment or reschedule if I have to cancel. I will notify my provider of any barriers to my plan of care. I will follow my Zone Management tool to seek urgent or emergent care. I will notify my provider of any symptoms that indicate a worsening of my condition. Barriers: lack of support and overwhelmed by complexity of regimen  Plan for overcoming my barriers: care coordination  Confidence: 9/10  Anticipated Goal Completion Date: 1/27/2023                Prior to Admission medications    Medication Sig Start Date End Date Taking? Authorizing Provider   gabapentin (NEURONTIN) 600 MG tablet Take 600 mg by mouth 3 times daily.  10/27/22   Historical Provider, MD   metoprolol succinate (TOPROL XL) 50 MG extended release tablet TAKE 1/2 TABLET BY MOUTH EVERY DAY 10/19/22   Selena Alcala MD   ELIQUIS 5 MG TABS tablet TAKE 1 TABLET BY MOUTH TWICE DAILY 9/8/22   An Pérez, APRN - CNP   atorvastatin (LIPITOR) 20 MG tablet 1 tab daily 8/15/22   Bijan Foy MD   clopidogrel (PLAVIX) 75 MG tablet Take 1 tablet by mouth in the morning. 8/8/22   Dana Monzon MD   cyclobenzaprine (FLEXERIL) 10 MG tablet Take 1 tablet by mouth every 8 hours as needed  Patient not taking: Reported on 11/18/2022 3/22/22   Historical Provider, MD   famotidine (PEPCID) 20 MG tablet TAKE 1 TABLET BY MOUTH TWICE A DAY  Patient taking differently: Indications: PRN 1/4/22   Keagan Sevilla MD   ferrous sulfate (IRON 325) 325 (65 Fe) MG tablet Take 325 mg by mouth daily (with breakfast)    Historical Provider, MD   nitroGLYCERIN (NITROSTAT) 0.4 MG SL tablet PLACE 1 TABLET UNDER TONGUE EVERY 5 MINS, UP TO 3 DOSES AS NEEDED FOR CHEST PAIN 8/1/20   Jelena Gar MD   calcium-vitamin D (OSCAL-500) 500-200 MG-UNIT per tablet Take 1 tablet by mouth daily Indications: 200 mg    Historical Provider, MD   Coenzyme Q10 (COQ10 PO) Take 500 mg by mouth daily    Historical Provider, MD   Krill Oil 500 MG CAPS Take  by mouth daily.    Historical Provider, MD       Future Appointments   Date Time Provider Department Center   6/12/2023  7:45 AM MD JEFF [facility]   8/15/2023  1:30 PM MD KILEY MelendezX HARVE[facility]   11/6/2023 11:45 AM Richy Miranda APRN - CNP [facility] LEMUEL Deleon [facility]

## 2023-01-25 NOTE — CARE COORDINATION
Remote Patient Monitoring Note      Date/Time:  1/25/2023 9:58 AM    EMTP reviewed patients reported daily Remote Patient Monitoring metrics. All reported metrics are within alert parameters. Plan/Follow Up:  Will continue to review, monitor and address alerts with follow up based on severity of symptoms and risk factors-- Current Patient Metrics ---- Blood Pressure: 148/95, 71bpm Pulseox: 99%, 62bpm Survey: - Weight: 255.0lbs Note Created at: 01/25/2023 09:58 AM ET ---- Time-Spent: 2 minutes 0 seconds

## 2023-01-26 ENCOUNTER — CARE COORDINATION (OUTPATIENT)
Dept: CASE MANAGEMENT | Age: 73
End: 2023-01-26

## 2023-01-26 NOTE — CARE COORDINATION
Remote Alert Monitoring Note      Date/Time:  2023 11:25 AM    LPN contacted patient by telephone regarding red alert received for blood pressure reading (129/116). Verified patients name and  as identifiers. Attempted to reach patient (Mail Box Full)  & Son Maurisio Miranda for [employer] Unable to reach patient. Left HIPAA Compliant message for Son on Voice Mail to call. Phone number left on Voice Mail to call back. Will continue to follow. Danial Garcia LPN    [phone]  [facility] Coordinator      Background: HTN  Refer to 911 immediately if:  Patient unresponsive or unable to provide history  Change in cognition or sudden confusion  Patient unable to respond in complete sentences  Intense chest pain/tightness  Any concern for any clinical emergency  Red Alert: Provider response time of 1 hr required for any red alert requiring intervention  Yellow Alert: Provider response time of 3hr required for any escalated yellow alert    BP Triage  Are you having any Chest Pain? no   Are you having any Shortness of Breath? no   Do you have a headache or have any vision changes? no   Are you having any numbness or tingling? no   Are you having any other health concerns or issues? no       Clinical Interventions: Reviewed and followed up on alerts and treatments-Patient's son Maurisio Miranda called back and states he will have his father recheck the BP. Denies CP, SOB and any Stroke-like S/S at this time. Patient is taking medications as directed. Will recheck BP later on. Rechecked BP at 169/83. No further action required at this time. Education of patient/family/caregiver/guardian to support self-management-Put cuff on upper arm and wait and relax for about 2 minutes before testing. Feet are to be flat on the floor. Plan/Follow Up: Will continue to review, monitor and address alerts with follow up based on severity of symptoms and risk factors.    Current Patient Metrics ---- Blood Pressure: 169/83, 60bpm Pulseox: 97%, 77bpm Survey: C Weight: 256.5lbs Note Created at: 01/26/2023 01:16 PM ET ---- Time-Spent: 10 minutes 0 seconds

## 2023-01-27 ENCOUNTER — CARE COORDINATION (OUTPATIENT)
Dept: CARE COORDINATION | Age: 73
End: 2023-01-27

## 2023-01-27 NOTE — CARE COORDINATION
Remote Patient Monitoring Note      Date/Time:  1/27/2023 10:50 AM    EMTP reviewed patients reported daily Remote Patient Monitoring metrics. All reported metrics are within alert parameters. Plan/Follow Up:  Will continue to review, monitor and address alerts with follow up based on severity of symptoms and risk factors--- Current Patient Metrics ---- Blood Pressure: 135/86, 70bpm Pulseox: 93%, 76bpm Survey: - Weight: 253.5lbs Note Created at: 01/27/2023 10:50 AM ET ---- Time-Spent: 2 minutes 0 seconds

## 2023-01-30 ENCOUNTER — CARE COORDINATION (OUTPATIENT)
Dept: CASE MANAGEMENT | Age: 73
End: 2023-01-30

## 2023-01-31 ENCOUNTER — CARE COORDINATION (OUTPATIENT)
Dept: CARE COORDINATION | Age: 73
End: 2023-01-31

## 2023-01-31 NOTE — CARE COORDINATION
Remote Patient Monitoring Note      Date/Time:  1/31/2023 12:50 PM    EMTP reviewed patients reported daily Remote Patient Monitoring metrics. All reported metrics are within alert parameters. Plan/Follow Up:  Will continue to review, monitor and address alerts with follow up based on severity of symptoms and risk factors--- Current Patient Metrics ---- Blood Pressure: 144/81, 64bpm Pulseox: 98%, 57bpm Survey: C Weight: 252.0lbs Note Created at: 01/31/2023 12:52 PM ET ---- Time-Spent: 2 minutes 0 seconds

## 2023-02-01 ENCOUNTER — CARE COORDINATION (OUTPATIENT)
Dept: CARE COORDINATION | Age: 73
End: 2023-02-01

## 2023-02-01 NOTE — CARE COORDINATION
Remote Patient Monitoring Note      Date/Time:  2/1/2023 2:09 PM    EMTP reviewed patients reported daily Remote Patient Monitoring metrics. All reported metrics are within alert parameters. Plan/Follow Up:  Will continue to review, monitor and address alerts with follow up based on severity of symptoms and risk factors--- Current Patient Metrics ---- Blood Pressure: 120/49, 71bpm Pulseox: 98%, 68bpm Survey: C Weight: 250.5lbs Note Created at: 02/01/2023 02:10 PM ET ---- Time-Spent: 2 minutes 0 seconds

## 2023-02-02 ENCOUNTER — CARE COORDINATION (OUTPATIENT)
Dept: CASE MANAGEMENT | Age: 73
End: 2023-02-02

## 2023-02-02 NOTE — CARE COORDINATION
Remote Alert Monitoring Note      Date/Time:  2/2/2023 10:04 AM    LPN attempted to contacted patient by telephone regarding red alert received for weight increase (5# over night). Attempted to reach patient for RPM Red Alert Call. Unable to reach patient. Mail Box Full - Unable to leave message at this time. Will continue to follow. Latonya Sepulveda LPN    [phone]  [facility] / [facility] Coordinator      Background: HTN  Refer to 911 immediately if:  Patient unresponsive or unable to provide history  Change in cognition or sudden confusion  Patient unable to respond in complete sentences  Intense chest pain/tightness  Any concern for any clinical emergency  Red Alert: Provider response time of 1 hr required for any red alert requiring intervention  Yellow Alert: Provider response time of 3hr required for any escalated yellow alert    Plan/Follow Up: Will continue to review, monitor and address alerts with follow up based on severity of symptoms and risk factors.      Current Patient Metrics ---- Blood Pressure: 147/85, 63bpm Pulseox: 98%, 66bpm Survey: C Weight: 255.5lbs Note Created at: 02/02/2023 10:08 AM ET ---- Time-Spent: 4 minutes 0 seconds

## 2023-02-03 ENCOUNTER — CARE COORDINATION (OUTPATIENT)
Dept: CARE COORDINATION | Age: 73
End: 2023-02-03

## 2023-02-03 NOTE — CARE COORDINATION
Remote Patient Monitoring Note      Date/Time:  2/3/2023 10:39 AM    CCSS reviewed patients reported daily Remote Patient Monitoring metrics. All reported metrics are within alert parameters.     Plan/Follow Up: Will continue to review, monitor and address alerts with follow up based on severity of symptoms and risk factors  Current Patient Metrics ---- Blood Pressure: 108/73, 70bpm Pulseox: 92%, 50bpm Survey: C Weight: 250.5lbs Note Created at: 02/03/2023 10:39 AM ET ---- Time-Spent: 2 minutes 0 seconds

## 2023-02-06 ENCOUNTER — CARE COORDINATION (OUTPATIENT)
Dept: CARE COORDINATION | Age: 73
End: 2023-02-06

## 2023-02-06 RX ORDER — FAMOTIDINE 20 MG/1
TABLET, FILM COATED ORAL
Qty: 180 TABLET | Refills: 2 | Status: SHIPPED | OUTPATIENT
Start: 2023-02-06

## 2023-02-06 NOTE — CARE COORDINATION
Remote Patient Monitoring Note      Date/Time:  2/6/2023 2:03 PM    EMTP reviewed patients reported daily Remote Patient Monitoring metrics. All reported metrics are within alert parameters. Plan/Follow Up:  Will continue to review, monitor and address alerts with follow up based on severity of symptoms and risk factors--- Current Patient Metrics ---- Blood Pressure: 144/79, 66bpm Pulseox: 98%, 62bpm Survey: - Weight: 251.5lbs Note Created at: 02/06/2023 02:03 PM ET ---- Time-Spent: 2 minutes 0 seconds

## 2023-02-07 ENCOUNTER — CARE COORDINATION (OUTPATIENT)
Dept: CARE COORDINATION | Age: 73
End: 2023-02-07

## 2023-02-07 NOTE — CARE COORDINATION
Remote Patient Monitoring Note      Date/Time:  2/7/2023 1:06 PM    EMTP reviewed patients reported daily Remote Patient Monitoring metrics. All reported metrics are within alert parameters. Plan/Follow Up:  Will continue to review, monitor and address alerts with follow up based on severity of symptoms and risk factors---- Current Patient Metrics ---- Blood Pressure: 148/75, 63bpm Pulseox: 96%, 55bpm Survey: C Weight: 253.5lbs Note Created at: 02/07/2023 01:06 PM ET ---- Time-Spent: 2 minutes 0 seconds

## 2023-02-08 ENCOUNTER — CARE COORDINATION (OUTPATIENT)
Dept: CASE MANAGEMENT | Age: 73
End: 2023-02-08

## 2023-02-08 NOTE — CARE COORDINATION
Remote Alert Monitoring Note      Date/Time:  2023 9:33 AM    LPN contacted patient by telephone regarding red alert received for weight increase (5# overnight). Verified patients name and  as identifiers. Background: HTN  Refer to 911 immediately if:  Patient unresponsive or unable to provide history  Change in cognition or sudden confusion  Patient unable to respond in complete sentences  Intense chest pain/tightness  Any concern for any clinical emergency  Red Alert: Provider response time of 1 hr required for any red alert requiring intervention  Yellow Alert: Provider response time of 3hr required for any escalated yellow alert    Weight Scale Triage  Was your weight obtained upon rising/waking today? Yes   Was your weight obtained after voiding and/or use of the bathroom today? yes   Did you weigh yourself in the same amount of clothing today, compared to how you typically do? yes   Was the scale bumped or moved prior to today's weight? no   Is your scale on a flat/hard surface? yes   Did you obtain your weight with shoes on? no   If yes, is this something you normally do during your daily weights? no   Were you standing up straight on the scale today? yes   Were you leaning on anything while obtaining your weight today? no       Clinical Interventions: Reviewed and followed up on alerts and treatments-Patient had 5# weight gain over night. Denies CP, SOB, Swelling. Did eat a little more NA then usually does. Cheese and crackers. No further action needed at this time. Will monitor weight tomorrow. Education of patient/family/caregiver/guardian to support self-management-    Instructed patient on the importance of weighing daily, in the morning after urinating, Same Clothing on, NO shoes, Scale on Flat/hard surface, and that if the patient has weight gain of 3# over night or 5# weight gain in a week to call their physician immediately and report. Plan/Follow Up:  Will continue to review, monitor and address alerts with follow up based on severity of symptoms and risk factors.     Current Patient Metrics ---- Blood Pressure: 141/74, 51bpm Pulseox: 97%, 56bpm Survey: - Weight: 258.5lbs Note Created at: 02/08/2023 09:41 AM ET ---- Time-Spent: 9 minutes 0 seconds

## 2023-02-09 ENCOUNTER — CARE COORDINATION (OUTPATIENT)
Dept: CASE MANAGEMENT | Age: 73
End: 2023-02-09

## 2023-02-09 NOTE — CARE COORDINATION
Remote Alert Monitoring Note      Date/Time:  2023 9:41 AM    LPN contacted patient by telephone regarding red alert received for weight increase (5# 7 days). Verified patients name and  as identifiers. Background: HTN  Refer to 911 immediately if:  Patient unresponsive or unable to provide history  Change in cognition or sudden confusion  Patient unable to respond in complete sentences  Intense chest pain/tightness  Any concern for any clinical emergency  Red Alert: Provider response time of 1 hr required for any red alert requiring intervention  Yellow Alert: Provider response time of 3hr required for any escalated yellow alert    Weight Scale Triage  Was your weight obtained upon rising/waking today? Yes   Was your weight obtained after voiding and/or use of the bathroom today? yes   Did you weigh yourself in the same amount of clothing today, compared to how you typically do? yes   Was the scale bumped or moved prior to today's weight? no   Is your scale on a flat/hard surface? yes   Did you obtain your weight with shoes on? no   If yes, is this something you normally do during your daily weights? no   Were you standing up straight on the scale today? yes   Were you leaning on anything while obtaining your weight today? no       Clinical Interventions: Reviewed and followed up on alerts and treatments-Patient has weight gain of 5# in 7 days. Denies Swelling, CP, SOB, Has taken medications this morning. Patient did eat cheese and crackers the other day for bedtime snack. No increased NA since then. Will Monitor weight the next few days. Education of patient/family/caregiver/guardian to support self-management-Instructed to watch NA intake and drink more water.     Instructed patient on the importance of weighing daily, in the morning after urinating, Same Clothing on, NO shoes, Scale on Flat/hard surface, and that if the patient has weight gain of 3# over night or 5# weight gain in a week to call their physician immediately and report. Plan/Follow Up: Will continue to review, monitor and address alerts with follow up based on severity of symptoms and risk factors.     Current Patient Metrics ---- Blood Pressure: 144/87, 62bpm Pulseox: 98%, 59bpm Survey: - Weight: 260.5lbs Note Created at: 02/09/2023 09:59 AM ET ---- Time-Spent: 18 minutes 0 seconds

## 2023-02-10 ENCOUNTER — CARE COORDINATION (OUTPATIENT)
Dept: CARE COORDINATION | Age: 73
End: 2023-02-10

## 2023-02-10 RX ORDER — ATORVASTATIN CALCIUM 20 MG/1
TABLET, FILM COATED ORAL
Qty: 90 TABLET | Refills: 1 | Status: SHIPPED | OUTPATIENT
Start: 2023-02-10

## 2023-02-13 ENCOUNTER — CARE COORDINATION (OUTPATIENT)
Dept: CASE MANAGEMENT | Age: 73
End: 2023-02-13

## 2023-02-13 NOTE — CARE COORDINATION
Remote Alert Monitoring Note      Date/Time:  2023 9:47 AM  Patient Current Location: [facility]    LPN contacted patient by telephone regarding red alert received for weight increase (5# over night). Verified patients name and  as identifiers. Background: HTN  Refer to 911 immediately if:  Patient unresponsive or unable to provide history  Change in cognition or sudden confusion  Patient unable to respond in complete sentences  Intense chest pain/tightness  Any concern for any clinical emergency  Red Alert: Provider response time of 1 hr required for any red alert requiring intervention  Yellow Alert: Provider response time of 3hr required for any escalated yellow alert    Weight Scale Triage  Was your weight obtained upon rising/waking today? Yes     Was your weight obtained after voiding and/or use of the bathroom today? yes   Did you weigh yourself in the same amount of clothing today, compared to how you typically do? yes   Was the scale bumped or moved prior to today's weight? no   Is your scale on a flat/hard surface? yes   Did you obtain your weight with shoes on? no   If yes, is this something you normally do during your daily weights? no   Were you standing up straight on the scale today? yes   Were you leaning on anything while obtaining your weight today? no       Clinical Interventions: Reviewed and followed up on alerts and treatments-Patient has 5# weight gain over night. Denies CP, SOB, Increased NA intake, swelling. Taking medications as directed. Patient weigh as instructed. Since no S/S of any weight gain will monitor weight for increased weight gain tomorrow. No further action needed at this time.    Education of patient/family/caregiver/guardian to support self-management-    Instructed patient on the importance of weighing daily, in the morning after urinating, Same Clothing on, NO shoes, Scale on Flat/hard surface, and that if the patient has weight gain of 3# over night or 5# weight gain in a week to call their physician immediately and report. Plan/Follow Up: Will continue to review, monitor and address alerts with follow up based on severity of symptoms and risk factors.      Current Patient Metrics ---- Blood Pressure: 140/85, 53bpm Pulseox: 98%, 62bpm Survey: - Weight: 259.0lbs Note Created at: 02/13/2023 09:56 AM ET ---- Time-Spent: 10 minutes 0 seconds

## 2023-02-14 ENCOUNTER — CARE COORDINATION (OUTPATIENT)
Dept: CASE MANAGEMENT | Age: 73
End: 2023-02-14

## 2023-02-14 NOTE — CARE COORDINATION
Remote Patient Monitoring Note      Date/Time:  2/14/2023 11:47 AM    LPN reviewed patients reported daily Remote Patient Monitoring metrics. All reported metrics are within alert parameters. Plan/Follow Up: Will continue to review, monitor and address alerts with follow up based on severity of symptoms and risk factors.     Current Patient Metrics ---- Blood Pressure: 126/65, 61bpm Pulseox: 97%, 61bpm Survey: C Weight: 252.5lbs Note Created at: 02/14/2023 11:47 AM ET ---- Time-Spent: 2 minutes 0 seconds

## 2023-02-15 ENCOUNTER — CARE COORDINATION (OUTPATIENT)
Dept: CARE COORDINATION | Age: 73
End: 2023-02-15

## 2023-02-15 NOTE — CARE COORDINATION
Ambulatory Care Coordination Note  2/15/2023    Patient Current Location: [facility]     ACM contacted the patient by telephone. Verified name and  with patient as identifiers. Provided introduction to self, and explanation of the ACM role. Challenges to be reviewed by the provider   Additional needs identified to be addressed with provider: No  none               Method of communication with provider: none. ACM: Roman Davenport RN    Doing well. BP well controlled. Working toward graduation next contact. Plan -   RPM HTN - discharge RPM next contact if stable  Working toward graduation  Report new or worsening symptoms ASAP  Early symptom recognition and reporting to prevent ED and admissions  Use same or next day appts with PCP office for non-emergency problems    Offered patient enrollment in the Remote Patient Monitoring (RPM) program for in-home monitoring: Yes, patient already enrolled. Lab Results       None            Care Coordination Interventions    Referral from Primary Care Provider: No  Suggested Interventions and Community Resources  Home Health Services: Declined  Meals on Wheels: Declined  Medi Set or Pill Pack: Declined  Transportation Support: Declined  Other Services or Interventions: est with cardiology, neuro, urology, rad onc, OIO          Goals Addressed                   This Visit's Progress     Conditions and Symptoms   On track     I will schedule office visits, as directed by my provider. I will keep my appointment or reschedule if I have to cancel. I will notify my provider of any barriers to my plan of care. I will follow my Zone Management tool to seek urgent or emergent care. I will notify my provider of any symptoms that indicate a worsening of my condition.     Barriers: lack of support and overwhelmed by complexity of regimen  Plan for overcoming my barriers: care coordination  Confidence: 9/10  Anticipated Goal Completion Date: 2023 Future Appointments   Date Time Provider Meggan Ashley   6/12/2023  7:45 AM MD JEFF Donis [facility] E[place]IE [place]X [address]   8/15/2023  1:30 PM [facility] Melgar [facility]  [place]FFIE [place] [email or web address]   11/6/2023 11:45 AM Dior Santos, [address] [place] [address]

## 2023-02-15 NOTE — CARE COORDINATION
CCSS reviewed patients reported daily Remote Patient Monitoring metrics. All reported metrics are within alert parameters. Plan/Follow Up:  Will continue to review, monitor and address alerts with follow up based on severity of symptoms and risk factors      Current Patient Metrics ---- Blood Pressure: 148/87, 61bpm Pulseox: 98%, 66bpm Survey: C Weight: 254.0lbs Note Created at: 02/15/2023 12:55 PM CT ---- Time-Spent: 2 minutes 0 seconds    Shiraz Barker, [address] [facility]   Cell: [phone]

## 2023-02-16 ENCOUNTER — CARE COORDINATION (OUTPATIENT)
Dept: CASE MANAGEMENT | Age: 73
End: 2023-02-16

## 2023-02-16 NOTE — CARE COORDINATION
Remote Patient Monitoring Note      Date/Time:  2/16/2023 11:30 AM    LPN reviewed patients reported daily Remote Patient Monitoring metrics. All reported metrics are within alert parameters. Plan/Follow Up: Will continue to review, monitor and address alerts with follow up based on severity of symptoms and risk factors.     Current Patient Metrics ---- Blood Pressure: 129/96, 63bpm Pulseox: 99%, 63bpm Survey: C Weight: 254.5lbs Note Created at: 02/16/2023 11:30 AM ET ---- Time-Spent: 2 minutes 0 seconds

## 2023-02-17 ENCOUNTER — CARE COORDINATION (OUTPATIENT)
Dept: CARE COORDINATION | Age: 73
End: 2023-02-17

## 2023-02-17 NOTE — CARE COORDINATION
Remote Patient Monitoring Note      Date/Time:  2/17/2023 9:23 AM    CCSS reviewed patients reported daily Remote Patient Monitoring metrics. All reported metrics are within alert parameters. Plan/Follow Up:  Will continue to review, monitor and address alerts with follow up based on severity of symptoms and risk factors  Current Patient Metrics ---- Blood Pressure: 150/79, 60bpm Pulseox: 97%, 63bpm Survey: - Weight: 251.5lbs Note Created at: 02/17/2023 09:23 AM ET ---- Time-Spent: 2 minutes 0 seconds

## 2023-02-20 ENCOUNTER — CARE COORDINATION (OUTPATIENT)
Dept: CASE MANAGEMENT | Age: 73
End: 2023-02-20

## 2023-02-20 NOTE — CARE COORDINATION
Remote Alert Monitoring Note      Date/Time:  2023 9:54 AM  Patient Current Location: [facility]    LPN contacted patient by telephone regarding red alert received for pulse ox reading (84%) and weight increase (5# in 3 days). Verified patients name and  as identifiers. Background: HTN  Refer to 911 immediately if:  Patient unresponsive or unable to provide history  Change in cognition or sudden confusion  Patient unable to respond in complete sentences  Intense chest pain/tightness  Any concern for any clinical emergency  Red Alert: Provider response time of 1 hr required for any red alert requiring intervention  Yellow Alert: Provider response time of 3hr required for any escalated yellow alert    BP Triage  Are you having any Chest Pain? no   Are you having any Shortness of Breath? no   Do you have a headache or have any vision changes? no   Are you having any numbness or tingling? no   Are you having any other health concerns or issues? no       O2 Triage  Are you having any Chest Pain? no   Are you having any Shortness of Breath? no   Swelling in your hands or feet? no     Are you having any other health concerns or issues? no       Clinical Interventions: Reviewed and followed up on alerts and treatments-Patient has low SpO2 levels of 84% and weight gain of 5# in 3 days. Denies, CP, SOB, Swelling, Dizziness, increase NA intake. Taking all medications as directed. Rechecked SpO2 level for 96%. Patient states his weights just keep \"bouncing around on the scale\". Will Monitor weight for next few days. No further action needed at this time. Education of patient/family/caregiver/guardian to support self-management-Recheck SpO2 level. Have warm hands, hold hands and fingers very still while testing SpO2 level. Take a few deep breaths before testing. Plan/Follow Up: Will continue to review, monitor and address alerts with follow up based on severity of symptoms and risk factors.     Current Patient Metrics ---- Blood Pressure: 132/80, 55bpm Pulseox: 96%, 59bpm Survey: C Weight: 256.5lbs Note Created at: 02/20/2023 10:09 AM ET ---- Time-Spent: 15 minutes 0 seconds

## 2023-02-21 ENCOUNTER — CARE COORDINATION (OUTPATIENT)
Dept: CARE COORDINATION | Age: 73
End: 2023-02-21

## 2023-02-21 NOTE — CARE COORDINATION
Remote Patient Monitoring Note      Date/Time:  2/21/2023 11:02 AM    CCSS reviewed patients reported daily Remote Patient Monitoring metrics. All reported metrics are within alert parameters. Plan/Follow Up:  Will continue to review, monitor and address alerts with follow up based on severity of symptoms and risk factors   Current Patient Metrics ---- Blood Pressure: 130/93, 58bpm Pulseox: 97%, 67bpm Survey: C Weight: 250.0lbs Note Created at: 02/21/2023 11:02 AM ET ---- Time-Spent: 2 minutes 0 seconds

## 2023-02-22 ENCOUNTER — CARE COORDINATION (OUTPATIENT)
Dept: CARE COORDINATION | Age: 73
End: 2023-02-22

## 2023-02-22 NOTE — CARE COORDINATION
Remote Patient Monitoring Note      Date/Time:  2/22/2023 10:37 AM    CCSS reviewed patients reported daily Remote Patient Monitoring metrics. All reported metrics are within alert parameters. Plan/Follow Up:  Will continue to review, monitor and address alerts with follow up based on severity of symptoms and risk factors  - Current Patient Metrics ---- Blood Pressure: 141/87, 68bpm Pulseox: 98%, 67bpm Survey: C Weight: 249.0lbs Note Created at: 02/22/2023 10:37 AM ET ---- Time-Spent: 2 minutes 0 seconds

## 2023-02-23 ENCOUNTER — CARE COORDINATION (OUTPATIENT)
Dept: CARE COORDINATION | Age: 73
End: 2023-02-23

## 2023-02-23 NOTE — CARE COORDINATION
Remote Patient Monitoring Note      Date/Time:  2/23/2023 9:21 AM    CCSS reviewed patients reported daily Remote Patient Monitoring metrics. All reported metrics are within alert parameters. Plan/Follow Up:  Will continue to review, monitor and address alerts with follow up based on severity of symptoms and risk factors  Current Patient Metrics ---- Blood Pressure: 132/77, 67bpm Pulseox: 98%, 71bpm Survey: - Weight: 248.5lbs Note Created at: 02/23/2023 09:21 AM ET ---- Time-Spent: 2 minutes 0 seconds

## 2023-02-24 ENCOUNTER — CARE COORDINATION (OUTPATIENT)
Dept: CARE COORDINATION | Age: 73
End: 2023-02-24

## 2023-02-24 NOTE — CARE COORDINATION
CCSS reviewed patients reported daily Remote Patient Monitoring metrics. All reported metrics are within alert parameters. Plan/Follow Up:  Will continue to review, monitor and address alerts with follow up based on severity of symptoms and risk factors       Current Patient Metrics ---- Blood Pressure: 127/99, 72bpm Pulseox: 97%, 64bpm Survey: C Weight: 248.5lbs Note Created at: 02/24/2023 10:39 AM CT ---- Time-Spent: 2 minutes 0 seconds    Jojo James, [address] [facility]   Cell: [phone]

## 2023-02-27 ENCOUNTER — CARE COORDINATION (OUTPATIENT)
Dept: CASE MANAGEMENT | Age: 73
End: 2023-02-27

## 2023-02-27 NOTE — CARE COORDINATION
Remote Alert Monitoring Note      Date/Time:  2/27/2023 9:50 AM  Patient Current Location: [facility]    LPN attempted to contacted patient by telephone regarding red alert received for pulse ox reading (85%) and weight increase (5# 7 days). Attempted X 2 to reach patient for RPM Red Alert Call. Unable to reach patient. Left HIPAA Compliant message on Voice Mail to call. Phone number left on Voice Mail to call back. Will continue to follow. Dena Munoz LPN    [phone]  [facility] / [facility] Coordinator      Background: HTN  Refer to 911 immediately if:  Patient unresponsive or unable to provide history  Change in cognition or sudden confusion  Patient unable to respond in complete sentences  Intense chest pain/tightness  Any concern for any clinical emergency  Red Alert: Provider response time of 1 hr required for any red alert requiring intervention  Yellow Alert: Provider response time of 3hr required for any escalated yellow alert    Plan/Follow Up: Will continue to review, monitor and address alerts with follow up based on severity of symptoms and risk factors.     Current Patient Metrics ---- Blood Pressure: 108/75, 59bpm Pulseox: 85%, 69bpm Survey: C Weight: 254.5lbs Note Created at: 02/27/2023 03:24 PM ET ---- Time-Spent: 2 minutes 0 seconds

## 2023-02-28 ENCOUNTER — CARE COORDINATION (OUTPATIENT)
Dept: CARE COORDINATION | Age: 73
End: 2023-02-28

## 2023-02-28 NOTE — CARE COORDINATION
Remote Patient Monitoring Note      Date/Time:  2/28/2023 9:26 AM    EMTP reviewed patients reported daily Remote Patient Monitoring metrics. All reported metrics are within alert parameters. Plan/Follow Up:  Will continue to review, monitor and address alerts with follow up based on severity of symptoms and risk factors--- Current Patient Metrics ---- Blood Pressure: 130/81, 61bpm Pulseox: 96%, 82bpm Survey: C Weight: 251.0lbs Note Created at: 02/28/2023 09:26 AM ET ---- Time-Spent: 2 minutes 0 seconds

## 2023-03-01 ENCOUNTER — CARE COORDINATION (OUTPATIENT)
Dept: CARE COORDINATION | Age: 73
End: 2023-03-01

## 2023-03-01 NOTE — CARE COORDINATION
Remote Patient Monitoring Note      Date/Time:  3/1/2023 8:14 AM    CCSS reviewed patients reported daily Remote Patient Monitoring metrics. All reported metrics are within alert parameters. Plan/Follow Up:  Will continue to review, monitor and address alerts with follow up based on severity of symptoms and risk factors   Current Patient Metrics ---- Blood Pressure: 122/72, 69bpm Pulseox: 95%, 65bpm Survey: - Weight: 250.5lbs Note Created at: 03/01/2023 08:14 AM ET ---- Time-Spent: 2 minutes 0 seconds

## 2023-03-02 ENCOUNTER — CARE COORDINATION (OUTPATIENT)
Dept: CASE MANAGEMENT | Age: 73
End: 2023-03-02

## 2023-03-02 NOTE — CARE COORDINATION
Remote Alert Monitoring Note      Date/Time:  3/2/2023 9:40 AM  Patient Current Location: [facility]    LPN contacted patient by telephone regarding red alert received for weight increase (5# over night). Verified patients name and  as identifiers. Background: HTN  Refer to 911 immediately if:  Patient unresponsive or unable to provide history  Change in cognition or sudden confusion  Patient unable to respond in complete sentences  Intense chest pain/tightness  Any concern for any clinical emergency  Red Alert: Provider response time of 1 hr required for any red alert requiring intervention  Yellow Alert: Provider response time of 3hr required for any escalated yellow alert    Weight Scale Triage  Was your weight obtained upon rising/waking today? YES   Was your weight obtained after voiding and/or use of the bathroom today? yes   Did you weigh yourself in the same amount of clothing today, compared to how you typically do? yes   Was the scale bumped or moved prior to today's weight? no   Is your scale on a flat/hard surface? yes   Did you obtain your weight with shoes on? no   If yes, is this something you normally do during your daily weights? no   Were you standing up straight on the scale today? yes   Were you leaning on anything while obtaining your weight today? no       Clinical Interventions: Reviewed and followed up on alerts and treatments-Patient had 5# weight gain over night. Denies CP, SOB, Swelling, Increased NA intake, scale problems. Taking all medications as directed. Patient has no idea why he has weight increase. Next cardio appointment 6/15/23. Since not CHF will monitor at this time. No action needed at this time. Education of patient/family/caregiver/guardian to support self-management-   No further Questions or concerns at this time. Plan/Follow Up: Will continue to review, monitor and address alerts with follow up based on severity of symptoms and risk factors.      Current Patient Metrics ---- Blood Pressure: 126/83, 60bpm Pulseox: 98%, 52bpm Survey: C Weight: 255.5lbs Note Created at: 03/02/2023 09:50 AM ET ---- Time-Spent: 11 minutes 0 seconds

## 2023-03-03 ENCOUNTER — CARE COORDINATION (OUTPATIENT)
Dept: CARE COORDINATION | Age: 73
End: 2023-03-03

## 2023-03-03 NOTE — CARE COORDINATION
Remote Patient Monitoring Note      Date/Time:  3/3/2023 10:55 AM    CCSS reviewed patients reported daily Remote Patient Monitoring metrics. All reported metrics are within alert parameters. Plan/Follow Up:  Will continue to review, monitor and address alerts with follow up based on severity of symptoms and risk factors   Current Patient Metrics ---- Blood Pressure: 116/79, 76bpm Pulseox: 95%, 80bpm Survey: C Weight: 249.5lbs Note Created at: 03/03/2023 10:56 AM ET ---- Time-Spent: 2 minutes 0 seconds

## 2023-03-06 ENCOUNTER — CARE COORDINATION (OUTPATIENT)
Dept: CARE COORDINATION | Age: 73
End: 2023-03-06

## 2023-03-06 NOTE — CARE COORDINATION
Remote Patient Monitoring Note      Date/Time:  3/6/2023 10:12 AM    CCSS reviewed patients reported daily Remote Patient Monitoring metrics. All reported metrics are within alert parameters. Plan/Follow Up:  Will continue to review, monitor and address alerts with follow up based on severity of symptoms and risk factors   Current Patient Metrics ---- Blood Pressure: 170/95, 56bpm Pulseox: 99%, 57bpm Survey: C Weight: 251.5lbs Note Created at: 03/06/2023 10:12 AM ET ---- Time-Spent: 2 minutes 0 seconds

## 2023-03-07 ENCOUNTER — CARE COORDINATION (OUTPATIENT)
Dept: CARE COORDINATION | Age: 73
End: 2023-03-07

## 2023-03-07 NOTE — CARE COORDINATION
Remote Patient Monitoring Note      Date/Time:  3/7/2023 8:47 AM    CCSS reviewed patients reported daily Remote Patient Monitoring metrics. All reported metrics are within alert parameters. Plan/Follow Up:  Will continue to review, monitor and address alerts with follow up based on severity of symptoms and risk factors   Current Patient Metrics ---- Blood Pressure: 140/78, 63bpm Pulseox: 99%, 64bpm Survey: - Weight: 251.0lbs Note Created at: 03/07/2023 08:47 AM ET ---- Time-Spent: 2 minutes 0 seconds

## 2023-03-08 ENCOUNTER — CARE COORDINATION (OUTPATIENT)
Dept: CASE MANAGEMENT | Age: 73
End: 2023-03-08

## 2023-03-08 NOTE — CARE COORDINATION
Remote Patient Monitoring Note      Date/Time:  3/8/2023 8:43 AM  Patient Current Location: [place] [address] contacted patient by telephone regarding red alert received for pulse ox reading (79%). Verified patients name and  as identifiers. Background: Enrolled in RPM for   Clinical Interventions: Reviewed and followed up on alerts and treatments-, triaged symptoms. Pt denies SOB, lightheadedness, dizziness, CP or other symptoms or concerns. Speaking in full sentences without difficulty. Reviewed how to use SpO2 device. He agrees to retake his SpO2 reading. New reading  98%, HR 64.     Plan/Follow Up: Will continue to review, monitor and address alerts with follow up based on severity of symptoms and risk factors.        Current Patient Metrics ---- Blood Pressure: 150/88, 62bpm Pulseox: 98%, 64bpm Survey: - Weight: 251.5lbs Note Created at: 2023 08:51 AM ET ---- Time-Spent: 12 minutes 0 seconds

## 2023-03-09 ENCOUNTER — CARE COORDINATION (OUTPATIENT)
Dept: CASE MANAGEMENT | Age: 73
End: 2023-03-09

## 2023-03-09 ENCOUNTER — CARE COORDINATION (OUTPATIENT)
Dept: CARE COORDINATION | Age: 73
End: 2023-03-09

## 2023-03-09 NOTE — CARE COORDINATION
Attempted care management follow up. Left message to return phone call to ambulatory care manager at [phone].   Plan -   RPM HTN - discharge RPM next contact if stable  Working toward graduation  Report new or worsening symptoms ASAP  Early symptom recognition and reporting to prevent ED and admissions  Use same or next day appts with PCP office for non-emergency problems

## 2023-03-09 NOTE — CARE COORDINATION
Remote Alert Monitoring Note      Date/Time:  3/9/2023 9:23 AM  Patient Current Location: [facility]    LPN contacted patient by telephone regarding red alert received for weight increase (4# over night). Verified patients name and  as identifiers. Background: HTN  Refer to 911 immediately if:  Patient unresponsive or unable to provide history  Change in cognition or sudden confusion  Patient unable to respond in complete sentences  Intense chest pain/tightness  Any concern for any clinical emergency  Red Alert: Provider response time of 1 hr required for any red alert requiring intervention  Yellow Alert: Provider response time of 3hr required for any escalated yellow alert    Weight Scale Triage  Was your weight obtained upon rising/waking today? YES   Was your weight obtained after voiding and/or use of the bathroom today? yes   Did you weigh yourself in the same amount of clothing today, compared to how you typically do? yes   Was the scale bumped or moved prior to today's weight? no   Is your scale on a flat/hard surface? yes   Did you obtain your weight with shoes on? no   If yes, is this something you normally do during your daily weights? no   Were you standing up straight on the scale today? yes   Were you leaning on anything while obtaining your weight today? no       Clinical Interventions: Reviewed and followed up on alerts and treatments-Patient had 4# weight gain over night. Denies CP, SOB, Swelling Increased NA intake, Patient states he is doing well, doesn't know why weight bounces around all the time. No Scale problems. Will continue to monitor weight to see if any increases over the next few days. No further action needed at this time. Patient NOT CHF. Education of patient/family/caregiver/guardian to support self-management-Monitor weight over next few days.      Instructed patient on the importance of weighing daily, in the morning after urinating, Same Clothing on, NO shoes, Scale on Flat/hard surface, and that if the patient has weight gain of 3# over night or 5# weight gain in a week to call their physician immediately and report. Plan/Follow Up: Will continue to review, monitor and address alerts with follow up based on severity of symptoms and risk factors.     Current Patient Metrics ---- Blood Pressure: 153/83, 73bpm Pulseox: 97%, 68bpm Survey: - Weight: 255.5lbs Note Created at: 03/09/2023 09:32 AM ET ---- Time-Spent: 10 minutes 0 seconds

## 2023-03-10 ENCOUNTER — CARE COORDINATION (OUTPATIENT)
Dept: CARE COORDINATION | Age: 73
End: 2023-03-10

## 2023-03-10 NOTE — CARE COORDINATION
Remote Patient Monitoring Note      Date/Time:  3/10/2023 8:30 AM    CCSS reviewed patients reported daily Remote Patient Monitoring metrics. All reported metrics are within alert parameters. Plan/Follow Up:  Will continue to review, monitor and address alerts with follow up based on severity of symptoms and risk factors  Current Patient Metrics ---- Blood Pressure: 122/77, 55bpm Pulseox: 96%, 52bpm Survey: - Weight: 249.0lbs Note Created at: 03/10/2023 08:31 AM ET ---- Time-Spent: 2 minutes 0 seconds

## 2023-03-13 ENCOUNTER — CARE COORDINATION (OUTPATIENT)
Dept: CARE COORDINATION | Age: 73
End: 2023-03-13

## 2023-03-13 NOTE — CARE COORDINATION
Remote Patient Monitoring Note      Date/Time:  3/13/2023 12:06 PM    CCSS reviewed patients reported daily Remote Patient Monitoring metrics. All reported metrics are within alert parameters. Plan/Follow Up:  Will continue to review, monitor and address alerts with follow up based on severity of symptoms and risk factors  Current Patient Metrics ---- Blood Pressure: 135/79, 68bpm Pulseox: 98%, 74bpm Survey: C Weight: 247.0lbs Note Created at: 03/13/2023 12:06 PM ET ---- Time-Spent: 2 minutes 0 seconds

## 2023-03-14 ENCOUNTER — CARE COORDINATION (OUTPATIENT)
Dept: CARE COORDINATION | Age: 73
End: 2023-03-14

## 2023-03-14 NOTE — CARE COORDINATION
CCSS reviewed patients reported daily Remote Patient Monitoring metrics. All reported metrics are within alert parameters. Plan/Follow Up:  Will continue to review, monitor and address alerts with follow up based on severity of symptoms and risk factors      Current Patient Metrics ---- Blood Pressure: 110/73, 59bpm Pulseox: 98%, 71bpm Survey: C Weight: 246.0lbs Note Created at: 03/14/2023 11:12 AM CT ---- Time-Spent: 2 minutes 0 seconds    Bradford Tyler, [address] [facility]   Cell: [phone]

## 2023-03-15 ENCOUNTER — CARE COORDINATION (OUTPATIENT)
Dept: CASE MANAGEMENT | Age: 73
End: 2023-03-15

## 2023-03-15 NOTE — CARE COORDINATION
Remote Alert Monitoring Note      Date/Time:  3/15/2023 10:49 AM  Patient Current Location: [facility]    LPN contacted patient by telephone regarding red alert received for weight increase (4# over night). Verified patients name and  as identifiers. Background: HTN  Refer to 911 immediately if:  Patient unresponsive or unable to provide history  Change in cognition or sudden confusion  Patient unable to respond in complete sentences  Intense chest pain/tightness  Any concern for any clinical emergency  Red Alert: Provider response time of 1 hr required for any red alert requiring intervention  Yellow Alert: Provider response time of 3hr required for any escalated yellow alert    Weight Scale Triage  Was your weight obtained upon rising/waking today? YES   Was your weight obtained after voiding and/or use of the bathroom today? yes   Did you weigh yourself in the same amount of clothing today, compared to how you typically do? yes   Was the scale bumped or moved prior to today's weight? no   Is your scale on a flat/hard surface? yes   Did you obtain your weight with shoes on? no   If yes, is this something you normally do during your daily weights? no   Were you standing up straight on the scale today? yes   Were you leaning on anything while obtaining your weight today? no       Clinical Interventions: Reviewed and followed up on alerts and treatments-Patient has 4# weight gain over night. Denies CP, SOB, swelling, Increased NA intake, Scale problems. Has cardiology appointment 2023. Has taken all medications as directed this morning. Patient states his weight just goes up and down - no reason why. Will continue to monitor weigh over next few days. No further action needed at this time.    Education of patient/family/caregiver/guardian to support self-management-    Instructed patient on the importance of weighing daily, in the morning after urinating, Same Clothing on, NO shoes, Scale on Flat/hard surface, and that if the patient has weight gain of 3# over night or 5# weight gain in a week to call their physician immediately and report. Advised Patient to contact PCP 24/7 regarding any health concerns for early outpatient intervention in an effort to avoid hospitalization. Report any worsening symptoms to PCP and/or Call 911 and/or GO TO  EMERGENCY ROOM if symptoms are severe or worsening. Expresses understanding. Plan/Follow Up: Will continue to review, monitor and address alerts with follow up based on severity of symptoms and risk factors.      Current Patient Metrics ---- Blood Pressure: 122/74, 64bpm Pulseox: -%, -bpm Survey: C Weight: 250.0lbs Note Created at: 03/15/2023 10:57 AM ET ---- Time-Spent: 8 minutes 0 seconds

## 2023-03-16 ENCOUNTER — CARE COORDINATION (OUTPATIENT)
Dept: CARE COORDINATION | Age: 73
End: 2023-03-16

## 2023-03-16 ENCOUNTER — CARE COORDINATION (OUTPATIENT)
Dept: CASE MANAGEMENT | Age: 73
End: 2023-03-16

## 2023-03-16 NOTE — CARE COORDINATION
Remote Alert Monitoring Note      Date/Time:  3/16/2023 10:15 AM  Patient Current Location: [facility]    LPN attempted to contacted patient by telephone regarding red alert received for pulse ox reading (74%). Attempted to reach patient for RPM Red Alert Call. Unable to reach patient. Left HIPAA Compliant message on Voice Mail to call. Phone number left on Voice Mail to call back. Will continue to follow. Abiel Garcia LPN    [phone]  [facility] Coordinator    Patient rechecked SpO2 level at 97%. Background: HTN  Refer to 911 immediately if:  Patient unresponsive or unable to provide history  Change in cognition or sudden confusion  Patient unable to respond in complete sentences  Intense chest pain/tightness  Any concern for any clinical emergency  Red Alert: Provider response time of 1 hr required for any red alert requiring intervention  Yellow Alert: Provider response time of 3hr required for any escalated yellow alert    Plan/Follow Up: Will continue to review, monitor and address alerts with follow up based on severity of symptoms and risk factors.      Current Patient Metrics ---- Blood Pressure: 120/73, 77bpm Pulseox: 97%, 80bpm Survey: C Weight: 246.5lbs Note Created at: 03/16/2023 01:08 PM ET ---- Time-Spent: 2 minutes 0 seconds

## 2023-03-16 NOTE — CARE COORDINATION
Ambulatory Care Coordination Note  3/16/2023    Patient Current Location: [facility]     ACM contacted the patient by telephone. Verified name and  with patient as identifiers. Provided introduction to self, and explanation of the ACM role. Challenges to be reviewed by the provider   Additional needs identified to be addressed with provider: No  none               Method of communication with provider: none. ACM: Sunni Acharya RN    Red alert for weigh gain yesterday 4 lbs in 1 day and today POX 74%. RPM team reaching out as well. Attempted recheck. POX read 54 and 149. Then read 98% POX. Call back from patient. Reviewed RPM alerts. States he has a little bit of a cough but feeling good. Denies chest pain, SOB, congestion. Reviewed yesterday's alert for weight gain 4 lbs overnight. No leg swelling, bloating, or any symptoms. Reviewed RPM notes. Mostly green alerts. If any red alerts was typically equipment problem. He is ready for graduation from [employer].    Remote Patient Monitoring Graduation      Date/Time:  3/16/2023 1:09 PM  Patient Current Location: [place]  Patient has graduated from the Remote Patient Monitoring program on 3/16/2023. RPM goals have been met at this time. Patient has been provided instruction on process to return RPM equipment and RPM has been deactivated. Patient has ACM's contact information for any further questions, concerns, or needs. Plan -   RPM HTN   Working toward graduation  Report new or worsening symptoms ASAP  Early symptom recognition and reporting to prevent ED and admissions  Use same or next day appts with PCP office for non-emergency problems    Offered patient enrollment in the Remote Patient Monitoring (RPM) program for in-home monitoring: Yes, patient already enrolled.     Lab Results       None            Care Coordination Interventions    Referral from Primary Care Provider: No  Suggested Interventions and [facility] Services: Declined  Meals on Wheels: Declined  Medi Set or Pill Pack: Declined  Transportation Support: Declined  Other Services or Interventions: est with cardiology, neuro, urology, rad onc, OIO          Goals Addressed                   This Visit's Progress     Conditions and Symptoms   On track     I will schedule office visits, as directed by my provider. I will keep my appointment or reschedule if I have to cancel. I will notify my provider of any barriers to my plan of care. I will follow my Zone Management tool to seek urgent or emergent care. I will notify my provider of any symptoms that indicate a worsening of my condition.     Barriers: lack of support and overwhelmed by complexity of regimen  Plan for overcoming my barriers: care coordination  Confidence: 9/10  Anticipated Goal Completion Date: 1/27/2023                Future Appointments   Date Time Provider Meggan Ramirez   6/12/2023  7:45 AM MD JEFF Buckley [facility] - [place]   8/15/2023  1:30 PM Fermin Galindo MD [facility] - [place]   11/6/2023 11:45 AM Ashlee Clark, [address] [place] [address]

## 2023-03-16 NOTE — CARE COORDINATION
EMTP placed call to patient to arrange RPM kit  through [facility]. Reviewed with patient how to pack equipment in original packing. Verified patient's availability to schedule UPS  time. UPS  time requested.  Anticipated  date range : Anytime

## 2023-03-23 ENCOUNTER — CARE COORDINATION (OUTPATIENT)
Dept: CARE COORDINATION | Age: 73
End: 2023-03-23

## 2023-03-23 NOTE — CARE COORDINATION
condition.     Barriers: lack of support and overwhelmed by complexity of regimen  Plan for overcoming my barriers: care coordination  Confidence: 9/10  Anticipated Goal Completion Date: 1/27/2023                Future Appointments   Date Time Provider Meggan Ramirez   6/12/2023  7:45 AM Ariane Flanagan MD N [facility] - [address] [facility]   8/15/2023  1:30 PM Nani Pacheco MD [facility] - [address] [place] [address]   11/6/2023 11:45 AM Alfredo Sánchez, [address] [place] [address]

## 2023-03-27 VITALS
DIASTOLIC BLOOD PRESSURE: 73 MMHG | OXYGEN SATURATION: 97 % | SYSTOLIC BLOOD PRESSURE: 120 MMHG | HEART RATE: 80 BPM | BODY MASS INDEX: 33.43 KG/M2 | WEIGHT: 246.5 LBS

## 2023-04-18 RX ORDER — METOPROLOL SUCCINATE 50 MG/1
TABLET, EXTENDED RELEASE ORAL
Qty: 45 TABLET | Refills: 2 | Status: SHIPPED | OUTPATIENT
Start: 2023-04-18

## 2023-04-18 NOTE — TELEPHONE ENCOUNTER
Dewayne notified of MM's response, MRI ordered and sent to [facility] for prior auth
Discussed with cardiology, ok to order MRI lumbar spine.
Dr Pilar Ho requires he have an MRI of lumbar within 6 months. Daughter wants to make sure he can have an MRI because he just had stents placed. Order MRI lumbar spine?   [facility]    Dr Pilar Ho [phone]
For information on Fall & Injury Prevention, visit: [email or web address] OR  [email or web address] OR  https://www.cdc.gov/steadi/patient.html

## 2023-05-08 ENCOUNTER — HOSPITAL ENCOUNTER (OUTPATIENT)
Age: 73
Discharge: HOME | End: 2023-05-08
Payer: MEDICARE

## 2023-05-08 DIAGNOSIS — C61 PROSTATE CANCER (HCC): ICD-10-CM

## 2023-05-08 LAB — PSA SERPL-MCNC: 0.06 NG/ML (ref 0–1)

## 2023-05-08 PROCEDURE — 84153 ASSAY OF PSA TOTAL: CPT

## 2023-05-08 PROCEDURE — 36415 COLL VENOUS BLD VENIPUNCTURE: CPT

## 2023-05-15 ENCOUNTER — NURSE ONLY (OUTPATIENT)
Dept: LAB | Age: 73
End: 2023-05-15

## 2023-05-15 DIAGNOSIS — C61 PROSTATE CANCER (HCC): ICD-10-CM

## 2023-05-15 DIAGNOSIS — Z79.818 ANDROGEN DEPRIVATION THERAPY: Primary | ICD-10-CM

## 2023-05-15 DIAGNOSIS — Z79.818 ANDROGEN DEPRIVATION THERAPY: ICD-10-CM

## 2023-05-15 LAB — PSA SERPL-MCNC: 0.07 NG/ML (ref 0–1)

## 2023-05-18 LAB — TESTOST SERPL-MCNC: 50 NG/DL (ref 300–720)

## 2023-06-29 ENCOUNTER — HOSPITAL ENCOUNTER (EMERGENCY)
Age: 73
Discharge: HOME | End: 2023-06-29
Attending: EMERGENCY MEDICINE
Payer: MEDICARE

## 2023-06-29 ENCOUNTER — APPOINTMENT (OUTPATIENT)
Dept: CT IMAGING | Age: 73
End: 2023-06-29
Payer: MEDICARE

## 2023-06-29 VITALS
SYSTOLIC BLOOD PRESSURE: 149 MMHG | TEMPERATURE: 97.6 F | HEIGHT: 72 IN | OXYGEN SATURATION: 95 % | BODY MASS INDEX: 33.86 KG/M2 | HEART RATE: 56 BPM | RESPIRATION RATE: 16 BRPM | WEIGHT: 250 LBS | DIASTOLIC BLOOD PRESSURE: 85 MMHG

## 2023-06-29 DIAGNOSIS — K04.90: Primary | ICD-10-CM

## 2023-06-29 LAB — GLUCOSE BLD STRIP.AUTO-MCNC: 146 MG/DL (ref 70–108)

## 2023-06-29 PROCEDURE — 99284 EMERGENCY DEPT VISIT MOD MDM: CPT

## 2023-06-29 PROCEDURE — 82948 REAGENT STRIP/BLOOD GLUCOSE: CPT

## 2023-06-29 PROCEDURE — 70450 CT HEAD/BRAIN W/O DYE: CPT

## 2023-06-29 PROCEDURE — 6370000000 HC RX 637 (ALT 250 FOR IP): Performed by: EMERGENCY MEDICINE

## 2023-06-29 RX ORDER — AMOXICILLIN 500 MG/1
500 CAPSULE ORAL 2 TIMES DAILY
Qty: 20 CAPSULE | Refills: 0 | Status: SHIPPED | OUTPATIENT
Start: 2023-06-29 | End: 2023-07-09

## 2023-06-29 RX ORDER — AMOXICILLIN 250 MG/1
500 CAPSULE ORAL ONCE
Status: COMPLETED | OUTPATIENT
Start: 2023-06-29 | End: 2023-06-29

## 2023-06-29 RX ADMIN — AMOXICILLIN 500 MG: 250 CAPSULE ORAL at 07:57

## 2023-06-29 ASSESSMENT — PAIN - FUNCTIONAL ASSESSMENT: PAIN_FUNCTIONAL_ASSESSMENT: NONE - DENIES PAIN

## 2023-06-30 ENCOUNTER — OFFICE VISIT (OUTPATIENT)
Dept: FAMILY MEDICINE CLINIC | Age: 73
End: 2023-06-30
Payer: MEDICARE

## 2023-06-30 VITALS
WEIGHT: 255 LBS | OXYGEN SATURATION: 98 % | RESPIRATION RATE: 16 BRPM | DIASTOLIC BLOOD PRESSURE: 80 MMHG | SYSTOLIC BLOOD PRESSURE: 112 MMHG | BODY MASS INDEX: 34.58 KG/M2 | HEART RATE: 61 BPM

## 2023-06-30 DIAGNOSIS — Z00.00 ROUTINE GENERAL MEDICAL EXAMINATION AT A HEALTH CARE FACILITY: ICD-10-CM

## 2023-06-30 DIAGNOSIS — R73.9 HYPERGLYCEMIA: ICD-10-CM

## 2023-06-30 DIAGNOSIS — K04.7 DENTAL INFECTION: Primary | ICD-10-CM

## 2023-06-30 PROCEDURE — G8417 CALC BMI ABV UP PARAM F/U: HCPCS | Performed by: FAMILY MEDICINE

## 2023-06-30 PROCEDURE — 1036F TOBACCO NON-USER: CPT | Performed by: FAMILY MEDICINE

## 2023-06-30 PROCEDURE — 1123F ACP DISCUSS/DSCN MKR DOCD: CPT | Performed by: FAMILY MEDICINE

## 2023-06-30 PROCEDURE — G8427 DOCREV CUR MEDS BY ELIG CLIN: HCPCS | Performed by: FAMILY MEDICINE

## 2023-06-30 PROCEDURE — 3017F COLORECTAL CA SCREEN DOC REV: CPT | Performed by: FAMILY MEDICINE

## 2023-06-30 PROCEDURE — 3074F SYST BP LT 130 MM HG: CPT | Performed by: FAMILY MEDICINE

## 2023-06-30 PROCEDURE — 99213 OFFICE O/P EST LOW 20 MIN: CPT | Performed by: FAMILY MEDICINE

## 2023-06-30 PROCEDURE — 3078F DIAST BP <80 MM HG: CPT | Performed by: FAMILY MEDICINE

## 2023-06-30 SDOH — ECONOMIC STABILITY: HOUSING INSECURITY
IN THE LAST 12 MONTHS, WAS THERE A TIME WHEN YOU DID NOT HAVE A STEADY PLACE TO SLEEP OR SLEPT IN A SHELTER (INCLUDING NOW)?: NO

## 2023-06-30 SDOH — ECONOMIC STABILITY: FOOD INSECURITY: WITHIN THE PAST 12 MONTHS, THE FOOD YOU BOUGHT JUST DIDN'T LAST AND YOU DIDN'T HAVE MONEY TO GET MORE.: NEVER TRUE

## 2023-06-30 SDOH — ECONOMIC STABILITY: FOOD INSECURITY: WITHIN THE PAST 12 MONTHS, YOU WORRIED THAT YOUR FOOD WOULD RUN OUT BEFORE YOU GOT MONEY TO BUY MORE.: NEVER TRUE

## 2023-06-30 ASSESSMENT — PATIENT HEALTH QUESTIONNAIRE - PHQ9
9. THOUGHTS THAT YOU WOULD BE BETTER OFF DEAD, OR OF HURTING YOURSELF: 0
SUM OF ALL RESPONSES TO PHQ QUESTIONS 1-9: 7
5. POOR APPETITE OR OVEREATING: 0
SUM OF ALL RESPONSES TO PHQ9 QUESTIONS 1 & 2: 4
6. FEELING BAD ABOUT YOURSELF - OR THAT YOU ARE A FAILURE OR HAVE LET YOURSELF OR YOUR FAMILY DOWN: 1
10. IF YOU CHECKED OFF ANY PROBLEMS, HOW DIFFICULT HAVE THESE PROBLEMS MADE IT FOR YOU TO DO YOUR WORK, TAKE CARE OF THINGS AT HOME, OR GET ALONG WITH OTHER PEOPLE: 0
2. FEELING DOWN, DEPRESSED OR HOPELESS: 1
SUM OF ALL RESPONSES TO PHQ QUESTIONS 1-9: 7
8. MOVING OR SPEAKING SO SLOWLY THAT OTHER PEOPLE COULD HAVE NOTICED. OR THE OPPOSITE, BEING SO FIGETY OR RESTLESS THAT YOU HAVE BEEN MOVING AROUND A LOT MORE THAN USUAL: 1
3. TROUBLE FALLING OR STAYING ASLEEP: 0
1. LITTLE INTEREST OR PLEASURE IN DOING THINGS: 3
7. TROUBLE CONCENTRATING ON THINGS, SUCH AS READING THE NEWSPAPER OR WATCHING TELEVISION: 1
4. FEELING TIRED OR HAVING LITTLE ENERGY: 0
SUM OF ALL RESPONSES TO PHQ QUESTIONS 1-9: 7
SUM OF ALL RESPONSES TO PHQ QUESTIONS 1-9: 7

## 2023-07-04 ASSESSMENT — ENCOUNTER SYMPTOMS
RHINORRHEA: 0
SINUS PRESSURE: 0
FACIAL SWELLING: 1
DIARRHEA: 0
COUGH: 0
SORE THROAT: 0
ABDOMINAL DISTENTION: 0
NAUSEA: 0
CONSTIPATION: 0
EYE PAIN: 0
ABDOMINAL PAIN: 0
SHORTNESS OF BREATH: 0

## 2023-07-04 NOTE — PROGRESS NOTES
frequency and urgency. Musculoskeletal:  Negative for arthralgias. Skin:  Negative for rash. Neurological:  Negative for dizziness. Past Medical History:   Diagnosis Date    Arthritis     back    BPH (benign prostatic hyperplasia)     CAD (coronary artery disease)     CVA (cerebral vascular accident) ([address]) 2010    Disease of blood and blood forming organ     anemia    FH: heart disease     GERD (gastroesophageal reflux disease)     Hyperlipidemia     Hypertension     Obesity     Prostate cancer ([address]) 2019      Past Surgical History:   Procedure Laterality Date    BACK SURGERY  08/2021    CARDIAC VALVE REPLACEMENT  April 2011    Arnol Vigil (Pt unsure if part of CABG)    CARDIOVASCULAR STRESS TEST  1 05 2011    Cannot exclude slight inferobasal lateral stress-induced ischemia in a relatively small-sized area. EF 68%. Mildly abnormal nuclear scan. Lexiscan test associated with nonspecific symptoms. EKG nondiagnostic with nonspecific ST-T wave changes. CAROTID ENDARTERECTOMY  2 08 2011    Right carotid endarterectomy with patch angioplasty with convention patch angioplasty. COLONOSCOPY      CORONARY ARTERY BYPASS GRAFT      DIAGNOSTIC CARDIAC CATH LAB PROCEDURE  3 24 2011    LV end-diastolic pressure was 12 mmHg with no change before and after contrast injection. There was no significant gradient across the aortic valve to signify aortic stenosis. LV function was within normal limits, EF 55%. 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 Nonobstructive diffuse disease in a small sized RCA. Nomral LV function. EGD      HAND FUSION Right     HERNIA REPAIR      Inguinal hernia repair. PROSTATECTOMY N/A 3/20/2019    PROSTATECTOMY LAPAROSCOPIC ROBOTIC performed by Darshan Miller MD at [facility] ECHOCARDIOGRAM  12 21 2010    Size was normal. Systolic function was normal. EF was estimated in the range of 55-65%.  There

## 2023-08-07 RX ORDER — ATORVASTATIN CALCIUM 20 MG/1
TABLET, FILM COATED ORAL
Qty: 90 TABLET | Refills: 3 | Status: SHIPPED | OUTPATIENT
Start: 2023-08-07

## 2023-08-11 ENCOUNTER — NURSE ONLY (OUTPATIENT)
Dept: LAB | Age: 73
End: 2023-08-11

## 2023-08-11 DIAGNOSIS — Z00.00 ROUTINE GENERAL MEDICAL EXAMINATION AT A HEALTH CARE FACILITY: ICD-10-CM

## 2023-08-11 DIAGNOSIS — R73.9 HYPERGLYCEMIA: ICD-10-CM

## 2023-08-11 LAB
ALBUMIN SERPL BCG-MCNC: 4.4 G/DL (ref 3.5–5.1)
ALP SERPL-CCNC: 62 U/L (ref 38–126)
ALT SERPL W/O P-5'-P-CCNC: 22 U/L (ref 11–66)
ANION GAP SERPL CALC-SCNC: 13 MEQ/L (ref 8–16)
AST SERPL-CCNC: 25 U/L (ref 5–40)
BASOPHILS ABSOLUTE: 0.1 THOU/MM3 (ref 0–0.1)
BASOPHILS NFR BLD AUTO: 1.2 %
BILIRUB SERPL-MCNC: 0.7 MG/DL (ref 0.3–1.2)
BUN SERPL-MCNC: 12 MG/DL (ref 7–22)
CALCIUM SERPL-MCNC: 9.8 MG/DL (ref 8.5–10.5)
CHLORIDE SERPL-SCNC: 102 MEQ/L (ref 98–111)
CHOLEST SERPL-MCNC: 122 MG/DL (ref 100–199)
CO2 SERPL-SCNC: 28 MEQ/L (ref 23–33)
CREAT SERPL-MCNC: 0.9 MG/DL (ref 0.4–1.2)
DEPRECATED MEAN GLUCOSE BLD GHB EST-ACNC: 141 MG/DL (ref 70–126)
DEPRECATED RDW RBC AUTO: 41.5 FL (ref 35–45)
EOSINOPHIL NFR BLD AUTO: 2.6 %
EOSINOPHILS ABSOLUTE: 0.2 THOU/MM3 (ref 0–0.4)
ERYTHROCYTE [DISTWIDTH] IN BLOOD BY AUTOMATED COUNT: 11.8 % (ref 11.5–14.5)
GFR SERPL CREATININE-BSD FRML MDRD: > 60 ML/MIN/1.73M2
GLUCOSE SERPL-MCNC: 153 MG/DL (ref 70–108)
HBA1C MFR BLD HPLC: 6.7 % (ref 4.4–6.4)
HCT VFR BLD AUTO: 46.8 % (ref 42–52)
HDLC SERPL-MCNC: 44 MG/DL
HGB BLD-MCNC: 15.3 GM/DL (ref 14–18)
IMM GRANULOCYTES # BLD AUTO: 0.08 THOU/MM3 (ref 0–0.07)
IMM GRANULOCYTES NFR BLD AUTO: 0.9 %
LDLC SERPL CALC-MCNC: 62 MG/DL
LYMPHOCYTES ABSOLUTE: 1.5 THOU/MM3 (ref 1–4.8)
LYMPHOCYTES NFR BLD AUTO: 18 %
MCH RBC QN AUTO: 31.6 PG (ref 26–33)
MCHC RBC AUTO-ENTMCNC: 32.7 GM/DL (ref 32.2–35.5)
MCV RBC AUTO: 96.7 FL (ref 80–94)
MONOCYTES ABSOLUTE: 0.6 THOU/MM3 (ref 0.4–1.3)
MONOCYTES NFR BLD AUTO: 7.5 %
NEUTROPHILS NFR BLD AUTO: 69.8 %
NRBC BLD AUTO-RTO: 0 /100 WBC
PLATELET # BLD AUTO: 246 THOU/MM3 (ref 130–400)
PMV BLD AUTO: 9.4 FL (ref 9.4–12.4)
POTASSIUM SERPL-SCNC: 4.3 MEQ/L (ref 3.5–5.2)
PROT SERPL-MCNC: 7.5 G/DL (ref 6.1–8)
RBC # BLD AUTO: 4.84 MILL/MM3 (ref 4.7–6.1)
SEGMENTED NEUTROPHILS ABSOLUTE COUNT: 5.9 THOU/MM3 (ref 1.8–7.7)
SODIUM SERPL-SCNC: 143 MEQ/L (ref 135–145)
TRIGL SERPL-MCNC: 79 MG/DL (ref 0–199)
WBC # BLD AUTO: 8.5 THOU/MM3 (ref 4.8–10.8)

## 2023-08-15 ENCOUNTER — OFFICE VISIT (OUTPATIENT)
Dept: FAMILY MEDICINE CLINIC | Age: 73
End: 2023-08-15

## 2023-08-15 VITALS
OXYGEN SATURATION: 97 % | SYSTOLIC BLOOD PRESSURE: 110 MMHG | RESPIRATION RATE: 20 BRPM | DIASTOLIC BLOOD PRESSURE: 76 MMHG | HEART RATE: 68 BPM | TEMPERATURE: 97.8 F | WEIGHT: 258 LBS | BODY MASS INDEX: 36.94 KG/M2 | HEIGHT: 70 IN

## 2023-08-15 DIAGNOSIS — C61 MALIGNANT NEOPLASM OF PROSTATE (HCC): ICD-10-CM

## 2023-08-15 DIAGNOSIS — R73.9 HYPERGLYCEMIA: ICD-10-CM

## 2023-08-15 DIAGNOSIS — L40.9 PSORIASIS: ICD-10-CM

## 2023-08-15 DIAGNOSIS — Z00.00 MEDICARE ANNUAL WELLNESS VISIT, SUBSEQUENT: Primary | ICD-10-CM

## 2023-08-15 DIAGNOSIS — E66.01 SEVERE OBESITY (BMI 35.0-39.9) WITH COMORBIDITY (HCC): ICD-10-CM

## 2023-08-15 DIAGNOSIS — Z00.00 ROUTINE GENERAL MEDICAL EXAMINATION AT A HEALTH CARE FACILITY: ICD-10-CM

## 2023-08-15 RX ORDER — MOMETASONE FUROATE 1 MG/G
CREAM TOPICAL
Qty: 45 G | Refills: 2 | Status: SHIPPED | OUTPATIENT
Start: 2023-08-15

## 2023-08-15 RX ORDER — PREGABALIN 100 MG/1
100 CAPSULE ORAL DAILY
COMMUNITY
Start: 2023-08-01

## 2023-08-15 SDOH — ECONOMIC STABILITY: FOOD INSECURITY: WITHIN THE PAST 12 MONTHS, YOU WORRIED THAT YOUR FOOD WOULD RUN OUT BEFORE YOU GOT MONEY TO BUY MORE.: NEVER TRUE

## 2023-08-15 SDOH — ECONOMIC STABILITY: INCOME INSECURITY: HOW HARD IS IT FOR YOU TO PAY FOR THE VERY BASICS LIKE FOOD, HOUSING, MEDICAL CARE, AND HEATING?: NOT HARD AT ALL

## 2023-08-15 SDOH — ECONOMIC STABILITY: FOOD INSECURITY: WITHIN THE PAST 12 MONTHS, THE FOOD YOU BOUGHT JUST DIDN'T LAST AND YOU DIDN'T HAVE MONEY TO GET MORE.: NEVER TRUE

## 2023-08-15 ASSESSMENT — PATIENT HEALTH QUESTIONNAIRE - PHQ9
SUM OF ALL RESPONSES TO PHQ QUESTIONS 1-9: 13
8. MOVING OR SPEAKING SO SLOWLY THAT OTHER PEOPLE COULD HAVE NOTICED. OR THE OPPOSITE, BEING SO FIGETY OR RESTLESS THAT YOU HAVE BEEN MOVING AROUND A LOT MORE THAN USUAL: 1
9. THOUGHTS THAT YOU WOULD BE BETTER OFF DEAD, OR OF HURTING YOURSELF: 0
10. IF YOU CHECKED OFF ANY PROBLEMS, HOW DIFFICULT HAVE THESE PROBLEMS MADE IT FOR YOU TO DO YOUR WORK, TAKE CARE OF THINGS AT HOME, OR GET ALONG WITH OTHER PEOPLE: 0
SUM OF ALL RESPONSES TO PHQ QUESTIONS 1-9: 13
5. POOR APPETITE OR OVEREATING: 2
1. LITTLE INTEREST OR PLEASURE IN DOING THINGS: 2
SUM OF ALL RESPONSES TO PHQ QUESTIONS 1-9: 13
2. FEELING DOWN, DEPRESSED OR HOPELESS: 2
SUM OF ALL RESPONSES TO PHQ9 QUESTIONS 1 & 2: 4
4. FEELING TIRED OR HAVING LITTLE ENERGY: 2
SUM OF ALL RESPONSES TO PHQ QUESTIONS 1-9: 13
7. TROUBLE CONCENTRATING ON THINGS, SUCH AS READING THE NEWSPAPER OR WATCHING TELEVISION: 1
3. TROUBLE FALLING OR STAYING ASLEEP: 2
6. FEELING BAD ABOUT YOURSELF - OR THAT YOU ARE A FAILURE OR HAVE LET YOURSELF OR YOUR FAMILY DOWN: 1

## 2023-08-15 ASSESSMENT — LIFESTYLE VARIABLES
HOW MANY STANDARD DRINKS CONTAINING ALCOHOL DO YOU HAVE ON A TYPICAL DAY: 1 OR 2
HOW OFTEN DO YOU HAVE A DRINK CONTAINING ALCOHOL: MONTHLY OR LESS

## 2023-08-15 NOTE — PROGRESS NOTES
Medicare Annual Wellness Visit    Coutrney Daniels is here for Medicare AWV and Discuss Labs    Assessment & Plan   Routine general medical examination at a health care facility  Hyperglycemia  -     Comprehensive Metabolic Panel; Future  -     Hemoglobin A1C; Future  Severe obesity (BMI 35.0-39. 9) with comorbidity ([address])  Malignant neoplasm of prostate (HCC)  Psoriasis  -     mometasone (ELOCON) 0.1 % cream; Apply topically daily. , Disp-45 g, R-2, Normal    Recommendations for Preventive Services Due: see orders and patient instructions/AVS.  Recommended screening schedule for the next 5-10 years is provided to the patient in written form: see Patient Instructions/AVS.     No follow-ups on file. Subjective   The following acute and/or chronic problems were also addressed today:   Diagnosis Orders   1. Routine general medical examination at a health care facility        2. Hyperglycemia  Comprehensive Metabolic Panel    Hemoglobin A1C      3. Severe obesity (BMI 35.0-39. 9) with comorbidity ([address])        4. Malignant neoplasm of prostate ([address])        5. Psoriasis  mometasone (ELOCON) 0.1 % cream            Patient's complete Health Risk Assessment and screening values have been reviewed and are found in Flowsheets. The following problems were reviewed today and where indicated follow up appointments were made and/or referrals ordered.     Positive Risk Factor Screenings with Interventions:        Depression:  PHQ-2 Score: 4  PHQ-9 Total Score: 13    Interpretation:   1-4 = minimal  5-9 = mild  10-14 = moderate  15-19 = moderately severe  20-27 = severe    Interventions:  See A/P for any pertinent orders            Weight and Activity:  Physical Activity: Insufficiently Active    Days of Exercise per Week: 4 days    Minutes of Exercise per Session: 30 min     On average, how many days per week do you engage in moderate to strenuous exercise (like a brisk walk)?: 4 days  Have you lost any weight without trying in

## 2023-10-27 RX ORDER — FAMOTIDINE 20 MG/1
20 TABLET, FILM COATED ORAL DAILY
Qty: 90 TABLET | Refills: 2 | Status: SHIPPED | OUTPATIENT
Start: 2023-10-27

## 2023-11-01 ENCOUNTER — NURSE ONLY (OUTPATIENT)
Dept: LAB | Age: 73
End: 2023-11-01

## 2023-11-01 DIAGNOSIS — R73.9 HYPERGLYCEMIA: ICD-10-CM

## 2023-11-01 DIAGNOSIS — C61 PROSTATE CANCER (HCC): Primary | ICD-10-CM

## 2023-11-01 DIAGNOSIS — C61 PROSTATE CANCER (HCC): ICD-10-CM

## 2023-11-01 LAB
ALBUMIN SERPL BCG-MCNC: 4.4 G/DL (ref 3.5–5.1)
ALP SERPL-CCNC: 63 U/L (ref 38–126)
ALT SERPL W/O P-5'-P-CCNC: 22 U/L (ref 11–66)
ANION GAP SERPL CALC-SCNC: 11 MEQ/L (ref 8–16)
AST SERPL-CCNC: 25 U/L (ref 5–40)
BILIRUB SERPL-MCNC: 0.6 MG/DL (ref 0.3–1.2)
BUN SERPL-MCNC: 15 MG/DL (ref 7–22)
CALCIUM SERPL-MCNC: 9.9 MG/DL (ref 8.5–10.5)
CHLORIDE SERPL-SCNC: 100 MEQ/L (ref 98–111)
CO2 SERPL-SCNC: 29 MEQ/L (ref 23–33)
CREAT SERPL-MCNC: 0.8 MG/DL (ref 0.4–1.2)
DEPRECATED MEAN GLUCOSE BLD GHB EST-ACNC: 138 MG/DL (ref 70–126)
GFR SERPL CREATININE-BSD FRML MDRD: > 60 ML/MIN/1.73M2
GLUCOSE SERPL-MCNC: 155 MG/DL (ref 70–108)
HBA1C MFR BLD HPLC: 6.6 % (ref 4.4–6.4)
POTASSIUM SERPL-SCNC: 5.1 MEQ/L (ref 3.5–5.2)
PROT SERPL-MCNC: 7.5 G/DL (ref 6.1–8)
SODIUM SERPL-SCNC: 140 MEQ/L (ref 135–145)

## 2023-11-01 PROCEDURE — 84403 ASSAY OF TOTAL TESTOSTERONE: CPT

## 2023-11-02 DIAGNOSIS — Z79.818 ANDROGEN DEPRIVATION THERAPY: ICD-10-CM

## 2023-11-02 DIAGNOSIS — C61 PROSTATE CANCER (HCC): Primary | ICD-10-CM

## 2023-11-02 LAB — PSA SERPL-MCNC: 0.33 NG/ML (ref 0–1)

## 2023-11-05 LAB — TESTOST SERPL-MCNC: 56 NG/DL (ref 300–720)

## 2023-11-06 ENCOUNTER — HOSPITAL ENCOUNTER (OUTPATIENT)
Dept: RADIATION ONCOLOGY | Age: 73
Discharge: HOME | End: 2023-11-06
Attending: RADIOLOGY
Payer: MEDICARE

## 2023-11-06 VITALS
HEART RATE: 67 BPM | TEMPERATURE: 97.4 F | WEIGHT: 250 LBS | OXYGEN SATURATION: 96 % | BODY MASS INDEX: 35.62 KG/M2 | SYSTOLIC BLOOD PRESSURE: 127 MMHG | DIASTOLIC BLOOD PRESSURE: 66 MMHG | RESPIRATION RATE: 16 BRPM

## 2023-11-06 DIAGNOSIS — C61 PROSTATE CANCER (HCC): Primary | ICD-10-CM

## 2023-11-06 DIAGNOSIS — Z79.818 ANDROGEN DEPRIVATION THERAPY: ICD-10-CM

## 2023-11-06 PROCEDURE — 99212 OFFICE O/P EST SF 10 MIN: CPT | Performed by: NURSE PRACTITIONER

## 2023-11-06 PROCEDURE — 99213 OFFICE O/P EST LOW 20 MIN: CPT | Performed by: NURSE PRACTITIONER

## 2023-11-06 RX ORDER — DULOXETIN HYDROCHLORIDE 60 MG/1
CAPSULE, DELAYED RELEASE ORAL
COMMUNITY
Start: 2023-10-23

## 2023-11-06 ASSESSMENT — ENCOUNTER SYMPTOMS
DIARRHEA: 0
BACK PAIN: 1
COUGH: 0
NAUSEA: 0
VOMITING: 0
RECTAL PAIN: 0
CONSTIPATION: 0
BLOOD IN STOOL: 0
ABDOMINAL DISTENTION: 0
ANAL BLEEDING: 0
SHORTNESS OF BREATH: 1
ABDOMINAL PAIN: 0

## 2023-11-16 ENCOUNTER — HOSPITAL ENCOUNTER (OUTPATIENT)
Dept: NUCLEAR MEDICINE | Age: 73
Discharge: HOME | End: 2023-11-18
Payer: MEDICARE

## 2023-11-16 PROCEDURE — 3430000000 HC RX DIAGNOSTIC RADIOPHARMACEUTICAL: Performed by: NURSE PRACTITIONER

## 2023-11-16 PROCEDURE — A9596 HC RX DIAGNOSTIC RADIOPHARMACEUTICAL: HCPCS | Performed by: NURSE PRACTITIONER

## 2023-11-16 PROCEDURE — 2580000003 HC RX 258: Performed by: NURSE PRACTITIONER

## 2023-11-16 PROCEDURE — 78815 PET IMAGE W/CT SKULL-THIGH: CPT

## 2023-11-16 RX ORDER — SODIUM CHLORIDE 0.9 % (FLUSH) 0.9 %
10 SYRINGE (ML) INJECTION
Status: COMPLETED | OUTPATIENT
Start: 2023-11-16 | End: 2023-11-16

## 2023-11-16 RX ADMIN — SODIUM CHLORIDE, PRESERVATIVE FREE 10 ML: 5 INJECTION INTRAVENOUS at 15:48

## 2023-11-16 RX ADMIN — KIT FOR THE PREPARATION OF GALLIUM GA 68 GOZETOTIDE INJECTION 6.12 MILLICURIE: KIT INTRAVENOUS at 15:48

## 2023-12-15 ENCOUNTER — HOSPITAL ENCOUNTER (OUTPATIENT)
Dept: RADIATION ONCOLOGY | Age: 73
Discharge: HOME | End: 2023-12-15
Attending: RADIOLOGY
Payer: MEDICARE

## 2023-12-15 VITALS
HEART RATE: 74 BPM | BODY MASS INDEX: 36.1 KG/M2 | OXYGEN SATURATION: 97 % | WEIGHT: 253.4 LBS | RESPIRATION RATE: 18 BRPM | SYSTOLIC BLOOD PRESSURE: 150 MMHG | DIASTOLIC BLOOD PRESSURE: 71 MMHG | TEMPERATURE: 96.7 F

## 2023-12-15 PROCEDURE — 99213 OFFICE O/P EST LOW 20 MIN: CPT | Performed by: NURSE PRACTITIONER

## 2023-12-15 PROCEDURE — 99212 OFFICE O/P EST SF 10 MIN: CPT | Performed by: NURSE PRACTITIONER

## 2023-12-15 ASSESSMENT — PAIN DESCRIPTION - LOCATION: LOCATION: BACK

## 2023-12-15 ASSESSMENT — PAIN SCALES - GENERAL: PAINLEVEL_OUTOF10: 4

## 2023-12-15 NOTE — PROGRESS NOTES
LABALBU 4.4 11/01/2023 03:29 PM    LABALBU 4.5 11/25/2011 11:10 AM    CALCIUM 9.9 11/01/2023 03:29 PM    BILITOT 0.6 11/01/2023 03:29 PM    ALKPHOS 63 11/01/2023 03:29 PM    AST 25 11/01/2023 03:29 PM    ALT 22 11/01/2023 03:29 PM     Onc labs:   PSA History:  11/01/2023:            0.33  05/15/2023:            0.07  05/08/2023:            0.06  11/04/2022:          <0.02  05/03/2022:          <0.02  11/02/2021:          <0.02      -> Last Lupron injection given May 2021  04/29/2021:          <0.02  10/28/2020:          <0.02  04/30/2020:          <0.02  01/31/2020:          <0.02      -> Completed RT on 12/02/2019  10/23/2019:          <0.02  10/08/2019:          <0.02  08/27/2019:            0.15  07/02/2019:            0.04  05/03/2019:            0.07  09/06/2018:            8.28  04/01/2015:            5.68  01/27/2014:            7.02  03/25/2013:            5.51  06/02/2009:            6.2           Testosterone History:  11/01/2023:          56  05/15/2023:          50  05/03/2022:          <3           -> Last Lupron injection given May 2021  11/08/2019:          <3        Review of Systems   Constitutional:  Positive for fatigue. Negative for activity change, appetite change and unexpected weight change. Respiratory:  Negative for cough and shortness of breath. Cardiovascular:  Negative for chest pain. Gastrointestinal:  Positive for constipation. Negative for abdominal distention, abdominal pain, anal bleeding, blood in stool, diarrhea, nausea, rectal pain and vomiting. Genitourinary:  Positive for dysuria (2 night ago blood in urine x2, then cleared) and urgency. Negative for frequency and hematuria. Musculoskeletal:  Positive for back pain. Negative for arthralgias and myalgias. Neurological:  Negative for dizziness, weakness, numbness and headaches. Psychiatric/Behavioral:  Negative for confusion.         A complete review of systems was performed and found to be negative except as

## 2024-02-14 ENCOUNTER — APPOINTMENT (OUTPATIENT)
Dept: GENERAL RADIOLOGY | Age: 74
End: 2024-02-14
Payer: MEDICARE

## 2024-02-14 ENCOUNTER — HOSPITAL ENCOUNTER (EMERGENCY)
Age: 74
Discharge: HOME | End: 2024-02-14
Payer: MEDICARE

## 2024-02-14 VITALS
RESPIRATION RATE: 16 BRPM | HEART RATE: 82 BPM | TEMPERATURE: 97.7 F | SYSTOLIC BLOOD PRESSURE: 146 MMHG | OXYGEN SATURATION: 97 % | DIASTOLIC BLOOD PRESSURE: 85 MMHG

## 2024-02-14 DIAGNOSIS — M25.541 ARTHRALGIA OF RIGHT HAND: ICD-10-CM

## 2024-02-14 DIAGNOSIS — S99.929A INJURY OF GREAT TOENAIL: Primary | ICD-10-CM

## 2024-02-14 PROCEDURE — 99213 OFFICE O/P EST LOW 20 MIN: CPT

## 2024-02-14 PROCEDURE — 99213 OFFICE O/P EST LOW 20 MIN: CPT | Performed by: NURSE PRACTITIONER

## 2024-02-14 PROCEDURE — 73130 X-RAY EXAM OF HAND: CPT

## 2024-02-14 RX ORDER — IBUPROFEN 200 MG
TABLET ORAL
Refills: 1 | COMMUNITY
Start: 2024-02-14

## 2024-02-14 NOTE — DISCHARGE INSTR - COC
Continuity of Care Form    Patient Name: Patrice Hastings   :  1950  MRN:  [id number]    Admit date:  2024  Discharge date:  ***    Code Status Order: Prior   Advance Directives:     Admitting Physician:  No admitting provider for patient encounter.  PCP: Keagan Sevilla MD    Discharging Nurse: ***  Discharging Hospital Unit/Room#:   Discharging Unit Phone Number: ***    Emergency Contact:   Extended Emergency Contact Information  Primary Emergency Contact: RaghuKeagan sommer   [facility]  Home Phone: [phone]  Relation: Child  Preferred language: English  Secondary Emergency Contact: ZAIRE HASTINGS  Home Phone: [phone]  Relation: Child  Preferred language: English   needed? No    Past Surgical History:  Past Surgical History:   Procedure Laterality Date    BACK SURGERY  2021    CARDIAC VALVE REPLACEMENT  2011    [place] (Pt unsure if part of CABG)    CARDIOVASCULAR STRESS TEST  2011    Cannot exclude slight inferobasal lateral stress-induced ischemia in a relatively small-sized area. EF 68%. Mildly abnormal nuclear scan. Lexiscan test associated with nonspecific symptoms. EKG nondiagnostic with nonspecific ST-T wave changes.     CAROTID ENDARTERECTOMY  2011    Right carotid endarterectomy with patch angioplasty with convention patch angioplasty.     COLONOSCOPY      CORONARY ARTERY BYPASS GRAFT      DIAGNOSTIC CARDIAC CATH LAB PROCEDURE  3 24 2011    LV end-diastolic pressure was 12 mmHg with no change before and after contrast injection. There was no significant gradient across the aortic valve to signify aortic stenosis. LV function was within normal limits, EF 55%. 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 Nonobstructive diffuse disease in a small sized RCA. Nomral LV function.     EGD      HAND FUSION Right     HERNIA REPAIR      Inguinal hernia repair.     PROSTATECTOMY N/A

## 2024-02-14 NOTE — ED PROVIDER NOTES
[facility] URGENT CARE  Urgent Care Encounter       CHIEF COMPLAINT       Chief Complaint   Patient presents with    Hand Pain     Finger, 2 months    Toe Pain     Last night       Nurses Notes reviewed and I agree except as noted in the HPI.  HISTORY OF PRESENT ILLNESS   Patrice Hastings is a 73 y.o. male who presents with complaints of right little finger distal pain and swelling.  This has been present for the past 2 months.  Patient has a known previous tablesaw injury to his right fingers.  He admits to DIP pain with movement and palpation.    In addition, patient is here for evaluation of his right great toe after he reportedly caught his toenail on a blanket on the floor ripping his toenail back.  He admits to some bleeding.  He has kept clean with Chloraseptic spray.    The history is provided by the patient.       REVIEW OF SYSTEMS     Review of Systems   Constitutional:  Negative for fever.   Respiratory:  Negative for shortness of breath.    Cardiovascular:  Negative for chest pain.   Musculoskeletal:  Positive for arthralgias (right 5th digit DIP), joint swelling and myalgias (right great toenail).   Skin:  Positive for wound (toenail injury great toe right foot).   Neurological:  Negative for weakness and numbness.       PAST MEDICAL HISTORY         Diagnosis Date    Arthritis     back    BPH (benign prostatic hyperplasia)     CAD (coronary artery disease)     CVA (cerebral vascular accident) ([facility]) 2010    Disease of blood and blood forming organ     anemia    FH: heart disease     GERD (gastroesophageal reflux disease)     Hyperlipidemia     Hypertension     Obesity     Prostate cancer ([facility]) 2019       SURGICALHISTORY     Patient  has a past surgical history that includes Coronary artery bypass graft; Carotid endarterectomy (2 08 2011); hernia repair; cardiovascular stress test (1 05 2011); transthoracic echocardiogram (12 21 2010); Diagnostic Cardiac Cath Lab Procedure (3 24 2011);

## 2024-02-14 NOTE — ED NOTES
Pt complains of right pinky pain for two months. Also states he ripped his toenail off last night and it is painful     Downton, Amy, RN  02/14/24 1825

## 2024-02-14 NOTE — ED NOTES
Right great toe wrapped with tube guaze. to help prevent toenail from pulling off.      Amy Lyn RN  02/14/24 0052

## 2024-02-15 ENCOUNTER — TELEPHONE (OUTPATIENT)
Dept: CARDIOLOGY CLINIC | Age: 74
End: 2024-02-15

## 2024-02-15 NOTE — TELEPHONE ENCOUNTER
Pre op Risk Assessment    Procedure Injection   Physician Ortho neuro  Date of surgery/procedure 2-27-24    Last OV 6-12-23  Last Stress 4-28-20  Last Echo 6-8-22  Last Cath 5-4-20  Last Stent   Is patient on blood thinners Eliquis  Hold Meds/how many days  3 days prior return taking 24 hrs after    Fax form to [phone] and [phone]

## 2024-02-16 ENCOUNTER — OFFICE VISIT (OUTPATIENT)
Dept: FAMILY MEDICINE CLINIC | Age: 74
End: 2024-02-16

## 2024-02-16 VITALS
WEIGHT: 259 LBS | OXYGEN SATURATION: 99 % | DIASTOLIC BLOOD PRESSURE: 84 MMHG | HEART RATE: 72 BPM | SYSTOLIC BLOOD PRESSURE: 132 MMHG | BODY MASS INDEX: 36.9 KG/M2 | TEMPERATURE: 97.9 F

## 2024-02-16 DIAGNOSIS — S91.209A AVULSION OF TOENAIL OF RIGHT FOOT: Primary | ICD-10-CM

## 2024-02-16 PROBLEM — E11.9 TYPE 2 DIABETES MELLITUS (HCC): Status: ACTIVE | Noted: 2024-02-16

## 2024-02-16 RX ORDER — CEPHALEXIN 500 MG/1
500 CAPSULE ORAL 2 TIMES DAILY
Qty: 14 CAPSULE | Refills: 0 | Status: SHIPPED | OUTPATIENT
Start: 2024-02-16 | End: 2024-02-23

## 2024-02-16 NOTE — PROGRESS NOTES
[facility] PROFESSIONAL SERVS  [facility]  [address] [id number]  Dept: [phone]  Dept Fax: [phone]  Loc: [phone]     Visit Date:  2/16/2024      Patient:  Patrice Hastings  YOB: 1950    HPI:     Chief Complaint   Patient presents with    Referral - General     Podiatry referral. Patient ripped off toenail on R foot; seen 2/14 in ED for this        Presents with right great toenail nearly avulsed after catching it on a piece of rug yesterday.        Medications    Current Outpatient Medications:     cephALEXin (KEFLEX) 500 MG capsule, Take 1 capsule by mouth 2 times daily for 7 days, Disp: 14 capsule, Rfl: 0    neomycin-bacitracin-polymyxin (NEOSPORIN) 5-400-5000 ointment, Apply topically 4 times daily., Disp: , Rfl: 1    DULoxetine (CYMBALTA) 60 MG extended release capsule, TAKE 1 CAPSULE BY MOUTH DAILY. DO NOT CRUSH OR CHEW, Disp: , Rfl:     famotidine (PEPCID) 20 MG tablet, Take 1 tablet by mouth daily, Disp: 90 tablet, Rfl: 2    mometasone (ELOCON) 0.1 % cream, Apply topically daily., Disp: 45 g, Rfl: 2    atorvastatin (LIPITOR) 20 MG tablet, TAKE 1 TABLET BY MOUTH DAILY, Disp: 90 tablet, Rfl: 3    apixaban (ELIQUIS) 5 MG TABS tablet, Take 1 tablet by mouth 2 times daily, Disp: 180 tablet, Rfl: 3    metoprolol succinate (TOPROL XL) 50 MG extended release tablet, TAKE 1/2 TABLET BY MOUTH EVERY DAY, Disp: 45 tablet, Rfl: 2    ferrous sulfate (IRON 325) 325 (65 Fe) MG tablet, Take 1 tablet by mouth daily (with breakfast), Disp: , Rfl:     calcium-vitamin D (OSCAL-500) 500-200 MG-UNIT per tablet, Take 1 tablet by mouth daily Indications: 200 mg, Disp: , Rfl:     Coenzyme Q10 (COQ10 PO), Take 500 mg by mouth daily, Disp: , Rfl:     Krill Oil 500 MG CAPS, Take  by mouth daily., Disp: , Rfl:     pregabalin (LYRICA) 100 MG capsule, Take 1 capsule by mouth daily. Per [facility]. Will d/c in 1 week and start Cymbalta

## 2024-02-23 ENCOUNTER — HOSPITAL ENCOUNTER (OUTPATIENT)
Dept: INTERVENTIONAL RADIOLOGY/VASCULAR | Age: 74
End: 2024-02-23
Payer: MEDICARE

## 2024-02-23 DIAGNOSIS — I70.213 ATHEROSCLEROSIS OF NATIVE ARTERY OF BOTH LOWER EXTREMITIES WITH INTERMITTENT CLAUDICATION (HCC): ICD-10-CM

## 2024-02-23 PROCEDURE — 93925 LOWER EXTREMITY STUDY: CPT

## 2024-03-05 NOTE — PROGRESS NOTES
[facility] PHYSICIANS [place] SPECIALTY  [facility] CARDIOLOGY  [address]  [address]  [facility] [address]  Dept: [phone]  Dept Fax: [phone]  Loc: [phone]    Visit Date: 3/6/2024    Mr. Hastings is a 73 y.o. male  who presented for:  Referral for peripheral arterial disease  CAD  HPI:   HPI   Patrice DINH Hastings is a pleasant 73 year old male patient who  has a past medical history of Arthritis, BPH (benign prostatic hyperplasia), CAD (coronary artery disease), CVA (cerebral vascular accident) (HCC), Disease of blood and blood forming organ, FH: heart disease, GERD (gastroesophageal reflux disease), Hyperlipidemia, Hypertension, Obesity, Prostate cancer (HCC), and Type 2 diabetes mellitus. Patient is followed by dr lewis. He has h/o CAD, CABG, s/p PCI of LCX in 2022, PAF. ECHO 2022 revealed a preserved EF. Patient is followed by Podiatry for a right great toe injury that resulted in partial ripping of the toenail which was later removed. He has undergone debridement. Patient was referred by Podiatry, for PAD screening. Vascular study on 2/2024 was reviewed. The TERRI was with normal range (1.1 on both sides). Doppler study revealed moderate ight anterior tibial stenosis, otherwise unremarkable. He denies claudications. Denies foot pain or fever. Has no discharge from right big toe. Some redness on distal right foot, also noted on the left side.      Current Outpatient Medications:     neomycin-bacitracin-polymyxin (NEOSPORIN) 5-400-5000 ointment, Apply topically 4 times daily., Disp: , Rfl: 1    DULoxetine (CYMBALTA) 60 MG extended release capsule, TAKE 1 CAPSULE BY MOUTH DAILY. DO NOT CRUSH OR CHEW, Disp: , Rfl:     famotidine (PEPCID) 20 MG tablet, Take 1 tablet by mouth daily, Disp: 90 tablet, Rfl: 2    pregabalin (LYRICA) 100 MG capsule, Take 1 capsule by mouth daily. Per [facility]. Will d/c in 1 week and start Cymbalta 30mg daily on 8/29/23 per PM (Patient

## 2024-03-06 ENCOUNTER — OFFICE VISIT (OUTPATIENT)
Dept: CARDIOLOGY CLINIC | Age: 74
End: 2024-03-06
Payer: MEDICARE

## 2024-03-06 VITALS
BODY MASS INDEX: 35.42 KG/M2 | DIASTOLIC BLOOD PRESSURE: 76 MMHG | WEIGHT: 247.38 LBS | SYSTOLIC BLOOD PRESSURE: 132 MMHG | HEART RATE: 107 BPM | HEIGHT: 70 IN

## 2024-03-06 DIAGNOSIS — I48.0 PAROXYSMAL ATRIAL FIBRILLATION (HCC): ICD-10-CM

## 2024-03-06 DIAGNOSIS — I25.810 CORONARY ARTERY DISEASE INVOLVING CORONARY BYPASS GRAFT OF NATIVE HEART WITHOUT ANGINA PECTORIS: Primary | ICD-10-CM

## 2024-03-06 PROCEDURE — G8484 FLU IMMUNIZE NO ADMIN: HCPCS | Performed by: INTERNAL MEDICINE

## 2024-03-06 PROCEDURE — 1123F ACP DISCUSS/DSCN MKR DOCD: CPT | Performed by: INTERNAL MEDICINE

## 2024-03-06 PROCEDURE — 3017F COLORECTAL CA SCREEN DOC REV: CPT | Performed by: INTERNAL MEDICINE

## 2024-03-06 PROCEDURE — G8417 CALC BMI ABV UP PARAM F/U: HCPCS | Performed by: INTERNAL MEDICINE

## 2024-03-06 PROCEDURE — 3075F SYST BP GE 130 - 139MM HG: CPT | Performed by: INTERNAL MEDICINE

## 2024-03-06 PROCEDURE — 99214 OFFICE O/P EST MOD 30 MIN: CPT | Performed by: INTERNAL MEDICINE

## 2024-03-06 PROCEDURE — 3078F DIAST BP <80 MM HG: CPT | Performed by: INTERNAL MEDICINE

## 2024-03-06 PROCEDURE — 93000 ELECTROCARDIOGRAM COMPLETE: CPT | Performed by: INTERNAL MEDICINE

## 2024-03-06 PROCEDURE — G8428 CUR MEDS NOT DOCUMENT: HCPCS | Performed by: INTERNAL MEDICINE

## 2024-03-06 PROCEDURE — 1036F TOBACCO NON-USER: CPT | Performed by: INTERNAL MEDICINE

## 2024-03-06 NOTE — PATIENT INSTRUCTIONS
Your  Nurses  Today:  Roberto    Your Provider for Today: Dr. Edwards       You may receive a survey regarding the care you received during your visit.  Your input is valuable to us.  We encourage you to complete and return your survey.  We hope you will choose us in the future for your healthcare needs.

## 2024-03-06 NOTE — PROGRESS NOTES
New pt here for check up     EKG done today     Pt states med list is correct from PCP appt 2/16/24    Pt c/o heart palpitations, sob noticed over last couple months

## 2024-05-08 ENCOUNTER — OFFICE VISIT (OUTPATIENT)
Dept: FAMILY MEDICINE CLINIC | Age: 74
End: 2024-05-08

## 2024-05-08 VITALS
WEIGHT: 250 LBS | SYSTOLIC BLOOD PRESSURE: 118 MMHG | DIASTOLIC BLOOD PRESSURE: 78 MMHG | BODY MASS INDEX: 35.79 KG/M2 | RESPIRATION RATE: 16 BRPM | HEART RATE: 77 BPM | TEMPERATURE: 97.7 F | HEIGHT: 70 IN | OXYGEN SATURATION: 98 %

## 2024-05-08 DIAGNOSIS — E11.9 TYPE 2 DIABETES MELLITUS WITHOUT COMPLICATION, WITHOUT LONG-TERM CURRENT USE OF INSULIN (HCC): Primary | ICD-10-CM

## 2024-05-08 DIAGNOSIS — E66.01 SEVERE OBESITY (BMI 35.0-39.9) WITH COMORBIDITY (HCC): ICD-10-CM

## 2024-05-08 DIAGNOSIS — M48.062 LUMBAR STENOSIS WITH NEUROGENIC CLAUDICATION: ICD-10-CM

## 2024-05-08 DIAGNOSIS — Z85.46 HISTORY OF PROSTATE CANCER: ICD-10-CM

## 2024-05-08 RX ORDER — MOXIFLOXACIN 5 MG/ML
SOLUTION/ DROPS OPHTHALMIC
COMMUNITY
Start: 2024-05-01

## 2024-05-08 RX ORDER — PREDNISOLONE ACETATE 10 MG/ML
SUSPENSION/ DROPS OPHTHALMIC
COMMUNITY
Start: 2024-05-01

## 2024-05-08 ASSESSMENT — PATIENT HEALTH QUESTIONNAIRE - PHQ9
2. FEELING DOWN, DEPRESSED OR HOPELESS: NOT AT ALL
SUM OF ALL RESPONSES TO PHQ QUESTIONS 1-9: 0
SUM OF ALL RESPONSES TO PHQ QUESTIONS 1-9: 0
SUM OF ALL RESPONSES TO PHQ9 QUESTIONS 1 & 2: 0
1. LITTLE INTEREST OR PLEASURE IN DOING THINGS: NOT AT ALL
SUM OF ALL RESPONSES TO PHQ QUESTIONS 1-9: 0
SUM OF ALL RESPONSES TO PHQ QUESTIONS 1-9: 0

## 2024-05-09 ENCOUNTER — NURSE ONLY (OUTPATIENT)
Dept: LAB | Age: 74
End: 2024-05-09

## 2024-05-09 DIAGNOSIS — E11.9 TYPE 2 DIABETES MELLITUS WITHOUT COMPLICATION, WITHOUT LONG-TERM CURRENT USE OF INSULIN (HCC): ICD-10-CM

## 2024-05-09 LAB
ALBUMIN SERPL BCG-MCNC: 4.1 G/DL (ref 3.5–5.1)
ALP SERPL-CCNC: 48 U/L (ref 38–126)
ALT SERPL W/O P-5'-P-CCNC: 30 U/L (ref 11–66)
ANION GAP SERPL CALC-SCNC: 13 MEQ/L (ref 8–16)
AST SERPL-CCNC: 32 U/L (ref 5–40)
BILIRUB SERPL-MCNC: 0.9 MG/DL (ref 0.3–1.2)
BUN SERPL-MCNC: 15 MG/DL (ref 7–22)
CALCIUM SERPL-MCNC: 9.5 MG/DL (ref 8.5–10.5)
CHLORIDE SERPL-SCNC: 101 MEQ/L (ref 98–111)
CHOLEST SERPL-MCNC: 145 MG/DL (ref 100–199)
CO2 SERPL-SCNC: 24 MEQ/L (ref 23–33)
CREAT SERPL-MCNC: 0.8 MG/DL (ref 0.4–1.2)
CREAT UR-MCNC: 383.3 MG/DL
DEPRECATED MEAN GLUCOSE BLD GHB EST-ACNC: 162 MG/DL (ref 70–126)
GFR SERPL CREATININE-BSD FRML MDRD: > 90 ML/MIN/1.73M2
GLUCOSE SERPL-MCNC: 193 MG/DL (ref 70–108)
HBA1C MFR BLD HPLC: 7.4 % (ref 4.4–6.4)
HDLC SERPL-MCNC: 41 MG/DL
LDLC SERPL CALC-MCNC: 80 MG/DL
MICROALBUMIN UR-MCNC: 12.22 MG/DL
MICROALBUMIN/CREAT RATIO PNL UR: 32 MG/G (ref 0–30)
POTASSIUM SERPL-SCNC: 4 MEQ/L (ref 3.5–5.2)
PROT SERPL-MCNC: 7.1 G/DL (ref 6.1–8)
SODIUM SERPL-SCNC: 138 MEQ/L (ref 135–145)
TRIGL SERPL-MCNC: 118 MG/DL (ref 0–199)

## 2024-05-13 ENCOUNTER — TELEPHONE (OUTPATIENT)
Dept: FAMILY MEDICINE CLINIC | Age: 74
End: 2024-05-13

## 2024-05-13 DIAGNOSIS — E78.5 HYPERLIPIDEMIA, UNSPECIFIED HYPERLIPIDEMIA TYPE: Chronic | ICD-10-CM

## 2024-05-13 DIAGNOSIS — E11.9 TYPE 2 DIABETES MELLITUS WITHOUT COMPLICATION, WITHOUT LONG-TERM CURRENT USE OF INSULIN (HCC): Primary | ICD-10-CM

## 2024-05-13 NOTE — TELEPHONE ENCOUNTER
----- Message from Keagan Sevilla MD sent at 5/12/2024  9:32 PM EDT -----  Inform patient blood sugars are elevated from previous, reduce carbs and sugars in diet remain active as possible repeat CMP hemoglobin A1c in 3 months

## 2024-06-17 ENCOUNTER — OFFICE VISIT (OUTPATIENT)
Dept: CARDIOLOGY CLINIC | Age: 74
End: 2024-06-17
Payer: MEDICARE

## 2024-06-17 VITALS
HEART RATE: 92 BPM | DIASTOLIC BLOOD PRESSURE: 68 MMHG | WEIGHT: 248.2 LBS | HEIGHT: 70 IN | SYSTOLIC BLOOD PRESSURE: 122 MMHG | BODY MASS INDEX: 35.53 KG/M2

## 2024-06-17 DIAGNOSIS — I25.810 CORONARY ARTERY DISEASE INVOLVING CORONARY BYPASS GRAFT OF NATIVE HEART WITHOUT ANGINA PECTORIS: ICD-10-CM

## 2024-06-17 DIAGNOSIS — I73.9 PVD (PERIPHERAL VASCULAR DISEASE) (HCC): ICD-10-CM

## 2024-06-17 DIAGNOSIS — E78.01 FAMILIAL HYPERCHOLESTEROLEMIA: ICD-10-CM

## 2024-06-17 DIAGNOSIS — I10 PRIMARY HYPERTENSION: Primary | ICD-10-CM

## 2024-06-17 PROCEDURE — 1036F TOBACCO NON-USER: CPT | Performed by: NUCLEAR MEDICINE

## 2024-06-17 PROCEDURE — 1123F ACP DISCUSS/DSCN MKR DOCD: CPT | Performed by: NUCLEAR MEDICINE

## 2024-06-17 PROCEDURE — G8427 DOCREV CUR MEDS BY ELIG CLIN: HCPCS | Performed by: NUCLEAR MEDICINE

## 2024-06-17 PROCEDURE — 99214 OFFICE O/P EST MOD 30 MIN: CPT | Performed by: NUCLEAR MEDICINE

## 2024-06-17 PROCEDURE — 3078F DIAST BP <80 MM HG: CPT | Performed by: NUCLEAR MEDICINE

## 2024-06-17 PROCEDURE — 3074F SYST BP LT 130 MM HG: CPT | Performed by: NUCLEAR MEDICINE

## 2024-06-17 PROCEDURE — G8417 CALC BMI ABV UP PARAM F/U: HCPCS | Performed by: NUCLEAR MEDICINE

## 2024-06-17 PROCEDURE — 3017F COLORECTAL CA SCREEN DOC REV: CPT | Performed by: NUCLEAR MEDICINE

## 2024-06-17 RX ORDER — METOPROLOL SUCCINATE 50 MG/1
25 TABLET, EXTENDED RELEASE ORAL DAILY
Qty: 45 TABLET | Refills: 3 | Status: SHIPPED | OUTPATIENT
Start: 2024-06-17

## 2024-06-17 NOTE — PROGRESS NOTES
[facility] PHYSICIANS [place] SPECIALTY  [facility] CARDIOLOGY  [address]  [address]  [place] [address]  Dept: [phone]  Dept Fax: [phone]  Loc: [phone]    Visit Date: 6/17/2024    Patrice Hastings is a 73 y.o. male who presents todayfor:  Chief Complaint   Patient presents with    1 Year Follow Up    Hypertension    Hyperlipidemia    Coronary Artery Disease   Known CABG and stents  Seen [facility] for PVD evaluation   Medical Rx for the time being   Known HTN and hyperlipidemia  On medical Rx  No chest pain   No changes in breathing  BP is stable  No dizziness  No syncope  On statins for hyperlipidemia  No issues with the meds   No obvious claudication   BS is so so controlled  Needs better control   HPI:  HPI  Past Medical History:   Diagnosis Date    Arthritis     back    BPH (benign prostatic hyperplasia)     CAD (coronary artery disease)     CVA (cerebral vascular accident) (HCC) 2010    Disease of blood and blood forming organ     anemia    FH: heart disease     GERD (gastroesophageal reflux disease)     Hyperlipidemia     Hypertension     Obesity     Prostate cancer (HCC) 2019    Type 2 diabetes mellitus 2/16/2024      Past Surgical History:   Procedure Laterality Date    BACK SURGERY  08/2021    CARDIAC VALVE REPLACEMENT  April 2011    [place], [place] (Pt unsure if part of CABG)    CARDIOVASCULAR STRESS TEST  1 05 2011    Cannot exclude slight inferobasal lateral stress-induced ischemia in a relatively small-sized area. EF 68%. Mildly abnormal nuclear scan. Lexiscan test associated with nonspecific symptoms. EKG nondiagnostic with nonspecific ST-T wave changes.     CAROTID ENDARTERECTOMY  2 08 2011    Right carotid endarterectomy with patch angioplasty with convention patch angioplasty.     COLONOSCOPY      CORONARY ARTERY BYPASS GRAFT      DIAGNOSTIC CARDIAC CATH LAB PROCEDURE  3 24 2011    LV end-diastolic pressure was 12 mmHg with no change before and after contrast

## 2024-06-21 ENCOUNTER — HOSPITAL ENCOUNTER (EMERGENCY)
Age: 74
Discharge: HOME | End: 2024-06-21
Payer: MEDICARE

## 2024-06-21 VITALS
RESPIRATION RATE: 18 BRPM | HEART RATE: 71 BPM | OXYGEN SATURATION: 96 % | SYSTOLIC BLOOD PRESSURE: 107 MMHG | TEMPERATURE: 98.7 F | DIASTOLIC BLOOD PRESSURE: 63 MMHG

## 2024-06-21 DIAGNOSIS — R05.1 ACUTE COUGH: Primary | ICD-10-CM

## 2024-06-21 PROCEDURE — 99213 OFFICE O/P EST LOW 20 MIN: CPT

## 2024-06-21 RX ORDER — LORATADINE 10 MG
1-2 CAPSULE ORAL EVERY 6 HOURS PRN
Qty: 168 CAPSULE | Refills: 0 | Status: SHIPPED | OUTPATIENT
Start: 2024-06-21

## 2024-06-21 RX ORDER — AZITHROMYCIN 250 MG/1
TABLET, FILM COATED ORAL
Qty: 6 TABLET | Refills: 0 | Status: SHIPPED | OUTPATIENT
Start: 2024-06-21 | End: 2024-07-01

## 2024-06-21 NOTE — DISCHARGE INSTRUCTIONS
Please take antibiotics as prescribed for the recommended duration even if you are feeling better.  Okay to use Coricidin for cough/congestion on as-needed basis.    Hydrate well keeping urine clear/pale yellow.  Side effects of antibiotics including sensitivity to sun, diarrhea and may make you more vulnerable to opportunistic infections.  Please use probiotics and practice good hand hygiene while you are taking his medications.    Please see your family doctor if symptoms fail to improve or sooner if they worsen.    If any of your symptoms are urgent/emergent or out-of-control please report to urgent care/ER or call 911.    I hope you are feeling better soon!

## 2024-06-21 NOTE — ED PROVIDER NOTES
[facility] URGENT CARE      URGENT CARE     Pt Name: Patrice Hastings  MRN: [id number]  Birthdate 1950  Date of evaluation: 6/21/2024  Provider: DOREEN Josue CNP    Urgent Care Encounter     CHIEF COMPLAINT       Chief Complaint   Patient presents with    Cough     Chest congestion x 3 days     HISTORY OF PRESENT ILLNESS   Patrice Hastings is a 73 y.o. male who presents to urgent care with chief complaint of cough/congestion and green thick phlegm.  Started 2-3 days ago.  Denies sick contacts with similar symptoms.  Denies fevers.  Treating with Mucinex which is providing improvement.  Denies smoking.  Patient admits to pertinent medical history of diabetes type 2, atrial fibrillation and heart stents.  Currently on blood thinners.  Denies chest pains or shortness of breath.  Denies nausea/vomiting.  Denies fevers.    History obtained from patient  URGENT CARE TIMELINE      ED Course as of 06/21/24 1530   Fri Jun 21, 2024   1530 BP: 107/63  Vitals are stable, afebrile.  Oxygenating well.    Extensive discussion regarding steroids/antibiotics.  I explained that considering as diabetes steroids would likely be an appropriate prescription because they increase blood sugar.  Discussed history of diabetes does not provide immunocompromise status reducing threshold for antibiotics.  Considering his diabetes and ongoing heart disease/blood thinners shared decision making opted to undergo antimicrobial therapy at this time. [JR]      ED Course User Index  [JR] Lazaro Gay APRN - CNP     PAST MEDICAL HISTORY         Diagnosis Date    Arthritis     back    BPH (benign prostatic hyperplasia)     CAD (coronary artery disease)     CVA (cerebral vascular accident) (HCC) 2010    Disease of blood and blood forming organ     anemia    FH: heart disease     GERD (gastroesophageal reflux disease)     Hyperlipidemia     Hypertension     Obesity     Prostate cancer (HCC) 2019    Type 2

## 2024-07-19 NOTE — TELEPHONE ENCOUNTER
Patrice is requesting a refill of their   Requested Prescriptions     Pending Prescriptions Disp Refills    apixaban (ELIQUIS) 5 MG TABS tablet 180 tablet 3     Sig: Take 1 tablet by mouth 2 times daily   . Please advise.      Last Appt:  Visit date not found  Next Appt:   Visit date not found  Preferred pharmacy: [id number] - [place], [place] - [address] - P [phone] F [phone]

## 2024-08-15 ENCOUNTER — LAB (OUTPATIENT)
Dept: LAB | Age: 74
End: 2024-08-15

## 2024-08-15 DIAGNOSIS — E78.5 HYPERLIPIDEMIA, UNSPECIFIED HYPERLIPIDEMIA TYPE: Chronic | ICD-10-CM

## 2024-08-15 DIAGNOSIS — E11.9 TYPE 2 DIABETES MELLITUS WITHOUT COMPLICATION, WITHOUT LONG-TERM CURRENT USE OF INSULIN (HCC): ICD-10-CM

## 2024-08-15 LAB
ALBUMIN SERPL BCG-MCNC: 4.2 G/DL (ref 3.5–5.1)
ALP SERPL-CCNC: 48 U/L (ref 38–126)
ALT SERPL W/O P-5'-P-CCNC: 16 U/L (ref 11–66)
ANION GAP SERPL CALC-SCNC: 10 MEQ/L (ref 8–16)
AST SERPL-CCNC: 20 U/L (ref 5–40)
BILIRUB SERPL-MCNC: 0.9 MG/DL (ref 0.3–1.2)
BUN SERPL-MCNC: 15 MG/DL (ref 7–22)
CALCIUM SERPL-MCNC: 9.4 MG/DL (ref 8.5–10.5)
CHLORIDE SERPL-SCNC: 101 MEQ/L (ref 98–111)
CO2 SERPL-SCNC: 28 MEQ/L (ref 23–33)
CREAT SERPL-MCNC: 0.7 MG/DL (ref 0.4–1.2)
DEPRECATED MEAN GLUCOSE BLD GHB EST-ACNC: 147 MG/DL (ref 70–126)
GFR SERPL CREATININE-BSD FRML MDRD: > 90 ML/MIN/1.73M2
GLUCOSE SERPL-MCNC: 168 MG/DL (ref 70–108)
HBA1C MFR BLD HPLC: 6.9 % (ref 4.4–6.4)
POTASSIUM SERPL-SCNC: 4.3 MEQ/L (ref 3.5–5.2)
PROT SERPL-MCNC: 7 G/DL (ref 6.1–8)
SODIUM SERPL-SCNC: 139 MEQ/L (ref 135–145)

## 2024-08-20 ENCOUNTER — OFFICE VISIT (OUTPATIENT)
Dept: FAMILY MEDICINE CLINIC | Age: 74
End: 2024-08-20
Payer: MEDICARE

## 2024-08-20 VITALS
SYSTOLIC BLOOD PRESSURE: 128 MMHG | OXYGEN SATURATION: 96 % | BODY MASS INDEX: 35.41 KG/M2 | HEART RATE: 56 BPM | DIASTOLIC BLOOD PRESSURE: 78 MMHG | WEIGHT: 246.8 LBS | TEMPERATURE: 98 F

## 2024-08-20 DIAGNOSIS — Z00.00 MEDICARE ANNUAL WELLNESS VISIT, SUBSEQUENT: Primary | ICD-10-CM

## 2024-08-20 DIAGNOSIS — L98.9 SKIN LESION OF HAND: ICD-10-CM

## 2024-08-20 DIAGNOSIS — E11.65 TYPE 2 DIABETES MELLITUS WITH HYPERGLYCEMIA, WITHOUT LONG-TERM CURRENT USE OF INSULIN (HCC): ICD-10-CM

## 2024-08-20 DIAGNOSIS — L57.0 AK (ACTINIC KERATOSIS): ICD-10-CM

## 2024-08-20 DIAGNOSIS — C61 MALIGNANT NEOPLASM OF PROSTATE (HCC): ICD-10-CM

## 2024-08-20 PROCEDURE — G0439 PPPS, SUBSEQ VISIT: HCPCS | Performed by: FAMILY MEDICINE

## 2024-08-20 PROCEDURE — 1123F ACP DISCUSS/DSCN MKR DOCD: CPT | Performed by: FAMILY MEDICINE

## 2024-08-20 PROCEDURE — 3044F HG A1C LEVEL LT 7.0%: CPT | Performed by: FAMILY MEDICINE

## 2024-08-20 PROCEDURE — 3078F DIAST BP <80 MM HG: CPT | Performed by: FAMILY MEDICINE

## 2024-08-20 PROCEDURE — 3074F SYST BP LT 130 MM HG: CPT | Performed by: FAMILY MEDICINE

## 2024-08-20 PROCEDURE — 3017F COLORECTAL CA SCREEN DOC REV: CPT | Performed by: FAMILY MEDICINE

## 2024-08-20 SDOH — ECONOMIC STABILITY: FOOD INSECURITY: WITHIN THE PAST 12 MONTHS, YOU WORRIED THAT YOUR FOOD WOULD RUN OUT BEFORE YOU GOT MONEY TO BUY MORE.: NEVER TRUE

## 2024-08-20 SDOH — ECONOMIC STABILITY: FOOD INSECURITY: WITHIN THE PAST 12 MONTHS, THE FOOD YOU BOUGHT JUST DIDN'T LAST AND YOU DIDN'T HAVE MONEY TO GET MORE.: NEVER TRUE

## 2024-08-20 SDOH — ECONOMIC STABILITY: INCOME INSECURITY: HOW HARD IS IT FOR YOU TO PAY FOR THE VERY BASICS LIKE FOOD, HOUSING, MEDICAL CARE, AND HEATING?: NOT HARD AT ALL

## 2024-08-20 ASSESSMENT — PATIENT HEALTH QUESTIONNAIRE - PHQ9
SUM OF ALL RESPONSES TO PHQ QUESTIONS 1-9: 2
SUM OF ALL RESPONSES TO PHQ9 QUESTIONS 1 & 2: 2
2. FEELING DOWN, DEPRESSED OR HOPELESS: SEVERAL DAYS
1. LITTLE INTEREST OR PLEASURE IN DOING THINGS: SEVERAL DAYS

## 2024-08-20 ASSESSMENT — LIFESTYLE VARIABLES
HOW OFTEN DO YOU HAVE A DRINK CONTAINING ALCOHOL: NEVER
HOW MANY STANDARD DRINKS CONTAINING ALCOHOL DO YOU HAVE ON A TYPICAL DAY: PATIENT DOES NOT DRINK

## 2024-08-28 RX ORDER — ATORVASTATIN CALCIUM 20 MG/1
TABLET, FILM COATED ORAL
Qty: 90 TABLET | Refills: 3 | Status: SHIPPED | OUTPATIENT
Start: 2024-08-28

## 2024-09-10 ENCOUNTER — HOSPITAL ENCOUNTER (EMERGENCY)
Age: 74
Discharge: HOME | End: 2024-09-10
Attending: EMERGENCY MEDICINE
Payer: MEDICARE

## 2024-09-10 VITALS
SYSTOLIC BLOOD PRESSURE: 147 MMHG | TEMPERATURE: 97.8 F | RESPIRATION RATE: 18 BRPM | BODY MASS INDEX: 33.59 KG/M2 | WEIGHT: 248 LBS | HEART RATE: 71 BPM | HEIGHT: 72 IN | OXYGEN SATURATION: 95 % | DIASTOLIC BLOOD PRESSURE: 77 MMHG

## 2024-09-10 DIAGNOSIS — H81.12 BENIGN PAROXYSMAL POSITIONAL VERTIGO OF LEFT EAR: Primary | ICD-10-CM

## 2024-09-10 LAB
ANION GAP SERPL CALC-SCNC: 11 MEQ/L (ref 8–16)
BASOPHILS ABSOLUTE: 0.1 THOU/MM3 (ref 0–0.1)
BASOPHILS NFR BLD AUTO: 0.7 %
BUN SERPL-MCNC: 11 MG/DL (ref 7–22)
CALCIUM SERPL-MCNC: 9.3 MG/DL (ref 8.5–10.5)
CHLORIDE SERPL-SCNC: 102 MEQ/L (ref 98–111)
CO2 SERPL-SCNC: 27 MEQ/L (ref 23–33)
CREAT SERPL-MCNC: 0.7 MG/DL (ref 0.4–1.2)
DEPRECATED RDW RBC AUTO: 41 FL (ref 35–45)
EKG ATRIAL RATE: 57 BPM
EKG Q-T INTERVAL: 418 MS
EKG QRS DURATION: 132 MS
EKG QTC CALCULATION (BAZETT): 406 MS
EKG R AXIS: -24 DEGREES
EKG T AXIS: 29 DEGREES
EKG VENTRICULAR RATE: 57 BPM
EOSINOPHIL NFR BLD AUTO: 1.9 %
EOSINOPHILS ABSOLUTE: 0.2 THOU/MM3 (ref 0–0.4)
ERYTHROCYTE [DISTWIDTH] IN BLOOD BY AUTOMATED COUNT: 11.9 % (ref 11.5–14.5)
GFR SERPL CREATININE-BSD FRML MDRD: > 90 ML/MIN/1.73M2
GLUCOSE SERPL-MCNC: 205 MG/DL (ref 70–108)
HCT VFR BLD AUTO: 44.1 % (ref 42–52)
HGB BLD-MCNC: 14.9 GM/DL (ref 14–18)
IMM GRANULOCYTES # BLD AUTO: 0.07 THOU/MM3 (ref 0–0.07)
IMM GRANULOCYTES NFR BLD AUTO: 0.7 %
LYMPHOCYTES ABSOLUTE: 1.4 THOU/MM3 (ref 1–4.8)
LYMPHOCYTES NFR BLD AUTO: 13.4 %
MCH RBC QN AUTO: 32.5 PG (ref 26–33)
MCHC RBC AUTO-ENTMCNC: 33.8 GM/DL (ref 32.2–35.5)
MCV RBC AUTO: 96.3 FL (ref 80–94)
MONOCYTES ABSOLUTE: 0.6 THOU/MM3 (ref 0.4–1.3)
MONOCYTES NFR BLD AUTO: 5.7 %
NEUTROPHILS ABSOLUTE: 8 THOU/MM3 (ref 1.8–7.7)
NEUTROPHILS NFR BLD AUTO: 77.6 %
NRBC BLD AUTO-RTO: 0 /100 WBC
OSMOLALITY SERPL CALC.SUM OF ELEC: 284.7 MOSMOL/KG (ref 275–300)
PLATELET # BLD AUTO: 219 THOU/MM3 (ref 130–400)
PMV BLD AUTO: 9.2 FL (ref 9.4–12.4)
POTASSIUM SERPL-SCNC: 4.1 MEQ/L (ref 3.5–5.2)
RBC # BLD AUTO: 4.58 MILL/MM3 (ref 4.7–6.1)
SODIUM SERPL-SCNC: 140 MEQ/L (ref 135–145)
WBC # BLD AUTO: 10.3 THOU/MM3 (ref 4.8–10.8)

## 2024-09-10 PROCEDURE — 80048 BASIC METABOLIC PNL TOTAL CA: CPT

## 2024-09-10 PROCEDURE — 93010 ELECTROCARDIOGRAM REPORT: CPT | Performed by: NUCLEAR MEDICINE

## 2024-09-10 PROCEDURE — 93005 ELECTROCARDIOGRAM TRACING: CPT | Performed by: EMERGENCY MEDICINE

## 2024-09-10 PROCEDURE — 85025 COMPLETE CBC W/AUTO DIFF WBC: CPT

## 2024-09-10 PROCEDURE — 99284 EMERGENCY DEPT VISIT MOD MDM: CPT

## 2024-09-10 PROCEDURE — 36415 COLL VENOUS BLD VENIPUNCTURE: CPT

## 2024-09-10 RX ORDER — MECLIZINE HYDROCHLORIDE 25 MG/1
25 TABLET ORAL 3 TIMES DAILY PRN
Qty: 15 TABLET | Refills: 0 | Status: SHIPPED | OUTPATIENT
Start: 2024-09-10 | End: 2024-09-20

## 2024-10-22 ENCOUNTER — TELEPHONE (OUTPATIENT)
Dept: CARDIOLOGY CLINIC | Age: 74
End: 2024-10-22

## 2024-10-22 NOTE — TELEPHONE ENCOUNTER
Pt needs to be scheduled for an intracept procedure, date is TBD. Pt is currently taking Eliquis, Dr. Brewer is asking if the pt can stop Eliquis for 3 days prior to procedure.     Okay to stop eliquis for 3 days?     Last OV 6/17/24  Last echo 6/8/22  Last Cath ?  Last sent ?    [facility]   F: [phone] P: [phone]

## 2024-11-18 ENCOUNTER — LAB (OUTPATIENT)
Dept: LAB | Age: 74
End: 2024-11-18

## 2024-11-18 DIAGNOSIS — C61 PROSTATE CANCER (HCC): ICD-10-CM

## 2024-11-18 DIAGNOSIS — E11.65 TYPE 2 DIABETES MELLITUS WITH HYPERGLYCEMIA, WITHOUT LONG-TERM CURRENT USE OF INSULIN (HCC): ICD-10-CM

## 2024-11-18 DIAGNOSIS — Z79.818 ANDROGEN DEPRIVATION THERAPY: ICD-10-CM

## 2024-11-18 LAB
ALBUMIN SERPL BCG-MCNC: 4.2 G/DL (ref 3.5–5.1)
ALP SERPL-CCNC: 52 U/L (ref 38–126)
ALT SERPL W/O P-5'-P-CCNC: 17 U/L (ref 11–66)
ANION GAP SERPL CALC-SCNC: 13 MEQ/L (ref 8–16)
AST SERPL-CCNC: 21 U/L (ref 5–40)
BILIRUB SERPL-MCNC: 0.6 MG/DL (ref 0.3–1.2)
BUN SERPL-MCNC: 13 MG/DL (ref 7–22)
CALCIUM SERPL-MCNC: 9.9 MG/DL (ref 8.5–10.5)
CHLORIDE SERPL-SCNC: 102 MEQ/L (ref 98–111)
CO2 SERPL-SCNC: 27 MEQ/L (ref 23–33)
CREAT SERPL-MCNC: 0.6 MG/DL (ref 0.4–1.2)
DEPRECATED MEAN GLUCOSE BLD GHB EST-ACNC: 147 MG/DL (ref 70–126)
GFR SERPL CREATININE-BSD FRML MDRD: > 90 ML/MIN/1.73M2
GLUCOSE SERPL-MCNC: 133 MG/DL (ref 70–108)
HBA1C MFR BLD HPLC: 6.9 % (ref 4.4–6.4)
POTASSIUM SERPL-SCNC: 4.3 MEQ/L (ref 3.5–5.2)
PROT SERPL-MCNC: 7.5 G/DL (ref 6.1–8)
PSA SERPL-MCNC: 4.77 NG/ML (ref 0–1)
SODIUM SERPL-SCNC: 142 MEQ/L (ref 135–145)
TESTOST SERPL-MCNC: 36 NG/DL (ref 193–740)

## 2024-11-21 ENCOUNTER — TELEPHONE (OUTPATIENT)
Dept: FAMILY MEDICINE CLINIC | Age: 74
End: 2024-11-21

## 2024-11-21 ENCOUNTER — OFFICE VISIT (OUTPATIENT)
Dept: FAMILY MEDICINE CLINIC | Age: 74
End: 2024-11-21

## 2024-11-21 VITALS
HEART RATE: 67 BPM | RESPIRATION RATE: 16 BRPM | WEIGHT: 249 LBS | OXYGEN SATURATION: 96 % | DIASTOLIC BLOOD PRESSURE: 66 MMHG | BODY MASS INDEX: 33.77 KG/M2 | TEMPERATURE: 97.5 F | SYSTOLIC BLOOD PRESSURE: 112 MMHG

## 2024-11-21 DIAGNOSIS — Z85.46 HISTORY OF PROSTATE CANCER: ICD-10-CM

## 2024-11-21 DIAGNOSIS — E11.65 TYPE 2 DIABETES MELLITUS WITH HYPERGLYCEMIA, WITHOUT LONG-TERM CURRENT USE OF INSULIN (HCC): Primary | ICD-10-CM

## 2024-11-21 DIAGNOSIS — C61 PROSTATE CANCER (HCC): Primary | ICD-10-CM

## 2024-11-21 RX ORDER — HYDROCODONE BITARTRATE AND ACETAMINOPHEN 5; 325 MG/1; MG/1
TABLET ORAL
COMMUNITY

## 2024-11-21 NOTE — TELEPHONE ENCOUNTER
I called Patrice and I was able to speak with him. We discussed the significant increase in his PSA since November of last year. I advised proceeding with PSMA PET to further assess for possible sources of the climbing PSA.    He confirmed that he is no longer following with urology. This will likely be needed in the future for consideration of ADT (despite PSA progressing with already low testosterone), but he prefers to discuss the next steps after the PSMA first.     Order for PSMA placed. Please help arrange this and schedule an extended follow up with myself for results review. He is aware our office will be reaching him to schedule. He thanked me for the update and will await scheduling.

## 2024-12-03 NOTE — PROGRESS NOTES
Observation goals  PRIOR TO DISCHARGE       Comments:   -diagnostic tests and consults completed and resulted  Not met  -vital signs normal or at patient baseline  Met  -returns to baseline functional status  Not met  -safe disposition plan has been identified  Not met  Nurse to notify provider when observation goals have been met and patient is ready for discharge.         [address]  PHYSICAL THERAPY  [] EVALUATION  [x] DAILY NOTE (LAND) [] DAILY NOTE (AQUATIC ) [] PROGRESS NOTE [] DISCHARGE NOTE    [x] OUTPATIENT REHABILITATION [facility]   [] [address] [address]    [] [facility]   [] Delaney Marker    Date: 2020  Patient Name:  Roque Silveira  : 1950  MRN: [id number]    Referring Practitioner Tammy Estevez DO   Diagnosis Lumbago with sciatica, unspecified side [M54.40]    Treatment Diagnosis Acute on chronic left lumbar pain with radiculopathy, core and left hip weakness, impaired posture, abnormal gait   Date of Evaluation 20    Additional Pertinent History MI, HTN, irregular heart beat, incontinence, prostrate cancer, stroke , heart bypass robotic surgery , 2 stents within last year. Functional Outcome Measure Used Gissell Bone   Functional Outcome Score 15/24 (20)       Insurance: Primary: Payor: Imelda Louie /  /  / ,   Secondary: Randall Sicks: Aquatics and modalities covered except javid, HP/CP   Visit # 4, 4/10 for progress note   Visits Allowed: Unlimited visits based on medical necessity   Recertification Date:    Physician Follow-Up: Dr. Horton Reason 20 for possible steroid injection   Physician Orders: 3x/wk for 6 weeks, [place] therapy for low back and lumbar radic, provide HEP; stretching, PROM, general conditioning; L3-4 stenosis on left   History of Present Illness:      SUBJECTIVE: Pt reports no pain in lower back today, stating that he feels pretty good. Patient states he got a piece of plywood to put under his mattress so he can perform the hip flexor stretch off EOB at home.              TREATMENT   Precautions: MRSA   Pain: denies    X in shaded column indicates activity completed today   Modalities Parameters/  Location  Notes                     Manual Therapy Time/Technique  Notes   MET for right pelvic upslip 5x5 sec hold Slight hip ABD and IR with forceful cough               Exercise/Intervention   Notes   Left Hip flexor stretch at step and off EOB 3x15 sec each  x Just off EOB today    Left piriformis (knee to opp shoulder) [id number]  x    Supine abdominal bracing  10x  5 sec  x    SKTC, DKTC 3x 15 sec x    Supine LTR 3x 15 sec x    Supine w/abdominal bracing: marches, shoulder flexion, combo 10x  x    Supine hip adduction w/ball, abduction  10x 5 sec for adduction  x                  Standing lumbar extension and right lateral glides 10x each   Just standing lumbar extension today          Prone lying 5 min      Prone press up on elbows Held 2 min. Tightness in LB but no pain   Prone glut sets,  HS curl 10x 5sec.hold     Prone abdominal bracing: UE, LE 10X 2-3 sec  Cues for decreased range to prevent body from rotating          Sitting with abdominal bracing: marching, LAQ, adductor squeeze, hip abduction bilat/unilat 10x  x    Seated forward flexion stretch  3x 15 sec x    Seated hamstring stretch  3x 15 sec x    Pt educated: Body mechanics, lifting techniques. Proper Lifting techniques for 1year old granddaughter            Specific Interventions Next Treatment: review HEP, initiate DLS, piriformis release, modalities as needed    Activity/Treatment Tolerance:  [x]  Patient tolerated treatment well  []  Patient limited by fatigue  []  Patient limited by pain   []  Patient limited by medical complications  []  Other:     Assessment: Performed supine, flexion exercises today to help decrease pain in lower back with good patient tolerance. Also performed seated strengthening exercises today with good patient tolerance. Added seated forward flexion stretch and seated bilateral hamstring stretch at end of session with good patient tolerance. Patient reported no increase in pain at end of session, but reported his legs felt tight and \"rubbery\".        GOALS:  Patient Goal: Reduce pain    Short Term Goals:  Time Frame: 6 weeks  Patient will report reduced pain to 2/10 at worst to tolerate sitting, standing, and walking longer durations. Patient will demonstrate good core engagement and postural awareness during therapy session with minimal cues  to carry over to functional activities, lifting, and housework. Patient will have <25% left hip flexor and piriformis tightness for symptom control with ease walking. Patient will improve left hip strength to 5/5 for stabilization when walking, lifting, and cleaning. Long Term Goals:  Time Frame: 12 weeks  Patient will be independent and compliant with HEP daily to achieve above goals. Patient will reduce Donna Sames score from 15/24 to 10/24 to tolerate functional activities and mobility. Patient Education:   [x]  HEP/Education Completed: Hip flexor stretch on steps and EOB, supine flexion exercises   [facility] Access Code: [email or web address]  []  No new Education completed  [x]  Reviewed Prior HEP      [x]  Patient verbalized and/or demonstrated understanding of education provided. []  Patient unable to verbalize and/or demonstrate understanding of education provided. Will continue education. []  Barriers to learning:     PLAN:  []  Plan of care initiated. Plan to see patient 3 times per week for 12 weeks to address the treatment planned outlined above. [x]  Continue with current plan of care  []  Modify plan of care as follows:    []  Hold pending physician visit  []  Discharge    Time In 1645   Time Out 1723   Timed Code Minutes: 38 min   Total Treatment Time: 38 min     Patient treated by student PTA under the direct supervision of licensed PT.     Electronically Signed by: KAYLENE Flores

## 2024-12-18 ENCOUNTER — HOSPITAL ENCOUNTER (OUTPATIENT)
Dept: PET IMAGING | Age: 74
Discharge: HOME | End: 2024-12-18
Payer: MEDICARE

## 2024-12-18 DIAGNOSIS — C61 PROSTATE CANCER (HCC): ICD-10-CM

## 2024-12-18 PROCEDURE — 3430000000 HC RX DIAGNOSTIC RADIOPHARMACEUTICAL: Performed by: PHYSICIAN ASSISTANT

## 2024-12-18 PROCEDURE — 78815 PET IMAGE W/CT SKULL-THIGH: CPT

## 2024-12-18 PROCEDURE — A9595 HC RX DIAGNOSTIC RADIOPHARMACEUTICAL: HCPCS | Performed by: PHYSICIAN ASSISTANT

## 2024-12-18 RX ADMIN — PIFLUFOLASTAT F-18 9.3 MILLICURIE: 80 INJECTION INTRAVENOUS at 08:43

## 2024-12-20 ENCOUNTER — HOSPITAL ENCOUNTER (OUTPATIENT)
Dept: RADIATION ONCOLOGY | Age: 74
Discharge: HOME | End: 2024-12-20
Payer: MEDICARE

## 2024-12-20 VITALS
RESPIRATION RATE: 16 BRPM | BODY MASS INDEX: 33.63 KG/M2 | SYSTOLIC BLOOD PRESSURE: 131 MMHG | DIASTOLIC BLOOD PRESSURE: 71 MMHG | HEART RATE: 62 BPM | OXYGEN SATURATION: 96 % | WEIGHT: 248 LBS

## 2024-12-20 PROCEDURE — 99212 OFFICE O/P EST SF 10 MIN: CPT | Performed by: RADIOLOGY

## 2024-12-20 ASSESSMENT — ENCOUNTER SYMPTOMS
SHORTNESS OF BREATH: 0
BLOOD IN STOOL: 0
ABDOMINAL PAIN: 0
COUGH: 0
CONSTIPATION: 0
ABDOMINAL DISTENTION: 0
ANAL BLEEDING: 0
RECTAL PAIN: 0
NAUSEA: 0
VOMITING: 0
DIARRHEA: 1

## 2024-12-20 NOTE — PROGRESS NOTES
2022 03:25 PM     2024 10:32 AM    CO2 27 2024 10:32 AM    BUN 13 2024 10:32 AM    CREATININE 0.6 2024 10:32 AM    LABGLOM > 90 2024 10:32 AM    LABGLOM >60 2023 03:29 PM    GLUCOSE 133 2024 10:32 AM    CALCIUM 9.9 2024 10:32 AM    BILITOT 0.6 2024 10:32 AM    ALKPHOS 52 2024 10:32 AM    AST 21 2024 10:32 AM    ALT 17 2024 10:32 AM           PATHOLOGY: ***      RADIOLOGIC STUDIES: ***      Review of Systems    A complete review of systems was performed and found to be negative except as presented above.    PHYSICAL EXAMINATION:   VITAL SIGNS: /71   Pulse 62   Resp 16   Wt 112.5 kg (248 lb)   SpO2 96%   BMI 33.63 kg/m²   ECOG PERFORMANCE STATUS: ***  PAIN RATIN/10  GENERAL: Pleasant well-developed adult ***.  In no acute distress.  Alert and oriented ×3; clear mentation with appropriate affect  SKIN: Warm and dry, without jaundice, ecchymoses, or petechiae.  HEENT: Normocephalic, atraumatic.  PERRL/EOMI; ears, nose and lips unremarkable on external examination; oral cavity/oropharyngeal mucosa moist without lesion or exudate  NECK:  No JVD; no palpable cervical adenopathy.  THORAX: No palpable supraclavicular or axillary adenopathy;  LUNGS: clear bilaterally.  Good inspiratory effort, no accessory muscle use.   CARDIAC: Regular rate and rhythm, without murmur  BREASTS: ***  ABDOMEN: Soft, nontender, bowel sounds present  PELVIS/RECTAL: No significant external hemorrhoidal tissue. Sphincter tone normal. Prostate within limits of this exam, no intrinsic rectal mass is appreciated.  EXTREMITIES: ***  NEUROLOGIC: No grossly apparent focal deficits. Cranial nerves grossly intact      Thank you for allowing my assistance in the care of MrSarah       {RADONCATTENDIN}  Radiation Oncology     ATTESTATION: *** minutes face-to-face time,  >50% spent in counseling/discussion/education.    CC: Maritza Sevilla; FLAQUITO

## 2024-12-23 DIAGNOSIS — C61 MALIGNANT NEOPLASM OF PROSTATE (HCC): Primary | ICD-10-CM

## 2025-02-09 NOTE — PROGRESS NOTES
[facility] PHYSICIANS [place] SPECIALTY  [facility]  [address]   [address]  [facility] [address]  Dept: [phone]  Dept Fax: [phone]  Loc: [phone]    Patrice Hastings is a 74 y.o. male who was referred byEmanuel Casarez for:  Chief Complaint   Patient presents with    New Patient     Dizziness. Patient states that he hasn't had his dizziness in a while, uses the laying on your side and looking at a 45 degree angle exercise and it helps.    .    HPI:     Patrice Hastings is a 74 y.o. male who presents today for dizziness.Patient said that he had been getting episodes of dizziness whenever he wakes up in the morning, normally vomits soon after then the dizziness dissipates few weeks ago. Turning his head or rolling in bed makes the dizziness worse/brings it on. Episode lasts about 15 minutes then dissipates.  He is with well-known hearing loss(on hearing aids).  He has a history of tympanoplasty.  Worked as  in his service in the .  [facility] who recommended [facility].  Patient performed the exercises at home for one week, which resolved his dizziness. No other complaints on today's visit.    History:     No Known Allergies  Current Outpatient Medications   Medication Sig Dispense Refill    HYDROcodone-acetaminophen (NORCO)  MG per tablet TAKE 1 TABLET BY MOUTH EVERY 8 HOURS AS NEEDED FOR SEVERE PAIN. NOT TO EXCEED 2 PER DAY      atorvastatin (LIPITOR) 20 MG tablet TAKE 1 TABLET BY MOUTH DAILY 90 tablet 3    apixaban (ELIQUIS) 5 MG TABS tablet Take 1 tablet by mouth 2 times daily 180 tablet 3    Dextromethorphan-guaiFENesin (CORICIDIN HBP CONGESTION/COUGH)  MG CAPS Take 1-2 tablets by mouth every 6 hours as needed (Cough/congestion) (Patient taking differently: Take 1-2 tablets by mouth every 6 hours as needed (Cough/congestion) PRN) 168 capsule 0    metoprolol succinate (TOPROL XL) 50 MG extended release

## 2025-02-10 ENCOUNTER — OFFICE VISIT (OUTPATIENT)
Dept: ENT CLINIC | Age: 75
End: 2025-02-10
Payer: MEDICARE

## 2025-02-10 VITALS
BODY MASS INDEX: 33.98 KG/M2 | HEIGHT: 72 IN | OXYGEN SATURATION: 99 % | WEIGHT: 250.9 LBS | HEART RATE: 61 BPM | SYSTOLIC BLOOD PRESSURE: 142 MMHG | TEMPERATURE: 97.6 F | DIASTOLIC BLOOD PRESSURE: 80 MMHG

## 2025-02-10 DIAGNOSIS — H81.10 BENIGN PAROXYSMAL POSITIONAL VERTIGO, UNSPECIFIED LATERALITY: Primary | ICD-10-CM

## 2025-02-10 DIAGNOSIS — H90.3 SENSORINEURAL HEARING LOSS (SNHL) OF BOTH EARS: ICD-10-CM

## 2025-02-10 PROCEDURE — 1123F ACP DISCUSS/DSCN MKR DOCD: CPT | Performed by: OTOLARYNGOLOGY

## 2025-02-10 PROCEDURE — 3017F COLORECTAL CA SCREEN DOC REV: CPT | Performed by: OTOLARYNGOLOGY

## 2025-02-10 PROCEDURE — 99202 OFFICE O/P NEW SF 15 MIN: CPT | Performed by: OTOLARYNGOLOGY

## 2025-02-10 PROCEDURE — 1159F MED LIST DOCD IN RCRD: CPT | Performed by: OTOLARYNGOLOGY

## 2025-02-10 PROCEDURE — 1036F TOBACCO NON-USER: CPT | Performed by: OTOLARYNGOLOGY

## 2025-02-10 PROCEDURE — 3077F SYST BP >= 140 MM HG: CPT | Performed by: OTOLARYNGOLOGY

## 2025-02-10 PROCEDURE — G8417 CALC BMI ABV UP PARAM F/U: HCPCS | Performed by: OTOLARYNGOLOGY

## 2025-02-10 PROCEDURE — G8427 DOCREV CUR MEDS BY ELIG CLIN: HCPCS | Performed by: OTOLARYNGOLOGY

## 2025-02-10 PROCEDURE — 1160F RVW MEDS BY RX/DR IN RCRD: CPT | Performed by: OTOLARYNGOLOGY

## 2025-02-10 PROCEDURE — 3079F DIAST BP 80-89 MM HG: CPT | Performed by: OTOLARYNGOLOGY

## 2025-02-10 RX ORDER — HYDROCODONE BITARTRATE AND ACETAMINOPHEN 10; 325 MG/1; MG/1
TABLET ORAL
COMMUNITY
Start: 2025-01-28

## 2025-02-13 ENCOUNTER — OFFICE VISIT (OUTPATIENT)
Dept: FAMILY MEDICINE CLINIC | Age: 75
End: 2025-02-13

## 2025-02-13 VITALS
BODY MASS INDEX: 33.93 KG/M2 | SYSTOLIC BLOOD PRESSURE: 124 MMHG | HEART RATE: 57 BPM | OXYGEN SATURATION: 99 % | TEMPERATURE: 98.2 F | WEIGHT: 250.2 LBS | DIASTOLIC BLOOD PRESSURE: 76 MMHG

## 2025-02-13 DIAGNOSIS — I48.0 PAROXYSMAL ATRIAL FIBRILLATION (HCC): ICD-10-CM

## 2025-02-13 DIAGNOSIS — E11.65 TYPE 2 DIABETES MELLITUS WITH HYPERGLYCEMIA, WITHOUT LONG-TERM CURRENT USE OF INSULIN (HCC): ICD-10-CM

## 2025-02-13 DIAGNOSIS — K92.1 BLACK STOOLS: Primary | ICD-10-CM

## 2025-02-13 DIAGNOSIS — C61 MALIGNANT NEOPLASM OF PROSTATE (HCC): ICD-10-CM

## 2025-02-13 RX ORDER — NITROGLYCERIN 0.4 MG/1
TABLET SUBLINGUAL
Qty: 25 TABLET | Refills: 1 | Status: SHIPPED | OUTPATIENT
Start: 2025-02-13

## 2025-02-13 SDOH — ECONOMIC STABILITY: FOOD INSECURITY: WITHIN THE PAST 12 MONTHS, YOU WORRIED THAT YOUR FOOD WOULD RUN OUT BEFORE YOU GOT MONEY TO BUY MORE.: NEVER TRUE

## 2025-02-13 SDOH — ECONOMIC STABILITY: FOOD INSECURITY: WITHIN THE PAST 12 MONTHS, THE FOOD YOU BOUGHT JUST DIDN'T LAST AND YOU DIDN'T HAVE MONEY TO GET MORE.: NEVER TRUE

## 2025-02-13 ASSESSMENT — PATIENT HEALTH QUESTIONNAIRE - PHQ9
SUM OF ALL RESPONSES TO PHQ QUESTIONS 1-9: 4
SUM OF ALL RESPONSES TO PHQ QUESTIONS 1-9: 4
8. MOVING OR SPEAKING SO SLOWLY THAT OTHER PEOPLE COULD HAVE NOTICED. OR THE OPPOSITE, BEING SO FIGETY OR RESTLESS THAT YOU HAVE BEEN MOVING AROUND A LOT MORE THAN USUAL: NOT AT ALL
6. FEELING BAD ABOUT YOURSELF - OR THAT YOU ARE A FAILURE OR HAVE LET YOURSELF OR YOUR FAMILY DOWN: NOT AT ALL
SUM OF ALL RESPONSES TO PHQ QUESTIONS 1-9: 4
SUM OF ALL RESPONSES TO PHQ QUESTIONS 1-9: 4
1. LITTLE INTEREST OR PLEASURE IN DOING THINGS: MORE THAN HALF THE DAYS
10. IF YOU CHECKED OFF ANY PROBLEMS, HOW DIFFICULT HAVE THESE PROBLEMS MADE IT FOR YOU TO DO YOUR WORK, TAKE CARE OF THINGS AT HOME, OR GET ALONG WITH OTHER PEOPLE: SOMEWHAT DIFFICULT
2. FEELING DOWN, DEPRESSED OR HOPELESS: MORE THAN HALF THE DAYS
3. TROUBLE FALLING OR STAYING ASLEEP: NOT AT ALL
4. FEELING TIRED OR HAVING LITTLE ENERGY: NOT AT ALL
5. POOR APPETITE OR OVEREATING: NOT AT ALL
SUM OF ALL RESPONSES TO PHQ9 QUESTIONS 1 & 2: 4
9. THOUGHTS THAT YOU WOULD BE BETTER OFF DEAD, OR OF HURTING YOURSELF: NOT AT ALL
7. TROUBLE CONCENTRATING ON THINGS, SUCH AS READING THE NEWSPAPER OR WATCHING TELEVISION: NOT AT ALL

## 2025-02-18 ENCOUNTER — LAB (OUTPATIENT)
Dept: LAB | Age: 75
End: 2025-02-18

## 2025-02-18 DIAGNOSIS — E11.65 TYPE 2 DIABETES MELLITUS WITH HYPERGLYCEMIA, WITHOUT LONG-TERM CURRENT USE OF INSULIN (HCC): ICD-10-CM

## 2025-02-18 LAB
ALBUMIN SERPL BCG-MCNC: 4.3 G/DL (ref 3.5–5.1)
ALP SERPL-CCNC: 66 U/L (ref 38–126)
ALT SERPL W/O P-5'-P-CCNC: 18 U/L (ref 11–66)
ANION GAP SERPL CALC-SCNC: 14 MEQ/L (ref 8–16)
AST SERPL-CCNC: 22 U/L (ref 5–40)
BILIRUB SERPL-MCNC: 0.8 MG/DL (ref 0.3–1.2)
BUN SERPL-MCNC: 14 MG/DL (ref 7–22)
CALCIUM SERPL-MCNC: 9.6 MG/DL (ref 8.2–9.6)
CHLORIDE SERPL-SCNC: 99 MEQ/L (ref 98–111)
CO2 SERPL-SCNC: 27 MEQ/L (ref 23–33)
CREAT SERPL-MCNC: 0.7 MG/DL (ref 0.4–1.2)
DEPRECATED MEAN GLUCOSE BLD GHB EST-ACNC: 138 MG/DL (ref 70–126)
GFR SERPL CREATININE-BSD FRML MDRD: > 90 ML/MIN/1.73M2
GLUCOSE SERPL-MCNC: 182 MG/DL (ref 70–108)
HBA1C MFR BLD HPLC: 6.6 % (ref 4.4–6.4)
POTASSIUM SERPL-SCNC: 4.4 MEQ/L (ref 3.5–5.2)
PROT SERPL-MCNC: 7.1 G/DL (ref 6.1–8)
SODIUM SERPL-SCNC: 140 MEQ/L (ref 135–145)

## 2025-02-20 ENCOUNTER — TELEPHONE (OUTPATIENT)
Dept: FAMILY MEDICINE CLINIC | Age: 75
End: 2025-02-20

## 2025-02-20 DIAGNOSIS — E11.65 TYPE 2 DIABETES MELLITUS WITH HYPERGLYCEMIA, WITHOUT LONG-TERM CURRENT USE OF INSULIN (HCC): Primary | ICD-10-CM

## 2025-02-20 DIAGNOSIS — I48.0 PAROXYSMAL ATRIAL FIBRILLATION (HCC): ICD-10-CM

## 2025-02-20 DIAGNOSIS — E78.5 HYPERLIPIDEMIA, UNSPECIFIED HYPERLIPIDEMIA TYPE: ICD-10-CM

## 2025-02-20 NOTE — TELEPHONE ENCOUNTER
Left a voicemail informing pt his sugars are improved. It was ok to cancel his appointment for tomorrow. Dr. Sevilla is recommending recheck labs in 3  months. Orders were mailed.

## 2025-02-20 NOTE — TELEPHONE ENCOUNTER
Sugars improved, okay to cancel follow-up appointment.  Recommend he get CMP CBC and hemoglobin A1c in 3 months

## 2025-02-20 NOTE — TELEPHONE ENCOUNTER
Pt checking on lab results. Pt states that MM tod him at last appt that he may not need for appt on 2/21 depending on lab results.    Please contact pt about whether he needs to come to 2/21 appt or not.    [phone]

## 2025-04-02 ENCOUNTER — HOSPITAL ENCOUNTER (OUTPATIENT)
Dept: RADIATION ONCOLOGY | Age: 75
Discharge: HOME | End: 2025-04-02
Payer: MEDICARE

## 2025-04-02 VITALS
RESPIRATION RATE: 18 BRPM | TEMPERATURE: 97.8 F | HEIGHT: 72 IN | SYSTOLIC BLOOD PRESSURE: 129 MMHG | DIASTOLIC BLOOD PRESSURE: 64 MMHG | OXYGEN SATURATION: 96 % | HEART RATE: 70 BPM | WEIGHT: 248 LBS | BODY MASS INDEX: 33.59 KG/M2

## 2025-04-02 PROCEDURE — 99212 OFFICE O/P EST SF 10 MIN: CPT | Performed by: RADIOLOGY

## 2025-04-02 ASSESSMENT — ENCOUNTER SYMPTOMS
BLOOD IN STOOL: 0
NAUSEA: 0
ABDOMINAL DISTENTION: 0
CONSTIPATION: 0
RECTAL PAIN: 0
COUGH: 0
ANAL BLEEDING: 0
BACK PAIN: 1
DIARRHEA: 0
ABDOMINAL PAIN: 0
VOMITING: 0
SHORTNESS OF BREATH: 0

## 2025-04-02 ASSESSMENT — PAIN DESCRIPTION - LOCATION: LOCATION: BACK

## 2025-04-02 ASSESSMENT — PAIN SCALES - GENERAL: PAINLEVEL_OUTOF10: 7

## 2025-04-02 NOTE — PROGRESS NOTES
Radiation Oncology Extended Follow-Up  Encounter Date: 2025     Mr. Patrice Hastings is a 74 y.o. male  : 1950  MRN: [id number]  Acct Number: [id number]  Requesting Provider:  [facility]     Reason for request: ***      CONSULTANT: Suzy Maza PhD, MD      CHIEF COMPLAINT: Prostate cancer, PSA progression.  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  RT bebeto volume was determined by Dr. Lopes and included only lower pelvic nodes)  Date of diagnosis: 2018      ASSESSMENT:  ***      PLAN:  ***    RADIATION THERAPY TREATMENT HISTORY:   Treatment Course Number: 1     Treatment Site (s) Modality Dose (cGy) From To Fractions/  Elapsed Days   Prostate Fossa + Lower Pelvic Nodes 6MV photons 5040 cGy 10/23/2019 2019 28/ 40   Prostate Fossa Boost 6MV photons 1800 cGy 2019 2019 10/ 13        Cumulative Dose to Prostate Fossa: 6840 cGy     Cumulative Dose to \"Lower Pelvic Nodes\": 5040 cGy      HISTORY OF PRESENT ILLNESS:   Patrice Hastings initially presented to radiation oncology as a 69 year old [place]  with a history of a modestly elevated PSA that remained stable for approximately 9 years.  In 2018 it had risen to 8.28 and he completed a sextant biopsy on 2018, showing a Tip 6 adenocarcinoma involving 3 of the 12 biopsy samples.  He was initially managed with active surveillance, but MRI scan obtained 2019 was concerning for multiple finding suggesting a high probability of clinically significant cancer.  He underwent re-biopsy on 2019.  At that time he was found with mixed grade adenocarcinoma involving 3 of the 6 sectors sampled.  [employer] [place]

## 2025-04-07 ENCOUNTER — HOSPITAL ENCOUNTER (OUTPATIENT)
Dept: RADIATION ONCOLOGY | Age: 75
Discharge: HOME | End: 2025-04-07
Payer: MEDICARE

## 2025-04-07 ENCOUNTER — HOSPITAL ENCOUNTER (OUTPATIENT)
Dept: CT IMAGING | Age: 75
Discharge: HOME | End: 2025-04-07

## 2025-04-07 DIAGNOSIS — C77.2 SECONDARY AND UNSPECIFIED MALIGNANT NEOPLASM OF INTRA-ABDOMINAL LYMPH NODES (HCC): ICD-10-CM

## 2025-04-07 PROCEDURE — 77334 RADIATION TREATMENT AID(S): CPT | Performed by: RADIOLOGY

## 2025-04-07 PROCEDURE — 77332 RADIATION TREATMENT AID(S): CPT | Performed by: RADIOLOGY

## 2025-04-07 PROCEDURE — 3209999900 CT GUIDE RADIATION THERAPY NO CHARGE

## 2025-04-24 ENCOUNTER — HOSPITAL ENCOUNTER (OUTPATIENT)
Dept: RADIATION ONCOLOGY | Age: 75
End: 2025-04-24
Payer: MEDICARE

## 2025-04-24 PROCEDURE — 77338 DESIGN MLC DEVICE FOR IMRT: CPT | Performed by: RADIOLOGY

## 2025-04-28 DIAGNOSIS — C61 PROSTATE CANCER (HCC): Primary | ICD-10-CM

## 2025-04-29 ENCOUNTER — HOSPITAL ENCOUNTER (OUTPATIENT)
Dept: RADIATION ONCOLOGY | Age: 75
Discharge: HOME | End: 2025-04-29
Payer: MEDICARE

## 2025-04-29 PROCEDURE — 77014 CHG CT GUIDANCE RADIATION THERAPY FLDS PLACEMENT: CPT | Performed by: RADIOLOGY

## 2025-04-29 PROCEDURE — 77386 HC NTSTY MODUL RAD TX DLVR CPLX: CPT | Performed by: RADIOLOGY

## 2025-04-30 ENCOUNTER — HOSPITAL ENCOUNTER (OUTPATIENT)
Dept: RADIATION ONCOLOGY | Age: 75
Discharge: HOME | End: 2025-04-30
Payer: MEDICARE

## 2025-04-30 PROCEDURE — 77014 CHG CT GUIDANCE RADIATION THERAPY FLDS PLACEMENT: CPT | Performed by: RADIOLOGY

## 2025-04-30 PROCEDURE — 77386 HC NTSTY MODUL RAD TX DLVR CPLX: CPT | Performed by: RADIOLOGY

## 2025-05-01 ENCOUNTER — HOSPITAL ENCOUNTER (OUTPATIENT)
Dept: RADIATION ONCOLOGY | Age: 75
Discharge: HOME | End: 2025-05-01
Payer: MEDICARE

## 2025-05-01 VITALS
BODY MASS INDEX: 32.77 KG/M2 | OXYGEN SATURATION: 98 % | HEART RATE: 64 BPM | WEIGHT: 241.62 LBS | DIASTOLIC BLOOD PRESSURE: 87 MMHG | RESPIRATION RATE: 18 BRPM | SYSTOLIC BLOOD PRESSURE: 183 MMHG | TEMPERATURE: 96.5 F

## 2025-05-01 PROCEDURE — 77386 HC NTSTY MODUL RAD TX DLVR CPLX: CPT | Performed by: RADIOLOGY

## 2025-05-01 RX ORDER — ABIRATERONE ACETATE 250 MG/1
250 TABLET ORAL DAILY
COMMUNITY
Start: 2025-04-03

## 2025-05-01 RX ORDER — PREDNISONE 5 MG/1
5 TABLET ORAL DAILY
COMMUNITY
Start: 2025-04-30

## 2025-05-01 NOTE — PROGRESS NOTES
[facility]          [address], [address]        [place], [place] [address]        O: [phone]        F: [phone]       [employer]            Dr. Abebe Amaya MD MS          Dr. Suzy Maza MD PhD    ON TREATMENT VISIT (OTV) NOTE     Date of Service: 2025  Patient ID: Patrice Hastings   : 1950  MRN: [id number]   Acct Number: [id number]     RADIATION ONCOLOGY ATTENDING:  Suzy Maza PhD MD    DIAGNOSIS:   Cancer Staging   Prostate cancer (HCC)  Staging form: Prostate, AJCC 8th Edition  - Clinical stage from 2019: Stage IIC (cT2b, cN0, cM0, PSA: 8.3, Grade Group: 3) - Signed by Brody Lopes MD on 2019  - Pathologic stage from 3/20/2019: rpT3a, cN1, cM0, PSA: 8.3, Grade Group: 3 - Signed by Suzy Maza MD on 2024  Staging comments: Post-Op PSA:    5-3-2019    0.07   1st                             2019  0.15   Most recent      Treatment Area: Pelvis- Male    Current Total Dose(cGy): 1050  Current Fraction: 3/15  Final/Cumulative Rx. Dose (cGy): 5250    Patient was seen today for weekly visit.     Wt Readings from Last 3 Encounters:   25 109.6 kg (241 lb 10 oz)   25 112.5 kg (248 lb)   25 113.5 kg (250 lb 3.2 oz)       BP (!) 183/87   Pulse 64   Temp (!) 96.5 °F (35.8 °C) (Infrared)   Resp 18   Wt 109.6 kg (241 lb 10 oz)   SpO2 98%   BMI 32.77 kg/m²     Lab Results   Component Value Date    WBC 10.3 09/10/2024    HGB 14.9 09/10/2024    HCT 44.1 09/10/2024     09/10/2024       Comfort Alteration  Fatigue:Able to perform daily activities with rest periods    Pain Location: Denies  Pain Intensity (Current): 0 No Pain  Pain Treatment: No treatment scheduled  Pain Relief: n/a    Emotional Alteration:   Coping: effective    Nutritional Alteration  Anorexia: none   Nausea: No nausea noted  Vomiting: No vomiting     Skin Alteration   Skin reaction: No

## 2025-05-01 NOTE — TELEPHONE ENCOUNTER
Deisi from [address] called stating the patient was discharged with a tolu close drain. He was not given any saline. They would like to know if they can use distilled water or sterile water to do the flushes with. They have an order to flush with saline 10 ml BID. If they use saline they will need a DME and a prescription which will cause a cost for the patient. Please advise. Thank you. Pt arrives to ED via EMS for AMSx 11 days which is the last time family heard from him. Pt was found on kitchen floor by daughter 45min ago Pt has been on floor for unknown amount of time. EMS reporting multiple pressure ulcers. Pts arms are contracted upward, which is not patients baseline.     Glucose 316 en route    Pt has been groaning per EMS, only spoken words are \"help me\". Pt is not responding to commands.     MD at bedside for assessment.

## 2025-05-02 ENCOUNTER — HOSPITAL ENCOUNTER (OUTPATIENT)
Dept: RADIATION ONCOLOGY | Age: 75
Discharge: HOME | End: 2025-05-02
Payer: MEDICARE

## 2025-05-02 PROCEDURE — 77386 HC NTSTY MODUL RAD TX DLVR CPLX: CPT | Performed by: RADIOLOGY

## 2025-05-05 ENCOUNTER — HOSPITAL ENCOUNTER (OUTPATIENT)
Dept: RADIATION ONCOLOGY | Age: 75
Discharge: HOME | End: 2025-05-05
Payer: MEDICARE

## 2025-05-05 PROCEDURE — 77336 RADIATION PHYSICS CONSULT: CPT | Performed by: RADIOLOGY

## 2025-05-05 PROCEDURE — 77014 CHG CT GUIDANCE RADIATION THERAPY FLDS PLACEMENT: CPT | Performed by: RADIOLOGY

## 2025-05-05 PROCEDURE — 77386 HC NTSTY MODUL RAD TX DLVR CPLX: CPT | Performed by: RADIOLOGY

## 2025-05-06 ENCOUNTER — HOSPITAL ENCOUNTER (OUTPATIENT)
Dept: RADIATION ONCOLOGY | Age: 75
Discharge: HOME | End: 2025-05-06
Payer: MEDICARE

## 2025-05-06 PROCEDURE — 77386 HC NTSTY MODUL RAD TX DLVR CPLX: CPT | Performed by: RADIOLOGY

## 2025-05-07 ENCOUNTER — HOSPITAL ENCOUNTER (OUTPATIENT)
Dept: RADIATION ONCOLOGY | Age: 75
Discharge: HOME | End: 2025-05-07
Payer: MEDICARE

## 2025-05-07 PROCEDURE — 77386 HC NTSTY MODUL RAD TX DLVR CPLX: CPT | Performed by: RADIOLOGY

## 2025-05-08 ENCOUNTER — HOSPITAL ENCOUNTER (OUTPATIENT)
Dept: RADIATION ONCOLOGY | Age: 75
Discharge: HOME | End: 2025-05-08
Payer: MEDICARE

## 2025-05-08 VITALS
WEIGHT: 240.74 LBS | OXYGEN SATURATION: 97 % | DIASTOLIC BLOOD PRESSURE: 75 MMHG | TEMPERATURE: 96.8 F | BODY MASS INDEX: 32.65 KG/M2 | RESPIRATION RATE: 16 BRPM | HEART RATE: 57 BPM | SYSTOLIC BLOOD PRESSURE: 136 MMHG

## 2025-05-08 PROCEDURE — 77386 HC NTSTY MODUL RAD TX DLVR CPLX: CPT | Performed by: RADIOLOGY

## 2025-05-08 RX ORDER — ONDANSETRON 8 MG/1
8 TABLET, ORALLY DISINTEGRATING ORAL EVERY 8 HOURS PRN
Qty: 21 TABLET | Refills: 1 | Status: SHIPPED | OUTPATIENT
Start: 2025-05-08

## 2025-05-08 NOTE — PROGRESS NOTES
[facility]          [address], [address]        [place] [id number]        O: [phone]        F: [phone]       [employer]            Dr. Abebe Amaya MD MS          Dr. Suzy Maza MD PhD    ON TREATMENT VISIT (OTV) NOTE     Date of Service: 2025  Patient ID: Patrice Hastings   : 1950  MRN: [id number]   Acct Number: [id number]     RADIATION ONCOLOGY ATTENDING:  Suzy Maza PhD MD    DIAGNOSIS:   Cancer Staging   Prostate cancer (HCC)  Staging form: Prostate, AJCC 8th Edition  - Clinical stage from 2019: Stage IIC (cT2b, cN0, cM0, PSA: 8.3, Grade Group: 3) - Signed by Brody Lopes MD on 2019  - Pathologic stage from 3/20/2019: rpT3a, cN1, cM0, PSA: 8.3, Grade Group: 3 - Signed by Suzy Maza MD on 2024  Staging comments: Post-Op PSA:    5-3-2019    0.07   1st                             2019  0.15   Most recent      Treatment Area: Pelvis- Male    Current Total Dose(cGy): 2800  Current Fraction: 8/15  Final/Cumulative Rx. Dose (cGy): 5250    Patient was seen today for weekly visit.     Wt Readings from Last 3 Encounters:   25 109.2 kg (240 lb 11.9 oz)   25 109.6 kg (241 lb 10 oz)   25 112.5 kg (248 lb)       /75   Pulse 57   Temp 96.8 °F (36 °C) (Infrared)   Resp 16   Wt 109.2 kg (240 lb 11.9 oz)   SpO2 97%   BMI 32.65 kg/m²     Lab Results   Component Value Date    WBC 10.3 09/10/2024    HGB 14.9 09/10/2024    HCT 44.1 09/10/2024     09/10/2024       Comfort Alteration  Fatigue:Able to perform daily activities with rest periods    Pain Location: Denies  Pain Intensity (Current): 0 No Pain  Pain Treatment: No treatment scheduled  Pain Relief: n/a    Emotional Alteration:   Coping: effective    Nutritional Alteration  Anorexia: none   Nausea: No nausea noted, some mornings but not consistent  Vomiting: No vomiting     Skin

## 2025-05-09 ENCOUNTER — HOSPITAL ENCOUNTER (OUTPATIENT)
Dept: RADIATION ONCOLOGY | Age: 75
Discharge: HOME | End: 2025-05-09
Payer: MEDICARE

## 2025-05-09 PROCEDURE — 77386 HC NTSTY MODUL RAD TX DLVR CPLX: CPT | Performed by: RADIOLOGY

## 2025-05-12 ENCOUNTER — HOSPITAL ENCOUNTER (OUTPATIENT)
Dept: RADIATION ONCOLOGY | Age: 75
Discharge: HOME | End: 2025-05-12
Payer: MEDICARE

## 2025-05-12 PROCEDURE — 77336 RADIATION PHYSICS CONSULT: CPT | Performed by: RADIOLOGY

## 2025-05-12 PROCEDURE — 77386 HC NTSTY MODUL RAD TX DLVR CPLX: CPT | Performed by: RADIOLOGY

## 2025-05-12 PROCEDURE — 77014 CHG CT GUIDANCE RADIATION THERAPY FLDS PLACEMENT: CPT | Performed by: RADIOLOGY

## 2025-05-13 ENCOUNTER — HOSPITAL ENCOUNTER (OUTPATIENT)
Dept: RADIATION ONCOLOGY | Age: 75
Discharge: HOME | End: 2025-05-13
Payer: MEDICARE

## 2025-05-13 PROCEDURE — 77386 HC NTSTY MODUL RAD TX DLVR CPLX: CPT | Performed by: RADIOLOGY

## 2025-05-13 NOTE — DISCHARGE INSTRUCTIONS
PATIENT DISCHARGE INSTRUCTIONS    Remember that side effects present at the end of your treatments will improve within a few weeks after the last treatment.  Eat well balanced meals even though your treatments are finished.  This will help speed the healing process. Continue any special diets prescribed to control side effects until these side effects have been resolved.  Get plenty of rest.  If you have experienced fatigue and/or weakness, this may continue for several weeks after your last treatment.  Continue with your daily activities according to the way you feel.  Continue to be gentle with your skin.  Follow your present skin care instructions until your follow-up visit.  IF YOU DEVELOP ANY CHANGES IN YOUR SKIN IN THE AREA TREATED WITH RADIATION, PLEASE CALL THE RADIATION ONCOLOGY NURSE [phone].  Protect your skin from any injury and avoid direct sun exposure in the treatment area.  The skin in the treated area may always be more sensitive than the rest of your skin.  Always use SPF 30 or higher sun block if you will be in the sun and cannot avoid exposure.  Please contact your referring physician for a follow-up appointment in addition to your Radiation Oncology appointment.  Presence of pain:   Medication Taper: No    See Instructions Dated: N/A  Follow up orders: Will be discussed at Follow-Up

## 2025-05-14 ENCOUNTER — HOSPITAL ENCOUNTER (OUTPATIENT)
Dept: RADIATION ONCOLOGY | Age: 75
Discharge: HOME | End: 2025-05-14
Payer: MEDICARE

## 2025-05-14 PROCEDURE — 77386 HC NTSTY MODUL RAD TX DLVR CPLX: CPT | Performed by: RADIOLOGY

## 2025-05-15 ENCOUNTER — LAB (OUTPATIENT)
Dept: LAB | Age: 75
End: 2025-05-15

## 2025-05-15 ENCOUNTER — HOSPITAL ENCOUNTER (OUTPATIENT)
Dept: RADIATION ONCOLOGY | Age: 75
Discharge: HOME | End: 2025-05-15
Payer: MEDICARE

## 2025-05-15 VITALS
SYSTOLIC BLOOD PRESSURE: 169 MMHG | OXYGEN SATURATION: 96 % | WEIGHT: 242.51 LBS | HEART RATE: 61 BPM | RESPIRATION RATE: 16 BRPM | TEMPERATURE: 97.2 F | DIASTOLIC BLOOD PRESSURE: 87 MMHG | BODY MASS INDEX: 32.89 KG/M2

## 2025-05-15 DIAGNOSIS — E11.65 TYPE 2 DIABETES MELLITUS WITH HYPERGLYCEMIA, WITHOUT LONG-TERM CURRENT USE OF INSULIN (HCC): ICD-10-CM

## 2025-05-15 DIAGNOSIS — I48.0 PAROXYSMAL ATRIAL FIBRILLATION (HCC): ICD-10-CM

## 2025-05-15 DIAGNOSIS — E78.5 HYPERLIPIDEMIA, UNSPECIFIED HYPERLIPIDEMIA TYPE: ICD-10-CM

## 2025-05-15 DIAGNOSIS — C61 PROSTATE CANCER (HCC): ICD-10-CM

## 2025-05-15 LAB
ALBUMIN SERPL BCG-MCNC: 4 G/DL (ref 3.4–4.9)
ALP SERPL-CCNC: 43 U/L (ref 40–129)
ALT SERPL W/O P-5'-P-CCNC: 15 U/L (ref 10–50)
ANION GAP SERPL CALC-SCNC: 11 MEQ/L (ref 8–16)
AST SERPL-CCNC: 28 U/L (ref 10–50)
BASOPHILS ABSOLUTE: 0.1 THOU/MM3 (ref 0–0.1)
BASOPHILS NFR BLD AUTO: 0.9 %
BILIRUB SERPL-MCNC: 0.8 MG/DL (ref 0.3–1.2)
BUN SERPL-MCNC: 14 MG/DL (ref 8–23)
CALCIUM SERPL-MCNC: 9.8 MG/DL (ref 8.8–10.2)
CHLORIDE SERPL-SCNC: 103 MEQ/L (ref 98–111)
CO2 SERPL-SCNC: 26 MEQ/L (ref 22–29)
CREAT SERPL-MCNC: 0.8 MG/DL (ref 0.7–1.2)
DEPRECATED MEAN GLUCOSE BLD GHB EST-ACNC: 135 MG/DL (ref 70–126)
DEPRECATED RDW RBC AUTO: 43.1 FL (ref 35–45)
EOSINOPHIL NFR BLD AUTO: 1.5 %
EOSINOPHILS ABSOLUTE: 0.1 THOU/MM3 (ref 0–0.4)
ERYTHROCYTE [DISTWIDTH] IN BLOOD BY AUTOMATED COUNT: 11.9 % (ref 11.5–14.5)
GFR SERPL CREATININE-BSD FRML MDRD: > 90 ML/MIN/1.73M2
GLUCOSE SERPL-MCNC: 145 MG/DL (ref 74–109)
HBA1C MFR BLD HPLC: 6.5 % (ref 4–6)
HCT VFR BLD AUTO: 43.4 % (ref 42–52)
HGB BLD-MCNC: 14.5 GM/DL (ref 14–18)
IMM GRANULOCYTES # BLD AUTO: 0.05 THOU/MM3 (ref 0–0.07)
IMM GRANULOCYTES NFR BLD AUTO: 0.8 %
LYMPHOCYTES ABSOLUTE: 0.3 THOU/MM3 (ref 1–4.8)
LYMPHOCYTES NFR BLD AUTO: 4.5 %
MCH RBC QN AUTO: 33.1 PG (ref 26–33)
MCHC RBC AUTO-ENTMCNC: 33.4 GM/DL (ref 32.2–35.5)
MCV RBC AUTO: 99.1 FL (ref 80–94)
MONOCYTES ABSOLUTE: 0.3 THOU/MM3 (ref 0.4–1.3)
MONOCYTES NFR BLD AUTO: 4.9 %
NEUTROPHILS ABSOLUTE: 5.7 THOU/MM3 (ref 1.8–7.7)
NEUTROPHILS NFR BLD AUTO: 87.4 %
NRBC BLD AUTO-RTO: 0 /100 WBC
PLATELET # BLD AUTO: 132 THOU/MM3 (ref 130–400)
PMV BLD AUTO: 9.6 FL (ref 9.4–12.4)
POTASSIUM SERPL-SCNC: 4.2 MEQ/L (ref 3.5–5.2)
PROT SERPL-MCNC: 6.6 G/DL (ref 6.4–8.3)
RBC # BLD AUTO: 4.38 MILL/MM3 (ref 4.7–6.1)
SODIUM SERPL-SCNC: 140 MEQ/L (ref 135–145)
WBC # BLD AUTO: 6.5 THOU/MM3 (ref 4.8–10.8)

## 2025-05-15 PROCEDURE — 77386 HC NTSTY MODUL RAD TX DLVR CPLX: CPT | Performed by: RADIOLOGY

## 2025-05-15 RX ORDER — ONDANSETRON 8 MG/1
8 TABLET, FILM COATED ORAL EVERY 8 HOURS PRN
Qty: 30 TABLET | Refills: 0 | Status: SHIPPED | OUTPATIENT
Start: 2025-05-15

## 2025-05-15 NOTE — PROGRESS NOTES
tablet by mouth daily (with breakfast)      calcium-vitamin D (OSCAL-500) 500-200 MG-UNIT per tablet Take 1 tablet by mouth daily Indications: 200 mg      Coenzyme Q10 (COQ10 PO) Take 500 mg by mouth daily      Krill Oil 500 MG CAPS Take  by mouth daily.       No current facility-administered medications for this encounter.     * New    PHYSICAL EXAM:       ECO - Symptomatic but completely ambulatory (Restricted in physically strenuous activity but ambulatory and able to carry out work of a light or sedentary nature. For example, light housework, office work)     General: NAD, AO x 3, Mentation is clear with appropriate affect.  HEENT: Normocephalic, atraumatic  Thorax:  Unlabored  Abdomen:  Non-distended  Neuro:  Cranial nerves grossly intact; no focal deficits    Chemotherapy Update: Concurrent ADT - Lupron 22.5mg 4/3/2025 and added Zytiga+prednisone at that time as well per -Onc (Dr Watt) at [facility]    Treatment Imaging: CBCT    ASSESSMENT: Mild to modest radiation side effects, reports some nausea in the AM's followed by loose BM. Will start Imodium at bedtime; new instructions given for zofran.    New medications, diagnostic results: eRx: ondansetron 8 mg every 8 hrs prn nausea - take 1 before bedtime, take 1 in AM if nausea.  Take 1/2 dose imodium at bedtime.    PLAN: Again reviewed potential side effects of radiation for the patient's treatment.    Continue local/topical care.    Continue current radiation course as prescribed.

## 2025-05-16 ENCOUNTER — HOSPITAL ENCOUNTER (OUTPATIENT)
Dept: RADIATION ONCOLOGY | Age: 75
Discharge: HOME | End: 2025-05-16
Payer: MEDICARE

## 2025-05-16 PROCEDURE — 77386 HC NTSTY MODUL RAD TX DLVR CPLX: CPT | Performed by: RADIOLOGY

## 2025-05-19 ENCOUNTER — HOSPITAL ENCOUNTER (OUTPATIENT)
Dept: RADIATION ONCOLOGY | Age: 75
Discharge: HOME | End: 2025-05-19
Payer: MEDICARE

## 2025-05-19 PROCEDURE — 77386 HC NTSTY MODUL RAD TX DLVR CPLX: CPT | Performed by: RADIOLOGY

## 2025-05-19 PROCEDURE — 77014 CHG CT GUIDANCE RADIATION THERAPY FLDS PLACEMENT: CPT | Performed by: RADIOLOGY

## 2025-05-19 PROCEDURE — 77336 RADIATION PHYSICS CONSULT: CPT | Performed by: RADIOLOGY

## 2025-05-27 RX ORDER — METOPROLOL SUCCINATE 50 MG/1
25 TABLET, EXTENDED RELEASE ORAL DAILY
Qty: 45 TABLET | Refills: 0 | Status: SHIPPED | OUTPATIENT
Start: 2025-05-27

## 2025-06-18 ENCOUNTER — OFFICE VISIT (OUTPATIENT)
Dept: CARDIOLOGY CLINIC | Age: 75
End: 2025-06-18
Payer: MEDICARE

## 2025-06-18 VITALS
BODY MASS INDEX: 33.05 KG/M2 | WEIGHT: 244 LBS | HEART RATE: 56 BPM | HEIGHT: 72 IN | SYSTOLIC BLOOD PRESSURE: 142 MMHG | DIASTOLIC BLOOD PRESSURE: 76 MMHG

## 2025-06-18 DIAGNOSIS — I10 PRIMARY HYPERTENSION: ICD-10-CM

## 2025-06-18 DIAGNOSIS — E78.01 FAMILIAL HYPERCHOLESTEROLEMIA: ICD-10-CM

## 2025-06-18 DIAGNOSIS — I25.10 CORONARY ARTERY DISEASE INVOLVING NATIVE CORONARY ARTERY OF NATIVE HEART WITHOUT ANGINA PECTORIS: Primary | ICD-10-CM

## 2025-06-18 PROCEDURE — 3017F COLORECTAL CA SCREEN DOC REV: CPT | Performed by: NUCLEAR MEDICINE

## 2025-06-18 PROCEDURE — 3077F SYST BP >= 140 MM HG: CPT | Performed by: NUCLEAR MEDICINE

## 2025-06-18 PROCEDURE — 3078F DIAST BP <80 MM HG: CPT | Performed by: NUCLEAR MEDICINE

## 2025-06-18 PROCEDURE — 1036F TOBACCO NON-USER: CPT | Performed by: NUCLEAR MEDICINE

## 2025-06-18 PROCEDURE — G8428 CUR MEDS NOT DOCUMENT: HCPCS | Performed by: NUCLEAR MEDICINE

## 2025-06-18 PROCEDURE — G8417 CALC BMI ABV UP PARAM F/U: HCPCS | Performed by: NUCLEAR MEDICINE

## 2025-06-18 PROCEDURE — 99214 OFFICE O/P EST MOD 30 MIN: CPT | Performed by: NUCLEAR MEDICINE

## 2025-06-18 PROCEDURE — 1123F ACP DISCUSS/DSCN MKR DOCD: CPT | Performed by: NUCLEAR MEDICINE

## 2025-06-18 NOTE — PROGRESS NOTES
[facility] [place] [facility]  [facility]  [address]  [address]  [facility] [address]  Dept: [phone]  Dept Fax: [phone]  Loc: [phone]    Visit Date: 6/18/2025    Patrice Hastings is a 74 y.o. male who presents todayfor:  Chief Complaint   Patient presents with    Follow-up    Hypertension    Coronary Artery Disease    Hyperlipidemia   Known CABG and stents  No chest pain  No changes in breathing  Some progressive symptoms but stable   Cath and stents 2020   No use of nitro   BP is stable  No dizziness  No syncope  On statins for hyperlipidemia  No issues with the meds  Does have PVD         HPI:  HPI  Past Medical History:   Diagnosis Date    Arthritis     back    BPH (benign prostatic hyperplasia)     CAD (coronary artery disease)     CVA (cerebral vascular accident) ([facility]) 2010    Disease of blood and blood forming organ     anemia    FH: heart disease     GERD (gastroesophageal reflux disease)     Hyperlipidemia     Hypertension     Obesity     Prostate cancer (HCC) 2019    Type 2 diabetes mellitus 2/16/2024      Past Surgical History:   Procedure Laterality Date    BACK SURGERY  08/2021    CARDIAC VALVE REPLACEMENT  April 2011    [place], [place] (Pt unsure if part of CABG)    CARDIOVASCULAR STRESS TEST  1 05 2011    Cannot exclude slight inferobasal lateral stress-induced ischemia in a relatively small-sized area. EF 68%. Mildly abnormal nuclear scan. Lexiscan test associated with nonspecific symptoms. EKG nondiagnostic with nonspecific ST-T wave changes.     CAROTID ENDARTERECTOMY  2 08 2011    Right carotid endarterectomy with patch angioplasty with convention patch angioplasty.     COLONOSCOPY      CORONARY ARTERY BYPASS GRAFT      DIAGNOSTIC CARDIAC CATH LAB PROCEDURE  3 24 2011    LV end-diastolic pressure was 12 mmHg with no change before and after contrast injection. There was no significant gradient across the aortic valve to signify aortic stenosis.

## 2025-06-30 ENCOUNTER — HOSPITAL ENCOUNTER (OUTPATIENT)
Dept: RADIATION ONCOLOGY | Age: 75
Discharge: HOME | End: 2025-06-30
Payer: MEDICARE

## 2025-06-30 VITALS
HEART RATE: 72 BPM | RESPIRATION RATE: 16 BRPM | SYSTOLIC BLOOD PRESSURE: 138 MMHG | DIASTOLIC BLOOD PRESSURE: 82 MMHG | TEMPERATURE: 97.3 F | WEIGHT: 247 LBS | OXYGEN SATURATION: 95 % | BODY MASS INDEX: 33.5 KG/M2

## 2025-06-30 PROBLEM — C61 MALIGNANT NEOPLASM OF PROSTATE (HCC): Status: ACTIVE | Noted: 2025-06-30

## 2025-06-30 PROCEDURE — 99024 POSTOP FOLLOW-UP VISIT: CPT | Performed by: PHYSICIAN ASSISTANT

## 2025-06-30 PROCEDURE — 99212 OFFICE O/P EST SF 10 MIN: CPT | Performed by: PHYSICIAN ASSISTANT

## 2025-06-30 ASSESSMENT — ENCOUNTER SYMPTOMS
ABDOMINAL DISTENTION: 0
ABDOMINAL PAIN: 0
NAUSEA: 0
CONSTIPATION: 0
VOMITING: 0
RECTAL PAIN: 0
ANAL BLEEDING: 0
DIARRHEA: 0
COUGH: 0
SHORTNESS OF BREATH: 0
BLOOD IN STOOL: 0

## 2025-06-30 NOTE — PROGRESS NOTES
[facility]  [address] [place] [id number]  Phone: [phone]   Toll Free: [phone]   Fax: [phone]    RADIATION ONCOLOGY FOLLOW UP REPORT    PATIENT NAME:  Patrice Hastings              : 1950  MEDICAL RECORD NO: [id number]    LOCATION: Radiation Oncology  CSN NO: [id number]      PROVIDER: Blayne Lake PA-C    DATE OF SERVICE: 2025      FOLLOW UP PHYSICIANS: Maritza Sevilla; YURY Watt ([facility]/Onc)      DIAGNOSIS: C61 -- Malignant neoplasm of the prostate; Adenocarcinoma, [place]'s 3+4=7, Grade Group 3; PSA at diagnosis 8.3; pT3a pN0 M0, Stage IIIB: detectable and increasing PSA after prostatectomy; PSA prior to RT: 0.15 treated with \"late adjuvant\" RT + ADT, now with increasing PSA despite Testosterone <56 (see PSA history below.  RT bebeto volume was determined by Dr. Lopes and included only lower pelvic nodes)  Date of diagnosis: 2018      ASSESSMENT:  Patrice Hastings is doing well in his recovery from radiation therapy. he reports little residual radiation side effects at this time, mainly some occasional diarrhea but has reportedly had intermittent diarrhea for years. Overall, he reports good urinary function, AUA of 11 without dysuria and hematuria. There is no evidence of unexpected radiation complications or persistent/recurrent disease on exam.         PLAN:  Surveillance recommendations were reviewed. Advised repeat PSA in ~3 months per uro-onc  Continue ADT (Lupron+Zytiga) per uro-onc  Radiation Oncology follow up in 6 months for continued surveillance/results review. As usual, Patrice Hastings was reminded to call and arrange for an earlier appointment if he has any problems, questions, or concerns in the meantime.   Continue care in uro-onc, PCP, and with other physicians/providers.   Continue surveillance and basic/preventative/supportive health care in accordance with clinical practice

## 2025-07-09 ENCOUNTER — HOSPITAL ENCOUNTER (OUTPATIENT)
Dept: NUCLEAR MEDICINE | Age: 75
Discharge: HOME | End: 2025-07-09
Attending: NUCLEAR MEDICINE
Payer: MEDICARE

## 2025-07-09 ENCOUNTER — HOSPITAL ENCOUNTER (OUTPATIENT)
Age: 75
Discharge: HOME | End: 2025-07-11
Attending: NUCLEAR MEDICINE
Payer: MEDICARE

## 2025-07-09 VITALS — WEIGHT: 238 LBS | HEIGHT: 71 IN | BODY MASS INDEX: 33.32 KG/M2

## 2025-07-09 DIAGNOSIS — I10 PRIMARY HYPERTENSION: ICD-10-CM

## 2025-07-09 DIAGNOSIS — I25.10 CORONARY ARTERY DISEASE INVOLVING NATIVE CORONARY ARTERY OF NATIVE HEART WITHOUT ANGINA PECTORIS: ICD-10-CM

## 2025-07-09 DIAGNOSIS — E78.01 FAMILIAL HYPERCHOLESTEROLEMIA: ICD-10-CM

## 2025-07-09 LAB
ECHO AO ASC DIAM: 3.2 CM
ECHO AO ASCENDING AORTA INDEX: 1.41 CM/M2
ECHO AV AREA PEAK VELOCITY: 1.1 CM2
ECHO AV AREA VTI: 1.2 CM2
ECHO AV AREA/BSA PEAK VELOCITY: 0.5 CM2/M2
ECHO AV AREA/BSA VTI: 0.5 CM2/M2
ECHO AV CUSP MM: 1.1 CM
ECHO AV MEAN GRADIENT: 18 MMHG
ECHO AV MEAN VELOCITY: 2 M/S
ECHO AV PEAK GRADIENT: 30 MMHG
ECHO AV PEAK VELOCITY: 2.8 M/S
ECHO AV VELOCITY RATIO: 0.36
ECHO AV VTI: 63.7 CM
ECHO BSA: 2.33 M2
ECHO BSA: 2.33 M2
ECHO EST RA PRESSURE: 3 MMHG
ECHO LA AREA 2C: 19.9 CM2
ECHO LA AREA 4C: 22.4 CM2
ECHO LA DIAMETER INDEX: 1.94 CM/M2
ECHO LA DIAMETER: 4.4 CM
ECHO LA MAJOR AXIS: 6.4 CM
ECHO LA MINOR AXIS: 5.8 CM
ECHO LA VOL BP: 61 ML (ref 18–58)
ECHO LA VOL MOD A2C: 54 ML (ref 18–58)
ECHO LA VOL MOD A4C: 63 ML (ref 18–58)
ECHO LA VOL/BSA BIPLANE: 27 ML/M2 (ref 16–34)
ECHO LA VOLUME INDEX MOD A2C: 24 ML/M2 (ref 16–34)
ECHO LA VOLUME INDEX MOD A4C: 28 ML/M2 (ref 16–34)
ECHO LV EJECTION FRACTION 3D: 55 %
ECHO LV FRACTIONAL SHORTENING: 33 % (ref 28–44)
ECHO LV INTERNAL DIMENSION DIASTOLE INDEX: 1.89 CM/M2
ECHO LV INTERNAL DIMENSION DIASTOLIC: 4.3 CM (ref 4.2–5.9)
ECHO LV INTERNAL DIMENSION SYSTOLIC INDEX: 1.28 CM/M2
ECHO LV INTERNAL DIMENSION SYSTOLIC: 2.9 CM
ECHO LV ISOVOLUMETRIC RELAXATION TIME (IVRT): 67 MS
ECHO LV IVSD: 1.2 CM (ref 0.6–1)
ECHO LV MASS 2D: 184.7 G (ref 88–224)
ECHO LV MASS INDEX 2D: 81.4 G/M2 (ref 49–115)
ECHO LV POSTERIOR WALL DIASTOLIC: 1.2 CM (ref 0.6–1)
ECHO LV RELATIVE WALL THICKNESS RATIO: 0.56
ECHO LVOT AREA: 3.1 CM2
ECHO LVOT AV VTI INDEX: 0.38
ECHO LVOT DIAM: 2 CM
ECHO LVOT MEAN GRADIENT: 2 MMHG
ECHO LVOT PEAK GRADIENT: 4 MMHG
ECHO LVOT PEAK VELOCITY: 1 M/S
ECHO LVOT STROKE VOLUME INDEX: 33.2 ML/M2
ECHO LVOT SV: 75.4 ML
ECHO LVOT VTI: 24 CM
ECHO MV A VELOCITY: 0.44 M/S
ECHO MV E DECELERATION TIME (DT): 203 MS
ECHO MV E VELOCITY: 1.07 M/S
ECHO MV E/A RATIO: 2.43
ECHO MV REGURGITANT PEAK GRADIENT: 96 MMHG
ECHO MV REGURGITANT PEAK VELOCITY: 4.9 M/S
ECHO PV MAX VELOCITY: 0.7 M/S
ECHO PV PEAK GRADIENT: 2 MMHG
ECHO RIGHT VENTRICULAR SYSTOLIC PRESSURE (RVSP): 45 MMHG
ECHO RV INTERNAL DIMENSION: 4.3 CM
ECHO RV TAPSE: 1.8 CM (ref 1.7–?)
ECHO TV E WAVE: 0.6 M/S
ECHO TV REGURGITANT MAX VELOCITY: 3.24 M/S
ECHO TV REGURGITANT PEAK GRADIENT: 42 MMHG
NUC STRESS EJECTION FRACTION: 60 %
STRESS BASELINE DIAS BP: 61 MMHG
STRESS BASELINE HR: 52 BPM
STRESS BASELINE SYS BP: 139 MMHG
STRESS ESTIMATED WORKLOAD: 6.6 METS
STRESS EXERCISE DUR MIN: 4 MIN
STRESS EXERCISE DUR SEC: 56 SEC
STRESS PEAK DIAS BP: 77 MMHG
STRESS PEAK SYS BP: 210 MMHG
STRESS PERCENT HR ACHIEVED: 80 %
STRESS POST PEAK HR: 118 BPM
STRESS RATE PRESSURE PRODUCT: NORMAL BPM*MMHG
STRESS STAGE 1 BP: NORMAL MMHG
STRESS STAGE 1 DURATION: 3 MIN:SEC
STRESS STAGE 1 HR: 96 BPM
STRESS STAGE 2 DURATION: NORMAL MIN:SEC
STRESS STAGE 2 HR: 118 BPM
STRESS STAGE RECOVERY 1 BP: NORMAL MMHG
STRESS STAGE RECOVERY 1 DURATION: 1 MIN:SEC
STRESS STAGE RECOVERY 1 HR: 111 BPM
STRESS STAGE RECOVERY 2 BP: NORMAL MMHG
STRESS STAGE RECOVERY 2 DURATION: 1 MIN:SEC
STRESS STAGE RECOVERY 2 HR: 84 BPM
STRESS STAGE RECOVERY 3 BP: NORMAL MMHG
STRESS STAGE RECOVERY 3 DURATION: 3 MIN:SEC
STRESS STAGE RECOVERY 3 HR: 60 BPM
STRESS STAGE RECOVERY 4 BP: NORMAL MMHG
STRESS STAGE RECOVERY 4 DURATION: 4 MIN:SEC
STRESS STAGE RECOVERY 4 HR: 61 BPM
STRESS STANDING DIAS BP: 61 MMHG
STRESS STANDING HR: 55 BPM
STRESS STANDING SYS BP: 139 MMHG
STRESS TARGET HR: 148 BPM

## 2025-07-09 PROCEDURE — 93306 TTE W/DOPPLER COMPLETE: CPT

## 2025-07-09 PROCEDURE — 93016 CV STRESS TEST SUPVJ ONLY: CPT | Performed by: NUCLEAR MEDICINE

## 2025-07-09 PROCEDURE — 93018 CV STRESS TEST I&R ONLY: CPT | Performed by: NUCLEAR MEDICINE

## 2025-07-09 PROCEDURE — 93306 TTE W/DOPPLER COMPLETE: CPT | Performed by: NUCLEAR MEDICINE

## 2025-07-09 PROCEDURE — 3430000000 HC RX DIAGNOSTIC RADIOPHARMACEUTICAL: Performed by: NUCLEAR MEDICINE

## 2025-07-09 PROCEDURE — A9500 TC99M SESTAMIBI: HCPCS | Performed by: NUCLEAR MEDICINE

## 2025-07-09 PROCEDURE — 93017 CV STRESS TEST TRACING ONLY: CPT

## 2025-07-09 PROCEDURE — 78452 HT MUSCLE IMAGE SPECT MULT: CPT | Performed by: NUCLEAR MEDICINE

## 2025-07-09 PROCEDURE — 78452 HT MUSCLE IMAGE SPECT MULT: CPT

## 2025-07-09 RX ORDER — TETRAKIS(2-METHOXYISOBUTYLISOCYANIDE)COPPER(I) TETRAFLUOROBORATE 1 MG/ML
32 INJECTION, POWDER, LYOPHILIZED, FOR SOLUTION INTRAVENOUS
Status: COMPLETED | OUTPATIENT
Start: 2025-07-09 | End: 2025-07-09

## 2025-07-09 RX ORDER — TETRAKIS(2-METHOXYISOBUTYLISOCYANIDE)COPPER(I) TETRAFLUOROBORATE 1 MG/ML
9.4 INJECTION, POWDER, LYOPHILIZED, FOR SOLUTION INTRAVENOUS
Status: COMPLETED | OUTPATIENT
Start: 2025-07-09 | End: 2025-07-09

## 2025-07-09 RX ADMIN — Medication 32 MILLICURIE: at 09:40

## 2025-07-09 RX ADMIN — Medication 9.4 MILLICURIE: at 08:50

## 2025-07-10 ENCOUNTER — TELEPHONE (OUTPATIENT)
Dept: CARDIOLOGY CLINIC | Age: 75
End: 2025-07-10

## 2025-07-24 ENCOUNTER — LAB (OUTPATIENT)
Dept: LAB | Age: 75
End: 2025-07-24

## 2025-07-24 DIAGNOSIS — C61 PROSTATE CANCER (HCC): ICD-10-CM

## 2025-07-24 LAB
BASOPHILS ABSOLUTE: 0.1 THOU/MM3 (ref 0–0.1)
BASOPHILS NFR BLD AUTO: 1.1 %
DEPRECATED RDW RBC AUTO: 45.5 FL (ref 35–45)
EOSINOPHIL NFR BLD AUTO: 3.2 %
EOSINOPHILS ABSOLUTE: 0.2 THOU/MM3 (ref 0–0.4)
ERYTHROCYTE [DISTWIDTH] IN BLOOD BY AUTOMATED COUNT: 12.1 % (ref 11.5–14.5)
HCT VFR BLD AUTO: 41.7 % (ref 42–52)
HGB BLD-MCNC: 14.1 GM/DL (ref 14–18)
IMM GRANULOCYTES # BLD AUTO: 0.07 THOU/MM3 (ref 0–0.07)
IMM GRANULOCYTES NFR BLD AUTO: 0.9 %
LYMPHOCYTES ABSOLUTE: 0.6 THOU/MM3 (ref 1–4.8)
LYMPHOCYTES NFR BLD AUTO: 7.8 %
MCH RBC QN AUTO: 34.5 PG (ref 26–33)
MCHC RBC AUTO-ENTMCNC: 33.8 GM/DL (ref 32.2–35.5)
MCV RBC AUTO: 102 FL (ref 80–94)
MONOCYTES ABSOLUTE: 0.6 THOU/MM3 (ref 0.4–1.3)
MONOCYTES NFR BLD AUTO: 7.9 %
NEUTROPHILS ABSOLUTE: 6 THOU/MM3 (ref 1.8–7.7)
NEUTROPHILS NFR BLD AUTO: 79.1 %
NRBC BLD AUTO-RTO: 0 /100 WBC
PLATELET # BLD AUTO: 180 THOU/MM3 (ref 130–400)
PMV BLD AUTO: 9.4 FL (ref 9.4–12.4)
RBC # BLD AUTO: 4.09 MILL/MM3 (ref 4.7–6.1)
WBC # BLD AUTO: 7.6 THOU/MM3 (ref 4.8–10.8)

## 2025-08-15 RX ORDER — APIXABAN 5 MG/1
5 TABLET, FILM COATED ORAL 2 TIMES DAILY
Qty: 180 TABLET | Refills: 3 | Status: SHIPPED | OUTPATIENT
Start: 2025-08-15

## 2025-08-21 RX ORDER — ATORVASTATIN CALCIUM 20 MG/1
TABLET, FILM COATED ORAL
Qty: 90 TABLET | Refills: 3 | Status: SHIPPED | OUTPATIENT
Start: 2025-08-21

## 2025-08-21 RX ORDER — METOPROLOL SUCCINATE 50 MG/1
25 TABLET, EXTENDED RELEASE ORAL DAILY
Qty: 45 TABLET | Refills: 3 | Status: SHIPPED | OUTPATIENT
Start: 2025-08-21

## 2025-08-22 ENCOUNTER — OFFICE VISIT (OUTPATIENT)
Dept: FAMILY MEDICINE CLINIC | Age: 75
End: 2025-08-22

## 2025-08-22 VITALS
HEART RATE: 62 BPM | DIASTOLIC BLOOD PRESSURE: 78 MMHG | BODY MASS INDEX: 33.99 KG/M2 | WEIGHT: 242.8 LBS | HEIGHT: 71 IN | OXYGEN SATURATION: 97 % | RESPIRATION RATE: 18 BRPM | SYSTOLIC BLOOD PRESSURE: 116 MMHG

## 2025-08-22 DIAGNOSIS — R53.83 FATIGUE, UNSPECIFIED TYPE: ICD-10-CM

## 2025-08-22 DIAGNOSIS — I48.0 PAROXYSMAL ATRIAL FIBRILLATION (HCC): ICD-10-CM

## 2025-08-22 DIAGNOSIS — Z85.46 HISTORY OF PROSTATE CANCER: ICD-10-CM

## 2025-08-22 DIAGNOSIS — E11.65 TYPE 2 DIABETES MELLITUS WITH HYPERGLYCEMIA, WITHOUT LONG-TERM CURRENT USE OF INSULIN (HCC): ICD-10-CM

## 2025-08-22 DIAGNOSIS — Z00.00 MEDICARE ANNUAL WELLNESS VISIT, SUBSEQUENT: Primary | ICD-10-CM

## 2025-08-22 DIAGNOSIS — E78.5 HYPERLIPIDEMIA, UNSPECIFIED HYPERLIPIDEMIA TYPE: ICD-10-CM

## 2025-08-22 ASSESSMENT — PATIENT HEALTH QUESTIONNAIRE - PHQ9
SUM OF ALL RESPONSES TO PHQ QUESTIONS 1-9: 2
SUM OF ALL RESPONSES TO PHQ QUESTIONS 1-9: 2
2. FEELING DOWN, DEPRESSED OR HOPELESS: SEVERAL DAYS
SUM OF ALL RESPONSES TO PHQ QUESTIONS 1-9: 2
1. LITTLE INTEREST OR PLEASURE IN DOING THINGS: SEVERAL DAYS
SUM OF ALL RESPONSES TO PHQ QUESTIONS 1-9: 2

## 2025-08-26 ENCOUNTER — LAB (OUTPATIENT)
Dept: LAB | Age: 75
End: 2025-08-26

## 2025-08-26 DIAGNOSIS — E11.65 TYPE 2 DIABETES MELLITUS WITH HYPERGLYCEMIA, WITHOUT LONG-TERM CURRENT USE OF INSULIN (HCC): ICD-10-CM

## 2025-08-26 DIAGNOSIS — R53.83 FATIGUE, UNSPECIFIED TYPE: ICD-10-CM

## 2025-08-26 LAB
ALBUMIN SERPL BCG-MCNC: 4.3 G/DL (ref 3.4–4.9)
ALP SERPL-CCNC: 39 U/L (ref 40–129)
ALT SERPL W/O P-5'-P-CCNC: 14 U/L (ref 10–50)
ANION GAP SERPL CALC-SCNC: 10 MEQ/L (ref 8–16)
AST SERPL-CCNC: 22 U/L (ref 10–50)
BASOPHILS ABSOLUTE: 0.1 THOU/MM3 (ref 0–0.1)
BASOPHILS NFR BLD AUTO: 0.9 %
BILIRUB SERPL-MCNC: 0.8 MG/DL (ref 0.3–1.2)
BUN SERPL-MCNC: 15 MG/DL (ref 8–23)
CALCIUM SERPL-MCNC: 10.3 MG/DL (ref 8.5–10.5)
CHLORIDE SERPL-SCNC: 104 MEQ/L (ref 98–111)
CHOLEST SERPL-MCNC: 123 MG/DL (ref 100–199)
CO2 SERPL-SCNC: 29 MEQ/L (ref 22–29)
CREAT SERPL-MCNC: 0.8 MG/DL (ref 0.7–1.2)
CREAT UR-MCNC: 232 MG/DL
DEPRECATED MEAN GLUCOSE BLD GHB EST-ACNC: 132 MG/DL (ref 70–126)
DEPRECATED RDW RBC AUTO: 43.8 FL (ref 35–45)
EOSINOPHIL NFR BLD AUTO: 1.9 %
EOSINOPHILS ABSOLUTE: 0.2 THOU/MM3 (ref 0–0.4)
ERYTHROCYTE [DISTWIDTH] IN BLOOD BY AUTOMATED COUNT: 11.6 % (ref 11.5–14.5)
GFR SERPL CREATININE-BSD FRML MDRD: > 90 ML/MIN/1.73M2
GLUCOSE SERPL-MCNC: 139 MG/DL (ref 74–109)
HBA1C MFR BLD HPLC: 6.4 % (ref 4–6)
HCT VFR BLD AUTO: 45.6 % (ref 42–52)
HDLC SERPL-MCNC: 49 MG/DL
HGB BLD-MCNC: 15.2 GM/DL (ref 14–18)
IMM GRANULOCYTES # BLD AUTO: 0.09 THOU/MM3 (ref 0–0.07)
IMM GRANULOCYTES NFR BLD AUTO: 0.9 %
LDLC SERPL CALC-MCNC: 50 MG/DL
LYMPHOCYTES ABSOLUTE: 0.8 THOU/MM3 (ref 1–4.8)
LYMPHOCYTES NFR BLD AUTO: 8 %
MCH RBC QN AUTO: 34.2 PG (ref 26–33)
MCHC RBC AUTO-ENTMCNC: 33.3 GM/DL (ref 32.2–35.5)
MCV RBC AUTO: 102.5 FL (ref 80–94)
MICROALBUMIN UR-MCNC: 5.7 MG/DL
MICROALBUMIN/CREAT RATIO PNL UR: 25 MG/G (ref 0–30)
MONOCYTES ABSOLUTE: 0.9 THOU/MM3 (ref 0.4–1.3)
MONOCYTES NFR BLD AUTO: 9.1 %
NEUTROPHILS ABSOLUTE: 8 THOU/MM3 (ref 1.8–7.7)
NEUTROPHILS NFR BLD AUTO: 79.2 %
NRBC BLD AUTO-RTO: 0 /100 WBC
PLATELET # BLD AUTO: 220 THOU/MM3 (ref 130–400)
PMV BLD AUTO: 9.4 FL (ref 9.4–12.4)
POTASSIUM SERPL-SCNC: 4.9 MEQ/L (ref 3.5–5.2)
PROT SERPL-MCNC: 7.1 G/DL (ref 6.4–8.3)
RBC # BLD AUTO: 4.45 MILL/MM3 (ref 4.7–6.1)
SODIUM SERPL-SCNC: 143 MEQ/L (ref 135–145)
T4 FREE SERPL-MCNC: 1 NG/DL (ref 0.92–1.68)
TRIGL SERPL-MCNC: 119 MG/DL (ref 0–199)
TSH SERPL DL<=0.05 MIU/L-ACNC: 1.73 UIU/ML (ref 0.27–4.2)
WBC # BLD AUTO: 10.1 THOU/MM3 (ref 4.8–10.8)

## (undated) DEVICE — DRAIN,WOUND,15FR,3/16,FULL-FLUTED: Brand: MEDLINE

## (undated) DEVICE — EVACUATOR SURG 100CC SIL BLB SUCT RESVR FOR CLS WND DRNGE

## (undated) DEVICE — INTEGUSEAL MICROBIAL SEALANT: Brand: AVANOS

## (undated) DEVICE — TIP COVER ACCESSORY

## (undated) DEVICE — TAPE,CLOTH/SILK,CURAD,3"X10YD,LF,40/CS: Brand: CURAD

## (undated) DEVICE — SYRINGE MED 10ML LUERLOCK TIP W/O SFTY DISP

## (undated) DEVICE — PACK PROC LAP II AURORA

## (undated) DEVICE — JELLY,LUBE,STERILE,FLIP TOP,TUBE,2-OZ: Brand: MEDLINE

## (undated) DEVICE — Device

## (undated) DEVICE — SOLUTION ANTIFOG VIS SYS CLEARIFY LAPSCP

## (undated) DEVICE — TISSUE RETRIEVAL SYSTEM: Brand: INZII RETRIEVAL SYSTEM

## (undated) DEVICE — SOLUTION IV 1000ML 0.9% SOD CHL PH 5 INJ USP VIAFLX PLAS

## (undated) DEVICE — TRI-LUMEN FILTERED TUBE SET WITH ACTIVATED CHARCOAL FILTER: Brand: AIRSEAL

## (undated) DEVICE — CATHETER,FOLEY,SILI-ELAST,LTX,18FR,10ML: Brand: MEDLINE

## (undated) DEVICE — AGENT HEMSTAT W2XL14IN OXIDIZED REGENERATED CELOS ABSRB FOR

## (undated) DEVICE — CONTAINER,SPECIMEN,PNEU TUBE,4OZ,OR STRL: Brand: MEDLINE

## (undated) DEVICE — TUBING, SUCTION, 1/4" X 20', STRAIGHT: Brand: MEDLINE INDUSTRIES, INC.

## (undated) DEVICE — AIRSEAL 12 MM ACCESS PORT AND PALM GRIP OBTURATOR WITH BLADELESS OPTICAL TIP, 120 MM LENGTH: Brand: AIRSEAL

## (undated) DEVICE — GAUZE,SPONGE,2"X2",8PLY,STERILE,LF,2'S: Brand: MEDLINE

## (undated) DEVICE — COVER EQUIP STEEP TREND EZ CLN UP WTRPRF DISP FOR STD SZ

## (undated) DEVICE — SUTURE MCRYL SZ 3-0 L27IN ABSRB UD L17MM RB-1 1/2 CIR Y215H

## (undated) DEVICE — Z DISCONTINUED BY MEDLINE USE 2711682 TRAY SKIN PREP DRY W/ PREM GLV

## (undated) DEVICE — DRESSING,GAUZE,XEROFORM,CURAD,5"X9",ST: Brand: CURAD

## (undated) DEVICE — TETRA-FLEX CF WOVEN LATEX FREE ELASTIC BANDAGE 6" X 5.5 YD: Brand: TETRA-FLEX™CF

## (undated) DEVICE — BANDAGE,GAUZE,4.5"X4.1YD,STERILE,LF: Brand: MEDLINE

## (undated) DEVICE — SUTURE MCRYL SZ 3-0 L27IN ABSRB VLT L17MM RB-1 1/2 CIR Y305H

## (undated) DEVICE — DRESSING TRNSPAR W2XL2.75IN FLM SHT SEMIPERMEABLE WIND

## (undated) DEVICE — DRAINBAG,ANTI-REFLUX TOWER,L/F,2000ML,LL: Brand: MEDLINE

## (undated) DEVICE — BASIC SINGLE BASIN BTC-LF: Brand: MEDLINE INDUSTRIES, INC.

## (undated) DEVICE — BANDAGE COMPR E SGL LAYERED CLSR BGE W/ CLP W4INXL15FT

## (undated) DEVICE — COLUMN DRAPE

## (undated) DEVICE — GOWN,SIRUS,NON REINFRCD,LARGE,SET IN SL: Brand: MEDLINE

## (undated) DEVICE — GAUZE,SPONGE,8"X4",12PLY,XRAY,STRL,LF: Brand: MEDLINE

## (undated) DEVICE — APPLICATOR ENDOSCP L34CM W/ S STL CANN PLAS OBT STYL FOR

## (undated) DEVICE — INTENDED FOR TISSUE SEPARATION, AND OTHER PROCEDURES THAT REQUIRE A SHARP SURGICAL BLADE TO PUNCTURE OR CUT.: Brand: BARD-PARKER ® CARBON RIB-BACK BLADES

## (undated) DEVICE — GOWN,SIRUS,NONRNF,SETINSLV,XL,20/CS: Brand: MEDLINE

## (undated) DEVICE — CANNULA SEAL

## (undated) DEVICE — SOLUTION IV 1000ML LAC RINGERS PH 6.5 INJ USP VIAFLX PLAS

## (undated) DEVICE — DRESSING TRNSPAR W5XL4.5IN FLM SHT SEMIPERMEABLE WIND

## (undated) DEVICE — BLADE CLIPPER GEN PURP NS

## (undated) DEVICE — PUMP SUC IRR TBNG L10FT W/ HNDPC ASSEMB STRYKEFLOW 2

## (undated) DEVICE — 35 ML SYRINGE LUER-LOCK TIP: Brand: MONOJECT

## (undated) DEVICE — SOLUTION IV IRRIG WATER 1000ML POUR BRL 2F7114

## (undated) DEVICE — 3M™ WARMING BLANKET, UPPER BODY, 10 PER CASE, 42268: Brand: BAIR HUGGER™

## (undated) DEVICE — ELECTRO LUBE IS A SINGLE PATIENT USE DEVICE THAT IS INTENDED TO BE USED ON ELECTROSURGICAL ELECTRODES TO REDUCE STICKING.: Brand: KEY SURGICAL ELECTRO LUBE

## (undated) DEVICE — SOLUTION SCRB 4OZ 4% CHG H2O AIDED FOR PREOPERATIVE SKIN

## (undated) DEVICE — LINER SUCT CANSTR 1500CC SEMI RIG W/ POR HYDROPHOBIC SHUT

## (undated) DEVICE — Z DUP USE 2641840 CLIP INT L POLYMER LOK LIG HEM O LOK

## (undated) DEVICE — HYPODERMIC SAFETY NEEDLE: Brand: MAGELLAN

## (undated) DEVICE — NEEDLE INSUF L120MM DIA2MM DISP FOR PNEUMOPERI ENDOPATH

## (undated) DEVICE — SPONGE LAP W18XL18IN WHT COT 4 PLY FLD STRUNG RADPQ DISP ST

## (undated) DEVICE — BLADELESS OBTURATOR: Brand: WECK VISTA

## (undated) DEVICE — GLOVE ORANGE PI 7   MSG9070

## (undated) DEVICE — CHLORAPREP 26ML ORANGE

## (undated) DEVICE — SYRINGE CATH TIP 50ML

## (undated) DEVICE — GLOVE ORANGE PI 7 1/2   MSG9075

## (undated) DEVICE — SPONGE DRN W4XL4IN RAYON/POLYESTER 6 PLY NONWOVEN PRECUT

## (undated) DEVICE — DRAPE,ROBOTICS,STERILE: Brand: MEDLINE

## (undated) DEVICE — SKIN AFFIX SURG ADHESIVE 72/CS 0.55ML: Brand: MEDLINE

## (undated) DEVICE — BLADE LARYNSCP SZ 3 ENH DIR INTUB GLIDESCOPE MCGRATH MAC

## (undated) DEVICE — ARM DRAPE

## (undated) DEVICE — CATHETER,FOLEY,SILI-ELAST,LTX,20FR,10ML: Brand: MEDLINE